# Patient Record
Sex: MALE | Race: WHITE | Employment: FULL TIME | URBAN - METROPOLITAN AREA
[De-identification: names, ages, dates, MRNs, and addresses within clinical notes are randomized per-mention and may not be internally consistent; named-entity substitution may affect disease eponyms.]

---

## 2021-08-05 ENCOUNTER — APPOINTMENT (OUTPATIENT)
Dept: GENERAL RADIOLOGY | Age: 56
DRG: 291 | End: 2021-08-05
Payer: COMMERCIAL

## 2021-08-05 ENCOUNTER — HOSPITAL ENCOUNTER (INPATIENT)
Age: 56
LOS: 2 days | Discharge: HOME | DRG: 291 | End: 2021-08-07
Attending: EMERGENCY MEDICINE | Admitting: HOSPITALIST
Payer: COMMERCIAL

## 2021-08-05 ENCOUNTER — APPOINTMENT (OUTPATIENT)
Dept: CT IMAGING | Age: 56
DRG: 291 | End: 2021-08-05
Payer: COMMERCIAL

## 2021-08-05 DIAGNOSIS — R03.0 ELEVATED BLOOD PRESSURE READING: ICD-10-CM

## 2021-08-05 DIAGNOSIS — I50.9 NEW ONSET OF CONGESTIVE HEART FAILURE (HCC): Primary | ICD-10-CM

## 2021-08-05 PROBLEM — D75.89 MACROCYTOSIS: Status: ACTIVE | Noted: 2021-08-05

## 2021-08-05 PROBLEM — Z78.9 DAILY CONSUMPTION OF ALCOHOL: Status: ACTIVE | Noted: 2021-08-05

## 2021-08-05 PROBLEM — Z72.0 TOBACCO ABUSE: Status: ACTIVE | Noted: 2021-08-05

## 2021-08-05 PROBLEM — R94.31 RIGHT AXIS DEVIATION: Status: ACTIVE | Noted: 2021-08-05

## 2021-08-05 PROBLEM — I10 UNCONTROLLED HYPERTENSION: Status: ACTIVE | Noted: 2021-08-05

## 2021-08-05 PROBLEM — R94.31 QT PROLONGATION: Status: ACTIVE | Noted: 2021-08-05

## 2021-08-05 LAB
A/G RATIO: 1.1 (ref 1.1–2.2)
ALBUMIN SERPL-MCNC: 3.7 G/DL (ref 3.4–5)
ALP BLD-CCNC: 110 U/L (ref 40–129)
ALT SERPL-CCNC: 19 U/L (ref 10–40)
AMPHETAMINE SCREEN, URINE: ABNORMAL
ANION GAP SERPL CALCULATED.3IONS-SCNC: 8 MMOL/L (ref 3–16)
APTT: 30.3 SEC (ref 26.2–38.6)
AST SERPL-CCNC: 22 U/L (ref 15–37)
BARBITURATE SCREEN URINE: ABNORMAL
BASOPHILS ABSOLUTE: 0 K/UL (ref 0–0.2)
BASOPHILS RELATIVE PERCENT: 0.7 %
BENZODIAZEPINE SCREEN, URINE: ABNORMAL
BILIRUB SERPL-MCNC: 0.7 MG/DL (ref 0–1)
BILIRUBIN URINE: NEGATIVE
BLOOD, URINE: NEGATIVE
BUN BLDV-MCNC: 14 MG/DL (ref 7–20)
CALCIUM SERPL-MCNC: 8.9 MG/DL (ref 8.3–10.6)
CANNABINOID SCREEN URINE: POSITIVE
CHLORIDE BLD-SCNC: 98 MMOL/L (ref 99–110)
CHOLESTEROL, TOTAL: 121 MG/DL (ref 0–199)
CLARITY: CLEAR
CO2: 31 MMOL/L (ref 21–32)
COCAINE METABOLITE SCREEN URINE: ABNORMAL
COLOR: NORMAL
CREAT SERPL-MCNC: 0.8 MG/DL (ref 0.9–1.3)
D DIMER: 592 NG/ML DDU (ref 0–229)
EKG ATRIAL RATE: 102 BPM
EKG DIAGNOSIS: NORMAL
EKG P AXIS: 87 DEGREES
EKG P-R INTERVAL: 142 MS
EKG Q-T INTERVAL: 376 MS
EKG QRS DURATION: 98 MS
EKG QTC CALCULATION (BAZETT): 490 MS
EKG R AXIS: 187 DEGREES
EKG T AXIS: 43 DEGREES
EKG VENTRICULAR RATE: 102 BPM
EOSINOPHILS ABSOLUTE: 0.2 K/UL (ref 0–0.6)
EOSINOPHILS RELATIVE PERCENT: 2.5 %
ETHANOL: NORMAL MG/DL (ref 0–0.08)
GFR AFRICAN AMERICAN: >60
GFR NON-AFRICAN AMERICAN: >60
GLOBULIN: 3.3 G/DL
GLUCOSE BLD-MCNC: 108 MG/DL (ref 70–99)
GLUCOSE BLD-MCNC: 164 MG/DL (ref 70–99)
GLUCOSE URINE: NEGATIVE MG/DL
HCT VFR BLD CALC: 49 % (ref 40.5–52.5)
HDLC SERPL-MCNC: 39 MG/DL (ref 40–60)
HEMOGLOBIN: 16.5 G/DL (ref 13.5–17.5)
INR BLD: 1.24 (ref 0.88–1.12)
KETONES, URINE: NEGATIVE MG/DL
LDL CHOLESTEROL CALCULATED: 65 MG/DL
LEUKOCYTE ESTERASE, URINE: NEGATIVE
LYMPHOCYTES ABSOLUTE: 1.3 K/UL (ref 1–5.1)
LYMPHOCYTES RELATIVE PERCENT: 19.2 %
Lab: ABNORMAL
MAGNESIUM: 1.8 MG/DL (ref 1.8–2.4)
MCH RBC QN AUTO: 33.9 PG (ref 26–34)
MCHC RBC AUTO-ENTMCNC: 33.6 G/DL (ref 31–36)
MCV RBC AUTO: 100.9 FL (ref 80–100)
METHADONE SCREEN, URINE: ABNORMAL
MICROSCOPIC EXAMINATION: NORMAL
MONOCYTES ABSOLUTE: 0.6 K/UL (ref 0–1.3)
MONOCYTES RELATIVE PERCENT: 8.7 %
NEUTROPHILS ABSOLUTE: 4.6 K/UL (ref 1.7–7.7)
NEUTROPHILS RELATIVE PERCENT: 68.9 %
NITRITE, URINE: NEGATIVE
OPIATE SCREEN URINE: ABNORMAL
OXYCODONE URINE: ABNORMAL
PDW BLD-RTO: 13.9 % (ref 12.4–15.4)
PERFORMED ON: ABNORMAL
PH UA: 6
PH UA: 6 (ref 5–8)
PHENCYCLIDINE SCREEN URINE: ABNORMAL
PLATELET # BLD: 144 K/UL (ref 135–450)
PMV BLD AUTO: 8.1 FL (ref 5–10.5)
POTASSIUM SERPL-SCNC: 4.6 MMOL/L (ref 3.5–5.1)
PRO-BNP: 1401 PG/ML (ref 0–124)
PROPOXYPHENE SCREEN: ABNORMAL
PROTEIN UA: NEGATIVE MG/DL
PROTHROMBIN TIME: 14.1 SEC (ref 9.9–12.7)
RBC # BLD: 4.86 M/UL (ref 4.2–5.9)
SODIUM BLD-SCNC: 137 MMOL/L (ref 136–145)
SPECIFIC GRAVITY UA: 1.01 (ref 1–1.03)
T4 FREE: 1.2 NG/DL (ref 0.9–1.8)
TOTAL PROTEIN: 7 G/DL (ref 6.4–8.2)
TRIGL SERPL-MCNC: 83 MG/DL (ref 0–150)
TROPONIN: <0.01 NG/ML
TROPONIN: <0.01 NG/ML
TSH SERPL DL<=0.05 MIU/L-ACNC: 0.97 UIU/ML (ref 0.27–4.2)
URINE REFLEX TO CULTURE: NORMAL
URINE TYPE: NORMAL
UROBILINOGEN, URINE: 0.2 E.U./DL
VLDLC SERPL CALC-MCNC: 17 MG/DL
WBC # BLD: 6.7 K/UL (ref 4–11)

## 2021-08-05 PROCEDURE — 36415 COLL VENOUS BLD VENIPUNCTURE: CPT

## 2021-08-05 PROCEDURE — 85610 PROTHROMBIN TIME: CPT

## 2021-08-05 PROCEDURE — 82077 ASSAY SPEC XCP UR&BREATH IA: CPT

## 2021-08-05 PROCEDURE — 2580000003 HC RX 258: Performed by: HOSPITALIST

## 2021-08-05 PROCEDURE — 81003 URINALYSIS AUTO W/O SCOPE: CPT

## 2021-08-05 PROCEDURE — 6360000004 HC RX CONTRAST MEDICATION: Performed by: EMERGENCY MEDICINE

## 2021-08-05 PROCEDURE — 1200000000 HC SEMI PRIVATE

## 2021-08-05 PROCEDURE — 93005 ELECTROCARDIOGRAM TRACING: CPT | Performed by: PHYSICIAN ASSISTANT

## 2021-08-05 PROCEDURE — 85379 FIBRIN DEGRADATION QUANT: CPT

## 2021-08-05 PROCEDURE — 93010 ELECTROCARDIOGRAM REPORT: CPT | Performed by: INTERNAL MEDICINE

## 2021-08-05 PROCEDURE — 84484 ASSAY OF TROPONIN QUANT: CPT

## 2021-08-05 PROCEDURE — 82607 VITAMIN B-12: CPT

## 2021-08-05 PROCEDURE — 6370000000 HC RX 637 (ALT 250 FOR IP): Performed by: HOSPITALIST

## 2021-08-05 PROCEDURE — 6360000002 HC RX W HCPCS: Performed by: HOSPITALIST

## 2021-08-05 PROCEDURE — 80307 DRUG TEST PRSMV CHEM ANLYZR: CPT

## 2021-08-05 PROCEDURE — 96374 THER/PROPH/DIAG INJ IV PUSH: CPT

## 2021-08-05 PROCEDURE — 83735 ASSAY OF MAGNESIUM: CPT

## 2021-08-05 PROCEDURE — 80061 LIPID PANEL: CPT

## 2021-08-05 PROCEDURE — 80053 COMPREHEN METABOLIC PANEL: CPT

## 2021-08-05 PROCEDURE — 71260 CT THORAX DX C+: CPT

## 2021-08-05 PROCEDURE — 6360000002 HC RX W HCPCS: Performed by: PHYSICIAN ASSISTANT

## 2021-08-05 PROCEDURE — 84439 ASSAY OF FREE THYROXINE: CPT

## 2021-08-05 PROCEDURE — 84443 ASSAY THYROID STIM HORMONE: CPT

## 2021-08-05 PROCEDURE — 94640 AIRWAY INHALATION TREATMENT: CPT

## 2021-08-05 PROCEDURE — 99284 EMERGENCY DEPT VISIT MOD MDM: CPT

## 2021-08-05 PROCEDURE — 85730 THROMBOPLASTIN TIME PARTIAL: CPT

## 2021-08-05 PROCEDURE — 83036 HEMOGLOBIN GLYCOSYLATED A1C: CPT

## 2021-08-05 PROCEDURE — 83880 ASSAY OF NATRIURETIC PEPTIDE: CPT

## 2021-08-05 PROCEDURE — 71045 X-RAY EXAM CHEST 1 VIEW: CPT

## 2021-08-05 PROCEDURE — 85025 COMPLETE CBC W/AUTO DIFF WBC: CPT

## 2021-08-05 PROCEDURE — 82746 ASSAY OF FOLIC ACID SERUM: CPT

## 2021-08-05 RX ORDER — METHYLPREDNISOLONE SODIUM SUCCINATE 125 MG/2ML
60 INJECTION, POWDER, LYOPHILIZED, FOR SOLUTION INTRAMUSCULAR; INTRAVENOUS EVERY 12 HOURS
Status: DISCONTINUED | OUTPATIENT
Start: 2021-08-05 | End: 2021-08-06

## 2021-08-05 RX ORDER — ASPIRIN 81 MG/1
81 TABLET ORAL DAILY
Status: DISCONTINUED | OUTPATIENT
Start: 2021-08-05 | End: 2021-08-07 | Stop reason: HOSPADM

## 2021-08-05 RX ORDER — ACETAMINOPHEN 325 MG/1
650 TABLET ORAL EVERY 6 HOURS PRN
Status: DISCONTINUED | OUTPATIENT
Start: 2021-08-05 | End: 2021-08-07 | Stop reason: HOSPADM

## 2021-08-05 RX ORDER — LORAZEPAM 2 MG/ML
3 INJECTION INTRAMUSCULAR
Status: DISCONTINUED | OUTPATIENT
Start: 2021-08-05 | End: 2021-08-07 | Stop reason: HOSPADM

## 2021-08-05 RX ORDER — LORAZEPAM 2 MG/ML
1 INJECTION INTRAMUSCULAR
Status: DISCONTINUED | OUTPATIENT
Start: 2021-08-05 | End: 2021-08-07 | Stop reason: HOSPADM

## 2021-08-05 RX ORDER — SODIUM CHLORIDE 0.9 % (FLUSH) 0.9 %
10 SYRINGE (ML) INJECTION PRN
Status: DISCONTINUED | OUTPATIENT
Start: 2021-08-05 | End: 2021-08-07 | Stop reason: HOSPADM

## 2021-08-05 RX ORDER — SODIUM CHLORIDE 0.9 % (FLUSH) 0.9 %
10 SYRINGE (ML) INJECTION EVERY 12 HOURS SCHEDULED
Status: DISCONTINUED | OUTPATIENT
Start: 2021-08-05 | End: 2021-08-07 | Stop reason: HOSPADM

## 2021-08-05 RX ORDER — MAGNESIUM SULFATE IN WATER 40 MG/ML
2000 INJECTION, SOLUTION INTRAVENOUS PRN
Status: DISCONTINUED | OUTPATIENT
Start: 2021-08-05 | End: 2021-08-07 | Stop reason: HOSPADM

## 2021-08-05 RX ORDER — THIAMINE HYDROCHLORIDE 100 MG/ML
100 INJECTION, SOLUTION INTRAMUSCULAR; INTRAVENOUS DAILY
Status: DISCONTINUED | OUTPATIENT
Start: 2021-08-06 | End: 2021-08-05 | Stop reason: SDUPTHER

## 2021-08-05 RX ORDER — ACETAMINOPHEN 650 MG/1
650 SUPPOSITORY RECTAL EVERY 6 HOURS PRN
Status: DISCONTINUED | OUTPATIENT
Start: 2021-08-05 | End: 2021-08-07 | Stop reason: HOSPADM

## 2021-08-05 RX ORDER — FUROSEMIDE 10 MG/ML
20 INJECTION INTRAMUSCULAR; INTRAVENOUS ONCE
Status: COMPLETED | OUTPATIENT
Start: 2021-08-05 | End: 2021-08-05

## 2021-08-05 RX ORDER — SODIUM CHLORIDE 9 MG/ML
25 INJECTION, SOLUTION INTRAVENOUS PRN
Status: DISCONTINUED | OUTPATIENT
Start: 2021-08-05 | End: 2021-08-07 | Stop reason: HOSPADM

## 2021-08-05 RX ORDER — LOSARTAN POTASSIUM 25 MG/1
25 TABLET ORAL DAILY
Status: DISCONTINUED | OUTPATIENT
Start: 2021-08-06 | End: 2021-08-07

## 2021-08-05 RX ORDER — M-VIT,TX,IRON,MINS/CALC/FOLIC 27MG-0.4MG
1 TABLET ORAL DAILY
Status: DISCONTINUED | OUTPATIENT
Start: 2021-08-06 | End: 2021-08-07 | Stop reason: HOSPADM

## 2021-08-05 RX ORDER — NICOTINE 21 MG/24HR
1 PATCH, TRANSDERMAL 24 HOURS TRANSDERMAL DAILY
Status: DISCONTINUED | OUTPATIENT
Start: 2021-08-06 | End: 2021-08-07 | Stop reason: HOSPADM

## 2021-08-05 RX ORDER — LORAZEPAM 1 MG/1
3 TABLET ORAL
Status: DISCONTINUED | OUTPATIENT
Start: 2021-08-05 | End: 2021-08-07 | Stop reason: HOSPADM

## 2021-08-05 RX ORDER — SENNA PLUS 8.6 MG/1
1 TABLET ORAL DAILY PRN
Status: DISCONTINUED | OUTPATIENT
Start: 2021-08-05 | End: 2021-08-07 | Stop reason: HOSPADM

## 2021-08-05 RX ORDER — ONDANSETRON 2 MG/ML
4 INJECTION INTRAMUSCULAR; INTRAVENOUS EVERY 6 HOURS PRN
Status: DISCONTINUED | OUTPATIENT
Start: 2021-08-05 | End: 2021-08-07 | Stop reason: HOSPADM

## 2021-08-05 RX ORDER — LORAZEPAM 1 MG/1
1 TABLET ORAL
Status: DISCONTINUED | OUTPATIENT
Start: 2021-08-05 | End: 2021-08-07 | Stop reason: HOSPADM

## 2021-08-05 RX ORDER — PROMETHAZINE HYDROCHLORIDE 25 MG/1
12.5 TABLET ORAL EVERY 6 HOURS PRN
Status: DISCONTINUED | OUTPATIENT
Start: 2021-08-05 | End: 2021-08-07 | Stop reason: HOSPADM

## 2021-08-05 RX ORDER — LORAZEPAM 1 MG/1
2 TABLET ORAL
Status: DISCONTINUED | OUTPATIENT
Start: 2021-08-05 | End: 2021-08-07 | Stop reason: HOSPADM

## 2021-08-05 RX ORDER — LORAZEPAM 2 MG/ML
4 INJECTION INTRAMUSCULAR
Status: DISCONTINUED | OUTPATIENT
Start: 2021-08-05 | End: 2021-08-07 | Stop reason: HOSPADM

## 2021-08-05 RX ORDER — POTASSIUM CHLORIDE 20 MEQ/1
40 TABLET, EXTENDED RELEASE ORAL PRN
Status: DISCONTINUED | OUTPATIENT
Start: 2021-08-05 | End: 2021-08-07 | Stop reason: HOSPADM

## 2021-08-05 RX ORDER — LORAZEPAM 2 MG/ML
2 INJECTION INTRAMUSCULAR
Status: DISCONTINUED | OUTPATIENT
Start: 2021-08-05 | End: 2021-08-07 | Stop reason: HOSPADM

## 2021-08-05 RX ORDER — LORAZEPAM 1 MG/1
4 TABLET ORAL
Status: DISCONTINUED | OUTPATIENT
Start: 2021-08-05 | End: 2021-08-07 | Stop reason: HOSPADM

## 2021-08-05 RX ORDER — POTASSIUM CHLORIDE 7.45 MG/ML
10 INJECTION INTRAVENOUS PRN
Status: DISCONTINUED | OUTPATIENT
Start: 2021-08-05 | End: 2021-08-07 | Stop reason: HOSPADM

## 2021-08-05 RX ORDER — IPRATROPIUM BROMIDE AND ALBUTEROL SULFATE 2.5; .5 MG/3ML; MG/3ML
1 SOLUTION RESPIRATORY (INHALATION)
Status: DISCONTINUED | OUTPATIENT
Start: 2021-08-05 | End: 2021-08-07 | Stop reason: HOSPADM

## 2021-08-05 RX ORDER — PANTOPRAZOLE SODIUM 40 MG/1
40 TABLET, DELAYED RELEASE ORAL
Status: DISCONTINUED | OUTPATIENT
Start: 2021-08-06 | End: 2021-08-07 | Stop reason: HOSPADM

## 2021-08-05 RX ADMIN — FUROSEMIDE 20 MG: 10 INJECTION, SOLUTION INTRAMUSCULAR; INTRAVENOUS at 16:05

## 2021-08-05 RX ADMIN — NITROGLYCERIN 0.5 INCH: 20 OINTMENT TOPICAL at 21:44

## 2021-08-05 RX ADMIN — IPRATROPIUM BROMIDE AND ALBUTEROL SULFATE 1 AMPULE: .5; 2.5 SOLUTION RESPIRATORY (INHALATION) at 21:37

## 2021-08-05 RX ADMIN — Medication 10 ML: at 23:42

## 2021-08-05 RX ADMIN — FUROSEMIDE 5 MG/HR: 10 INJECTION, SOLUTION INTRAMUSCULAR; INTRAVENOUS at 23:43

## 2021-08-05 RX ADMIN — IOPAMIDOL 75 ML: 755 INJECTION, SOLUTION INTRAVENOUS at 17:01

## 2021-08-05 RX ADMIN — ASPIRIN 81 MG: 81 TABLET, COATED ORAL at 21:43

## 2021-08-05 RX ADMIN — METHYLPREDNISOLONE SODIUM SUCCINATE 60 MG: 125 INJECTION, POWDER, FOR SOLUTION INTRAMUSCULAR; INTRAVENOUS at 21:44

## 2021-08-05 ASSESSMENT — PAIN DESCRIPTION - ORIENTATION: ORIENTATION: RIGHT;LEFT

## 2021-08-05 ASSESSMENT — ENCOUNTER SYMPTOMS
VOMITING: 0
COUGH: 1
ABDOMINAL PAIN: 0
CHEST TIGHTNESS: 0
SHORTNESS OF BREATH: 1
BACK PAIN: 0
NAUSEA: 0

## 2021-08-05 ASSESSMENT — PAIN DESCRIPTION - LOCATION: LOCATION: LEG

## 2021-08-05 ASSESSMENT — PAIN DESCRIPTION - FREQUENCY: FREQUENCY: CONTINUOUS

## 2021-08-05 ASSESSMENT — PAIN DESCRIPTION - PAIN TYPE: TYPE: ACUTE PAIN

## 2021-08-05 ASSESSMENT — PAIN DESCRIPTION - ONSET: ONSET: ON-GOING

## 2021-08-05 ASSESSMENT — PAIN SCALES - GENERAL
PAINLEVEL_OUTOF10: 1
PAINLEVEL_OUTOF10: 0

## 2021-08-05 ASSESSMENT — PAIN DESCRIPTION - PROGRESSION: CLINICAL_PROGRESSION: NOT CHANGED

## 2021-08-05 NOTE — ED NOTES
Writer updated patient's medication list provided by patient report. Patient does not take any medications on a daily basis and reports \"I have not been to a doctor since I was a kid. \"       True Sharma RN  08/05/21 7570

## 2021-08-05 NOTE — ED PROVIDER NOTES
**ADVANCED PRACTICE PROVIDER, I HAVE EVALUATED THIS [facility]  ED  EMERGENCY DEPARTMENT ENCOUNTER      Pt Name: Jorje Plasencia  PGL:[id number]  Birthdate 1965  Date of evaluation: 8/5/2021  Provider: MIO Godfrey      Chief Complaint:    Chief Complaint   Patient presents with    Leg Pain     pt reports having leg \"tightness\" and tesicle bilateral swelling and penis swelling starting 3 days ago. Has not been to PCP since \"I was a kid\"    Groin Swelling         Nursing Notes, Past Medical Hx, Past Surgical Hx, Social Hx, Allergies, and Family Hx were all reviewed and agreed with or any disagreements were addressed in the HPI.    HPI: (Location, Duration, Timing, Severity, Quality, Assoc Sx, Context, Modifying factors)    Chief Complaint of bilateral leg swelling    This is a  54 y.o. male who presents to the emergency department with no known past medical history complaining of bilateral leg swelling over the last 3 weeks. He states the swelling has increased from his feet and ankles into his calves and thighs. Over the last 3 days the swelling has progressed into his groin and abdomen. He states \"I thought it was just getting Fat and had to get new pants\". He is a smoker and smokes about 1 pack/day. He has not seen a doctor since he was a child. He denies any pain. He states he has had shortness of breath with exertion, but for him this has been chronic over the last several months to year. He does admit to cough with mucus production. He has not tried anything for his symptoms. PastMedical/Surgical History:  History reviewed. No pertinent past medical history. History reviewed. No pertinent surgical history. Medications:  Previous Medications    No medications on file         Review of Systems:  (2-9 systems needed)  Review of Systems   Constitutional: Negative for chills and fever. HENT: Negative for congestion. Eyes: Negative for visual disturbance. Resp: 20   Temp: 98.4 °F (36.9 °C)   TempSrc: Oral   SpO2: 94%   Weight: 170 lb (77.1 kg)   Height: 6' (1.829 m)       LABS:  Labs Reviewed   CBC WITH AUTO DIFFERENTIAL - Abnormal; Notable for the following components:       Result Value    .9 (*)     All other components within normal limits    Narrative:     Performed at:  [address], [facility]   Phone [phone]   COMPREHENSIVE METABOLIC PANEL - Abnormal; Notable for the following components:    Chloride 98 (*)     Glucose 108 (*)     CREATININE 0.8 (*)     All other components within normal limits    Narrative:     Performed at:  [address] [place] [address], [facility]   Phone [phone]   BRAIN NATRIURETIC PEPTIDE - Abnormal; Notable for the following components:    Pro-BNP 1,401 (*)     All other components within normal limits    Narrative:     Performed at:  [address] [place] [address], [facility]   Phone [phone]   TROPONIN    Narrative:     Performed at:  [address] [place] [address], [facility]   Phone  of labs reviewed and were negative at this time or not returned at the time of this note. RADIOLOGY:   Non-plain film images such as CT, Ultrasound and MRI are read by the radiologist. MIO Gomes have directly visualized the radiologic plain film image(s) with the below findings:      Interpretation per the Radiologist below, if available at the time of this note:    XR CHEST PORTABLE   Preliminary Result   Cardiomegaly without acute cardiopulmonary process. No results found. MEDICAL DECISION MAKING / ED COURSE:      Patient was seen and evaluated by myself and attending physician.   All diagnostic, treatment, and disposition decisions were made in conjunction with attending physician. Patient was given:  Medications   furosemide (LASIX) injection 20 mg (has no administration in time range)       Patient presents to the emergency department for bilateral lower extremity edema progressing through his thighs, groin, and down his abdomen. Triage vitals show uncontrolled blood pressure of 162/109. He is slightly tachycardic at 95. His BNP is elevated that 1401. Troponin negative. CBC without evidence of leukocytosis or acute anemia. CMP with maintained kidney function and no acute electrolyte imbalance. Chest x-ray shows cardiomegaly without acute cardiopulmonary process. EKG is interpreted by attending physician and found to have sinus tachycardia, he does not have an old EKG to compare. At this time the patient is suspected new onset CHF and we will plan for admission to the hospitalist with plans for an echo. He is started on 20mg IV lasix. The patient tolerated their visit well. I evaluated the patient. The physician was available for consultation as needed. The patient and / or the family were informed of the results of any tests, a time was given to answer questions, a plan was proposed and they agreed with plan. CLINICAL IMPRESSION:  1. New onset of congestive heart failure (HCC)    2. Elevated blood pressure reading        DISPOSITION Decision To Admit 08/05/2021 03:06:34 PM      PATIENT REFERRED TO:  No follow-up provider specified.     DISCHARGE MEDICATIONS:  New Prescriptions    No medications on file       DISCONTINUED MEDICATIONS:  Discontinued Medications    No medications on file              (Please note the MDM and HPI sections of this note were completed with a voice recognition program.  Efforts were made to edit the dictations but occasionally words are mis-transcribed.)    Electronically signed, Chick Rain, [employer],          Chick Rain, [employer]  08/05/21 2031

## 2021-08-05 NOTE — H&P
HOSPITALISTS HISTORY AND PHYSICAL    8/5/2021 9:26 PM    Patient Information:  Luz Maria Guadarrama is a 54 y.o. male [id number]  PCP:  No primary care provider on file. (Tel: None )    Chief complaint:    Chief Complaint   Patient presents with    Leg Pain     pt reports having leg \"tightness\" and tesicle bilateral swelling and penis swelling starting 3 days ago. Has not been to PCP since \"I was a kid\"    Groin Swelling        History of Present Illness:  Kay Del Angel is a 54 y.o. male who presented to the ED to be evaluated for gradually increasing leg edema ongoing for several months PTA. Patient states that he has not been evaluated by a doctor for greater then 40 years, but became concerned when his penis and testicles began to swell 3 days PTA. The patient takes no medications and endorses 1 PPD tobacco abuse, and routine alcohol consumption. EKG was obtained upon arrival and notable for sinus tachycardia with right axis deviation/pulmonary disease pattern, and nonspecific ST and T wave changes. CXR revealed cardiomegaly without acute cardiopulmonary findings. Labs were notable for BNP elevation 1401 and macrocytosis. Chest CT with PE protocol was ordered by admitting physician due to concerns for acute cor pulmonale. Patient received 1 dosage of 20 mg IV Lasix prior to request for admission. History obtained from patient and review of Epic chart    REVIEW OF SYSTEMS:   Constitutional: Positive generalized weakness and daytime fatigue; negative for fever,chills,weight loss  ENT: Negative for rhinorrhea, epistaxis, hoarseness, and sore throat.   Respiratory: Positive for exertional shortness of breath, wheezing, and cough  Cardiovascular: Negative for chest pain, palpitations; positive edema extending from feet into urogenital region Gastrointestinal: Negative for N/V/D; no hematemesis, hematochezia, or melena; positive anorexia  Genitourinary: Negative for dysuria, frequency, hesitancy, and urgency; no incontinence  Hematologic/Lymphatic: Negative for bleeding tendency, excessive bruising, and enlarged LN  Musculoskeletal: Negative for myalgias and arthalgias; able to ambulate without difficulty  Neurologic: Negative for LOC, seizure activity, paresthesias, dysarthria, vertigo, and gait disturbance  Skin: Negative for itching,rash  Psychiatric: Negative for depression,anxiety, and agitation  Endocrine: Negative for polyuria/polydipsia/polyphagia; no heat/cold intolerance    Past Medical History:   has no past medical history on file. Past Surgical History:   has no past surgical history on file. Medications:  No current facility-administered medications on file prior to encounter. No current outpatient medications on file prior to encounter. Allergies:  No Known Allergies     Social History:   reports that he has been smoking cigarettes. He has a 15.00 pack-year smoking history. He has never used smokeless tobacco. He reports current alcohol use of about 7.0 standard drinks of alcohol per week. He reports previous drug use. Upon further questioning patient states that he drinks 3-4 beers every day after work; past history of seizures when abruptly stopping    Family History:  family history is not on file.      Physical Exam:  BP (!) 158/100   Pulse 94   Temp 98.4 °F (36.9 °C) (Oral)   Resp 14   Ht 6' (1.829 m)   Wt 170 lb (77.1 kg)   SpO2 94%   BMI 23.06 kg/m²     General appearance: Diaphoretic middle-age male laying in bed with mild tachypnea at rest  Eyes: Sclera clear without conjunctival injection; PERRLA; EOMI  ENT: Mucous membranes moist without thrush; poor dentition  Neck: Supple without meningismus; no goiter; no carotid bruit bilaterally  Cardiovascular: Regular rhythm without ectopy; normal S1-S2 with no murmurs; 2+ pitting peripheral edema extending into scrotum/ penis  Respiratory: Positive tachypnea; markedly diminished air exchange with extensive wheeze and rhonchi cleared with cough; no rales  Gastrointestinal: Abdomen soft, non-tender, not distended; bowel sounds normal; no masses/organomegaly appreciated  Musculoskeletal: FROM spine and extremities x4; no gross deformity  Neurology: A&O x3; cranial nerves 2-12 grossly intact; motor 5/5; no seizure activity  Psychiatry: Well-groomed with good eye contact; appropriate affect  Skin: Warm, diaphoretic; venous stasis changes BLE   PV: 2/4 radial and dorsalis pedis bilaterally; brisk capillary refill    Labs:  CBC:   Lab Results   Component Value Date    WBC 6.7 08/05/2021    RBC 4.86 08/05/2021    HGB 16.5 08/05/2021    HCT 49.0 08/05/2021    .9 08/05/2021    MCH 33.9 08/05/2021    MCHC 33.6 08/05/2021    RDW 13.9 08/05/2021     08/05/2021    MPV 8.1 08/05/2021     BMP:    Lab Results   Component Value Date     08/05/2021    K 4.6 08/05/2021    CL 98 08/05/2021    CO2 31 08/05/2021    BUN 14 08/05/2021    CREATININE 0.8 08/05/2021    CALCIUM 8.9 08/05/2021    GFRAA >60 08/05/2021    LABGLOM >60 08/05/2021    GLUCOSE 108 08/05/2021     CT CHEST PULMONARY EMBOLISM W CONTRAST   Final Result   1. No evidence of pulmonary embolism   2. Cardiomegaly with trace pleural effusions         XR CHEST PORTABLE   Final Result   Cardiomegaly without acute cardiopulmonary process. EKG: Ventricular Rate 102 BPM QTc Calculation (Bazett) 490 ms   Atrial Rate 102 BPM P Axis 87 degrees   P-R Interval 142 ms R Axis 187 degrees   QRS Duration 98 ms T Axis 43 degrees   Q-T Interval 376 ms Diagnosis Sinus tachycardiaRight superior axis deviationPulmonary disease patternNonspecific ST & T wave changes; possible RVH.        I visualized CXR images and EKG strips personally and agree with documented interpretation    Discussed case  with ED provider    Problem List  Principal Problem:    Acute decompensated heart failure (HCC)  Active Problems:    Tobacco abuse    Daily consumption of alcohol    QT prolongation    Right axis deviation    Macrocytosis    Uncontrolled hypertension  Resolved Problems:    * No resolved hospital problems. *        Assessment/Plan:     Acute decompensated CHF with untreated HTN  -Admit to floor for continuous telemetry monitoring and strict I's & O's  -IV Lasix 20 megs x1 followed by continuous infusion of 5 mg/H overnight  -Nitropaste applied to chest wall; begin Cozaar 25 mg daily  -ECHO scheduled to further assess cardiac structure and function  -EC ASA 81 mg daily  -2G Na and fluid restriction dietary modifications in place  -Consult placed to Cardiology for further recommendations; may ultimately require right and left heart cath    RAD/RVH with concerns for COPD/ PAH  -Continuous pulse oximetry monitoring initiated with PRN supplemental O2   -Encourage aggressive pulmonary toilet including incentive spirometry every 4H while awake  -DuoNebs initiated 4 times daily  -IV Solu-Medrol 60 mg twice daily   -Patient counseled on necessity of smoking cessation; nicotine replacement patch provided  -Consultation placed to [facility] for further recommendations    Macrocytosis with regular EtOH consumption  -Admit to telemetry floor with CIWA Protocol order set initiated  -Seizure and fall risk precautions in place  -Thiamine, folate, and MVI oral supplementation initiated daily  -Ativan scheduled PRN based on CIWA score  -PPI Protonix ordered daily for GI prophylaxis    DVT prophylaxis-Lovenox 40 mg subcu daily  Code status-full code  Diet-cardiac 2 g sodium with fluid restriction  IV access-PIV established in ED      Admit as inpatient. I anticipate hospitalization spanning more than two midnights for investigation and treatment of the above medically necessary diagnoses. Please note that some part of this chart was generated using Dragon dictation software.  Although every effort was made to ensure the accuracy of this automated transcription, some errors in transcription may have occurred inadvertently. If you may need any clarification, please do not hesitate to contact me through [facility].        Trey Peralta MD    8/5/2021 9:26 PM

## 2021-08-06 LAB
ANION GAP SERPL CALCULATED.3IONS-SCNC: 11 MMOL/L (ref 3–16)
ANION GAP SERPL CALCULATED.3IONS-SCNC: 9 MMOL/L (ref 3–16)
BASOPHILS ABSOLUTE: 0 K/UL (ref 0–0.2)
BASOPHILS RELATIVE PERCENT: 0.9 %
BUN BLDV-MCNC: 14 MG/DL (ref 7–20)
BUN BLDV-MCNC: 15 MG/DL (ref 7–20)
CALCIUM IONIZED: 1.07 MMOL/L (ref 1.12–1.32)
CALCIUM SERPL-MCNC: 8.8 MG/DL (ref 8.3–10.6)
CALCIUM SERPL-MCNC: 8.9 MG/DL (ref 8.3–10.6)
CHLORIDE BLD-SCNC: 97 MMOL/L (ref 99–110)
CHLORIDE BLD-SCNC: 97 MMOL/L (ref 99–110)
CHOLESTEROL, TOTAL: 121 MG/DL (ref 0–199)
CO2: 29 MMOL/L (ref 21–32)
CO2: 34 MMOL/L (ref 21–32)
CREAT SERPL-MCNC: 0.6 MG/DL (ref 0.9–1.3)
CREAT SERPL-MCNC: 0.9 MG/DL (ref 0.9–1.3)
EKG ATRIAL RATE: 85 BPM
EKG DIAGNOSIS: NORMAL
EKG P AXIS: 85 DEGREES
EKG P-R INTERVAL: 148 MS
EKG Q-T INTERVAL: 392 MS
EKG QRS DURATION: 108 MS
EKG QTC CALCULATION (BAZETT): 466 MS
EKG R AXIS: 178 DEGREES
EKG T AXIS: -5 DEGREES
EKG VENTRICULAR RATE: 85 BPM
EOSINOPHILS ABSOLUTE: 0 K/UL (ref 0–0.6)
EOSINOPHILS RELATIVE PERCENT: 0.2 %
ESTIMATED AVERAGE GLUCOSE: 131.2 MG/DL
ESTIMATED AVERAGE GLUCOSE: 131.2 MG/DL
ESTIMATED AVERAGE GLUCOSE: 134.1 MG/DL
FOLATE: 6.22 NG/ML (ref 4.78–24.2)
GFR AFRICAN AMERICAN: >60
GFR AFRICAN AMERICAN: >60
GFR NON-AFRICAN AMERICAN: >60
GFR NON-AFRICAN AMERICAN: >60
GLUCOSE BLD-MCNC: 138 MG/DL (ref 70–99)
GLUCOSE BLD-MCNC: 150 MG/DL (ref 70–99)
GLUCOSE BLD-MCNC: 171 MG/DL (ref 70–99)
GLUCOSE BLD-MCNC: 179 MG/DL (ref 70–99)
GLUCOSE BLD-MCNC: 194 MG/DL (ref 70–99)
GLUCOSE BLD-MCNC: 197 MG/DL (ref 70–99)
HBA1C MFR BLD: 6.2 %
HBA1C MFR BLD: 6.2 %
HBA1C MFR BLD: 6.3 %
HCT VFR BLD CALC: 48.5 % (ref 40.5–52.5)
HDLC SERPL-MCNC: 41 MG/DL (ref 40–60)
HEMOGLOBIN: 16 G/DL (ref 13.5–17.5)
LDL CHOLESTEROL CALCULATED: 70 MG/DL
LV EF: 53 %
LVEF MODALITY: NORMAL
LYMPHOCYTES ABSOLUTE: 0.5 K/UL (ref 1–5.1)
LYMPHOCYTES RELATIVE PERCENT: 11.9 %
MAGNESIUM: 1.6 MG/DL (ref 1.8–2.4)
MCH RBC QN AUTO: 33.4 PG (ref 26–34)
MCHC RBC AUTO-ENTMCNC: 33 G/DL (ref 31–36)
MCV RBC AUTO: 101.4 FL (ref 80–100)
MONOCYTES ABSOLUTE: 0.1 K/UL (ref 0–1.3)
MONOCYTES RELATIVE PERCENT: 1.7 %
NEUTROPHILS ABSOLUTE: 3.7 K/UL (ref 1.7–7.7)
NEUTROPHILS RELATIVE PERCENT: 85.3 %
PDW BLD-RTO: 13.5 % (ref 12.4–15.4)
PERFORMED ON: ABNORMAL
PH VENOUS: 7.43 (ref 7.35–7.45)
PLATELET # BLD: 134 K/UL (ref 135–450)
PMV BLD AUTO: 8.2 FL (ref 5–10.5)
POTASSIUM SERPL-SCNC: 3.9 MMOL/L (ref 3.5–5.1)
POTASSIUM SERPL-SCNC: 4.1 MMOL/L (ref 3.5–5.1)
RBC # BLD: 4.78 M/UL (ref 4.2–5.9)
SARS-COV-2, NAAT: NOT DETECTED
SODIUM BLD-SCNC: 137 MMOL/L (ref 136–145)
SODIUM BLD-SCNC: 140 MMOL/L (ref 136–145)
TRIGL SERPL-MCNC: 49 MG/DL (ref 0–150)
TROPONIN: <0.01 NG/ML
VITAMIN B-12: 594 PG/ML (ref 211–911)
VLDLC SERPL CALC-MCNC: 10 MG/DL
WBC # BLD: 4.3 K/UL (ref 4–11)

## 2021-08-06 PROCEDURE — 99255 IP/OBS CONSLTJ NEW/EST HI 80: CPT | Performed by: INTERNAL MEDICINE

## 2021-08-06 PROCEDURE — 80048 BASIC METABOLIC PNL TOTAL CA: CPT

## 2021-08-06 PROCEDURE — 2700000000 HC OXYGEN THERAPY PER DAY

## 2021-08-06 PROCEDURE — 83735 ASSAY OF MAGNESIUM: CPT

## 2021-08-06 PROCEDURE — 6360000002 HC RX W HCPCS: Performed by: INTERNAL MEDICINE

## 2021-08-06 PROCEDURE — 1200000000 HC SEMI PRIVATE

## 2021-08-06 PROCEDURE — 94761 N-INVAS EAR/PLS OXIMETRY MLT: CPT

## 2021-08-06 PROCEDURE — 6370000000 HC RX 637 (ALT 250 FOR IP): Performed by: HOSPITALIST

## 2021-08-06 PROCEDURE — 2580000003 HC RX 258: Performed by: INTERNAL MEDICINE

## 2021-08-06 PROCEDURE — 94640 AIRWAY INHALATION TREATMENT: CPT

## 2021-08-06 PROCEDURE — 83036 HEMOGLOBIN GLYCOSYLATED A1C: CPT

## 2021-08-06 PROCEDURE — 82330 ASSAY OF CALCIUM: CPT

## 2021-08-06 PROCEDURE — 93306 TTE W/DOPPLER COMPLETE: CPT

## 2021-08-06 PROCEDURE — 6370000000 HC RX 637 (ALT 250 FOR IP): Performed by: INTERNAL MEDICINE

## 2021-08-06 PROCEDURE — 2580000003 HC RX 258: Performed by: HOSPITALIST

## 2021-08-06 PROCEDURE — 93005 ELECTROCARDIOGRAM TRACING: CPT | Performed by: HOSPITALIST

## 2021-08-06 PROCEDURE — 87635 SARS-COV-2 COVID-19 AMP PRB: CPT

## 2021-08-06 PROCEDURE — 93010 ELECTROCARDIOGRAM REPORT: CPT | Performed by: INTERNAL MEDICINE

## 2021-08-06 PROCEDURE — 84484 ASSAY OF TROPONIN QUANT: CPT

## 2021-08-06 PROCEDURE — 36415 COLL VENOUS BLD VENIPUNCTURE: CPT

## 2021-08-06 PROCEDURE — 80061 LIPID PANEL: CPT

## 2021-08-06 PROCEDURE — 6360000002 HC RX W HCPCS: Performed by: HOSPITALIST

## 2021-08-06 PROCEDURE — 85025 COMPLETE CBC W/AUTO DIFF WBC: CPT

## 2021-08-06 RX ORDER — MAGNESIUM SULFATE IN WATER 40 MG/ML
4000 INJECTION, SOLUTION INTRAVENOUS ONCE
Status: COMPLETED | OUTPATIENT
Start: 2021-08-06 | End: 2021-08-06

## 2021-08-06 RX ORDER — LANOLIN ALCOHOL/MO/W.PET/CERES
100 CREAM (GRAM) TOPICAL DAILY
Status: DISCONTINUED | OUTPATIENT
Start: 2021-08-06 | End: 2021-08-07 | Stop reason: HOSPADM

## 2021-08-06 RX ORDER — NICOTINE POLACRILEX 4 MG
15 LOZENGE BUCCAL PRN
Status: DISCONTINUED | OUTPATIENT
Start: 2021-08-06 | End: 2021-08-07 | Stop reason: HOSPADM

## 2021-08-06 RX ORDER — DEXTROSE MONOHYDRATE 50 MG/ML
100 INJECTION, SOLUTION INTRAVENOUS PRN
Status: DISCONTINUED | OUTPATIENT
Start: 2021-08-06 | End: 2021-08-07 | Stop reason: HOSPADM

## 2021-08-06 RX ORDER — DEXTROSE MONOHYDRATE 25 G/50ML
12.5 INJECTION, SOLUTION INTRAVENOUS PRN
Status: DISCONTINUED | OUTPATIENT
Start: 2021-08-06 | End: 2021-08-07 | Stop reason: HOSPADM

## 2021-08-06 RX ADMIN — IPRATROPIUM BROMIDE AND ALBUTEROL SULFATE 1 AMPULE: .5; 2.5 SOLUTION RESPIRATORY (INHALATION) at 20:07

## 2021-08-06 RX ADMIN — IPRATROPIUM BROMIDE AND ALBUTEROL SULFATE 1 AMPULE: .5; 2.5 SOLUTION RESPIRATORY (INHALATION) at 16:06

## 2021-08-06 RX ADMIN — FUROSEMIDE 5 MG/HR: 10 INJECTION, SOLUTION INTRAMUSCULAR; INTRAVENOUS at 14:40

## 2021-08-06 RX ADMIN — ENOXAPARIN SODIUM 40 MG: 40 INJECTION SUBCUTANEOUS at 10:18

## 2021-08-06 RX ADMIN — Medication 100 MG: at 21:28

## 2021-08-06 RX ADMIN — LOSARTAN POTASSIUM 25 MG: 25 TABLET, FILM COATED ORAL at 10:17

## 2021-08-06 RX ADMIN — IPRATROPIUM BROMIDE AND ALBUTEROL SULFATE 1 AMPULE: .5; 2.5 SOLUTION RESPIRATORY (INHALATION) at 08:55

## 2021-08-06 RX ADMIN — ASPIRIN 81 MG: 81 TABLET, COATED ORAL at 10:17

## 2021-08-06 RX ADMIN — Medication 10 ML: at 21:28

## 2021-08-06 RX ADMIN — METHYLPREDNISOLONE SODIUM SUCCINATE 60 MG: 125 INJECTION, POWDER, FOR SOLUTION INTRAMUSCULAR; INTRAVENOUS at 10:18

## 2021-08-06 RX ADMIN — MAGNESIUM SULFATE HEPTAHYDRATE 4000 MG: 40 INJECTION, SOLUTION INTRAVENOUS at 12:13

## 2021-08-06 RX ADMIN — NITROGLYCERIN 0.5 INCH: 20 OINTMENT TOPICAL at 05:57

## 2021-08-06 RX ADMIN — INSULIN LISPRO 1 UNITS: 100 INJECTION, SOLUTION INTRAVENOUS; SUBCUTANEOUS at 21:29

## 2021-08-06 RX ADMIN — MULTIPLE VITAMINS W/ MINERALS TAB 1 TABLET: TAB at 10:17

## 2021-08-06 RX ADMIN — PANTOPRAZOLE SODIUM 40 MG: 40 TABLET, DELAYED RELEASE ORAL at 05:57

## 2021-08-06 RX ADMIN — IPRATROPIUM BROMIDE AND ALBUTEROL SULFATE 1 AMPULE: .5; 2.5 SOLUTION RESPIRATORY (INHALATION) at 12:47

## 2021-08-06 RX ADMIN — INSULIN LISPRO 1 UNITS: 100 INJECTION, SOLUTION INTRAVENOUS; SUBCUTANEOUS at 01:02

## 2021-08-06 ASSESSMENT — PAIN SCALES - GENERAL
PAINLEVEL_OUTOF10: 0

## 2021-08-06 ASSESSMENT — PAIN DESCRIPTION - PROGRESSION: CLINICAL_PROGRESSION: NOT CHANGED

## 2021-08-06 NOTE — PROGRESS NOTES
[address] FAILURE PROGRAM      Naye Palafox 1965    History:  Past Medical History:   Diagnosis Date    Acute systolic congestive heart failure ([facility])     Essential hypertension     Pre-diabetes     Thrombocytopenia (HCC)        ECHO: ordered  ACE/ARB: losartan  BB:   Last Hospital Admission:  Discharge plans: to return home independent     Family Present: none  Lifestyle goal discussed: writer discussed dietary changes and potential medication changes with patient. Pt verbalized challenges with the dietary restrictions provided by dietician stating Adelaida Edgardo been looking at all the things I cant have. \" Writer offered support and encouragement. Patient resting in bed at this time on 1 L O2. Pt denies shortness of breath; no complaints of chest pain. Patient recent weights and intake/output reviewed:    Patient Vitals for the past 96 hrs (Last 3 readings):   Weight   08/06/21 0454 204 lb 4.8 oz (92.7 kg)   08/05/21 2314 207 lb 8 oz (94.1 kg)   08/05/21 1355 170 lb (77.1 kg)         Intake/Output Summary (Last 24 hours) at 8/6/2021 1206  Last data filed at 8/6/2021 1146  Gross per 24 hour   Intake 278.18 ml   Output 3100 ml   Net -2821.82 ml       Notified patient of need for hospital follow-up appointment within 7 days of discharge. This writer will assist with this process. Pt Verbalized understanding of new processes's stating \"this is all new for me. \" Denies any additional questions or concerns at this time.       Melinda Milian RN   8/6/2021 12:06 PM

## 2021-08-06 NOTE — CONSULTS
CARDIOLOGY CONSULTATION        Patient Name: Ying Flood  Date of admission: 8/5/2021  1:47 PM  Admission Dx: Elevated blood pressure reading [R03.0]  Acute decompensated heart failure ([address] [I50.9]  New onset of congestive heart failure ([address] [I50.9]  Requesting Physician: Shady Gordillo MD  Primary Care physician: No primary care provider on file. Reason for Consultation/Chief Complaint: CHF    History of Present Illness:     Ying Flood is a 54 y.o. patient with little past medical history/no MD evaluation in over 40 years, who presented to the hospital with complaints of testicular/penile/LE edema developing over the past few month. ED course/Vital signs on presentation: 162/109, HR 95 bpm, afebrile, 94% on room air. EKG showed sinus tachycardia, IRBBB, extreme right axis, possible RVH. Follow up EKG showed more strain pattern/ST&T wave abnormality anterior precordial leads. CXR showed cardiomegaly. CT PE protocol showed no PE. Cardiomegaly with trace effusions. Pro-BNP 1401, negative troponin x3. COVID neg. Given IV steroid and lasix 20 mg IV x 1 in ED. Patient reports increasing Lower extremity edema x past few months. Reports his legs have been heavy. More fatigued. More dyspneic with exertion, progressive. No orthopnea/PND. Does note he bought new pants as they were fitting tighter. Continued to work as cook. He notes in the last few days, however, more hip/abdominal and testicular swelling that scared him, so decided to seek medical attention. Denies angina. Denies palpitations, dizziness, near-syncope or barbara syncope. Continues to smoke about 1 PPD x 40 years. Drinks 2-3 beers in evening chronically. THC use - denies other illicit drug use. Has not seen MD since he was a child. Past Medical History:   has a past medical history of Acute systolic congestive heart failure ([address] Essential hypertension, Pre-diabetes, and Thrombocytopenia ([address]     Surgical History:  Denies prior surgical history     Social History:   reports that he has been smoking cigarettes. He has a 15.00 pack-year smoking history. He has never used smokeless tobacco. He reports current alcohol use of about 7.0 standard drinks of alcohol per week. He reports previous drug use. As above. Works as cook at [employer]. Family History:  Reports no history of early onset coronary artery disease, myocardial infarction, cerebrovascular accident or sudden cardiac death among primary relatives. Father with LALA. Mother history unknown. 2 brothers, healthy.      Home Medications:  Were reviewed and are listed in nursing record and/or below  Prior to Admission medications    Not on File        CURRENT Medications:  insulin lispro (HUMALOG) injection vial 0-12 Units, TID WC  insulin lispro (HUMALOG) injection vial 0-6 Units, Nightly  glucose (GLUTOSE) 40 % oral gel 15 g, PRN  dextrose 50 % IV solution, PRN  glucagon (rDNA) injection 1 mg, PRN  dextrose 5 % solution, PRN  perflutren lipid microspheres (DEFINITY) injection 1.65 mg, ONCE PRN  sodium chloride flush 0.9 % injection 10 mL, 2 times per day  sodium chloride flush 0.9 % injection 10 mL, PRN  0.9 % sodium chloride infusion, PRN  promethazine (PHENERGAN) tablet 12.5 mg, Q6H PRN   Or  ondansetron (ZOFRAN) injection 4 mg, Q6H PRN  senna (SENOKOT) tablet 8.6 mg, Daily PRN  acetaminophen (TYLENOL) tablet 650 mg, Q6H PRN   Or  acetaminophen (TYLENOL) suppository 650 mg, Q6H PRN  enoxaparin (LOVENOX) injection 40 mg, Daily  potassium chloride (KLOR-CON M) extended release tablet 40 mEq, PRN   Or  potassium bicarb-citric acid (EFFER-K) effervescent tablet 40 mEq, PRN   Or  potassium chloride 10 mEq/100 mL IVPB (Peripheral Line), PRN  magnesium sulfate 2000 mg in 50 mL IVPB premix, PRN  nitroglycerin (NITRO-BID) 2 % ointment 0.5 inch, 3 times per day  ipratropium-albuterol (DUONEB) nebulizer solution 1 ampule, Q4H WA  nicotine (NICODERM CQ) 21 MG/24HR 1 patch, Daily  losartan (COZAAR) tablet 25 mg, Daily  furosemide (LASIX) 100 mg in dextrose 5 % 100 mL infusion, Continuous  pantoprazole (PROTONIX) tablet 40 mg, QAM AC  aspirin EC tablet 81 mg, Daily  methylPREDNISolone sodium (SOLU-MEDROL) injection 60 mg, Q12H  sodium chloride flush 0.9 % injection 10 mL, 2 times per day  sodium chloride flush 0.9 % injection 10 mL, PRN  0.9 % sodium chloride infusion, PRN  LORazepam (ATIVAN) tablet 1 mg, Q1H PRN   Or  LORazepam (ATIVAN) injection 1 mg, Q1H PRN   Or  LORazepam (ATIVAN) tablet 2 mg, Q1H PRN   Or  LORazepam (ATIVAN) injection 2 mg, Q1H PRN   Or  LORazepam (ATIVAN) tablet 3 mg, Q1H PRN   Or  LORazepam (ATIVAN) injection 3 mg, Q1H PRN   Or  LORazepam (ATIVAN) tablet 4 mg, Q1H PRN   Or  LORazepam (ATIVAN) injection 4 mg, Q1H PRN  therapeutic multivitamin-minerals 1 tablet, Daily  thiamine (B-1) 100 mg in sodium chloride 0.9 % 100 mL IVPB, Q24H        Allergies:  Patient has no known allergies. Review of Systems:   A 14 point review of symptoms completed. Pertinent positives identified in the HPI, all other review of symptoms negative as below. Objective:     Vitals:    08/06/21 0454 08/06/21 0455 08/06/21 0824 08/06/21 0857   BP:  (!) 145/84 (!) 152/94    Pulse:  90 86    Resp:  18 16    Temp:  98 °F (36.7 °C) 97.6 °F (36.4 °C)    TempSrc:  Oral Oral    SpO2:  93% 90% 91%   Weight: 204 lb 4.8 oz (92.7 kg)      Height:          Weight: 204 lb 4.8 oz (92.7 kg)       PHYSICAL EXAM:    General:  Alert, cooperative, no distress, appears stated age   Head:  Normocephalic, atraumatic   Eyes:  Conjunctiva/corneas clear, anicteric sclerae    Nose: Nares normal, no drainage or sinus tenderness   Throat: No abnormalities of the lips, oral mucosa or tongue. Neck: Trachea midline.  Neck supple with no lymphadenopathy, thyroid not enlarged, symmetric, no tenderness/mass/nodules, elevated JVP to mandible    Lungs:   Diffuse wheezes bilateral lung fields, diminished BS L base, on 1L with no WOB   Chest Wall:  No deformity or tenderness to palpation   Heart:  Regular rate and rhythm, normal S1, normal S2, no murmur, no rub, no S3/S4, PMI non-palpable, no heave    Abdomen:   Central adiposity, Soft, non-tender, with normoactive bowel sounds. No masses, no hepatosplenomegaly   Extremities: No cyanosis, clubbing, 1+ pre-sacral edema, testicular edema noted, trace pitting LE   Vascular: 2+ radial, dorsalis pedis and posterior tibial pulses bilaterally. Brisk carotid upstrokes without carotid bruit. Skin: Skin color, texture, turgor are normal with no rashes or ulceration. Pysch: Euthymic mood, appropriate affect   Neurologic: Oriented to person, place and time. No slurred speech or facial asymmetry. No motor or sensory deficits on gross examination.          Labs:   CBC:   Lab Results   Component Value Date    WBC 4.3 08/06/2021    RBC 4.78 08/06/2021    HGB 16.0 08/06/2021    HCT 48.5 08/06/2021    .4 08/06/2021    RDW 13.5 08/06/2021     08/06/2021     CMP:  Lab Results   Component Value Date     08/06/2021    K 4.1 08/06/2021    CL 97 08/06/2021    CO2 34 08/06/2021    BUN 15 08/06/2021    CREATININE 0.9 08/06/2021    GFRAA >60 08/06/2021    AGRATIO 1.1 08/05/2021    LABGLOM >60 08/06/2021    GLUCOSE 171 08/06/2021    PROT 7.0 08/05/2021    CALCIUM 8.9 08/06/2021    BILITOT 0.7 08/05/2021    ALKPHOS 110 08/05/2021    AST 22 08/05/2021    ALT 19 08/05/2021     PT/INR:  No results found for: PTINR  HgBA1c:  Lab Results   Component Value Date    LABA1C 6.2 08/05/2021     Lab Results   Component Value Date    TROPONINI <0.01 08/06/2021       Lab Results   Component Value Date    CHOL 121 08/05/2021     Lab Results   Component Value Date    TRIG 83 08/05/2021     Lab Results   Component Value Date    HDL 39 (L) 08/05/2021     Lab Results   Component Value Date    LDLCALC 65 08/05/2021     Lab Results   Component Value Date    LABVLDL 17 08/05/2021     No results found for: CHOLHDLRATIO     Cardiac Data:     EKG: reviewed as above. Telemetry personally reviewed: NSR with brief run pSVT and one 4 beat run NSVT    Echo: pending. Impression and Plan:      1. Acute congestive heart failure, new onset, EF unknown   2. Hypertension, uncontrolled  3. Tobacco abuse  4. EtOH abuse   5. QT prolongation  6. THC use   7. Pre-diabetes  8. Hypomagnesemia   9. Thrombocytopenia    Patient Active Problem List   Diagnosis    Tobacco abuse    Daily consumption of alcohol    QT prolongation    Right axis deviation    Macrocytosis    Uncontrolled hypertension    Acute decompensated heart failure ([address]       PLAN:  1. Continue lasix gtt. Check BID renal profiles while on gtt, replace Mg/K as indicated for goal K >4, Mg>2  2. Strict I/O with external/internal catheter placement to accomplish this on case by case basis, daily standing weights, low salt/caridac diet, and CHF education recommended and discussed with the patient to guide our therapy in the inpatient setting. 3. Will obtain transthoracic echocardiogram for evaluation of underlying cardiac structure and function. 4. Started on losartan for BP management - pending echo, will adjust as needed. 5. Strongly advised smoking cessation  6. Consider metformin for pre-diabetes pending any need for LHC/contrast load  7. 4g IV mag ordered    If EF reduced, will need ischemic evaluation. Given EKG findings, concerned for right sided HF/PH process possibly independent of left sided heart failure syndrome. Further recs pending echo. I will address the patient's cardiac risk factors and adjusted pharmacologic treatment as needed. In addition, I have reinforced the need for patient directed risk factor modification. All questions and concerns were addressed to the patient/family. Alternatives to my treatment were discussed. Thank you for allowing us to participate in the care of Toby Blount.  Please call me with any questions [phone].     Delonte Vinson MD, [facility] [place]  Cardiovascular Disease  [facility]  [phone] [facility]  [phone] [address]  8/6/2021 9:00 AM

## 2021-08-06 NOTE — PROGRESS NOTES
Hospitalist Progress Note      PCP: No primary care provider on file. Date of Admission: 8/5/2021    Chief Complaint: leg swelling    Hospital Course:   Rj Persaud is a 54 y.o. male who presented to the ED to be evaluated for gradually increasing leg edema ongoing for several months PTA. Patient states that he has not been evaluated by a doctor for greater then 40 years, but became concerned when his penis and testicles began to swell 3 days PTA. The patient takes no medications and endorses 1 PPD tobacco abuse, and routine alcohol consumption. EKG was obtained upon arrival and notable for sinus tachycardia with right axis deviation/pulmonary disease pattern, and nonspecific ST and T wave changes. CXR revealed cardiomegaly without acute cardiopulmonary findings. Labs were notable for BNP elevation 1401 and macrocytosis. Chest CT with PE protocol was ordered by admitting physician due to concerns for acute cor pulmonale. Patient received 1 dosage of 20 mg IV Lasix prior to request for admission. Subjective: Leg swelling improving. Echo pending. No new complaints today.       Medications:  Reviewed    Infusion Medications    dextrose      sodium chloride      sodium chloride       Scheduled Medications    insulin lispro  0-12 Units Subcutaneous TID WC    insulin lispro  0-6 Units Subcutaneous Nightly    magnesium sulfate  4,000 mg Intravenous Once    thiamine  100 mg Oral Daily    sodium chloride flush  10 mL Intravenous 2 times per day    enoxaparin  40 mg Subcutaneous Daily    nitroglycerin  0.5 inch Topical 3 times per day    ipratropium-albuterol  1 ampule Inhalation Q4H WA    nicotine  1 patch Transdermal Daily    losartan  25 mg Oral Daily    pantoprazole  40 mg Oral QAM AC    aspirin  81 mg Oral Daily    methylPREDNISolone  60 mg Intravenous Q12H    sodium chloride flush  10 mL Intravenous 2 times per day    multivitamin  1 tablet Oral Daily     PRN Meds: glucose, dextrose, glucagon (rDNA), dextrose, perflutren lipid microspheres, sodium chloride flush, sodium chloride, promethazine **OR** ondansetron, senna, acetaminophen **OR** acetaminophen, potassium chloride **OR** potassium alternative oral replacement **OR** potassium chloride, magnesium sulfate, sodium chloride flush, sodium chloride, LORazepam **OR** LORazepam **OR** LORazepam **OR** LORazepam **OR** LORazepam **OR** LORazepam **OR** LORazepam **OR** LORazepam      Intake/Output Summary (Last 24 hours) at 8/6/2021 1125  Last data filed at 8/6/2021 4296  Gross per 24 hour   Intake 278.18 ml   Output 2450 ml   Net -2171.82 ml       Physical Exam Performed:    BP (!) 152/94   Pulse 86   Temp 97.6 °F (36.4 °C) (Oral)   Resp 16   Ht 6' (1.829 m)   Wt 204 lb 4.8 oz (92.7 kg)   SpO2 91%   BMI 27.71 kg/m²     General appearance: No apparent distress, appears stated age and cooperative. HEENT: Pupils equal, round, and reactive to light. Conjunctivae/corneas clear. Neck: Supple, with full range of motion. No jugular venous distention. Trachea midline. Respiratory:  Normal respiratory effort. Wheezes bilaterally without Rales/Rhonchi. Cardiovascular: Regular rate and rhythm with normal S1/S2 without murmurs, rubs or gallops. Abdomen: Soft, non-tender, non-distended with normal bowel sounds. Musculoskeletal: No clubbing, cyanosis. Marked LE edema bilaterally. Full range of motion without deformity. Skin: Skin color, texture, turgor normal.  No rashes or lesions. Neurologic:  Neurovascularly intact without any focal sensory/motor deficits.  Cranial nerves: II-XII intact, grossly non-focal.  Psychiatric: Alert and oriented, thought content appropriate, normal insight  Capillary Refill: Brisk,3 seconds, normal   Peripheral Pulses: +2 palpable, equal bilaterally       Labs:   Recent Labs     08/05/21  1411 08/06/21  0655   WBC 6.7 4.3   HGB 16.5 16.0   HCT 49.0 48.5    134*     Recent Labs     08/05/21  1411 08/06/21  0655    140   K 4.6 4.1   CL 98* 97*   CO2 31 34*   BUN 14 15   CREATININE 0.8* 0.9   CALCIUM 8.9 8.9     Recent Labs     08/05/21  1411   AST 22   ALT 19   BILITOT 0.7   ALKPHOS 110     Recent Labs     08/05/21  1653   INR 1.24*     Recent Labs     08/05/21  1411 08/05/21  2246 08/06/21  0045   TROPONINI <0.01 <0.01 <0.01       Urinalysis:      Lab Results   Component Value Date    NITRU Negative 08/05/2021    BLOODU Negative 08/05/2021    SPECGRAV 1.010 08/05/2021    GLUCOSEU Negative 08/05/2021       Radiology:  CT CHEST PULMONARY EMBOLISM W CONTRAST   Final Result   1. No evidence of pulmonary embolism   2. Cardiomegaly with trace pleural effusions         XR CHEST PORTABLE   Final Result   Cardiomegaly without acute cardiopulmonary process. Assessment/Plan:    Active Hospital Problems    Diagnosis     Tobacco abuse [Z72.0]     Daily consumption of alcohol [Z78.9]     QT prolongation [R94.31]     Right axis deviation [R94.31]     Macrocytosis [D75.89]     Uncontrolled hypertension [I10]     Acute decompensated heart failure (HCC) [I50.9]      Acute heart failure, suspect diastolic  - echo ordered  - cardiology consulted  - continue lasix gtt  - started losartan    Suspected COPD  - no evidence of exacerbation  - started on duonebs  - stopped IV steroids    Acute hypoxic respiratory failure  - 2/2 CHF  - wean O2 as able    HTN  - poorly controlled  - started on losartan    DMII  - newly diagnosed  - SSI ordered  - will start metformin on discharge    Alcohol dependence  - no evidence of withdrawal. WA protocol  - counseling given  - vitamins ordered    Tobacco dependence  - counseling given.  Nicotine replacement ordered    Hypomagnesemia  - monitor and replete    DVT Prophylaxis: lovenox  Diet: Diet NPO  Code Status: Full Code    PT/OT Eval Status: not ordered    Dispo - home in 1-3 days    Gildardo Au MD

## 2021-08-06 NOTE — PROGRESS NOTES
Perfect served cross cover regarding ionized calcium 1.07 to clarify if replacement is needed at this time. Message read, no new orders or response.

## 2021-08-06 NOTE — PLAN OF CARE
Problem: OXYGENATION/RESPIRATORY FUNCTION  Goal: Patient will maintain patent airway  Outcome: Ongoing  Note:   Patient's EF (Ejection Fraction) is unknown. Echo ordered, not yet completed. Heart Failure Medications:  Diuretics[de-identified] Furosemide    (One of the following REQUIRED for EF <40%/SYSTOLIC FAILURE but MAY be used in EF% >40%/DIASTOLIC FAILURE)        ACE[de-identified] None        ARB[de-identified] Losartan         ARNI[de-identified] None    (Beta Blockers)  NON- Evidenced Based Beta Blocker (for EF% >40%/DIASTOLIC FAILURE): None    Evidenced Based Beta Blocker::(REQUIRED for EF% <40%/SYSTOLIC FAILURE) None  . .................................................................................................................................................. Patient's weights and intake/output reviewed: Yes    Patient's Last Weight: 207 lbs obtained by standing scale. Difference of 0 lbs less than last documented weight. Intake/Output Summary (Last 24 hours) at 8/6/2021 0050  Last data filed at 8/6/2021 0017  Gross per 24 hour   Intake 240 ml   Output 500 ml   Net -260 ml       Comorbidities Reviewed Yes    Patient has no past medical history on file. >>For CHF and Comorbidity documentation on Education Time and Topics, please see Education Tab    Progressive Mobility Assessment:  What is this patient's Current Level of Mobility?: Ambulatory-Up Ad Maria Dolores  How was this patient Mobilized today?: Edge of Bed,  Up to Toilet/Shower, and Up in Room                 With Whom? Self                 Level of Difficulty/Assistance: Independent     Pt resting in bed at this time on room air. Pt denies shortness of breath. Pt with nonpitting lower extremity edema.      Patient and/or Family's stated Goal of Care this Admission: reduce shortness of breath, increase activity tolerance, better understand heart failure and disease management, be more comfortable, and reduce lower extremity edema prior to discharge        :      Problem: Serum Glucose Level - Abnormal:  Goal: Ability to maintain appropriate glucose levels will improve  Description: Ability to maintain appropriate glucose levels will improve  Outcome: Ongoing  Note: Pt will have accuchecks before meals and at bedtime with sliding scale insulin in place for coverage. Will continue to monitor for signs and symptoms of hypoglycemia and hyperglycemia throughout shift.

## 2021-08-06 NOTE — PROGRESS NOTES
4 Eyes Skin Assessment     The patient is being assess for  Admission    I agree that 2 RN's have performed a thorough Head to Toe Skin Assessment on the patient. ALL assessment sites listed below have been assessed. Areas assessed by both nurses: Katharine Hutchinson  [x]   Head, Face, and Ears   [x]   Shoulders, Back, and Chest  [x]   Arms, Elbows, and Hands   [x]   Coccyx, Sacrum, and Ischum  [x]   Legs, Feet, and Heels        Does the Patient have Skin Breakdown?   No         Paul Prevention initiated:  No   Wound Care Orders initiated:  No      [facility] nurse consulted for Pressure Injury (Stage 3,4, Unstageable, DTI, NWPT, and Complex wounds):  No      Nurse 1 eSignature: Electronically signed by Best Horne RN on 8/6/21 at 12:33 AM EDT    **SHARE this note so that the co-signing nurse is able to place an eSignature**    Nurse 2 eSignature: Electronically signed by Renee Easley RN on 8/6/21 at 12:34 AM EDT

## 2021-08-06 NOTE — PROGRESS NOTES
Patient admitted to room 210 from ED. Patient oriented to room, call light, bed rails, phone, lights and bathroom. Patient instructed about the schedule of the day including: vital sign frequency, lab draws, possible tests, frequency of MD and staff rounds, including RN/MD rounding together at bedside, daily weights, and I &O's. Patient instructed about prescribed diet, how to use 8MENU, and television. Telemetry box 22 in place, patient aware of placement and reason. Bed locked, in lowest position, side rails up 2/4, call light within reach. Will continue to monitor.

## 2021-08-06 NOTE — RT PROTOCOL NOTE
RT Inhaler-Nebulizer Bronchodilator Protocol Note    There is a bronchodilator order in the chart from a provider indicating to follow the RT Bronchodilator Protocol and there is an Initiate RT Bronchodilator Protocol order as well (see protocol at bottom of note). The findings from the last RT Protocol Assessment were as follows:  Smoking: >15 Pack years  Surgical Status: No surgery  Xray: Clear  Respiratory Pattern: RR <12 or 21-30  Mental Status: Alert and Oriented  Breath Sounds: Wheezing and/or rhonchi  Cough: Strong, productive  Activity Level: Walking unassisted  Oxygen Requirement: Room Air - 2LNC/28% or home setting  Indication for Bronchodilator Therapy: Wheezing associated with pulm disorder  Bronchodilator Assessment Score: 7    Aerosolized bronchodilator medication orders have been revised according to the RT Bronchodilator Protocol. RT Inhaler-Nebulizer Bronchodilator Protocol:    Respiratory Therapist to perform RT Therapy Protocol Assessment then follow the protocol. No Indications - adjust the frequency to every 6 hours PRN wheezing or bronchospasm, if no treatments needed after 48 hours then discontinue using Per Protocol order mode. If indication present, adjust the RT bronchodilator orders based on the Bronchodilator Assessment Score as follows:    0-6 - enter or revise RT bronchodilator order to Albuterol Inhaler order with frequency of every 2 hours PRN for wheezing or increased work of breathing using Per Protocol order mode. If Albuterol Inhaler not tolerated or not effective, then discontinue the Albuterol Inhaler order and enter Albuterol Nebulizer order with same frequency and PRN reasons. Repeat RT Therapy Protocol Assessment as needed.     7-10 - discontinue any other Inpatient aerosolized bronchodilator medication orders and enter or revise two Albuterol Inhaler orders, one with BID frequency and one with frequency of every 2 hours PRN wheezing or increased work of breathing using Per Protocol order mode. Repeat RT Therapy Protocol Assessment with second treatment then BID and as needed. If Albuterol Inhaler not tolerated or not effective, then discontinue the Albuterol Inhaler orders and enter two Albuterol Nebulizer orders with same frequencies and PRN reasons. 11-13 - discontinue any other Inpatient aerosolized bronchodilator medication orders and enter DuoNeb Nebulizer orders QID frequency and an Albuterol Nebulizer order every 2 hours PRN wheezing or increased work of breathing using Per Protocol order mode. Repeat RT Therapy Protocol Assessment with second treatment then QID and as needed. Greater than 13 - discontinue any other Inpatient bronchodilator aerosolized medication orders and enter DuoNeb Nebulizer order every 4 hours frequency and Albuterol Nebulizer every 2 hours PRN wheezing or increased work of breathing using Per Protocol order mode. Repeat RT Therapy Protocol Assessment with second treatment then every 4 hours and as needed. RT to enter RT Home Evaluation for COPD & MDI Assessment order using Per Protocol order mode.     Electronically signed by Keisha Lance RCP on 8/5/2021 at 11:27 PM

## 2021-08-07 VITALS
DIASTOLIC BLOOD PRESSURE: 81 MMHG | TEMPERATURE: 97.5 F | RESPIRATION RATE: 16 BRPM | HEIGHT: 72 IN | WEIGHT: 198.6 LBS | HEART RATE: 88 BPM | OXYGEN SATURATION: 91 % | SYSTOLIC BLOOD PRESSURE: 124 MMHG | BODY MASS INDEX: 26.9 KG/M2

## 2021-08-07 LAB
ANION GAP SERPL CALCULATED.3IONS-SCNC: 10 MMOL/L (ref 3–16)
ANION GAP SERPL CALCULATED.3IONS-SCNC: 9 MMOL/L (ref 3–16)
BUN BLDV-MCNC: 15 MG/DL (ref 7–20)
BUN BLDV-MCNC: 19 MG/DL (ref 7–20)
CALCIUM SERPL-MCNC: 9 MG/DL (ref 8.3–10.6)
CALCIUM SERPL-MCNC: 9 MG/DL (ref 8.3–10.6)
CHLORIDE BLD-SCNC: 93 MMOL/L (ref 99–110)
CHLORIDE BLD-SCNC: 94 MMOL/L (ref 99–110)
CO2: 32 MMOL/L (ref 21–32)
CO2: 33 MMOL/L (ref 21–32)
CREAT SERPL-MCNC: 0.7 MG/DL (ref 0.9–1.3)
CREAT SERPL-MCNC: 0.7 MG/DL (ref 0.9–1.3)
GFR AFRICAN AMERICAN: >60
GFR AFRICAN AMERICAN: >60
GFR NON-AFRICAN AMERICAN: >60
GFR NON-AFRICAN AMERICAN: >60
GLUCOSE BLD-MCNC: 100 MG/DL (ref 70–99)
GLUCOSE BLD-MCNC: 110 MG/DL (ref 70–99)
GLUCOSE BLD-MCNC: 120 MG/DL (ref 70–99)
GLUCOSE BLD-MCNC: 132 MG/DL (ref 70–99)
GLUCOSE BLD-MCNC: 139 MG/DL (ref 70–99)
MAGNESIUM: 2 MG/DL (ref 1.8–2.4)
PERFORMED ON: ABNORMAL
POTASSIUM SERPL-SCNC: 3.6 MMOL/L (ref 3.5–5.1)
POTASSIUM SERPL-SCNC: 4.2 MMOL/L (ref 3.5–5.1)
SODIUM BLD-SCNC: 135 MMOL/L (ref 136–145)
SODIUM BLD-SCNC: 136 MMOL/L (ref 136–145)

## 2021-08-07 PROCEDURE — 83735 ASSAY OF MAGNESIUM: CPT

## 2021-08-07 PROCEDURE — 2700000000 HC OXYGEN THERAPY PER DAY

## 2021-08-07 PROCEDURE — 2580000003 HC RX 258: Performed by: INTERNAL MEDICINE

## 2021-08-07 PROCEDURE — 94640 AIRWAY INHALATION TREATMENT: CPT

## 2021-08-07 PROCEDURE — 6370000000 HC RX 637 (ALT 250 FOR IP): Performed by: HOSPITALIST

## 2021-08-07 PROCEDURE — 6370000000 HC RX 637 (ALT 250 FOR IP): Performed by: INTERNAL MEDICINE

## 2021-08-07 PROCEDURE — 94761 N-INVAS EAR/PLS OXIMETRY MLT: CPT

## 2021-08-07 PROCEDURE — 99233 SBSQ HOSP IP/OBS HIGH 50: CPT | Performed by: INTERNAL MEDICINE

## 2021-08-07 PROCEDURE — 36415 COLL VENOUS BLD VENIPUNCTURE: CPT

## 2021-08-07 PROCEDURE — 6360000002 HC RX W HCPCS: Performed by: HOSPITALIST

## 2021-08-07 PROCEDURE — 6360000002 HC RX W HCPCS: Performed by: INTERNAL MEDICINE

## 2021-08-07 PROCEDURE — 80048 BASIC METABOLIC PNL TOTAL CA: CPT

## 2021-08-07 RX ORDER — FUROSEMIDE 40 MG/1
40 TABLET ORAL DAILY
Qty: 30 TABLET | Refills: 0 | Status: SHIPPED | OUTPATIENT
Start: 2021-08-08 | End: 2021-09-03 | Stop reason: SDUPTHER

## 2021-08-07 RX ORDER — LOSARTAN POTASSIUM 25 MG/1
50 TABLET ORAL DAILY
Status: DISCONTINUED | OUTPATIENT
Start: 2021-08-08 | End: 2021-08-07 | Stop reason: HOSPADM

## 2021-08-07 RX ORDER — LOSARTAN POTASSIUM 50 MG/1
50 TABLET ORAL DAILY
Qty: 30 TABLET | Refills: 0 | Status: SHIPPED | OUTPATIENT
Start: 2021-08-08 | End: 2021-09-03 | Stop reason: SDUPTHER

## 2021-08-07 RX ORDER — ALBUTEROL SULFATE 90 UG/1
2 AEROSOL, METERED RESPIRATORY (INHALATION) 4 TIMES DAILY PRN
Qty: 1 INHALER | Refills: 0 | Status: SHIPPED | OUTPATIENT
Start: 2021-08-07 | End: 2021-09-03 | Stop reason: SDUPTHER

## 2021-08-07 RX ORDER — FUROSEMIDE 40 MG/1
40 TABLET ORAL DAILY
Status: DISCONTINUED | OUTPATIENT
Start: 2021-08-08 | End: 2021-08-07 | Stop reason: HOSPADM

## 2021-08-07 RX ORDER — ATORVASTATIN CALCIUM 20 MG/1
20 TABLET, FILM COATED ORAL NIGHTLY
Qty: 30 TABLET | Refills: 0 | Status: SHIPPED | OUTPATIENT
Start: 2021-08-07 | End: 2021-09-03 | Stop reason: SDUPTHER

## 2021-08-07 RX ORDER — ATORVASTATIN CALCIUM 10 MG/1
20 TABLET, FILM COATED ORAL NIGHTLY
Status: DISCONTINUED | OUTPATIENT
Start: 2021-08-07 | End: 2021-08-07 | Stop reason: HOSPADM

## 2021-08-07 RX ORDER — ASPIRIN 81 MG/1
81 TABLET ORAL DAILY
Qty: 30 TABLET | Refills: 0 | Status: SHIPPED | OUTPATIENT
Start: 2021-08-08 | End: 2021-09-03 | Stop reason: SDUPTHER

## 2021-08-07 RX ADMIN — IPRATROPIUM BROMIDE AND ALBUTEROL SULFATE 1 AMPULE: .5; 2.5 SOLUTION RESPIRATORY (INHALATION) at 11:52

## 2021-08-07 RX ADMIN — IPRATROPIUM BROMIDE AND ALBUTEROL SULFATE 1 AMPULE: .5; 2.5 SOLUTION RESPIRATORY (INHALATION) at 07:45

## 2021-08-07 RX ADMIN — LOSARTAN POTASSIUM 25 MG: 25 TABLET, FILM COATED ORAL at 09:15

## 2021-08-07 RX ADMIN — FUROSEMIDE 5 MG/HR: 10 INJECTION, SOLUTION INTRAMUSCULAR; INTRAVENOUS at 05:50

## 2021-08-07 RX ADMIN — PANTOPRAZOLE SODIUM 40 MG: 40 TABLET, DELAYED RELEASE ORAL at 05:51

## 2021-08-07 RX ADMIN — ENOXAPARIN SODIUM 40 MG: 40 INJECTION SUBCUTANEOUS at 09:15

## 2021-08-07 RX ADMIN — FUROSEMIDE 5 MG/HR: 10 INJECTION, SOLUTION INTRAMUSCULAR; INTRAVENOUS at 11:10

## 2021-08-07 RX ADMIN — ASPIRIN 81 MG: 81 TABLET, COATED ORAL at 09:15

## 2021-08-07 RX ADMIN — Medication 100 MG: at 09:15

## 2021-08-07 RX ADMIN — MULTIPLE VITAMINS W/ MINERALS TAB 1 TABLET: TAB at 09:15

## 2021-08-07 RX ADMIN — IPRATROPIUM BROMIDE AND ALBUTEROL SULFATE 1 AMPULE: .5; 2.5 SOLUTION RESPIRATORY (INHALATION) at 15:44

## 2021-08-07 ASSESSMENT — PAIN DESCRIPTION - PROGRESSION: CLINICAL_PROGRESSION: NOT CHANGED

## 2021-08-07 ASSESSMENT — PAIN SCALES - GENERAL: PAINLEVEL_OUTOF10: 0

## 2021-08-07 NOTE — PROGRESS NOTES
Hospitalist Progress Note      PCP: No primary care provider on file. Date of Admission: 8/5/2021    Chief Complaint: leg swelling    Hospital Course:   Anitra Hadley is a 54 y.o. male who presented to the ED to be evaluated for gradually increasing leg edema ongoing for several months PTA. Patient states that he has not been evaluated by a doctor for greater then 40 years, but became concerned when his penis and testicles began to swell 3 days PTA. The patient takes no medications and endorses 1 PPD tobacco abuse, and routine alcohol consumption. EKG was obtained upon arrival and notable for sinus tachycardia with right axis deviation/pulmonary disease pattern, and nonspecific ST and T wave changes. CXR revealed cardiomegaly without acute cardiopulmonary findings. Labs were notable for BNP elevation 1401 and macrocytosis. Chest CT with PE protocol was ordered by admitting physician due to concerns for acute cor pulmonale. Patient received 1 dosage of 20 mg IV Lasix prior to request for admission. Subjective: Leg swelling improving. Echo pending. No new complaints today.       Medications:  Reviewed    Infusion Medications    dextrose      furosemide (LASIX) 1mg/ml infusion 5 mg/hr (08/07/21 0550)    sodium chloride      sodium chloride       Scheduled Medications    insulin lispro  0-12 Units Subcutaneous TID WC    insulin lispro  0-6 Units Subcutaneous Nightly    thiamine  100 mg Oral Daily    sodium chloride flush  10 mL Intravenous 2 times per day    enoxaparin  40 mg Subcutaneous Daily    ipratropium-albuterol  1 ampule Inhalation Q4H WA    nicotine  1 patch Transdermal Daily    losartan  25 mg Oral Daily    pantoprazole  40 mg Oral QAM AC    aspirin  81 mg Oral Daily    sodium chloride flush  10 mL Intravenous 2 times per day    multivitamin  1 tablet Oral Daily     PRN Meds: glucose, dextrose, glucagon (rDNA), dextrose, perflutren lipid microspheres, sodium chloride flush, sodium chloride, promethazine **OR** ondansetron, senna, acetaminophen **OR** acetaminophen, potassium chloride **OR** potassium alternative oral replacement **OR** potassium chloride, magnesium sulfate, sodium chloride flush, sodium chloride, LORazepam **OR** LORazepam **OR** LORazepam **OR** LORazepam **OR** LORazepam **OR** LORazepam **OR** LORazepam **OR** LORazepam      Intake/Output Summary (Last 24 hours) at 8/7/2021 0921  Last data filed at 8/7/2021 0646  Gross per 24 hour   Intake 600 ml   Output 2100 ml   Net -1500 ml       Physical Exam Performed:    /73   Pulse 80   Temp 98.3 °F (36.8 °C) (Axillary)   Resp 16   Ht 6' (1.829 m)   Wt 198 lb 9.6 oz (90.1 kg)   SpO2 92%   BMI 26.94 kg/m²     General appearance: No apparent distress, appears stated age and cooperative. HEENT: Pupils equal, round, and reactive to light. Conjunctivae/corneas clear. Neck: Supple, with full range of motion. No jugular venous distention. Trachea midline. Respiratory:  Normal respiratory effort. Wheezes bilaterally without Rales/Rhonchi. Cardiovascular: Regular rate and rhythm with normal S1/S2 without murmurs, rubs or gallops. Abdomen: Soft, non-tender, non-distended with normal bowel sounds. Musculoskeletal: No clubbing, cyanosis. Marked LE edema bilaterally. Full range of motion without deformity. Skin: Skin color, texture, turgor normal.  No rashes or lesions. Neurologic:  Neurovascularly intact without any focal sensory/motor deficits.  Cranial nerves: II-XII intact, grossly non-focal.  Psychiatric: Alert and oriented, thought content appropriate, normal insight  Capillary Refill: Brisk,3 seconds, normal   Peripheral Pulses: +2 palpable, equal bilaterally       Labs:   Recent Labs     08/05/21  1411 08/06/21  0655   WBC 6.7 4.3   HGB 16.5 16.0   HCT 49.0 48.5    134*     Recent Labs     08/06/21  0655 08/06/21  1800 08/07/21  0502    137 136   K 4.1 3.9 4.2   CL 97* 97* 94*   CO2 34* 29 33* BUN 15 14 15   CREATININE 0.9 0.6* 0.7*   CALCIUM 8.9 8.8 9.0     Recent Labs     08/05/21  1411   AST 22   ALT 19   BILITOT 0.7   ALKPHOS 110     Recent Labs     08/05/21  1653   INR 1.24*     Recent Labs     08/05/21  1411 08/05/21  2246 08/06/21  0045   TROPONINI <0.01 <0.01 <0.01       Urinalysis:      Lab Results   Component Value Date    NITRU Negative 08/05/2021    BLOODU Negative 08/05/2021    SPECGRAV 1.010 08/05/2021    GLUCOSEU Negative 08/05/2021       Radiology:  CT CHEST PULMONARY EMBOLISM W CONTRAST   Final Result   1. No evidence of pulmonary embolism   2. Cardiomegaly with trace pleural effusions         XR CHEST PORTABLE   Final Result   Cardiomegaly without acute cardiopulmonary process. Assessment/Plan:    Active Hospital Problems    Diagnosis     Tobacco abuse [Z72.0]     Daily consumption of alcohol [Z78.9]     QT prolongation [R94.31]     Right axis deviation [R94.31]     Macrocytosis [D75.89]     Uncontrolled hypertension [I10]     Acute decompensated heart failure (HCC) [I50.9]      Acute diastolic heart failure  - echo with EF 50-55%, severe right sided heart failure  - cardiology consulted  - continue lasix gtt  - started losartan    Suspected COPD  - no evidence of exacerbation  - started on duonebs  - stopped IV steroids    Acute hypoxic respiratory failure  - 2/2 CHF  - wean O2 as able    HTN  - poorly controlled  - started on losartan    DMII  - newly diagnosed  - SSI ordered  - will start metformin on discharge    Alcohol dependence  - no evidence of withdrawal. WA protocol  - counseling given  - vitamins ordered    Tobacco dependence  - counseling given. Nicotine replacement ordered    Hypomagnesemia  - monitor and replete    DVT Prophylaxis: lovenox  Diet: ADULT DIET; Regular; 5 carb choices (75 gm/meal);  Low Sodium (2 gm); 1800 ml  Code Status: Full Code    PT/OT Eval Status: not ordered    Dispo - home in 1-3 days    Claude Spearing, MD

## 2021-08-07 NOTE — PROGRESS NOTES
Oxygen documentation:    1. O2 saturation at REST on ROOM AIR = 89%    If saturation is 89% or above please proceed with steps 2 and 3. 2. O2 saturation with AMBULATION of 100 feet on ROOM AIR = 93%  3.  O2 saturation with AMBULATION on 1 liter/min = 96%    DCP notified: yes

## 2021-08-07 NOTE — DISCHARGE SUMMARY
Hospital Medicine Discharge Summary    Patient ID: Xander Allison      Patient's PCP: No primary care provider on file. Admit Date: 8/5/2021     Discharge Date:   08/07/21    Admitting Physician: Alex Ferreira MD     Discharge Physician: Pankaj Laureano MD     Discharge Diagnoses: Active Hospital Problems    Diagnosis     New onset of congestive heart failure ([facility] [address] [I50.9]     Elevated blood pressure reading [R03.0]     Tobacco abuse [Z72.0]     Daily consumption of alcohol [Z78.9]     QT prolongation [R94.31]     Right axis deviation [R94.31]     Macrocytosis [D75.89]     Uncontrolled hypertension [I10]     Acute decompensated heart failure ([facility] [address] [I50.9]        The patient was seen and examined on day of discharge and this discharge summary is in conjunction with any daily progress note from day of discharge. Hospital Course:   Brianna Falk a 54 y. o. male who presented to the ED to be evaluated for gradually increasing leg edema ongoing for several months PTA.  Patient states that he has not been evaluated by a doctor for greater then 40 years, but became concerned when his penis and testicles began to swell 3 days PTA.  The patient takes no medications and endorses 1 PPD tobacco abuse, and routine alcohol consumption.  EKG was obtained upon arrival and notable for sinus tachycardia with right axis deviation/pulmonary disease pattern, and nonspecific ST and T wave changes.  CXR revealed cardiomegaly without acute cardiopulmonary findings.  Labs were notable for BNP elevation 1401 and macrocytosis.  Chest CT with PE protocol was ordered by admitting physician due to concerns for acute cor pulmonale.  Patient received 1 dosage of 20 mg IV Lasix prior to request for admission.     Acute diastolic heart failure  - echo with EF 50-55%, severe right sided heart failure  - cardiology consulted  - lasix gtt changed to PO lasix  - started losartan     Suspected COPD  - no evidence of exacerbation  - started on PRN albuterol  - stopped IV steroids  - will need outpatient PFT     Acute hypoxic respiratory failure  - 2/2 CHF  - weaned to room air     HTN  - poorly controlled  - started on losartan    HLD  - started on statin     DMII  - newly diagnosed  - started on metformin     Alcohol dependence  - no evidence of withdrawal. [facility] protocol  - counseling given  - vitamins ordered     Tobacco dependence  - counseling given. Nicotine replacement ordered     Hypomagnesemia  - repleted    Physical Exam Performed:     /81   Pulse 88   Temp 97.5 °F (36.4 °C) (Oral)   Resp 16   Ht 6' (1.829 m)   Wt 198 lb 9.6 oz (90.1 kg)   SpO2 91%   BMI 26.94 kg/m²       General appearance: No apparent distress, appears stated age and cooperative. HEENT: Pupils equal, round, and reactive to light. Conjunctivae/corneas clear. Neck: Supple, with full range of motion. No jugular venous distention. Trachea midline. Respiratory:  Normal respiratory effort. Wheezes bilaterally without Rales/Rhonchi. Cardiovascular: Regular rate and rhythm with normal S1/S2 without murmurs, rubs or gallops. Abdomen: Soft, non-tender, non-distended with normal bowel sounds. Musculoskeletal: No clubbing, cyanosis. Marked LE edema bilaterally. Full range of motion without deformity. Skin: Skin color, texture, turgor normal.  No rashes or lesions. Neurologic:  Neurovascularly intact without any focal sensory/motor deficits. Cranial nerves: II-XII intact, grossly non-focal.  Psychiatric: Alert and oriented, thought content appropriate, normal insight  Capillary Refill: Brisk,3 seconds, normal   Peripheral Pulses: +2 palpable, equal bilaterally     Labs:  For convenience and continuity at follow-up the following most recent labs are provided:      CBC:    Lab Results   Component Value Date    WBC 4.3 08/06/2021    HGB 16.0 08/06/2021    HCT 48.5 08/06/2021     08/06/2021       Renal:    Lab Results   Component Value Date  08/07/2021    K 4.2 08/07/2021    CL 94 08/07/2021    CO2 33 08/07/2021    BUN 15 08/07/2021    CREATININE 0.7 08/07/2021    CALCIUM 9.0 08/07/2021         Significant Diagnostic Studies    Radiology:   CT CHEST PULMONARY EMBOLISM W CONTRAST   Final Result   1. No evidence of pulmonary embolism   2. Cardiomegaly with trace pleural effusions         XR CHEST PORTABLE   Final Result   Cardiomegaly without acute cardiopulmonary process. Consults:     IP CONSULT TO HOSPITALIST  IP CONSULT TO CARDIOLOGY    Disposition:  home    Condition at Discharge: Stable    Discharge Instructions/Follow-up:  Follow up with PCP, cardiology within 1-2 weeks     Code Status:  Full Code     Activity: activity as tolerated    Diet: diabetic diet      Discharge Medications:     Current Discharge Medication List           Details   aspirin 81 MG EC tablet Take 1 tablet by mouth daily  Qty: 30 tablet, Refills: 0      metFORMIN (GLUCOPHAGE) 500 MG tablet Take 1 tablet by mouth daily (with breakfast)  Qty: 30 tablet, Refills: 0      atorvastatin (LIPITOR) 20 MG tablet Take 1 tablet by mouth nightly  Qty: 30 tablet, Refills: 0      losartan (COZAAR) 50 MG tablet Take 1 tablet by mouth daily  Qty: 30 tablet, Refills: 0      furosemide (LASIX) 40 MG tablet Take 1 tablet by mouth daily  Qty: 30 tablet, Refills: 0      albuterol sulfate HFA (VENTOLIN HFA) 108 (90 Base) MCG/ACT inhaler Inhale 2 puffs into the lungs 4 times daily as needed for Wheezing  Qty: 1 Inhaler, Refills: 0             Time Spent on discharge is more than 30 minutes in the examination, evaluation, counseling and review of medications and discharge plan. Signed:    Nadira Almazan MD   8/7/2021      Thank you No primary care provider on file. for the opportunity to be involved in this patient's care. If you have any questions or concerns please feel free to contact me at [phone].

## 2021-08-07 NOTE — PROGRESS NOTES
[address] Daily Progress Note      Admit Date:  8/5/2021    Subjective:  Mr. Robin Deluna is seen for cardiology follow up. [place]  As per my associate Dr Sarika Handy yesterday. Janie Kuo is a 54 y.o. patient with little past medical history/no MD evaluation in over 40 years, who presented to the hospital with complaints of testicular/penile/LE edema developing over the past few month. He feels good today Denies chest pain, shortness of breath, edema, dizziness, palpitations and syncope.       ED course/Vital signs on presentation: 162/109, HR 95 bpm, afebrile, 94% on room air. EKG showed sinus tachycardia, IRBBB, extreme right axis, possible RVH. Follow up EKG showed more strain pattern/ST&T wave abnormality anterior precordial leads. CXR showed cardiomegaly. CT PE protocol showed no PE. Cardiomegaly with trace effusions. Pro-BNP 1401, negative troponin x3. COVID neg. Given IV steroid and lasix 20 mg IV x 1 in ED.     Patient reports increasing Lower extremity edema x past few months. Reports his legs have been heavy. More fatigued. More dyspneic with exertion, progressive. No orthopnea/PND. Does note he bought new pants as they were fitting tighter. Continued to work as cook. He notes in the last few days, however, more hip/abdominal and testicular swelling that scared him, so decided to seek medical attention. Denies angina. Denies palpitations, dizziness, near-syncope or barbara syncope.      Continues to smoke about 1 PPD x 40 years. Drinks 2-3 beers in evening chronically. THC use - denies other illicit drug use.  Has not seen MD since he was a child.      ROS:  12 point ROS negative in all areas as listed below except as in [address]  Constitutional, EENT, pulmonary, GI, , Musculoskeletal, skin, neurological, hematological, endocrine, Psychiatric    Past Medical History:   Diagnosis Date    Acute systolic congestive heart failure ([address]     Essential hypertension     Pre-diabetes     Thrombocytopenia ([address] History reviewed. No pertinent surgical history. Objective:   /81   Pulse 88   Temp 97.5 °F (36.4 °C) (Oral)   Resp 16   Ht 6' (1.829 m)   Wt 198 lb 9.6 oz (90.1 kg)   SpO2 90%   BMI 26.94 kg/m²       Intake/Output Summary (Last 24 hours) at 8/7/2021 1419  Last data filed at 8/7/2021 0646  Gross per 24 hour   Intake 600 ml   Output 1750 ml   Net -1150 ml       TELEMETRY: NSR    Physical Exam:  General: No Respiratory distress, appears well developed and well nourished. Eyes:  Sclera nonicteric  Nose/Sinuses:  negative findings: nose shows no deformity, asymmetry, or inflammation, nasal mucosa normal, septum midline with no perforation or bleeding  Back:  no pain to palpation  Joint:  no active joint inflammation  Musculoskeletal:  negative  Skin:  Warm and dry  Neck:  Negative for JVD and Carotid Bruits. Chest:  Exp wheezing to auscultation, respiration easy  Cardiovascular:  RRR, S1S2 normal, no murmur, no rub or thrill.   Abdomen:  Soft normal liver and spleen  Extremities:   No edema, clubbing, cyanosis,  Pulses:  pedal pulses are normal.  Neuro: intact    Medications:    [START ON 8/8/2021] furosemide  40 mg Oral Daily    atorvastatin  20 mg Oral Nightly    [START ON 8/8/2021] losartan  50 mg Oral Daily    insulin lispro  0-12 Units Subcutaneous TID WC    insulin lispro  0-6 Units Subcutaneous Nightly    thiamine  100 mg Oral Daily    sodium chloride flush  10 mL Intravenous 2 times per day    enoxaparin  40 mg Subcutaneous Daily    ipratropium-albuterol  1 ampule Inhalation Q4H WA    nicotine  1 patch Transdermal Daily    pantoprazole  40 mg Oral QAM AC    aspirin  81 mg Oral Daily    sodium chloride flush  10 mL Intravenous 2 times per day    multivitamin  1 tablet Oral Daily      dextrose      sodium chloride      sodium chloride       glucose, dextrose, glucagon (rDNA), dextrose, perflutren lipid microspheres, sodium chloride flush, sodium chloride, promethazine **OR** ondansetron, senna, acetaminophen **OR** acetaminophen, potassium chloride **OR** potassium alternative oral replacement **OR** potassium chloride, magnesium sulfate, sodium chloride flush, sodium chloride, LORazepam **OR** LORazepam **OR** LORazepam **OR** LORazepam **OR** LORazepam **OR** LORazepam **OR** LORazepam **OR** LORazepam    Lab Data:  CBC:   Recent Labs     08/05/21  1411 08/06/21  0655   WBC 6.7 4.3   HGB 16.5 16.0   HCT 49.0 48.5   .9* 101.4*    134*     BMP:   Recent Labs     08/06/21  0655 08/06/21  1800 08/07/21  0502    137 136   K 4.1 3.9 4.2   CL 97* 97* 94*   CO2 34* 29 33*   BUN 15 14 15   CREATININE 0.9 0.6* 0.7*     LIVER PROFILE:   Recent Labs     08/05/21  1411   AST 22   ALT 19   BILITOT 0.7   ALKPHOS 110     PT/INR:   Recent Labs     08/05/21  1653   PROTIME 14.1*   INR 1.24*     APTT:   Recent Labs     08/05/21  1653   APTT 30.3     BNP:  No results for input(s): BNP in the last 72 hours. 8.6.21 normal lipid panel  Troponin x3 <0.01  ProBNP 1401  IMAGING:   Echo doppler of heart      Conclusions      Summary   Left ventricular systolic function is low normal with a visually estimated   ejection fraction of 50-55%. EF estimated by 3D at 52 %. The left ventricle is normal in size with normal wall thickness. Flattened in diastole and systole (\"D-shaped septum\") consistent with right   ventricular volume and pressure overload. Normal left ventricular diastolic function. The right ventricle is severely dilated. Right ventricular systolic function is reduced. The right atrium is severely enlarged. Mild eccentric tricuspid regurgitation. Systolic pulmonary artery pressure (SPAP) is elevated and estimated at 56   mmHg (right atrial pressure 15 mmHg) consistent with moderate pulmonary   hypertension. The IVC is dilated in size (>2.1 cm) and collapses <50% with respiration   consistent with markedly elevated right atrial pressures (15 mmHg) .

## 2021-08-07 NOTE — PLAN OF CARE
Resting quietly in bed. No signs of acute distress noted. No c/o pain/discomfort noted. Monitored frequently.     Problem: OXYGENATION/RESPIRATORY FUNCTION  Goal: Patient will maintain patent airway  Outcome: Ongoing  Goal: Patient will achieve/maintain normal respiratory rate/effort  Description: Respiratory rate and effort will be within normal limits for the patient  Outcome: Ongoing     Problem: Serum Glucose Level - Abnormal:  Goal: Ability to maintain appropriate glucose levels will improve  Description: Ability to maintain appropriate glucose levels will improve  Outcome: Ongoing

## 2021-08-08 NOTE — PROGRESS NOTES
Pt d/c'd home. Removed IV and stopped bleeding. Catheter intact. Pt tolerated well. No redness noted at site. Notified [facility] and removed tele box. Reviewed d/c instructions, home meds, and  f/u information utilizing teach-back method. Scripts sent to pts pharmacy, medication book given to patient. Patient verbalized understanding.

## 2021-08-09 ENCOUNTER — CARE COORDINATION (OUTPATIENT)
Dept: CASE MANAGEMENT | Age: 56
End: 2021-08-09

## 2021-08-09 ENCOUNTER — TELEPHONE (OUTPATIENT)
Dept: CARDIOLOGY CLINIC | Age: 56
End: 2021-08-09

## 2021-08-09 NOTE — CARE COORDINATION
[address] Transitions Initial Follow Up Call    Call within 2 business days of discharge: Yes    Patient: Joanna Rater Patient : 1965   MRN: [id number]  Reason for Admission: HF  Discharge Date: 21 RARS: Readmission Risk Score: 14      Last Discharge [facility]       Complaint Diagnosis Description Type Department Provider    21 Leg Pain; Groin Swelling New onset of congestive heart failure ([address] . .. ED to Hosp-Admission (Discharged) (ADMITTED) Roya Duenas MD; Stephany Javed. .. Spoke with: [address]: TORI     Transitions of Care Initial Call    Was this an external facility discharge? No Discharge Facility: n/a    Challenges to be reviewed by the provider   Additional needs identified to be addressed with provider: No  none             Method of communication with provider : none      Advance Care Planning:   Does patient have an Advance Directive: not on file. Was this a readmission? No  Patient stated reason for admission: hf   Patients top risk factors for readmission: medical condition-.    Care Transition Nurse (CTN) contacted the patient by telephone to perform post hospital discharge assessment. Verified name and  with patient as identifiers. Provided introduction to self, and explanation of the CTN role. CTN reviewed discharge instructions, medical action plan and red flags with patient who verbalized understanding. Patient given an opportunity to ask questions and does not have any further questions or concerns at this time. Were discharge instructions available to patient? Yes. Reviewed appropriate site of care based on symptoms and resources available to patient including: PCP, Specialist and When to call 911. The patient agrees to contact the PCP office for questions related to their healthcare. Medication reconciliation was performed with patient, who verbalizes understanding of administration of home medications.  Advised obtaining a 90-day supply of all daily and as-needed medications. Reviewed and educated patient on any new and changed medications related to discharge diagnosis. CTN provided contact information. No further follow-up call indicated based on severity of symptoms and risk factors. Spoke with patient who reported he is doing well. Patient denied any cp, sob, or swelling. Patient reported his weight today was 180 lbs. Provided the following education r/t CHF:  · Weigh yourself first thing in the morning after using restroom. Notify your doctor with a weight gain of 3 lbs in a day or 5 lbs in a week. · Monitor sodium intake as directed by doctor. · Monitor daily fluid intake as directed by doctor. · Notify the doctor if you experience any of the following: shortness of breath, chest pain, cough, decrease or difficulty urinating, dizziness, fatigue, or new onset/increased weakness. · If edema present in legs, elevate them above the level of heart or as high as can be tolerated  · Take all of you medications as prescribed. Do not stop taking any medications unless directed by your doctor. If you have concerns of medication s/e, notify your doctor immediately. Patient/caregiver verbalized understanding. CTN reviewed all medications with patient who reported he is taking all as prescribed. Patient reported he has information to get established with new PCP and number for [facility] clinic. CTN encouraged patient to call and setup apt with [facility]. Patient verbalized understanding and denied any further needs at this time.            Care Transitions 24 Hour Call    Do you have any ongoing symptoms?: No  Do you have a copy of your discharge instructions?: Yes  Do you have all of your prescriptions and are they filled?: Yes  Have you been contacted by a [facility] Pharmacist?: No  Have you scheduled your follow up appointment?: No  Were you discharged with any Home Care or Post Acute Services: No  Do you feel

## 2021-08-17 NOTE — ED NOTES
I independently evaluated and obtained a history and physical on Allison Antoine. All diagnostic, treatment, and disposition assistants were made to myself in conjunction the advanced practice provider. For further details of this patient's emergency department encounter, please see the advanced practice provider's documentation. History: The patient is a 68-year-old male who presents with bilateral leg swelling for the past 3 weeks. Reports his symptoms are moderate, constant, and worsening. He denies any known aggravating or alleviating factors. Patient also reports cough and shortness of breath. He denies any chest pain. Physician Exam: Middle-aged male with 3+ pitting edema equal bilaterally. Mild and expiratory wheezing on exam. No focal neurologic deficits. MDM: Patient's history physical exam work-up is consistent with new onset congestive heart failure. He is admitted for further medical evaluation and care. He expresses understanding and agreement with this plan. FINAL IMPRESSION      1.  New onset of congestive heart failure (HCC)    2. Elevated blood pressure reading             Rogerio Nguyen MD  08/17/21 3285

## 2021-08-23 ENCOUNTER — HOSPITAL ENCOUNTER (OUTPATIENT)
Age: 56
Discharge: HOME | End: 2021-08-23
Payer: COMMERCIAL

## 2021-08-23 ENCOUNTER — OFFICE VISIT (OUTPATIENT)
Dept: CARDIOLOGY CLINIC | Age: 56
End: 2021-08-23
Payer: COMMERCIAL

## 2021-08-23 VITALS
BODY MASS INDEX: 24.92 KG/M2 | SYSTOLIC BLOOD PRESSURE: 130 MMHG | HEART RATE: 85 BPM | HEIGHT: 72 IN | OXYGEN SATURATION: 93 % | DIASTOLIC BLOOD PRESSURE: 78 MMHG | WEIGHT: 184 LBS

## 2021-08-23 DIAGNOSIS — R60.0 EDEMA OF BOTH LOWER EXTREMITIES: ICD-10-CM

## 2021-08-23 DIAGNOSIS — I10 UNCONTROLLED HYPERTENSION: ICD-10-CM

## 2021-08-23 DIAGNOSIS — I50.9 NEW ONSET OF CONGESTIVE HEART FAILURE (HCC): ICD-10-CM

## 2021-08-23 DIAGNOSIS — G47.33 OSA (OBSTRUCTIVE SLEEP APNEA): ICD-10-CM

## 2021-08-23 DIAGNOSIS — I50.9 NEW ONSET OF CONGESTIVE HEART FAILURE (HCC): Primary | ICD-10-CM

## 2021-08-23 DIAGNOSIS — Z72.0 TOBACCO ABUSE: ICD-10-CM

## 2021-08-23 LAB
ANION GAP SERPL CALCULATED.3IONS-SCNC: 11 MMOL/L (ref 3–16)
BUN BLDV-MCNC: 18 MG/DL (ref 7–20)
CALCIUM SERPL-MCNC: 10 MG/DL (ref 8.3–10.6)
CHLORIDE BLD-SCNC: 95 MMOL/L (ref 99–110)
CO2: 32 MMOL/L (ref 21–32)
CREAT SERPL-MCNC: <0.5 MG/DL (ref 0.9–1.3)
GFR AFRICAN AMERICAN: >60
GFR NON-AFRICAN AMERICAN: >60
GLUCOSE BLD-MCNC: 77 MG/DL (ref 70–99)
POTASSIUM SERPL-SCNC: 4.2 MMOL/L (ref 3.5–5.1)
PRO-BNP: 889 PG/ML (ref 0–124)
SODIUM BLD-SCNC: 138 MMOL/L (ref 136–145)

## 2021-08-23 PROCEDURE — 80048 BASIC METABOLIC PNL TOTAL CA: CPT

## 2021-08-23 PROCEDURE — 99214 OFFICE O/P EST MOD 30 MIN: CPT | Performed by: INTERNAL MEDICINE

## 2021-08-23 PROCEDURE — 36415 COLL VENOUS BLD VENIPUNCTURE: CPT

## 2021-08-23 PROCEDURE — 83880 ASSAY OF NATRIURETIC PEPTIDE: CPT

## 2021-08-23 NOTE — PATIENT INSTRUCTIONS
1. Referral to for sleep study given for sleep apnea  2. Limit Salt intake to less than 2 grams per day  3. Encourage you to refrain from smoking  4. Call [phone] to schedule PFT to assess for emphysema  5. Continue to monitor weight daily. 6. Encourage healthy eating   7. LABS: BNP, BMP  8. Referral to [facility] for primary needs  9.  Follow up in 3 months

## 2021-08-23 NOTE — PROGRESS NOTES
CARDIOLOGY FOLLOW UP        Patient Name: Olu Richey  Primary Care physician: No primary care provider on file. Reason for Referral/Chief Complaint: Olu Richey is a 54 y.o. patient who is referred to cardiology clinic today for evaluation and treatment of CHF and hypertension. History of Present Illness:   Olu Richey is a 54 y.o. patient with prior medical history unknown as he had not seen PCP in over 40 years, who presented to the hospital 8/5/2021 with complaints of testicular/penile/LE edema developing over the past few months. Cardiac evaluation included EKG revealing sinus tachycardia, IRBBB, extreme right axis, possible RVH. Follow up EKG showed more strain pattern/ST&T wave abnormality anterior precordial leads. CXR showed cardiomegaly. CT PE protocol showed no PE. Cardiomegaly with trace effusions. Pro-BNP 1401, negative troponin x3. Patient was evaluated by me on 8/6/2021 reported increasing lower extremity edema x past few months. Reported his legs have been heavy. More fatigued. More dyspneic with exertion, progressive. No orthopnea/PND. Does note he bought new pants as they were fitting tighter. Continued to work as cook. He noted in the last few days, however, more hip/abdominal and testicular swelling that scared him, so decided to seek medical attention. Echocardiogram revealed preserved ejection fraction, severely dilated right ventricle with reduced function in the setting of moderate pulmonary hypertension with estimated PASP 56 mmHg. Some evidence to suggest diastolic dysfunction. Patient was diuresed 9 pounds prior to discharge. Discharged on 40 mg oral Lasix, losartan for BP management. Today, he reports he is doing very well in the interim. He reports that he has quit smoking. His coughing has improved. He has rarely used inhaler. He is down 23 lbs since admission. Actually is 180 pounds by his home scale.  Denies paroxysmal nocturnal dyspnea, orthopnea, bendopnea, increasing lower extremity edema or weight gain. Denies angina. Denies palpitations, dizziness, near-syncope or barbara syncope. He has been told that he snores by his roommate. He is never been tested for sleep apnea. 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 He has returned to work. Past Medical History:   has a past medical history of Acute systolic congestive heart failure ([facility] [address] Essential hypertension, Pre-diabetes, and Thrombocytopenia ([facility] [address] Surgical History:  Denies prior surgical history. Social History:   reports that he has quit smoking. His smoking use included cigarettes. He has a 15.00 pack-year smoking history. He has never used smokeless tobacco. He reports current alcohol use of about 7.0 standard drinks of alcohol per week. He reports previous drug use. Family History:  Reports no history of early onset coronary artery disease, myocardial infarction, cerebrovascular accident or sudden cardiac death among primary relatives. Home Medications:  Were reviewed and are listed in nursing record and/or below  Prior to Admission medications    Medication Sig Start Date End Date Taking?  Authorizing Provider   aspirin 81 MG EC tablet Take 1 tablet by mouth daily 8/8/21  Yes Alanis Warner MD   metFORMIN (GLUCOPHAGE) 500 MG tablet Take 1 tablet by mouth daily (with breakfast) 8/7/21  Yes Alanis Warner MD   atorvastatin (LIPITOR) 20 MG tablet Take 1 tablet by mouth nightly 8/7/21  Yes Alanis Warner MD   losartan (COZAAR) 50 MG tablet Take 1 tablet by mouth daily 8/8/21  Yes Alanis Warner MD   furosemide (LASIX) 40 MG tablet Take 1 tablet by mouth daily 8/8/21  Yes Alanis Warner MD   albuterol sulfate HFA (VENTOLIN HFA) 108 (90 Base) MCG/ACT inhaler Inhale 2 puffs into the lungs 4 times daily as needed for Wheezing 8/7/21  Yes Alanis Warner MD        CURRENT Medications:  No current facility-administered medications for this visit. Allergies:  Patient has no known allergies. Review of Systems:   A 14 point review of symptoms completed. Pertinent positives identified in the HPI, all other review of symptoms negative as below. Objective:     Vitals:    08/23/21 1001   BP: 130/78   Pulse: 85   SpO2: 93%    Weight: 184 lb (83.5 kg)       PHYSICAL EXAM:    General:  Alert, cooperative, no distress, appears stated age   Head:  Normocephalic, atraumatic   Eyes:  Conjunctiva/corneas clear, anicteric sclerae    Nose: Nares normal, no drainage or sinus tenderness   Throat: No abnormalities of the lips, oral mucosa or tongue. Neck: Trachea midline. Neck supple with no lymphadenopathy, thyroid not enlarged, symmetric, no tenderness/mass/nodules, no Jugular venous pressure elevation    Lungs:   Clear to auscultation bilaterally, no wheezes, no rales, no respiratory distress   Chest Wall:  No deformity or tenderness to palpation   Heart:  Regular rate and rhythm, normal S1, normal S2, no murmur, no rub, no S3/S4, PMI non-displaced. No heave. Abdomen:   Soft, non-tender, with normoactive bowel sounds. No masses, no hepatosplenomegaly   Extremities: No cyanosis, clubbing, trace pitting feet bilaterally with varicose veins. Vascular: 2+ radial,dorsalis pedis and posterior tibial pulses bilaterally. Brisk carotid upstrokes without carotid bruit. Skin: Skin color, texture, turgor are normal with no rashes or ulceration. Pysch: Euthymic mood, appropriate affect   Neurologic: Oriented to person, place and time. No slurred speech or facial asymmetry. No motor or sensory deficits on gross examination.          Labs:   CBC:   Lab Results   Component Value Date    WBC 4.3 08/06/2021    RBC 4.78 08/06/2021    HGB 16.0 08/06/2021    HCT 48.5 08/06/2021    .4 08/06/2021    RDW 13.5 08/06/2021     08/06/2021     CMP:  Lab Results   Component Value Date     08/07/2021    K 3.6 08/07/2021    CL 93 08/07/2021    CO2 32 2021    BUN 19 2021    CREATININE 0.7 2021    GFRAA >60 2021    AGRATIO 1.1 2021    LABGLOM >60 2021    GLUCOSE 110 2021    PROT 7.0 2021    CALCIUM 9.0 2021    BILITOT 0.7 2021    ALKPHOS 110 2021    AST 22 2021    ALT 19 2021     PT/INR:  No results found for: PTINR  HgBA1c:  Lab Results   Component Value Date    LABA1C 6.3 2021     Lab Results   Component Value Date    TROPONINI <0.01 2021     Lab Results   Component Value Date    CHOL 121 2021    CHOL 121 2021     Lab Results   Component Value Date    TRIG 49 2021    TRIG 83 2021     Lab Results   Component Value Date    HDL 41 2021    HDL 39 (L) 2021     Lab Results   Component Value Date    LDLCALC 70 2021    LDLCALC 65 2021     Lab Results   Component Value Date    LABVLDL 10 2021    LABVLDL 17 2021     No results found for: CHOLHDLRATIO      Cardiac Data:     [id number]  Normal sinus rhythmRight axis deviationIncomplete right bundle branch blockRight ventricular hypertrophy with repolarization abnormalityT wave abnormality, consider anterior ischemiaProlonged QTAbnormal ECGWhen compared with ECG of 05-AUG-2021 14:42,T wave inversion more evident in Anterior leadsConfirmed by Phylicia Sunshine MD, Chan Salguero ([id number]) on 2021 4:20:17 PM       Echo: 2021  Summary   Left ventricular systolic function is low normal with a visually estimated   ejection fraction of 50-55%. EF estimated by 3D at 52 %. The left ventricle is normal in size with normal wall thickness. Flattened in diastole and systole (\"D-shaped septum\") consistent with right   ventricular volume and pressure overload. Normal left ventricular diastolic function. The right ventricle is severely dilated. Right ventricular systolic function is reduced. The right atrium is severely enlarged. Mild eccentric tricuspid regurgitation.    Systolic pulmonary artery pressure (SPAP) is elevated and estimated at 56   mmHg (right atrial pressure 15 mmHg) consistent with moderate pulmonary   hypertension. The IVC is dilated in size (>2.1 cm) and collapses <50% with respiration   consistent with markedly elevated right atrial pressures (15 mmHg) . Additional studies: CT Chest 8/5/2021  FINDINGS:   Pulmonary Arteries: Pulmonary arteries are adequately opacified. No emboli   are demonstrated       Mediastinum: Thoracic aorta normal in caliber. Cardiomegaly with trace   pericardial effusion. No mediastinal adenopathy       Lungs/pleura: Trace pleural effusions. Calcified granulomas in the left   lung. No infiltrate or edema       Upper Abdomen: Unremarkable       Soft Tissues/Bones: No acute bone or soft tissue abnormality. Impression   1. No evidence of pulmonary embolism   2. Cardiomegaly with trace pleural effusions             CXR 8/5/2021       FINDINGS:   The cardiomediastinal silhouette is mildly enlarged. The lungs are clear without acute infiltrate or pulmonary edema. No pleural effusion or pneumothorax is present. Impression   Cardiomegaly without acute cardiopulmonary process. Impression and Plan:         1. HFpEF, chronic, NYHA I symptoms at present. 2. Moderate pulmonary hypertension with cor pulmonale/RV dilation: This appears multifactorial with possible untreated underlying sleep apnea, COPD as well as left-sided heart failure contributing. CT showed no evidence of PE.   3. Tobacco abuse, now quit  4. EtOH abuse   5. QT prolongation  6. THC use   7. Pre-diabetes  8.  Thrombocytopenia       Patient Active Problem List   Diagnosis    Tobacco abuse    Daily consumption of alcohol    QT prolongation    Right axis deviation    Macrocytosis    Uncontrolled hypertension    Acute decompensated heart failure ([address]    New onset of congestive heart failure (HCC)    Elevated blood pressure reading    Edema of both lower extremities       PLAN:  1. Continue Lasix 40 mg daily   2. Repeat basic metabolic profile and proBNP today   3. Continue losartan at present dosing for BP management. Goal less than 130/80 in setting of heart failure   4. Will place referral to for sleep study for suspected underlying sleep apnea which may contribute to pulmonary hypertension  5. We discussed low-salt diet, water intake at today's appointment. 6.  Continue to abstain from tobacco use   7. We will obtain pulmonary function testing to assess for underlying emphysema that may be contributing to pulmonary hypertension  8. Referral placed to Dr. Mariela Jerome to establish primary care    Follow up in 3 months; consider repeat echo at that time to reassess pulmonary pressures. Call with results of labs as a return      This note was scribed in the presence of Jalyn Macias MD by Sarita Moore RN. The scribes documentation has been prepared under my direction and personally reviewed by me in its entirety. I confirm that the note above accurately reflects all work, treatment, procedures, and medical decision making performed by me. Abena Matos MD, personally performed the services described in this documentation as scribed by Sarita Moore RN in my presence, and it is both accurate and complete to the best of our ability. I will address the patient's cardiac risk factors and adjusted pharmacologic treatment as needed. In addition, I have reinforced the need for patient directed risk factor modification. All questions and concerns were addressed to the patient/family. Alternatives to my treatment were discussed. Thank you for allowing us to participate in the care of Alesha Nicholas. Please call me with any questions [phone].     Jami Ugarte MD, [facility]  Cardiovascular Disease  [place] [address]  [phone] [facility]  [phone] [address] [place] [address]  8/23/2021 10:34 AM

## 2021-08-24 ENCOUNTER — TELEPHONE (OUTPATIENT)
Dept: CARDIOLOGY CLINIC | Age: 56
End: 2021-08-24

## 2021-08-24 NOTE — TELEPHONE ENCOUNTER
----- Message from Jeronimo Elmore MD sent at 8/23/2021  4:53 PM EDT -----  Continue present medications. Labs look good -electrolytes and kidney function are tolerating diuretic/water pill. No changes.   Follow-up as planned

## 2021-09-02 ENCOUNTER — HOSPITAL ENCOUNTER (OUTPATIENT)
Dept: PULMONOLOGY | Age: 56
Discharge: HOME | End: 2021-09-02
Payer: COMMERCIAL

## 2021-09-02 VITALS — RESPIRATION RATE: 18 BRPM | OXYGEN SATURATION: 95 %

## 2021-09-02 DIAGNOSIS — I10 UNCONTROLLED HYPERTENSION: ICD-10-CM

## 2021-09-02 DIAGNOSIS — I50.9 NEW ONSET OF CONGESTIVE HEART FAILURE (HCC): ICD-10-CM

## 2021-09-02 DIAGNOSIS — G47.33 OSA (OBSTRUCTIVE SLEEP APNEA): ICD-10-CM

## 2021-09-02 DIAGNOSIS — R60.0 EDEMA OF BOTH LOWER EXTREMITIES: ICD-10-CM

## 2021-09-02 DIAGNOSIS — Z72.0 TOBACCO ABUSE: ICD-10-CM

## 2021-09-02 PROCEDURE — 94729 DIFFUSING CAPACITY: CPT

## 2021-09-02 PROCEDURE — 94726 PLETHYSMOGRAPHY LUNG VOLUMES: CPT

## 2021-09-02 PROCEDURE — 94010 BREATHING CAPACITY TEST: CPT

## 2021-09-02 PROCEDURE — 94760 N-INVAS EAR/PLS OXIMETRY 1: CPT

## 2021-09-03 ENCOUNTER — TELEPHONE (OUTPATIENT)
Dept: FAMILY MEDICINE CLINIC | Age: 56
End: 2021-09-03

## 2021-09-03 RX ORDER — LOSARTAN POTASSIUM 50 MG/1
50 TABLET ORAL DAILY
Qty: 30 TABLET | Refills: 1 | Status: SHIPPED | OUTPATIENT
Start: 2021-09-03 | End: 2021-11-05 | Stop reason: SDUPTHER

## 2021-09-03 RX ORDER — LOSARTAN POTASSIUM 50 MG/1
50 TABLET ORAL DAILY
Qty: 30 TABLET | Refills: 0 | Status: CANCELLED | OUTPATIENT
Start: 2021-09-03

## 2021-09-03 RX ORDER — ATORVASTATIN CALCIUM 20 MG/1
20 TABLET, FILM COATED ORAL NIGHTLY
Qty: 30 TABLET | Refills: 0 | Status: CANCELLED | OUTPATIENT
Start: 2021-09-03

## 2021-09-03 RX ORDER — ASPIRIN 81 MG/1
81 TABLET ORAL DAILY
Qty: 30 TABLET | Refills: 0 | Status: CANCELLED | OUTPATIENT
Start: 2021-09-03

## 2021-09-03 RX ORDER — ALBUTEROL SULFATE 90 UG/1
2 AEROSOL, METERED RESPIRATORY (INHALATION) 4 TIMES DAILY PRN
Qty: 1 EACH | Refills: 2 | Status: SHIPPED | OUTPATIENT
Start: 2021-09-03 | End: 2021-11-18 | Stop reason: SDUPTHER

## 2021-09-03 RX ORDER — FUROSEMIDE 40 MG/1
40 TABLET ORAL DAILY
Qty: 30 TABLET | Refills: 1 | Status: SHIPPED | OUTPATIENT
Start: 2021-09-03 | End: 2021-11-05 | Stop reason: SDUPTHER

## 2021-09-03 RX ORDER — ATORVASTATIN CALCIUM 20 MG/1
20 TABLET, FILM COATED ORAL NIGHTLY
Qty: 30 TABLET | Refills: 1 | Status: SHIPPED | OUTPATIENT
Start: 2021-09-03 | End: 2021-11-18 | Stop reason: SDUPTHER

## 2021-09-03 RX ORDER — ASPIRIN 81 MG/1
81 TABLET ORAL DAILY
Qty: 30 TABLET | Refills: 1 | Status: SHIPPED | OUTPATIENT
Start: 2021-09-03 | End: 2021-11-05 | Stop reason: SDUPTHER

## 2021-09-03 RX ORDER — ALBUTEROL SULFATE 90 UG/1
2 AEROSOL, METERED RESPIRATORY (INHALATION) 4 TIMES DAILY PRN
Qty: 1 EACH | Refills: 2 | Status: CANCELLED | OUTPATIENT
Start: 2021-09-03

## 2021-09-03 RX ORDER — FUROSEMIDE 40 MG/1
40 TABLET ORAL DAILY
Qty: 30 TABLET | Refills: 0 | Status: CANCELLED | OUTPATIENT
Start: 2021-09-03

## 2021-09-03 NOTE — TELEPHONE ENCOUNTER
Pt has a new patient appointment on 9/23/2021, he states that he was referred by Dr. Stephania Dai to have you become his new PCP, Pt states he is almost out of his medications for a the entire weekend and was wondering if he could get his refills today.      Refill Request     Last Seen: Last Seen Department: Visit date not found  Last Seen by PCP: Visit date not found    Last Written: 8/8/2021    Next Appointment:   Future Appointments   Date Time Provider Patricia Rust   9/23/2021 10:00 AM DO LOWELL Alcantara  [place] [facility]   11/4/2021 10:20 AM SANCHEZ Gleason - CNP [facility]   12/3/2021 10:30 AM MD Uziel Recinos [facility]       Future appointment scheduled      Requested Prescriptions     Pending Prescriptions Disp Refills    aspirin 81 MG EC tablet 30 tablet 1     Sig: Take 1 tablet by mouth daily    metFORMIN (GLUCOPHAGE) 500 MG tablet 30 tablet 1     Sig: Take 1 tablet by mouth daily (with breakfast)    atorvastatin (LIPITOR) 20 MG tablet 30 tablet 1     Sig: Take 1 tablet by mouth nightly    losartan (COZAAR) 50 MG tablet 30 tablet 1     Sig: Take 1 tablet by mouth daily    furosemide (LASIX) 40 MG tablet 30 tablet 1     Sig: Take 1 tablet by mouth daily    albuterol sulfate HFA (VENTOLIN HFA) 108 (90 Base) MCG/ACT inhaler 1 each 2     Sig: Inhale 2 puffs into the lungs 4 times daily as needed for Wheezing

## 2021-09-03 NOTE — PROCEDURES
[address] [address]                               PULMONARY FUNCTION    PATIENT NAME: Bryson Bagley                    :        1965  MED REC NO:   [id number]                          ROOM:  ACCOUNT NO:   [de-identified]                           ADMIT DATE: 2021  PROVIDER:     Kishore Sloan MD    DATE OF PROCEDURE:  2021    PFT INTERPRETATION    The patient is a 49-year-old male who underwent a PFT for uncontrolled  hypertension. Spirometry shows FVC to be 63%, FEV1 to be 33%, FEV1 to  FVC ratio was 53%, FEF25%-75% was 18%. Postbronchodilator study was not  done on this test.  Lung volume shows the total lung capacity was  normal.  The patient does have some air trapping and hyperinflation. The patient also has normal diffusion capacity when adjusted for volume. The patient's flow-volume loop was suggestive of obstructive pattern. On the basis of this PFT, the patient has very severe obstructive airway  disease and cannot comment upon postbronchodilator improvement because  the test was not done. The patient will benefit from pulmonary rehab on  the basis of this PFT parameter. Please correlate clinically.         Merlinda Pott, MD    D: 2021 9:07:41       T: 2021 9:09:58     [email or web address]  Job#: [id number]     Doc#: [id number]    CC:

## 2021-09-08 ENCOUNTER — TELEPHONE (OUTPATIENT)
Dept: CARDIOLOGY CLINIC | Age: 56
End: 2021-09-08

## 2021-09-08 NOTE — TELEPHONE ENCOUNTER
Spoke to pt. Kasandra Younger [facility] message. Pt is agreeable to seeing a Pulmonary doctor. Please create referral and call pt to let him know when he can call to schedule.

## 2021-09-08 NOTE — TELEPHONE ENCOUNTER
----- Message from Jesusita Jain MD sent at 9/7/2021 10:29 PM EDT -----  Pls let pawan know, PFT's consistent with very severe COPD from smoking which undoubtedly contributes to his right  sided heart failure. Needs optimization, likely pulmonary rehab as recommended by test reader. Will need referral to pulmonary medicine if agreeable. Sees PCP 9/23 as well.

## 2021-09-23 ENCOUNTER — OFFICE VISIT (OUTPATIENT)
Dept: FAMILY MEDICINE CLINIC | Age: 56
End: 2021-09-23
Payer: COMMERCIAL

## 2021-09-23 VITALS
BODY MASS INDEX: 26.79 KG/M2 | WEIGHT: 197.8 LBS | OXYGEN SATURATION: 95 % | SYSTOLIC BLOOD PRESSURE: 114 MMHG | HEART RATE: 77 BPM | DIASTOLIC BLOOD PRESSURE: 68 MMHG | HEIGHT: 72 IN

## 2021-09-23 DIAGNOSIS — Z12.11 SCREEN FOR COLON CANCER: ICD-10-CM

## 2021-09-23 DIAGNOSIS — Z72.0 TOBACCO ABUSE: ICD-10-CM

## 2021-09-23 DIAGNOSIS — Z76.89 ENCOUNTER TO ESTABLISH CARE: Primary | ICD-10-CM

## 2021-09-23 DIAGNOSIS — J44.9 CHRONIC OBSTRUCTIVE PULMONARY DISEASE, UNSPECIFIED COPD TYPE (HCC): ICD-10-CM

## 2021-09-23 DIAGNOSIS — I50.9 NEW ONSET OF CONGESTIVE HEART FAILURE (HCC): ICD-10-CM

## 2021-09-23 PROCEDURE — 99203 OFFICE O/P NEW LOW 30 MIN: CPT | Performed by: STUDENT IN AN ORGANIZED HEALTH CARE EDUCATION/TRAINING PROGRAM

## 2021-09-23 SDOH — ECONOMIC STABILITY: TRANSPORTATION INSECURITY
IN THE PAST 12 MONTHS, HAS THE LACK OF TRANSPORTATION KEPT YOU FROM MEDICAL APPOINTMENTS OR FROM GETTING MEDICATIONS?: NO

## 2021-09-23 SDOH — ECONOMIC STABILITY: FOOD INSECURITY: WITHIN THE PAST 12 MONTHS, THE FOOD YOU BOUGHT JUST DIDN'T LAST AND YOU DIDN'T HAVE MONEY TO GET MORE.: NEVER TRUE

## 2021-09-23 SDOH — ECONOMIC STABILITY: TRANSPORTATION INSECURITY
IN THE PAST 12 MONTHS, HAS LACK OF TRANSPORTATION KEPT YOU FROM MEETINGS, WORK, OR FROM GETTING THINGS NEEDED FOR DAILY LIVING?: NO

## 2021-09-23 SDOH — ECONOMIC STABILITY: FOOD INSECURITY: WITHIN THE PAST 12 MONTHS, YOU WORRIED THAT YOUR FOOD WOULD RUN OUT BEFORE YOU GOT MONEY TO BUY MORE.: NEVER TRUE

## 2021-09-23 ASSESSMENT — SOCIAL DETERMINANTS OF HEALTH (SDOH): HOW HARD IS IT FOR YOU TO PAY FOR THE VERY BASICS LIKE FOOD, HOUSING, MEDICAL CARE, AND HEATING?: NOT HARD AT ALL

## 2021-09-23 ASSESSMENT — PATIENT HEALTH QUESTIONNAIRE - PHQ9
SUM OF ALL RESPONSES TO PHQ QUESTIONS 1-9: 0
1. LITTLE INTEREST OR PLEASURE IN DOING THINGS: 0
SUM OF ALL RESPONSES TO PHQ QUESTIONS 1-9: 0
2. FEELING DOWN, DEPRESSED OR HOPELESS: 0
SUM OF ALL RESPONSES TO PHQ9 QUESTIONS 1 & 2: 0
SUM OF ALL RESPONSES TO PHQ QUESTIONS 1-9: 0

## 2021-09-23 NOTE — PATIENT INSTRUCTIONS
Patient Education        Counting Carbohydrates for Diabetes: Care Instructions  Your Care Instructions     You don't have to eat special foods when you have diabetes. You just have to be careful to eat healthy foods. Carbohydrates (carbs) raise blood sugar higher and quicker than any other nutrient. Carbs are found in desserts, breads and cereals, and fruit. They're also in starchy vegetables. These include potatoes, corn, and grains such as rice and pasta. Carbs are also in milk and yogurt. The more carbs you eat at one time, the higher your blood sugar will rise. Spreading carbs all through the day helps keep your blood sugar levels within your target range. Counting carbs is one of the best ways to keep your blood sugar under control. If you use insulin, counting carbs helps you match the right amount of insulin to the number of grams of carbs in a meal. Then you can change your diet and insulin dose as needed. Testing your blood sugar several times a day can help you learn how carbs affect your blood sugar. A registered dietitian or certified diabetes educator can help you plan meals and snacks. Follow-up care is a key part of your treatment and safety. Be sure to make and go to all appointments, and call your doctor if you are having problems. It's also a good idea to know your test results and keep a list of the medicines you take. How can you care for yourself at home? Know your daily amount of carbohydrates  Your daily amount depends on several things, such as your weight, how active you are, which diabetes medicines you take, and what your goals are for your blood sugar levels. A registered dietitian or certified diabetes educator can help you plan how many carbs to include in each meal and snack. For most adults, a guideline for the daily amount of carbs is:  · 45 to 60 grams at each meal. That's about the same as 3 to 4 carbohydrate servings. · 15 to 20 grams at each snack.  That's about the same as 1 carbohydrate serving. Count carbs  Counting carbs lets you know how much rapid-acting insulin to take before you eat. If you use an insulin pump, you get a constant rate of insulin during the day. So the pump must be programmed at meals. This gives you extra insulin to cover the rise in blood sugar after meals. If you take insulin:  · Learn your own insulin-to-carb ratio. You and your diabetes health professional will figure out the ratio. You can do this by testing your blood sugar after meals. For example, you may need a certain amount of insulin for every 15 grams of carbs. · Add up the carb grams in a meal. Then you can figure out how many units of insulin to take based on your insulin-to-carb ratio. · Exercise lowers blood sugar. You can use less insulin than you would if you were not doing exercise. Keep in mind that timing matters. If you exercise within 1 hour after a meal, your body may need less insulin for that meal than it would if you exercised 3 hours after the meal. Test your blood sugar to find out how exercise affects your need for insulin. If you do or don't take insulin:  · Look at labels on packaged foods. This can tell you how many carbs are in a serving. You can also use guides from the American Diabetes Association. · Be aware of portions, or serving sizes. If a package has two servings and you eat the whole package, you need to double the number of grams of carbohydrate listed for one serving. · Protein, fat, and fiber do not raise blood sugar as much as carbs do. If you eat a lot of these nutrients in a meal, your blood sugar will rise more slowly than it would otherwise. Eat from all food groups  · Eat at least three meals a day. · Plan meals to include food from all the food groups. The food groups include grains, fruits, dairy, proteins, and vegetables. · Talk to your dietitian or diabetes educator about ways to add limited amounts of sweets into your meal plan.   · If you drink alcohol, talk to your doctor. It may not be recommended when you are taking certain diabetes medicines. Where can you learn more? Go to https://chpepiceweb.gis.to. org and sign in to your [employer] account. Enter I148 in the Public Solution box to learn more about \"Counting Carbohydrates for Diabetes: Care Instructions. \"     If you do not have an account, please click on the \"Sign Up Now\" link. Current as of: August 31, 2020               Content Version: 13.0  © 3359-2063 [employer]. Care instructions adapted under license by [facility] ([facility]). If you have questions about a medical condition or this instruction, always ask your healthcare professional. [address] any warranty or liability for your use of this information. Patient Education        Prediabetes: Care Instructions  Overview     Prediabetes is a warning sign that you're at risk for getting type 2 diabetes. It means that your blood sugar is higher than it should be. But it's not high enough to be diabetes. The food you eat naturally turns into sugar. Your body uses the sugar for energy. Normally, an organ called the pancreas makes insulin. And insulin allows the sugar in your blood to get into your body's cells. But sometimes the body can't use insulin the right way. So the sugar stays in your blood instead. This is called insulin resistance. The buildup of sugar in your blood means you have prediabetes. The good news is that you may be able to prevent or delay diabetes. Making small lifestyle changes, like getting active and changing your eating habits, may help you get your blood sugar back to normal. You can work with your doctor to make a treatment plan. Follow-up care is a key part of your treatment and safety. Be sure to make and go to all appointments, and call your doctor if you are having problems.  It's also a good idea to know your test results and keep a list of the medicines you take. How can you care for yourself at home? · Watch your weight. A healthy weight helps your body use insulin properly. · Limit the amount of calories, sweets, and unhealthy fat you eat. Ask your doctor if you should see a dietitian. A registered dietitian can help you create meal plans that fit your lifestyle. · Get at least 30 minutes of exercise on most days of the week. Exercise helps control your blood sugar. It also helps you maintain a healthy weight. Walking is a good choice. You also may want to do other activities, such as running, swimming, cycling, or playing tennis or team sports. · Do not smoke. Smoking can make prediabetes worse. If you need help quitting, talk to your doctor about stop-smoking programs and medicines. These can increase your chances of quitting for good. · If your doctor prescribed medicines, take them exactly as prescribed. Call your doctor if you think you are having a problem with your medicine. You will get more details on the specific medicines your doctor prescribes. When should you call for help? Watch closely for changes in your health, and be sure to contact your doctor if:    · You have any symptoms of diabetes. These may include:  ? Being thirsty more often. ? Urinating more. ? Being hungrier. ? Losing weight. ? Being very tired. ? Having blurry vision.     · You have a wound that will not heal.     · You have an infection that will not go away.     · You have problems with your blood pressure.     · You want more information about diabetes and how you can keep from getting it. Where can you learn more? Go to https://Creactiveszeke.[email or web address] org and sign in to your [employer] account. Enter I222 in the [employer] box to learn more about \"Prediabetes: Care Instructions. \"     If you do not have an account, please click on the \"Sign Up Now\" link.   Current as of: August 31, 2020               Content Version: 13.0  © 4279-1198 Healthwise, "[employer]. Care instructions adapted under license by [facility] ([facility]). If you have questions about a medical condition or this instruction, always ask your healthcare professional. [address] any warranty or liability for your use of this information. Patient Education         Prediabetes: Healthy Changes You Can Make (02:19)  Your health professional recommends that you watch this short online health video. Learn how to make healthy changes that can help delay or prevent type 2 diabetes. Purpose:  Discusses healthy eating and moving more. Encourages making small changes over time. Goal:  The user will learn how to make healthy changes that can help delay or prevent type 2 diabetes. How to watch the video    Scan the QR code   OR Visit the website    https://[email or web address] se/[email or web address]   Current as of: August 31, 2020               Content Version: 13.0  © 5397-5475 [employer]. Care instructions adapted under license by [facility] ([facility]). If you have questions about a medical condition or this instruction, always ask your healthcare professional. [address] any warranty or liability for your use of this information.

## 2021-09-23 NOTE — PROGRESS NOTES
 Highest education level: Not on file   Occupational History    Not on file   Tobacco Use    Smoking status: Former Smoker     Packs/day: 1.00     Years: 15.00     Pack years: 15.00     Types: Cigarettes     Quit date: 2021     Years since quittin.1    Smokeless tobacco: Never Used   Substance and Sexual Activity    Alcohol use: Yes     Alcohol/week: 7.0 standard drinks     Types: 7 Cans of beer per week     Comment: \"2 beers after work\"    Drug use: Not Currently    Sexual activity: Not on file   Other Topics Concern    Not on file   Social History Narrative    Not on file     Social Determinants of Health     Financial Resource Strain: Low Risk     Difficulty of Paying Living Expenses: Not hard at all   Food Insecurity: No Food Insecurity    Worried About [address] in the Last Year: Never true    [address] in the Last Year: Never true   Transportation Needs: No Transportation Needs    Lack of Transportation (Medical): No    Lack of Transportation (Non-Medical): No   Physical Activity:     Days of Exercise per Week:     Minutes of Exercise per Session:    Stress:     Feeling of Stress :    Social Connections:     Frequency of Communication with Friends and Family:     Frequency of Social Gatherings with Friends and Family:     Attends [employer] Services:     Active Member of Clubs or Organizations:     Attends Club or Organization Meetings:     Marital Status:    Intimate Partner Violence:     Fear of Current or Ex-Partner:     Emotionally Abused:     Physically Abused:     Sexually Abused:         History reviewed. No pertinent family history. Vitals:    21 1015   BP: 114/68   Site: Right Upper Arm   Position: Sitting   Cuff Size: Medium Adult   Pulse: 77   SpO2: 95%   Weight: 197 lb 12.8 oz (89.7 kg)   Height: 6' (1.829 m)     Estimated body mass index is 26.83 kg/m² as calculated from the following:    Height as of this encounter: 6' (1.829 m). Weight as of this encounter: 197 lb 12.8 oz (89.7 kg). Physical Exam  Vitals reviewed. Constitutional:       General: He is not in acute distress. Appearance: Normal appearance. He is not ill-appearing. HENT:      Head: Normocephalic and atraumatic. Right Ear: Tympanic membrane normal.      Left Ear: Tympanic membrane normal.   Eyes:      Extraocular Movements: Extraocular movements intact. Conjunctiva/sclera: Conjunctivae normal.   Cardiovascular:      Rate and Rhythm: Normal rate and regular rhythm. Pulses: Normal pulses. Heart sounds: Normal heart sounds. No murmur heard. Pulmonary:      Effort: Pulmonary effort is normal.      Breath sounds: Normal breath sounds. Abdominal:      General: Abdomen is flat. Bowel sounds are normal. There is no distension. Palpations: Abdomen is soft. Tenderness: There is no abdominal tenderness. Musculoskeletal:         General: No swelling. Normal range of motion. Right lower leg: No edema. Left lower leg: No edema. Skin:     General: Skin is warm and dry. Capillary Refill: Capillary refill takes less than 2 seconds. Findings: No rash. Neurological:      General: No focal deficit present. Mental Status: He is alert and oriented to person, place, and time. Psychiatric:         Mood and Affect: Mood normal.         Behavior: Behavior normal.         Thought Content: Thought content normal.         Judgment: Judgment normal.         ASSESSMENT/PLAN:  1. Encounter to establish care  Has not roxanna  Physician regularly in 40 years. Muliple new diagnosis of chronic conditions. 2. New onset of congestive heart failure ([address]  Following with Dr. Anneliese Diaz. Significant improvement with new medications. Compliant with med regimen. 3. Tobacco abuse/COPD  Has stopped smoking completely. Asymptomatic currently. Establishing with pulmonology.       4. Screen for colon cancer  - Cologuard (For External Results Only); Future    5. Prediabetes  Working on changing dietary habits. Discussed at length today. Compliant with metfomin. NO AE. Will return for repeat A1c in 7 weeks. Return in about 7 weeks (around 11/8/2021) for pre-diabetes follow-up. An  electronic signature was used to authenticate this note.     --Nadira Martines, DO on 10/1/2021 at 7:43 AM

## 2021-09-30 ENCOUNTER — NURSE ONLY (OUTPATIENT)
Dept: FAMILY MEDICINE CLINIC | Age: 56
End: 2021-09-30
Payer: COMMERCIAL

## 2021-09-30 DIAGNOSIS — Z23 NEED FOR TETANUS, DIPHTHERIA, AND ACELLULAR PERTUSSIS (TDAP) VACCINE: Primary | ICD-10-CM

## 2021-09-30 PROCEDURE — 90715 TDAP VACCINE 7 YRS/> IM: CPT | Performed by: STUDENT IN AN ORGANIZED HEALTH CARE EDUCATION/TRAINING PROGRAM

## 2021-09-30 PROCEDURE — 90732 PPSV23 VACC 2 YRS+ SUBQ/IM: CPT | Performed by: STUDENT IN AN ORGANIZED HEALTH CARE EDUCATION/TRAINING PROGRAM

## 2021-09-30 PROCEDURE — 90471 IMMUNIZATION ADMIN: CPT | Performed by: STUDENT IN AN ORGANIZED HEALTH CARE EDUCATION/TRAINING PROGRAM

## 2021-09-30 PROCEDURE — 90472 IMMUNIZATION ADMIN EACH ADD: CPT | Performed by: STUDENT IN AN ORGANIZED HEALTH CARE EDUCATION/TRAINING PROGRAM

## 2021-10-01 PROBLEM — J44.9 CHRONIC OBSTRUCTIVE PULMONARY DISEASE (HCC): Status: ACTIVE | Noted: 2021-10-01

## 2021-10-04 ENCOUNTER — TELEPHONE (OUTPATIENT)
Dept: FAMILY MEDICINE CLINIC | Age: 56
End: 2021-10-04

## 2021-10-04 NOTE — TELEPHONE ENCOUNTER
Patient states early yesterday morning started experiencing pain in his ribs on the right side. States hurts when takes deep breath and when lays down. States the longer he is up and on his feet the better it feels. Denies any cough or fever. He is concerned it may be due to the shots he had 9/30/21.  Tdap and Pneumovax 23

## 2021-10-04 NOTE — TELEPHONE ENCOUNTER
Called patient twice today with no answer and no call back left voicemail  requesting a call back so that  note can be relayed to him.

## 2021-10-04 NOTE — TELEPHONE ENCOUNTER
This would be an uncommon reaction to the vaccine if this was the source of his discomfort. Can try taking 800mg Ibuprofen thee times a day to treat for possible pleurisy, however if not rapidly improving would recommend physical exam to evaluate. Also would require evaluation if developing any additional symptoms.

## 2021-10-05 NOTE — TELEPHONE ENCOUNTER
Pt called back and notified, He is going to try the IB and will call back for appointment or if any changes.

## 2021-11-03 ENCOUNTER — TELEPHONE (OUTPATIENT)
Dept: PULMONOLOGY | Age: 56
End: 2021-11-03

## 2021-11-03 NOTE — TELEPHONE ENCOUNTER
Patient cancelled appointment on 11/4/21 with [facility] for NPT sleep eval referred Dr Yuan Woo. Reason: family emergency    Patient did not reschedule appointment. Appointment rescheduled for patient states he will call back to kendal appt.

## 2021-11-05 RX ORDER — ASPIRIN 81 MG/1
81 TABLET ORAL DAILY
Qty: 90 TABLET | Refills: 1 | Status: SHIPPED | OUTPATIENT
Start: 2021-11-05 | End: 2022-06-01

## 2021-11-05 RX ORDER — LOSARTAN POTASSIUM 50 MG/1
50 TABLET ORAL DAILY
Qty: 90 TABLET | Refills: 1 | Status: SHIPPED | OUTPATIENT
Start: 2021-11-05 | End: 2022-06-01

## 2021-11-05 RX ORDER — FUROSEMIDE 40 MG/1
40 TABLET ORAL DAILY
Qty: 90 TABLET | Refills: 1 | Status: SHIPPED | OUTPATIENT
Start: 2021-11-05 | End: 2022-05-20

## 2021-11-05 NOTE — TELEPHONE ENCOUNTER
Last office visit 9/23/2021     Last written     Next office visit scheduled 11/11/2021    Requested Prescriptions     Pending Prescriptions Disp Refills    losartan (COZAAR) 50 MG tablet 30 tablet 1     Sig: Take 1 tablet by mouth daily    aspirin 81 MG EC tablet 30 tablet 1     Sig: Take 1 tablet by mouth daily    furosemide (LASIX) 40 MG tablet 30 tablet 1     Sig: Take 1 tablet by mouth daily    metFORMIN (GLUCOPHAGE) 500 MG tablet 30 tablet 1     Sig: Take 1 tablet by mouth daily (with breakfast)

## 2021-11-11 ENCOUNTER — OFFICE VISIT (OUTPATIENT)
Dept: FAMILY MEDICINE CLINIC | Age: 56
End: 2021-11-11
Payer: COMMERCIAL

## 2021-11-11 VITALS — DIASTOLIC BLOOD PRESSURE: 89 MMHG | HEART RATE: 81 BPM | OXYGEN SATURATION: 96 % | SYSTOLIC BLOOD PRESSURE: 130 MMHG

## 2021-11-11 DIAGNOSIS — R73.03 PREDIABETES: ICD-10-CM

## 2021-11-11 DIAGNOSIS — I10 ESSENTIAL HYPERTENSION: ICD-10-CM

## 2021-11-11 DIAGNOSIS — I50.9 NEW ONSET OF CONGESTIVE HEART FAILURE (HCC): ICD-10-CM

## 2021-11-11 DIAGNOSIS — Z12.11 SCREEN FOR COLON CANCER: Primary | ICD-10-CM

## 2021-11-11 LAB — HBA1C MFR BLD: 6.3 %

## 2021-11-11 PROCEDURE — 83036 HEMOGLOBIN GLYCOSYLATED A1C: CPT | Performed by: STUDENT IN AN ORGANIZED HEALTH CARE EDUCATION/TRAINING PROGRAM

## 2021-11-11 PROCEDURE — 99213 OFFICE O/P EST LOW 20 MIN: CPT | Performed by: STUDENT IN AN ORGANIZED HEALTH CARE EDUCATION/TRAINING PROGRAM

## 2021-11-11 NOTE — PROGRESS NOTES
Patient: Abbe Velázquez is a 64 y.o. male who presents today with the following Chief Complaint(s):  Chief Complaint   Patient presents with    3 Month Follow-Up     Follow up on new patient visit on New congestive heart failure          HPI     Pre-diabetes  Doing well with metformin    LALA  Rescheduling evaluation    Heart failure  Compliant with medications. Still feeling winded with going up steps  Following with cardiology    Current Outpatient Medications   Medication Sig Dispense Refill    losartan (COZAAR) 50 MG tablet Take 1 tablet by mouth daily 90 tablet 1    aspirin 81 MG EC tablet Take 1 tablet by mouth daily 90 tablet 1    furosemide (LASIX) 40 MG tablet Take 1 tablet by mouth daily 90 tablet 1    metFORMIN (GLUCOPHAGE) 500 MG tablet Take 1 tablet by mouth daily (with breakfast) 90 tablet 1    atorvastatin (LIPITOR) 20 MG tablet Take 1 tablet by mouth nightly 30 tablet 1    albuterol sulfate HFA (VENTOLIN HFA) 108 (90 Base) MCG/ACT inhaler Inhale 2 puffs into the lungs 4 times daily as needed for Wheezing 1 each 2     No current facility-administered medications for this visit. Patient's past medical history, surgical history, family history, medications,  andallergies  were all reviewed and updated as appropriate today. Review of Systems  All other systems reviewed and negative    Physical Exam  Vitals reviewed. Constitutional:       Appearance: Normal appearance. HENT:      Head: Normocephalic and atraumatic. Cardiovascular:      Rate and Rhythm: Normal rate and regular rhythm. Pulmonary:      Effort: Pulmonary effort is normal.      Breath sounds: Normal breath sounds. Neurological:      General: No focal deficit present. Mental Status: He is alert and oriented to person, place, and time. Psychiatric:         Behavior: Behavior normal.         Thought Content:  Thought content normal.       Vitals:    11/11/21 1010   BP: 130/89   Pulse: 81   SpO2: 96% Assessment:  Encounter Diagnoses   Name Primary?  Prediabetes     Screen for colon cancer Yes       Plan:  1. Prediabetes  No adverse effects of Metformin. A1c stable at 6.3. Encourage diet and exercise modification.  - POCT glycosylated hemoglobin (Hb A1C)    2. Screen for colon cancer  - Cologuard (For External Results Only); Future    3. Heart failure  Doing well with medications. Continuing to follow with cardiology. Exercise tolerance slowly improving. No follow-ups on file.

## 2021-11-12 PROBLEM — R60.0 EDEMA OF BOTH LOWER EXTREMITIES: Status: RESOLVED | Noted: 2021-08-23 | Resolved: 2021-11-12

## 2021-11-18 ENCOUNTER — NURSE ONLY (OUTPATIENT)
Dept: FAMILY MEDICINE CLINIC | Age: 56
End: 2021-11-18
Payer: COMMERCIAL

## 2021-11-18 DIAGNOSIS — Z23 NEED FOR SHINGLES VACCINE: Primary | ICD-10-CM

## 2021-11-18 PROCEDURE — 90750 HZV VACC RECOMBINANT IM: CPT | Performed by: STUDENT IN AN ORGANIZED HEALTH CARE EDUCATION/TRAINING PROGRAM

## 2021-11-18 PROCEDURE — 90471 IMMUNIZATION ADMIN: CPT | Performed by: STUDENT IN AN ORGANIZED HEALTH CARE EDUCATION/TRAINING PROGRAM

## 2021-11-18 RX ORDER — ATORVASTATIN CALCIUM 20 MG/1
20 TABLET, FILM COATED ORAL NIGHTLY
Qty: 90 TABLET | Refills: 1 | Status: SHIPPED | OUTPATIENT
Start: 2021-11-18 | End: 2022-06-01

## 2021-11-18 RX ORDER — ALBUTEROL SULFATE 90 UG/1
2 AEROSOL, METERED RESPIRATORY (INHALATION) 4 TIMES DAILY PRN
Qty: 1 EACH | Refills: 2 | Status: SHIPPED | OUTPATIENT
Start: 2021-11-18 | End: 2022-04-22

## 2021-11-18 NOTE — PROGRESS NOTES
Patient came in today for first dose of [place] vaccine. Given in R Deltoid. Patient tolerated well. Advised patient that his next dose is due 2-6 months from now.

## 2021-12-02 NOTE — PROGRESS NOTES
CARDIOLOGY FOLLOW UP        Patient Name: Martha Frost  Primary Care physician: Liliane Frances DO    Reason for Referral/Chief Complaint: Martha Frost is a 64 y.o. patient who is referred to cardiology clinic today for evaluation and treatment of CHF and hypertension. History of Present Illness:   Jeremias Ayala is a 64 y.o. patient with prior medical history unknown as he had not seen PCP in over 40 years, who presented to the hospital 8/5/2021 with complaints of testicular/penile/LE edema developing over the past few months. Cardiac evaluation included EKG revealing sinus tachycardia, IRBBB, extreme right axis, possible RVH. Follow up EKG showed more strain pattern/ST&T wave abnormality anterior precordial leads. CXR showed cardiomegaly. CT PE protocol showed no PE. Cardiomegaly with trace effusions. Pro-BNP 1401, negative troponin x3. Diagnosed with new onset congestive heart failure with preserved EF. Echocardiogram revealed preserved ejection fraction, severely dilated right ventricle with reduced function in the setting of moderate pulmonary hypertension with estimated PASP 56 mmHg. Some evidence to suggest diastolic dysfunction. Patient was diuresed 9 pounds prior to discharge. Discharged on 40 mg oral Lasix, losartan for BP management. Last OV 8/23/21, he reported he is doing very well in the interim and had quit smoking. Noted down 23 lbs since admission-180 pounds by his home scale. In interim the patient had a PFT done which showed severe COPD. It was recommended the patient undergo pulmonary rehab. He established care with Dr. Tad Ho for primary care. he was due to be tested for sleep apnea but had to cancel appointment. This has been rescheduled. Today he reports that he feels like his pants are fitting a little tighter. He has had some weight gain but not rapid.   Back up to 206 pounds no return of lower extremity swelling, testicular swelling or significant abdominal bloating. Denies  Bendopnea, orthopnea or PND. He states that when he takes a deep breath in he can \"feel\" some type of discomfort in his chest, mild, does not limit him. When he exerts himself, he does experience some shortness of breath. Stable. He is taking all medications as prescribed. He is on a rescue inhaler but no stable inhaler regimen. He reports he has quit smoking. 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 He has returned to work. Past Medical History:   has a past medical history of Acute systolic congestive heart failure ([facility] Essential hypertension, Pre-diabetes, and Thrombocytopenia ([facility] Surgical History:  Denies prior surgical history. Social History:   reports that he quit smoking about 4 months ago. His smoking use included cigarettes. He has a 15.00 pack-year smoking history. He has never used smokeless tobacco. He reports current alcohol use of about 7.0 standard drinks of alcohol per week. He reports previous drug use. Family History:  Reports no history of early onset coronary artery disease, myocardial infarction, cerebrovascular accident or sudden cardiac death among primary relatives. Home Medications:  Were reviewed and are listed in nursing record and/or below  Prior to Admission medications    Medication Sig Start Date End Date Taking?  Authorizing Provider   albuterol sulfate HFA (VENTOLIN HFA) 108 (90 Base) MCG/ACT inhaler Inhale 2 puffs into the lungs 4 times daily as needed for Wheezing 11/18/21  Yes Keyla Conrad DO   atorvastatin (LIPITOR) 20 MG tablet Take 1 tablet by mouth nightly 11/18/21  Yes Keyla Conrad DO   losartan (COZAAR) 50 MG tablet Take 1 tablet by mouth daily 11/5/21  Yes SANCHEZ Elizabeth - CNP   aspirin 81 MG EC tablet Take 1 tablet by mouth daily 11/5/21  Yes SANCHEZ Elizabeth - CNP   furosemide (LASIX) 40 MG tablet Take 1 tablet by mouth daily 11/5/21  Yes SANCHEZ Elizabeth - CNP   metFORMIN (GLUCOPHAGE) 500 MG tablet Take 1 tablet by mouth daily (with breakfast) 11/5/21  Yes SANCHEZ Rivers CNP        CURRENT Medications:  No current facility-administered medications for this visit. Allergies:  Patient has no known allergies. Review of Systems:   A 14 point review of symptoms completed. Pertinent positives identified in the HPI, all other review of symptoms negative as below. Objective:     Vitals:    12/03/21 1032   BP: 132/80   Pulse: 67    Weight: 206 lb (93.4 kg)       PHYSICAL EXAM:    General:  Alert, cooperative, no distress, appears stated age   Head:  Normocephalic, atraumatic   Eyes:  Conjunctiva/corneas clear, anicteric sclerae    Nose: Nares normal, no drainage or sinus tenderness   Throat: No abnormalities of the lips, oral mucosa or tongue. Neck: Trachea midline. Neck supple with no lymphadenopathy, thyroid not enlarged, symmetric, no tenderness/mass/nodules, no Jugular venous pressure elevation    Lungs:   Clear to auscultation bilaterally, rare end expiratory wheezing, no rales, no respiratory distress   Chest Wall:  No deformity or tenderness to palpation   Heart:  Regular rate and rhythm, normal S1, normal caliber P2, no murmur, no rub, no S3/S4, PMI non-displaced. No heave. Abdomen:   Soft, non-tender, with normoactive bowel sounds. No masses, no hepatosplenomegaly   Extremities: No cyanosis, clubbing, trace pitting feet bilaterally with varicose veins. Vascular: 2+ radial,dorsalis pedis and posterior tibial pulses bilaterally. Brisk carotid upstrokes without carotid bruit. Skin: Skin color, texture, turgor are normal with no rashes or ulceration. Pysch: Euthymic mood, appropriate affect   Neurologic: Oriented to person, place and time. No slurred speech or facial asymmetry. No motor or sensory deficits on gross examination.          Labs:   CBC:   Lab Results   Component Value Date    WBC 4.3 08/06/2021    RBC 4.78 08/06/2021    HGB 16.0 2021    HCT 48.5 2021    .4 2021    RDW 13.5 2021     2021     CMP:  Lab Results   Component Value Date     2021    K 4.2 2021    CL 95 2021    CO2 32 2021    BUN 18 2021    CREATININE <0.5 2021    GFRAA >60 2021    AGRATIO 1.1 2021    LABGLOM >60 2021    GLUCOSE 77 2021    PROT 7.0 2021    CALCIUM 10.0 2021    BILITOT 0.7 2021    ALKPHOS 110 2021    AST 22 2021    ALT 19 2021     PT/INR:  No results found for: PTINR  HgBA1c:  Lab Results   Component Value Date    LABA1C 6.3 2021     Lab Results   Component Value Date    TROPONINI <0.01 2021     Lab Results   Component Value Date    CHOL 121 2021    CHOL 121 2021     Lab Results   Component Value Date    TRIG 49 2021    TRIG 83 2021     Lab Results   Component Value Date    HDL 41 2021    HDL 39 (L) 2021     Lab Results   Component Value Date    LDLCALC 70 2021    LDLCALC 65 2021     Lab Results   Component Value Date    LABVLDL 10 2021    LABVLDL 17 2021     No results found for: CHOLHDLRATIO      Cardiac Data:     [id number]  Normal sinus rhythmRight axis deviationIncomplete right bundle branch blockRight ventricular hypertrophy with repolarization abnormalityT wave abnormality, consider anterior ischemiaProlonged QTAbnormal ECGWhen compared with ECG of 05-AUG-2021 14:42,T wave inversion more evident in Anterior leadsConfirmed by Morrie Cockayne MD, Félix Colbert ([id number]) on 2021 4:20:17 PM       Echo: 2021  Summary   Left ventricular systolic function is low normal with a visually estimated   ejection fraction of 50-55%. EF estimated by 3D at 52 %. The left ventricle is normal in size with normal wall thickness. Flattened in diastole and systole (\"D-shaped septum\") consistent with right   ventricular volume and pressure overload.    Normal the note above accurately reflects all work, treatment, procedures, and medical decision making performed by me. Ava Fuentes MD, personally performed the services described in this documentation as scribed by  Elida Young RN   in my presence, and it is both accurate and complete to the best of our ability. I will address the patient's cardiac risk factors and adjusted pharmacologic treatment as needed. In addition, I have reinforced the need for patient directed risk factor modification. All questions and concerns were addressed to the patient/family. Alternatives to my treatment were discussed. Thank you for allowing us to participate in the care of Abbe Velázquez. Please call me with any questions [phone].     Sunita Ruff MD, [facility]  Cardiovascular Disease  [facility]  [phone] [facility]  [phone] [address] [place] [address]  12/3/2021 11:03 AM

## 2021-12-03 ENCOUNTER — OFFICE VISIT (OUTPATIENT)
Dept: CARDIOLOGY CLINIC | Age: 56
End: 2021-12-03
Payer: COMMERCIAL

## 2021-12-03 VITALS
HEIGHT: 72 IN | DIASTOLIC BLOOD PRESSURE: 80 MMHG | HEART RATE: 67 BPM | BODY MASS INDEX: 27.9 KG/M2 | SYSTOLIC BLOOD PRESSURE: 132 MMHG | WEIGHT: 206 LBS

## 2021-12-03 DIAGNOSIS — I50.30 CONGESTIVE HEART FAILURE WITH PRESERVED LV FUNCTION, NYHA CLASS 1 (HCC): ICD-10-CM

## 2021-12-03 DIAGNOSIS — I50.9 NEW ONSET OF CONGESTIVE HEART FAILURE (HCC): ICD-10-CM

## 2021-12-03 DIAGNOSIS — I27.20 PULMONARY HYPERTENSION (HCC): Primary | ICD-10-CM

## 2021-12-03 DIAGNOSIS — I10 ESSENTIAL HYPERTENSION: ICD-10-CM

## 2021-12-03 PROCEDURE — 99214 OFFICE O/P EST MOD 30 MIN: CPT | Performed by: INTERNAL MEDICINE

## 2021-12-03 NOTE — PATIENT INSTRUCTIONS
PLAN:  1. Obtain a new scale for accurrate daily weights. 2. We encouraged modest weight loss through implementing appropriate dietary measures as well as initiation of a graded exercise program with the ultimate goal of 150 minutes of aerobic exercise weekly. 3. Follow up with Courtney Lozoya DO for COPD/emphysema management, and PFT results. 4. Continue with sleep study to assess obstructive sleep apnea. 5. Limited Echo to evaluate LV function and pulmonary pressures. Your provider has ordered testing for further evaluation. An order/prescription has been included in your paper work.  To schedule outpatient testing, contact Central Scheduling by calling [address] [place] [address] ([phone]). -We will call you with these results  6. Continue taking all medications as prescribed.     Follow up with me in 6 months

## 2021-12-06 ENCOUNTER — TELEPHONE (OUTPATIENT)
Dept: FAMILY MEDICINE CLINIC | Age: 56
End: 2021-12-06

## 2021-12-06 NOTE — TELEPHONE ENCOUNTER
----- Message from Felipe Price DO sent at 12/5/2021  1:55 PM EST -----  Please call patient and let him know that Dr. Alyse Ludwig reached out to me regarding his continued breathing difficulty. Please schedule for any available 15 min appointment to discuss treatment options.  Thank you

## 2022-02-03 ENCOUNTER — TELEPHONE (OUTPATIENT)
Dept: PULMONOLOGY | Age: 57
End: 2022-02-03

## 2022-02-03 ENCOUNTER — VIRTUAL VISIT (OUTPATIENT)
Dept: SLEEP MEDICINE | Age: 57
End: 2022-02-03
Payer: COMMERCIAL

## 2022-02-03 DIAGNOSIS — R53.83 OTHER FATIGUE: ICD-10-CM

## 2022-02-03 DIAGNOSIS — R06.83 SNORING: Primary | ICD-10-CM

## 2022-02-03 DIAGNOSIS — I50.9 NEW ONSET OF CONGESTIVE HEART FAILURE (HCC): ICD-10-CM

## 2022-02-03 DIAGNOSIS — I10 ESSENTIAL HYPERTENSION: ICD-10-CM

## 2022-02-03 DIAGNOSIS — R29.818 SUSPECTED SLEEP APNEA: ICD-10-CM

## 2022-02-03 PROCEDURE — 99244 OFF/OP CNSLTJ NEW/EST MOD 40: CPT | Performed by: NURSE PRACTITIONER

## 2022-02-03 ASSESSMENT — SLEEP AND FATIGUE QUESTIONNAIRES
HOW LIKELY ARE YOU TO NOD OFF OR FALL ASLEEP WHEN YOU ARE A PASSENGER IN A CAR FOR AN HOUR WITHOUT A BREAK: 0
HOW LIKELY ARE YOU TO NOD OFF OR FALL ASLEEP WHILE SITTING INACTIVE IN A PUBLIC PLACE: 0
HOW LIKELY ARE YOU TO NOD OFF OR FALL ASLEEP WHILE LYING DOWN TO REST IN THE AFTERNOON WHEN CIRCUMSTANCES PERMIT: 0
HOW LIKELY ARE YOU TO NOD OFF OR FALL ASLEEP WHILE SITTING AND TALKING TO SOMEONE: 0
HOW LIKELY ARE YOU TO NOD OFF OR FALL ASLEEP WHILE SITTING QUIETLY AFTER LUNCH WITHOUT ALCOHOL: 0
ESS TOTAL SCORE: 1
HOW LIKELY ARE YOU TO NOD OFF OR FALL ASLEEP IN A CAR, WHILE STOPPED FOR A FEW MINUTES IN TRAFFIC: 0
HOW LIKELY ARE YOU TO NOD OFF OR FALL ASLEEP WHILE WATCHING TV: 1
HOW LIKELY ARE YOU TO NOD OFF OR FALL ASLEEP WHILE SITTING AND READING: 0

## 2022-02-03 NOTE — TELEPHONE ENCOUNTER
Within this Telehealth Consent, the terms you and yours refer to the person using the Telehealth Service (Service), or in the case of a use of the Service by or on behalf of a minor, you and yours refer to and include (i) the parent or legal guardian who provides consent to the use of the Service by such minor or uses the Service on behalf of such minor, and (ii) the minor for whom consent is being provided or on whose behalf the Service is being utilized. When using Service, you will be consulting with your health care providers via the use of Telehealth.   Telehealth involves the delivery of healthcare services using electronic communications, information technology or other means between a healthcare provider and a patient who are not in the same physical location. Telehealth may be used for diagnosis, treatment, follow-up and/or patient education, and may include, but is not limited to, one or more of the following:    Electronic transmission of medical records, photo images, personal health information or other data between a patient and a healthcare provider    Interactions between a patient and healthcare provider via audio, video and/or data communications    Use of output data from medical devices, sound and video files    Anticipated Benefits   The use of Telehealth by your Provider(s) through the Service may have the following possible benefits:    Making it easier and more efficient for you to access medical care and treatment for the conditions treated by such Provider(s) utilizing the Service    Allowing you to obtain medical care and treatment by Provider(s) at times that are convenient for you    Enabling you to interact with Provider(s) without the necessity of an in-office appointment     Possible Risks   While the use of Telehealth can provide potential benefits for you, there are also potential risks associated with the use of Telehealth.  These risks include, but may not be limited to the following:    Your Provider(s) may not able to provide medical treatment for your particular condition and you may be required to seek alternative healthcare or emergency care services.  The electronic systems or other security protocols or safeguards used in the Service could fail, causing a breach of privacy of your medical or other information.  Given regulatory requirements in certain jurisdictions, your Provider(s) diagnosis and/or treatment options, especially pertaining to certain prescriptions, may be limited. Acceptance   1. You understand that Services will be provided via Telehealth. This process involves the use of HIPAA compliant and secure, real-time audio-visual interfacing with a qualified and appropriately trained provider located at [facility]. 2. You understand that, under no circumstances, will this session be recorded. 3. You understand that the Provider(s) at [facility] and other clinical participants will be party to the information obtained during the Telehealth session in accordance with best medical practices. 4. You understand that the information obtained during the Telehealth session will be used to help determine the most appropriate treatment options. 5. You understand that You have the right to revoke this consent at any point in time. 6. You understand that Telehealth is voluntary, and that continued treatment is not dependent upon consent. 7. You understand that, in the event of non-consent to Telehealth services and/or technical difficulties, you will obtain services as typically provided in the absence of Telehealth technology. 8. You understand that this consent will be kept in Your medical record. 9. No potential benefits from the use of Telehealth or specific results can be guaranteed. Your condition may not be cured or improved and, in some cases, may get worse.    10. There are limitations in the provision of medical care and treatment via Telehealth and the Service and you may not be able to receive diagnosis and/or treatment through the Service for every condition for which you seek diagnosis and/or treatment. 11. There are potential risks to the use of Telehealth, including but not limited to the risks described in this Telehealth Consent. 12. Your Provider(s) have discussed the use of Telehealth and the Service with you, including the benefits and risks of such and you have provided oral consent to your Provider(s) for the use of Telehealth and the Service. 15. You understand that it is your duty to provide your Provider(s) truthful, accurate and complete information, including all relevant information regarding care that you may have received or may be receiving from other healthcare providers outside of the Service. 14. You understand that each of your Provider(s) may determine in his or sole discretion that your condition is not suitable for diagnosis and/or treatment using the Service, and that you may need to seek medical care and treatment a specialist or other healthcare provider, outside of the Service. 15. You understand that you are fully responsible for payment for all services provided by Provider(s) or through use of the Service and that you may not be able to use third-party insurance. 16. You represent that (a) you have read this Telehealth Consent carefully, (b) you understand the risks and benefits of the Service and the use of Telehealth in the medical care and treatment provided to you by Provider(s) using the Service, and (c) you have the legal capacity and authority to provide this consent for yourself and/or the minor for which you are consenting under applicable federal and state laws, including laws relating to the age of [de-identified] and/or parental/guardian consent.    17. You give your informed consent to the use of Telehealth by Provider(s) using the Service under the terms described in the Terms of Service and this Telehealth Consent. The patient was read the following statement and has consented to the visit as of 2/3/22. The patient has been scheduled for their first telehealth visit on 2/3/22 with Hermes Wu CNP.

## 2022-02-03 NOTE — LETTER
[facility]  [address]  [address]  Phone: [phone]  Fax: [phone]           SANCHEZ Bauer CNP      February 3, 2022     Patient: Nasir Agustin   MR Number: [id number]   YOB: 1965   Date of Visit: 2/3/2022       Dear Dr. Asif Primes: Thank you for referring Arleth Collins to me for evaluation/treatment. Below are the relevant portions of my assessment and plan of care. Assessment:       · Snoring  · Suspected sleep apnea  · Fatigue  · Sleep onset insomniapsychophysiological hyperarousal, poor sleep hygiene  · Hypertension, CHF followed by cardiology  · COPDfollowed by PCP    STOP-BANG score= 5= high risk for LALA        Plan:      · PSG to evaluate forsleep related breathing disorder. · Ointment after testing to discuss results  · Treatment options were discussed with patient if PSG reveals LALA, including CPAP therapy, oral appliances and upper airway surgery. · Sleep hygiene  · Cognitive behavioral therapy was discussed with patient including stimulus control and sleep restriction  · Avoidsedatives, alcohol and caffeinated drinks at bed time. · No driving motorized vehicles or operating heavy machinery while fatigue, drowsy or sleepy. · Weight loss is also recommended as a long-term intervention. · Complications of LALA if not treated were discussed with patient patient to include systemic hypertension, pulmonary hypertension, cardiovascular morbidities, car accidents and all cause mortality. If you have questions, please do not hesitate to call me. I look forward to following Zenia Nunn along with you.     Sincerely,        Hettie , APRN - CNP    CC providers:    No Recipients

## 2022-02-18 ENCOUNTER — NURSE ONLY (OUTPATIENT)
Dept: FAMILY MEDICINE CLINIC | Age: 57
End: 2022-02-18
Payer: COMMERCIAL

## 2022-02-18 DIAGNOSIS — Z23 NEED FOR SHINGLES VACCINE: Primary | ICD-10-CM

## 2022-02-18 PROCEDURE — 90471 IMMUNIZATION ADMIN: CPT | Performed by: STUDENT IN AN ORGANIZED HEALTH CARE EDUCATION/TRAINING PROGRAM

## 2022-02-18 PROCEDURE — 90750 HZV VACC RECOMBINANT IM: CPT | Performed by: STUDENT IN AN ORGANIZED HEALTH CARE EDUCATION/TRAINING PROGRAM

## 2022-04-22 NOTE — TELEPHONE ENCOUNTER
Refill Request     Last Seen: Last Seen Department: 11/11/2021  Last Seen by PCP: Visit date not found    Last Written: 11/18/2021    Next Appointment:   Future Appointments   Date Time Provider Patricia Rust   6/9/2022 10:30 AM MD Carter Frank             Requested Prescriptions     Pending Prescriptions Disp Refills    albuterol sulfate  (90 Base) MCG/ACT inhaler [Pharmacy Med Name: ALBUTEROL HFA 90 MCG INHALER] 1 each 2     Sig: INHALE 2 PUFFS BY MOUTH FOUR TIMES A DAY AS NEEDED FOR WHEEZING

## 2022-04-23 RX ORDER — ALBUTEROL SULFATE 90 UG/1
AEROSOL, METERED RESPIRATORY (INHALATION)
Qty: 1 EACH | Refills: 2 | Status: SHIPPED | OUTPATIENT
Start: 2022-04-23

## 2022-05-11 ENCOUNTER — NURSE TRIAGE (OUTPATIENT)
Dept: OTHER | Facility: CLINIC | Age: 57
End: 2022-05-11

## 2022-05-11 NOTE — TELEPHONE ENCOUNTER
Received call from Julissa Cueva at [facility] with [employer] Complaint. Subjective: Caller states \"leg swelling and tightness, abdominal swelling, SOB\"     Current Symptoms: Abdominal swelling x3 weeks. B/l legs swollen up to groin. Genital area swollen. Had this last August and went to the hospital. Lasix does not seem to be helping. Difficulty breathing with exertion     Takes 40 mg of Lasix per day but missed a few days of rx, unsure when    Onset: 3 weeks ago;      Associated Symptoms: reduced activity, Eating, drinking, sleeping, urinating okay    Pain Severity: backache after work last night. 0/10 currently    Temperature: Denies    What has been tried: Lasix    Recommended disposition: Go to ED/UCC Now (Or to Office with PCP Approval)    Care advice provided, patient verbalizes understanding; denies any other questions or concerns; instructed to call back for any new or worsening symptoms. Writer provided warm transfer to [facility] at [facility] for 2nd level triage     Attention Provider: Thank you for allowing me to participate in the care of your patient. The patient was connected to triage in response to information provided to the ECC/PSC. Please do not respond through this encounter as the response is not directed to a shared pool.       Reason for Disposition   SEVERE swelling (e.g., swelling extends above knee, entire leg is swollen, weeping fluid)    Protocols used: LEG SWELLING AND EDEMA-ADULT-OH

## 2022-05-12 ENCOUNTER — OFFICE VISIT (OUTPATIENT)
Dept: FAMILY MEDICINE CLINIC | Age: 57
End: 2022-05-12
Payer: COMMERCIAL

## 2022-05-12 VITALS
DIASTOLIC BLOOD PRESSURE: 90 MMHG | TEMPERATURE: 97.6 F | HEIGHT: 71 IN | OXYGEN SATURATION: 93 % | WEIGHT: 235 LBS | BODY MASS INDEX: 32.9 KG/M2 | SYSTOLIC BLOOD PRESSURE: 144 MMHG | HEART RATE: 97 BPM

## 2022-05-12 DIAGNOSIS — J44.9 CHRONIC OBSTRUCTIVE PULMONARY DISEASE, UNSPECIFIED COPD TYPE (HCC): ICD-10-CM

## 2022-05-12 DIAGNOSIS — I50.9 ACUTE DECOMPENSATED HEART FAILURE (HCC): Primary | ICD-10-CM

## 2022-05-12 DIAGNOSIS — I50.9 ACUTE DECOMPENSATED HEART FAILURE (HCC): ICD-10-CM

## 2022-05-12 LAB
ALBUMIN SERPL-MCNC: 4.3 G/DL (ref 3.4–5)
ANION GAP SERPL CALCULATED.3IONS-SCNC: 14 MMOL/L (ref 3–16)
BUN BLDV-MCNC: 22 MG/DL (ref 7–20)
CALCIUM SERPL-MCNC: 9.4 MG/DL (ref 8.3–10.6)
CHLORIDE BLD-SCNC: 99 MMOL/L (ref 99–110)
CO2: 27 MMOL/L (ref 21–32)
CREAT SERPL-MCNC: 0.6 MG/DL (ref 0.9–1.3)
GFR AFRICAN AMERICAN: >60
GFR NON-AFRICAN AMERICAN: >60
GLUCOSE BLD-MCNC: 131 MG/DL (ref 70–99)
PHOSPHORUS: 3.8 MG/DL (ref 2.5–4.9)
POTASSIUM SERPL-SCNC: 4.8 MMOL/L (ref 3.5–5.1)
PRO-BNP: 1679 PG/ML (ref 0–124)
SODIUM BLD-SCNC: 140 MMOL/L (ref 136–145)

## 2022-05-12 PROCEDURE — 99214 OFFICE O/P EST MOD 30 MIN: CPT | Performed by: STUDENT IN AN ORGANIZED HEALTH CARE EDUCATION/TRAINING PROGRAM

## 2022-05-12 NOTE — PROGRESS NOTES
Patient: Sulma Sprague is a 64 y.o. male who presents today with the following Chief Complaint(s):  Chief Complaint   Patient presents with    Bloated    Shortness of Breath    Leg Swelling         HPI   Heart failure  Typically missing 2-3 doses of Lasix a week. Was previously taking lasix and could tell it was greatly increasing urination, now feels that it is not as effective. Not monitoring dialy weights or fluid intake. Has been slowly increasing in size over months. Reports that he now feels tense skin in bilateral legs and abdomen as well as significant scrotal swelling. Denies orthopnea. COPD/LALA  Using rescue inhaler twice daily. Did not establish with pulmonology and did not have sleep study done. Current Outpatient Medications   Medication Sig Dispense Refill    umeclidinium-vilanterol (ANORO ELLIPTA) 62.5-25 MCG/INH AEPB inhaler Inhale 1 puff into the lungs daily 60 each 1    albuterol sulfate  (90 Base) MCG/ACT inhaler INHALE 2 PUFFS BY MOUTH FOUR TIMES A DAY AS NEEDED FOR WHEEZING 1 each 2    atorvastatin (LIPITOR) 20 MG tablet Take 1 tablet by mouth nightly 90 tablet 1    losartan (COZAAR) 50 MG tablet Take 1 tablet by mouth daily 90 tablet 1    aspirin 81 MG EC tablet Take 1 tablet by mouth daily 90 tablet 1    furosemide (LASIX) 40 MG tablet Take 1 tablet by mouth daily 90 tablet 1    metFORMIN (GLUCOPHAGE) 500 MG tablet Take 1 tablet by mouth daily (with breakfast) 90 tablet 1     No current facility-administered medications for this visit. Patient's past medical history, surgical history, family history, medications,  andallergies  were all reviewed and updated as appropriate today. Review of Systems  All other systems reviewed and negative    Physical Exam   Tende LE edema bilaterally through upper thigh. Abdomen also distended with fluid. Scrotum not examined. Wheezing heard diffusely bilaterally. NO visible respiratory distress.   Heart RRR, no murmur  Vitals:    05/12/22 1133   BP: (!) 144/90   Pulse: 97   Temp: 97.6 °F (36.4 °C)   SpO2: 93%       Assessment:  Encounter Diagnoses   Name Primary?  Acute decompensated heart failure (HCC) Yes    Chronic obstructive pulmonary disease, unspecified COPD type ([facility]        Plan:  1. Acute decompensated heart failure (HCC)  We will increase Lasix to 80 mg twice daily from 40 mg daily. Instructed patient to monitor daily weights and limit fluid intake to 64 ounces daily. Discussed red flag symptoms including acute shortness of breath that would require ED evaluation for diuresis. Discussed risks of home diuresis and risk to kidney function which patient expressed understanding of  - Brain Natriuretic Peptide; Future  - Renal Function Panel; Future    2. Chronic obstructive pulmonary disease, unspecified COPD type ([facility]  Has not established with pulmonology. We will start Anoro. Currently utilizing rescue inhaler twice daily. - umeclidinium-vilanterol (ANORO ELLIPTA) 62.5-25 MCG/INH AEPB inhaler; Inhale 1 puff into the lungs daily  Dispense: 60 each; Refill: 1        No follow-ups on file.

## 2022-05-13 DIAGNOSIS — I50.9 ACUTE DECOMPENSATED HEART FAILURE (HCC): Primary | ICD-10-CM

## 2022-05-13 RX ORDER — POTASSIUM CHLORIDE 20 MEQ/1
20 TABLET, EXTENDED RELEASE ORAL 2 TIMES DAILY
Qty: 30 TABLET | Refills: 1 | Status: SHIPPED | OUTPATIENT
Start: 2022-05-13 | End: 2022-06-14

## 2022-05-17 RX ORDER — FUROSEMIDE 40 MG/1
TABLET ORAL
Qty: 90 TABLET | Refills: 1 | Status: CANCELLED | OUTPATIENT
Start: 2022-05-17

## 2022-05-17 NOTE — TELEPHONE ENCOUNTER
----- Message from Anisha Curtis DO sent at 5/13/2022 10:18 AM EDT -----  Regarding: CHF Patient  Matthews Ramírez,    I just saw this patient again yesterday. Poor compliance with medications and up 30lbs at least in water weight. Severe edema from abdomen down but luckily no respiratory complications. I was wondering if you would mind following up with him next week to see how his diuresis is going. He is coming in for repeat BMP on Monday to monitor kidney function during diuresis.  Thank you    Dr. Saranya Jang

## 2022-05-17 NOTE — TELEPHONE ENCOUNTER
Called patient to follow up on diuresis. Patient states he is taking lasix 80mg BID. He states he is watching fluids, no more than 64oz daily and sodium, he is not adding any table salt and trying to watch the sodium in the food he is eating. Avoiding fatty/fried foods as well. He states he is still swollen and has indents from shoes/socks but has lost a lot of water weight. He says its gotten better but feels there is still a lot to go. Morning weights from the last three days:  215lb 5/17  220 5/16  228 5/15    Due to his work schedule he plans to hopefully get blood work done tomorrow, Thursday at the latest.      Patient states he needs more lasix sent to his pharmacy. Confirmed he has started the potassium. Lasix pended for approval to his preferred pharmacy.

## 2022-05-19 ENCOUNTER — TELEPHONE (OUTPATIENT)
Dept: FAMILY MEDICINE CLINIC | Age: 57
End: 2022-05-19

## 2022-05-19 DIAGNOSIS — I50.9 ACUTE DECOMPENSATED HEART FAILURE (HCC): Primary | ICD-10-CM

## 2022-05-19 DIAGNOSIS — I50.9 ACUTE DECOMPENSATED HEART FAILURE (HCC): ICD-10-CM

## 2022-05-19 LAB
ANION GAP SERPL CALCULATED.3IONS-SCNC: 14 MMOL/L (ref 3–16)
BUN BLDV-MCNC: 24 MG/DL (ref 7–20)
CALCIUM SERPL-MCNC: 10.3 MG/DL (ref 8.3–10.6)
CHLORIDE BLD-SCNC: 99 MMOL/L (ref 99–110)
CO2: 30 MMOL/L (ref 21–32)
CREAT SERPL-MCNC: 0.9 MG/DL (ref 0.9–1.3)
GFR AFRICAN AMERICAN: >60
GFR NON-AFRICAN AMERICAN: >60
GLUCOSE BLD-MCNC: 105 MG/DL (ref 70–99)
POTASSIUM SERPL-SCNC: 4.3 MMOL/L (ref 3.5–5.1)
SODIUM BLD-SCNC: 143 MMOL/L (ref 136–145)

## 2022-05-19 NOTE — TELEPHONE ENCOUNTER
Called patient, he states his weight was 205lbs today. He is out of lasix. Still having swelling in abdomen, unable to button pants. Legs/groin are improving, still having indents on feet/ankles from shoes/socks. Spoke with PCP, he wants to wait for patient blood work to evaluate his kidney function before going forward on more lasix. If function looks good, he will continue 80mg BID until his weight begins to platou. He sees cardio on 6/9. Patient called and informed of plan regarding lasix.

## 2022-05-20 RX ORDER — FUROSEMIDE 80 MG
80 TABLET ORAL DAILY
Qty: 60 TABLET | Refills: 3 | Status: SHIPPED | OUTPATIENT
Start: 2022-05-20 | End: 2022-06-20 | Stop reason: SDUPTHER

## 2022-06-01 RX ORDER — LOSARTAN POTASSIUM 50 MG/1
TABLET ORAL
Qty: 90 TABLET | Refills: 1 | Status: ON HOLD
Start: 2022-06-01 | End: 2022-11-03 | Stop reason: HOSPADM

## 2022-06-01 RX ORDER — ATORVASTATIN CALCIUM 20 MG/1
TABLET, FILM COATED ORAL
Qty: 90 TABLET | Refills: 1 | Status: SHIPPED | OUTPATIENT
Start: 2022-06-01

## 2022-06-01 RX ORDER — FUROSEMIDE 80 MG
80 TABLET ORAL DAILY
Qty: 90 TABLET | Refills: 0 | OUTPATIENT
Start: 2022-06-01

## 2022-06-01 RX ORDER — ASPIRIN 81 MG/1
TABLET, COATED ORAL
Qty: 90 TABLET | Refills: 1 | Status: SHIPPED | OUTPATIENT
Start: 2022-06-01

## 2022-06-01 NOTE — TELEPHONE ENCOUNTER
Refill Request     CONFIRM preferrred pharmacy with the patient. If Mail Order Rx - Pend for 90 day refill.       Last Seen: Last Seen Department: 5/12/2022  Last Seen by PCP: 5/12/2022    Last Written: 11/18/2021 90 Tablet 1 Refill    Next Appointment:   Future Appointments   Date Time Provider Patricia Rust   6/9/2022 10:30 AM MD Christine Kam     Requested Prescriptions     Pending Prescriptions Disp Refills    atorvastatin (LIPITOR) 20 MG tablet [Pharmacy Med Name: ATORVASTATIN 20 MG TABLET] 30 tablet      Sig: TAKE ONE TABLET BY MOUTH ONCE NIGHTLY   ,

## 2022-06-01 NOTE — TELEPHONE ENCOUNTER
Refill Request     CONFIRM preferrred pharmacy with the patient. If Mail Order Rx - Pend for 90 day refill.       Last Seen: Last Seen Department: 5/12/2022  Last Seen by PCP: Visit date not found    Last Written:   Lasix- 05/20/2022 60 Tablet 3 Refills  Metformin- 11/05/2021 90 Tablet 1 Refill  Aspirin- 11/05/2021 90 Tablet 1 Refill  Losartan- 11/05/2021 90 Tablet 1 Refill    Next Appointment:   Future Appointments   Date Time Provider Patricia Rust   6/9/2022 10:30 AM MD Robert Terry     Requested Prescriptions     Pending Prescriptions Disp Refills    losartan (COZAAR) 50 mg tablet [Pharmacy Med Name: LOSARTAN POTASSIUM 50 MG TAB] 30 tablet      Sig: TAKE ONE TABLET BY MOUTH DAILY    ASPIRIN LOW DOSE 81 MG EC tablet [Pharmacy Med Name: ASPIRIN EC 81 MG TABLET] 30 tablet      Sig: TAKE ONE TABLET BY MOUTH DAILY    metFORMIN (GLUCOPHAGE) 500 MG tablet [Pharmacy Med Name: metFORMIN  MG TABLET] 30 tablet      Sig: TAKE ONE TABLET BY MOUTH DAILY WITH BREAKFAST    furosemide (LASIX) 40 MG tablet [Pharmacy Med Name: FUROSEMIDE 40 MG TABLET] 30 tablet      Sig: TAKE ONE TABLET BY MOUTH DAILY

## 2022-06-09 ENCOUNTER — HOSPITAL ENCOUNTER (OUTPATIENT)
Age: 57
Discharge: HOME | End: 2022-06-09
Payer: COMMERCIAL

## 2022-06-09 ENCOUNTER — OFFICE VISIT (OUTPATIENT)
Dept: CARDIOLOGY CLINIC | Age: 57
End: 2022-06-09
Payer: COMMERCIAL

## 2022-06-09 VITALS
OXYGEN SATURATION: 89 % | HEART RATE: 82 BPM | DIASTOLIC BLOOD PRESSURE: 80 MMHG | HEIGHT: 71 IN | BODY MASS INDEX: 30.31 KG/M2 | SYSTOLIC BLOOD PRESSURE: 110 MMHG | WEIGHT: 216.5 LBS

## 2022-06-09 DIAGNOSIS — I50.9 ACUTE DECOMPENSATED HEART FAILURE (HCC): ICD-10-CM

## 2022-06-09 DIAGNOSIS — J44.9 CHRONIC OBSTRUCTIVE PULMONARY DISEASE, UNSPECIFIED COPD TYPE (HCC): ICD-10-CM

## 2022-06-09 DIAGNOSIS — I50.9 NEW ONSET OF CONGESTIVE HEART FAILURE (HCC): ICD-10-CM

## 2022-06-09 DIAGNOSIS — I10 ESSENTIAL HYPERTENSION: ICD-10-CM

## 2022-06-09 DIAGNOSIS — I10 ESSENTIAL HYPERTENSION: Primary | ICD-10-CM

## 2022-06-09 LAB
ANION GAP SERPL CALCULATED.3IONS-SCNC: 11 MMOL/L (ref 3–16)
BUN BLDV-MCNC: 29 MG/DL (ref 7–20)
CALCIUM SERPL-MCNC: 10.2 MG/DL (ref 8.3–10.6)
CHLORIDE BLD-SCNC: 96 MMOL/L (ref 99–110)
CO2: 32 MMOL/L (ref 21–32)
CREAT SERPL-MCNC: 0.9 MG/DL (ref 0.9–1.3)
GFR AFRICAN AMERICAN: >60
GFR NON-AFRICAN AMERICAN: >60
GLUCOSE BLD-MCNC: 120 MG/DL (ref 70–99)
POTASSIUM SERPL-SCNC: 4.3 MMOL/L (ref 3.5–5.1)
PRO-BNP: 957 PG/ML (ref 0–124)
SODIUM BLD-SCNC: 139 MMOL/L (ref 136–145)

## 2022-06-09 PROCEDURE — 80048 BASIC METABOLIC PNL TOTAL CA: CPT

## 2022-06-09 PROCEDURE — 36415 COLL VENOUS BLD VENIPUNCTURE: CPT

## 2022-06-09 PROCEDURE — 99214 OFFICE O/P EST MOD 30 MIN: CPT | Performed by: INTERNAL MEDICINE

## 2022-06-09 PROCEDURE — 83880 ASSAY OF NATRIURETIC PEPTIDE: CPT

## 2022-06-09 NOTE — PATIENT INSTRUCTIONS
PLAN:  1. NEED to get ECHO done. I recommend ECHO to evaluate LV function and size, wall motion, and valves for any structural abnormalities. CALL TO SCHEDULE  2. Lab work bmp/bnp  3. Referrral Lung specialist  Dr. Glenis Knowles  4. Continue lasix 80 mg  twice a day   5. Limit fluid intake to 64 ounces a day  6. Monitor salt intake  7. Call if sob or increase weight    Your provider has ordered testing for further evaluation. An order/prescription has been included in your paper work.  To schedule outpatient testing, contact Central Scheduling by calling [address] [place] [address] ([phone]).     Follow up with me in 8 weeks

## 2022-06-10 ENCOUNTER — TELEPHONE (OUTPATIENT)
Dept: CARDIOLOGY CLINIC | Age: 57
End: 2022-06-10

## 2022-06-10 NOTE — TELEPHONE ENCOUNTER
----- Message from Mariia Michelle MD sent at 6/10/2022 10:05 AM EDT -----  Please let Monisha Petrona know I reviewed his lab work, electrolytes and kidney function are stable. Fluid level in the body appears back to baseline.   We will follow-up results of echocardiogram and call

## 2022-06-10 NOTE — TELEPHONE ENCOUNTER
Created telephone encounter. [facility] requesting a call back to the office.

## 2022-06-14 DIAGNOSIS — I50.9 ACUTE DECOMPENSATED HEART FAILURE (HCC): ICD-10-CM

## 2022-06-14 RX ORDER — POTASSIUM CHLORIDE 1500 MG/1
TABLET, EXTENDED RELEASE ORAL
Qty: 30 TABLET | Refills: 1 | Status: SHIPPED | OUTPATIENT
Start: 2022-06-14 | End: 2022-07-17

## 2022-06-14 NOTE — TELEPHONE ENCOUNTER
Refill Request     CONFIRM preferrred pharmacy with the patient. If Mail Order Rx - Pend for 90 day refill.       Last Seen: Last Seen Department: 5/12/2022  Last Seen by PCP: 5/12/2022    Last Written: 05/13/2022 30 Tablet 1 Refill    Next Appointment:   Future Appointments   Date Time Provider Patricia Rust   8/11/2022 10:15 AM MD Indra Valiente       Requested Prescriptions     Pending Prescriptions Disp Refills    KLOR-CON M20 20 MEQ extended release tablet [Pharmacy Med Name: KLOR-CON M20 TABLET] 30 tablet 1     Sig: TAKE ONE TABLET BY MOUTH TWICE A DAY WHILE ON INCREASED DOSE OF FUROSEMIDE

## 2022-06-20 DIAGNOSIS — I50.9 ACUTE DECOMPENSATED HEART FAILURE (HCC): ICD-10-CM

## 2022-06-20 RX ORDER — FUROSEMIDE 80 MG
80 TABLET ORAL 2 TIMES DAILY
Qty: 180 TABLET | Refills: 1 | Status: SHIPPED | OUTPATIENT
Start: 2022-06-20 | End: 2022-06-24 | Stop reason: SDUPTHER

## 2022-06-20 NOTE — TELEPHONE ENCOUNTER
Patient needs a refill on the lasix with updated directions. The pharmacy has him taking it once a day and he takes it twice a day so they wont fill it. They state it is too early.

## 2022-06-24 DIAGNOSIS — I50.9 ACUTE DECOMPENSATED HEART FAILURE (HCC): ICD-10-CM

## 2022-06-24 RX ORDER — FUROSEMIDE 80 MG
80 TABLET ORAL 2 TIMES DAILY
Qty: 180 TABLET | Refills: 1 | Status: ON HOLD
Start: 2022-06-24 | End: 2022-11-03 | Stop reason: HOSPADM

## 2022-06-24 NOTE — TELEPHONE ENCOUNTER
.  Refill Request     CONFIRM preferrred pharmacy with the patient. If Mail Order Rx - Pend for 90 day refill.       Last Seen: Last Seen Department: 5/12/2022  Last Seen by PCP: 5/12/2022    Last Written: 6/20/22 180 with 1     Next Appointment:   Future Appointments   Date Time Provider Patricia Rust   8/11/2022 10:15 AM MD Amadeo Macias       Requested Prescriptions     Pending Prescriptions Disp Refills    furosemide (LASIX) 80 MG tablet 180 tablet 1     Sig: Take 1 tablet by mouth 2 times daily

## 2022-07-16 DIAGNOSIS — I50.9 ACUTE DECOMPENSATED HEART FAILURE (HCC): ICD-10-CM

## 2022-07-17 ENCOUNTER — APPOINTMENT (OUTPATIENT)
Dept: ULTRASOUND IMAGING | Age: 57
End: 2022-07-17
Payer: COMMERCIAL

## 2022-07-17 ENCOUNTER — APPOINTMENT (OUTPATIENT)
Dept: CT IMAGING | Age: 57
End: 2022-07-17
Payer: COMMERCIAL

## 2022-07-17 ENCOUNTER — HOSPITAL ENCOUNTER (EMERGENCY)
Age: 57
Discharge: HOME | End: 2022-07-17
Attending: EMERGENCY MEDICINE
Payer: COMMERCIAL

## 2022-07-17 VITALS
OXYGEN SATURATION: 100 % | RESPIRATION RATE: 16 BRPM | BODY MASS INDEX: 30.68 KG/M2 | WEIGHT: 220 LBS | TEMPERATURE: 98.7 F | DIASTOLIC BLOOD PRESSURE: 56 MMHG | SYSTOLIC BLOOD PRESSURE: 102 MMHG | HEART RATE: 65 BPM

## 2022-07-17 DIAGNOSIS — K81.9 CHOLECYSTITIS: Primary | ICD-10-CM

## 2022-07-17 LAB
ALBUMIN SERPL-MCNC: 4.6 G/DL (ref 3.4–5)
ALP BLD-CCNC: 177 U/L (ref 40–129)
ALT SERPL-CCNC: 33 U/L (ref 10–40)
ANION GAP SERPL CALCULATED.3IONS-SCNC: 11 MMOL/L (ref 3–16)
AST SERPL-CCNC: 28 U/L (ref 15–37)
BASOPHILS ABSOLUTE: 0 K/UL (ref 0–0.2)
BASOPHILS RELATIVE PERCENT: 0.4 %
BILIRUB SERPL-MCNC: 1.1 MG/DL (ref 0–1)
BILIRUBIN DIRECT: 0.3 MG/DL (ref 0–0.3)
BILIRUBIN URINE: NEGATIVE
BILIRUBIN, INDIRECT: 0.8 MG/DL (ref 0–1)
BLOOD, URINE: NEGATIVE
BUN BLDV-MCNC: 25 MG/DL (ref 7–20)
CALCIUM SERPL-MCNC: 10.1 MG/DL (ref 8.3–10.6)
CHLORIDE BLD-SCNC: 94 MMOL/L (ref 99–110)
CLARITY: CLEAR
CO2: 30 MMOL/L (ref 21–32)
COLOR: YELLOW
CREAT SERPL-MCNC: 0.8 MG/DL (ref 0.9–1.3)
EOSINOPHILS ABSOLUTE: 0.4 K/UL (ref 0–0.6)
EOSINOPHILS RELATIVE PERCENT: 2.9 %
GFR AFRICAN AMERICAN: >60
GFR NON-AFRICAN AMERICAN: >60
GLUCOSE BLD-MCNC: 173 MG/DL (ref 70–99)
GLUCOSE URINE: NEGATIVE MG/DL
HCT VFR BLD CALC: 46.3 % (ref 40.5–52.5)
HEMOGLOBIN: 15.3 G/DL (ref 13.5–17.5)
KETONES, URINE: NEGATIVE MG/DL
LACTIC ACID: 1.7 MMOL/L (ref 0.4–2)
LEUKOCYTE ESTERASE, URINE: NEGATIVE
LIPASE: 28 U/L (ref 13–60)
LYMPHOCYTES ABSOLUTE: 1.7 K/UL (ref 1–5.1)
LYMPHOCYTES RELATIVE PERCENT: 13.5 %
MCH RBC QN AUTO: 31.6 PG (ref 26–34)
MCHC RBC AUTO-ENTMCNC: 33 G/DL (ref 31–36)
MCV RBC AUTO: 96 FL (ref 80–100)
MICROSCOPIC EXAMINATION: NORMAL
MONOCYTES ABSOLUTE: 0.9 K/UL (ref 0–1.3)
MONOCYTES RELATIVE PERCENT: 6.9 %
NEUTROPHILS ABSOLUTE: 9.7 K/UL (ref 1.7–7.7)
NEUTROPHILS RELATIVE PERCENT: 76.3 %
NITRITE, URINE: NEGATIVE
PDW BLD-RTO: 13.7 % (ref 12.4–15.4)
PH UA: 6 (ref 5–8)
PLATELET # BLD: 159 K/UL (ref 135–450)
PMV BLD AUTO: 8.5 FL (ref 5–10.5)
POTASSIUM REFLEX MAGNESIUM: 4 MMOL/L (ref 3.5–5.1)
PROCALCITONIN: 0.08 NG/ML (ref 0–0.15)
PROTEIN UA: NEGATIVE MG/DL
RBC # BLD: 4.82 M/UL (ref 4.2–5.9)
SODIUM BLD-SCNC: 135 MMOL/L (ref 136–145)
SPECIFIC GRAVITY UA: 1.01 (ref 1–1.03)
TOTAL PROTEIN: 8.2 G/DL (ref 6.4–8.2)
TROPONIN: <0.01 NG/ML
URINE REFLEX TO CULTURE: NORMAL
URINE TYPE: NORMAL
UROBILINOGEN, URINE: 0.2 E.U./DL
WBC # BLD: 12.7 K/UL (ref 4–11)

## 2022-07-17 PROCEDURE — 93005 ELECTROCARDIOGRAM TRACING: CPT | Performed by: PHYSICIAN ASSISTANT

## 2022-07-17 PROCEDURE — 84145 PROCALCITONIN (PCT): CPT

## 2022-07-17 PROCEDURE — 83036 HEMOGLOBIN GLYCOSYLATED A1C: CPT

## 2022-07-17 PROCEDURE — 83690 ASSAY OF LIPASE: CPT

## 2022-07-17 PROCEDURE — 83605 ASSAY OF LACTIC ACID: CPT

## 2022-07-17 PROCEDURE — 84484 ASSAY OF TROPONIN QUANT: CPT

## 2022-07-17 PROCEDURE — 6360000002 HC RX W HCPCS: Performed by: EMERGENCY MEDICINE

## 2022-07-17 PROCEDURE — 96375 TX/PRO/DX INJ NEW DRUG ADDON: CPT

## 2022-07-17 PROCEDURE — 6360000004 HC RX CONTRAST MEDICATION: Performed by: EMERGENCY MEDICINE

## 2022-07-17 PROCEDURE — 76705 ECHO EXAM OF ABDOMEN: CPT

## 2022-07-17 PROCEDURE — 81003 URINALYSIS AUTO W/O SCOPE: CPT

## 2022-07-17 PROCEDURE — 99285 EMERGENCY DEPT VISIT HI MDM: CPT

## 2022-07-17 PROCEDURE — 85025 COMPLETE CBC W/AUTO DIFF WBC: CPT

## 2022-07-17 PROCEDURE — 80048 BASIC METABOLIC PNL TOTAL CA: CPT

## 2022-07-17 PROCEDURE — 74177 CT ABD & PELVIS W/CONTRAST: CPT

## 2022-07-17 PROCEDURE — 96374 THER/PROPH/DIAG INJ IV PUSH: CPT

## 2022-07-17 PROCEDURE — 80076 HEPATIC FUNCTION PANEL: CPT

## 2022-07-17 RX ORDER — CIPROFLOXACIN 500 MG/1
500 TABLET, FILM COATED ORAL ONCE
Status: DISCONTINUED | OUTPATIENT
Start: 2022-07-17 | End: 2022-07-17 | Stop reason: HOSPADM

## 2022-07-17 RX ORDER — HYDROCODONE BITARTRATE AND ACETAMINOPHEN 5; 325 MG/1; MG/1
1 TABLET ORAL EVERY 6 HOURS PRN
Qty: 11 TABLET | Refills: 0 | Status: SHIPPED | OUTPATIENT
Start: 2022-07-17 | End: 2022-07-20

## 2022-07-17 RX ORDER — POTASSIUM CHLORIDE 1500 MG/1
TABLET, EXTENDED RELEASE ORAL
Qty: 30 TABLET | Refills: 1 | Status: ON HOLD
Start: 2022-07-17 | End: 2022-08-13 | Stop reason: HOSPADM

## 2022-07-17 RX ORDER — CIPROFLOXACIN 500 MG/1
500 TABLET, FILM COATED ORAL 2 TIMES DAILY
Qty: 20 TABLET | Refills: 0 | Status: SHIPPED | OUTPATIENT
Start: 2022-07-17 | End: 2022-07-27

## 2022-07-17 RX ORDER — MORPHINE SULFATE 4 MG/ML
4 INJECTION, SOLUTION INTRAMUSCULAR; INTRAVENOUS ONCE
Status: COMPLETED | OUTPATIENT
Start: 2022-07-17 | End: 2022-07-17

## 2022-07-17 RX ORDER — IBUPROFEN 800 MG/1
800 TABLET ORAL EVERY 8 HOURS PRN
Qty: 30 TABLET | Refills: 0 | Status: SHIPPED | OUTPATIENT
Start: 2022-07-17 | End: 2022-08-09

## 2022-07-17 RX ORDER — KETOROLAC TROMETHAMINE 30 MG/ML
15 INJECTION, SOLUTION INTRAMUSCULAR; INTRAVENOUS ONCE
Status: COMPLETED | OUTPATIENT
Start: 2022-07-17 | End: 2022-07-17

## 2022-07-17 RX ADMIN — IOPAMIDOL 75 ML: 755 INJECTION, SOLUTION INTRAVENOUS at 18:31

## 2022-07-17 RX ADMIN — KETOROLAC TROMETHAMINE 15 MG: 30 INJECTION, SOLUTION INTRAMUSCULAR at 21:06

## 2022-07-17 RX ADMIN — MORPHINE SULFATE 4 MG: 4 INJECTION, SOLUTION INTRAMUSCULAR; INTRAVENOUS at 21:06

## 2022-07-17 ASSESSMENT — PAIN - FUNCTIONAL ASSESSMENT: PAIN_FUNCTIONAL_ASSESSMENT: 0-10

## 2022-07-17 ASSESSMENT — PAIN SCALES - GENERAL
PAINLEVEL_OUTOF10: 8
PAINLEVEL_OUTOF10: 8

## 2022-07-17 NOTE — TELEPHONE ENCOUNTER
.Refill Request     CONFIRM preferrred pharmacy with the patient. If Mail Order Rx - Pend for 90 day refill.       Last Seen: Last Seen Department: 5/12/2022  Last Seen by PCP: 5/12/2022    Last Written: 6/14/22 30 with 1     Next Appointment:   Future Appointments   Date Time Provider Patricia Rust   8/11/2022 10:15 AM MD [facility]       Requested Prescriptions     Pending Prescriptions Disp Refills    KLOR-CON M20 20 MEQ extended release tablet [Pharmacy Med Name: KLOR-CON M20 TABLET] 30 tablet 1     Sig: TAKE ONE TABLET BY MOUTH TWICE A DAY WHILE ON INCREASED DOSE OF FUROSEMIDE

## 2022-07-17 NOTE — ED PROVIDER NOTES
[facility]  ED  EMERGENCY DEPARTMENT ENCOUNTER        Pt Name: Stephanie Narvaez  MRN: [id number]  [place] 1965  Date of evaluation: 7/17/2022  Provider: Arti Martin PA-C  PCP: Courtney Lozoya DO  Note Started: 3:32 PM EDT        I have seen and evaluated this patient with my supervising physician Gisselle Cano MD.    [address] [place] [address]       Chief Complaint   Patient presents with    Abdominal Pain     Right sided abd pain started today        HISTORY OF PRESENT ILLNESS   (Location, Timing/Onset, Context/Setting, Quality, Duration, Modifying Factors, Severity, Associated Signs and Symptoms)  Note limiting factors. Chief Complaint: Abdominal pain, nausea, vomiting    Stephanie Narvaez is a 64 y.o. male who presents with the above complaint. States woke this morning about 10 AM.  11 AM he had cereal.  Shortly thereafter he experienced some abdominal pain mildly diffuse but mostly now is located right lower quadrant. States standing up straight and walking in the right over the car was very uncomfortable. He indicates no prior abdominal surgeries. No history kidney stone. He does have associated nausea and vomiting. No diarrhea constipation. No urinary complaints. No chest pain or shortness of breath. The patient presents for evaluation of abdominal pain right lower quadrant. Nursing Notes were all reviewed and agreed with or any disagreements were addressed in the HPI. REVIEW OF SYSTEMS    (2-9 systems for level 4, 10 or more for level 5)     Review of Systems    Positives and Pertinent negatives as per HPI. Except as noted above in the ROS, all other systems were reviewed and negative. PAST MEDICAL HISTORY     Past Medical History:   Diagnosis Date    Acute systolic congestive heart failure ([address]     Essential hypertension     Pre-diabetes     Thrombocytopenia (HCC)          SURGICAL HISTORY   History reviewed.  No pertinent surgical history. CURRENTMEDICATIONS       Previous Medications    ALBUTEROL SULFATE  (90 BASE) MCG/ACT INHALER    INHALE 2 PUFFS BY MOUTH FOUR TIMES A DAY AS NEEDED FOR WHEEZING    ASPIRIN LOW DOSE 81 MG EC TABLET    TAKE ONE TABLET BY MOUTH DAILY    ATORVASTATIN (LIPITOR) 20 MG TABLET    TAKE ONE TABLET BY MOUTH ONCE NIGHTLY    FUROSEMIDE (LASIX) 80 MG TABLET    Take 1 tablet by mouth 2 times daily    LOSARTAN (COZAAR) 50 MG TABLET    TAKE ONE TABLET BY MOUTH DAILY    METFORMIN (GLUCOPHAGE) 500 MG TABLET    TAKE ONE TABLET BY MOUTH DAILY WITH BREAKFAST    UMECLIDINIUM-VILANTEROL (ANORO ELLIPTA) 62.5-25 MCG/INH AEPB INHALER    Inhale 1 puff into the lungs daily         ALLERGIES     Patient has no known allergies. FAMILYHISTORY       Family History   Family history unknown: Yes          SOCIAL HISTORY       Social History     Tobacco Use    Smoking status: Former     Packs/day: 1.00     Years: 15.00     Pack years: 15.00     Types: Cigarettes     Quit date: 2021     Years since quittin.9    Smokeless tobacco: Never   Vaping Use    Vaping Use: Never used   Substance Use Topics    Alcohol use: Not Currently     Comment: \"2 beers after work\"    Drug use: Not Currently       SCREENINGS    [place] Coma Scale  Eye Opening: Spontaneous  Best Verbal Response: Oriented  Best Motor Response: Obeys commands  [place] Coma Scale Score: 15        PHYSICAL EXAM    (up to 7 for level 4, 8 or more for level 5)     ED Triage Vitals [22 1509]   BP Temp Temp Source Heart Rate Resp SpO2 Height Weight   (!) 112/91 98.7 °F (37.1 °C) Oral 77 20 94 % -- 220 lb (99.8 kg)       Physical Exam  Vitals and nursing note reviewed. Constitutional:       Appearance: Normal appearance. He is well-developed. He is obese. HENT:      Head: Normocephalic and atraumatic. Right Ear: External ear normal.      Left Ear: External ear normal.   Eyes:      General: No scleral icterus. Right eye: No discharge.          Left eye: No discharge. Conjunctiva/sclera: Conjunctivae normal.   Cardiovascular:      Rate and Rhythm: Normal rate and regular rhythm. Heart sounds: Normal heart sounds. Pulmonary:      Effort: Pulmonary effort is normal.      Breath sounds: Normal breath sounds. Abdominal:      General: Abdomen is flat. Bowel sounds are normal.      Palpations: Abdomen is soft. Tenderness: abdominal tenderness There is guarding. There is no right CVA tenderness or left CVA tenderness. Comments: Patient has no diffuse abdominal tenderness. More noted right lower quadrant. Musculoskeletal:         General: Normal range of motion. Cervical back: Normal range of motion and neck supple. Right lower leg: No edema. Left lower leg: No edema. Skin:     General: Skin is warm and dry. Neurological:      General: No focal deficit present. Mental Status: He is alert and oriented to person, place, and time. Mental status is at baseline. Psychiatric:         Mood and Affect: Mood normal.         Behavior: Behavior normal.         Thought Content:  Thought content normal.         Judgment: Judgment normal.       DIAGNOSTIC RESULTS   LABS:    Labs Reviewed   CBC WITH AUTO DIFFERENTIAL - Abnormal; Notable for the following components:       Result Value    WBC 12.7 (*)     Neutrophils Absolute 9.7 (*)     All other components within normal limits   BASIC METABOLIC PANEL W/ REFLEX TO MG FOR LOW K - Abnormal; Notable for the following components:    Sodium 135 (*)     Chloride 94 (*)     Glucose 173 (*)     BUN 25 (*)     CREATININE 0.8 (*)     All other components within normal limits   HEPATIC FUNCTION PANEL - Abnormal; Notable for the following components:    Alkaline Phosphatase 177 (*)     Total Bilirubin 1.1 (*)     All other components within normal limits   LIPASE   TROPONIN   URINALYSIS WITH REFLEX TO CULTURE   LACTIC ACID   PROCALCITONIN   HEMOGLOBIN A1C       When ordered only abnormal lab results are displayed. All other labs were within normal range or not returned as of this dictation. EKG: When ordered, EKG's are interpreted by the Emergency Department Physician in the absence of a cardiologist.  Please see their note for interpretation of EKG. RADIOLOGY:   Non-plain film images such as CT, Ultrasound and MRI are read by the radiologist. Plain radiographic images are visualized and preliminarily interpreted by the ED Provider with the below findings:        Interpretation per the Radiologist below, if available at the time of this note:    [facility] organ? LIVER, GALLBLADDER, PANCREAS   Final Result   Tiny stones or sludge are suspected in the gallbladder. The gallbladder wall   thickening and tenderness on the sonographic exam raises the question of   early cholecystitis. No significant biliary ductal dilatation however      RECOMMENDATIONS:         CT ABDOMEN PELVIS W IV CONTRAST Additional Contrast? None   Final Result   1. Mild-to-moderate pericholecystic inflammation either due to   underdistention or acute cholecystitis. 2. Cardiomegaly with trace pericardial effusion. 3. Trace pelvic ascites. No results found. PROCEDURES   Unless otherwise noted below, none     Procedures    CRITICAL CARE TIME   Critical Care  There was a high probability of life-threatening clinical deterioration in the patient's condition requiring my urgent intervention. Total critical care time with the patient was 30 minutes excluding separately reportable procedures. Critical care required due to patients acute abdominal pain and discovery of acute cholecystitis with cholelithiasis and without choledocholithiasis. Time spent discussing case with attending physician and general surgery.       CONSULTS:  [address] and DIFFERENTIAL DIAGNOSIS/MDM:   Vitals:    Vitals:    07/17/22 1509 07/17/22 1902 07/17/22 2027   BP: (!) 112/91 113/77 (!) 102/56   Pulse: 77 60 65   Resp: 20 16 16   Temp: 98.7 °F (37.1 °C)     TempSrc: Oral     SpO2: 94% 99% 100%   Weight: 220 lb (99.8 kg)         Patient was given the following medications:  Medications   ciprofloxacin (CIPRO) tablet 500 mg (has no administration in time range)   iopamidol (ISOVUE-370) 76 % injection 75 mL (75 mLs IntraVENous Given 7/17/22 1831)   ketorolac (TORADOL) injection 15 mg (15 mg IntraVENous Given 7/17/22 2106)   morphine sulfate (PF) injection 4 mg (4 mg IntraVENous Given 7/17/22 2106)         Is this patient to be included in the SEP-1 Core Measure due to severe sepsis or septic shock? No   Exclusion criteria - the patient is NOT to be included for SEP-1 Core Measure due to: Infection is not suspected    This patient presenting with nausea and vomiting abdominal pain beginning about 11:00 this morning after eating cereal for breakfast.  Pain increased suddenly throughout the day. Worse with standing in the right in the car along with walking. CT scan showing concern for cholecystitis. Ultrasound showing evidence of cholelithiasis and sludge. Also suggesting cholecystitis. I did discuss case with attending physician who spoke with Dr. Nicci Ozuna on-call for general surgery. The patient will be discharged with antibiotics and pain control and nausea control and be seen in his surgeon's office on Tuesday. The patient was in agreement and did express understanding of his diagnosis and the treatment plan    FINAL IMPRESSION      1. Cholecystitis          DISPOSITION/PLAN   DISPOSITION Decision To Discharge 07/17/2022 09:18:44 PM      PATIENT REFERRED TO:  Caleb Molina MD  [address] [place] [address]  Jaylan: [address] [place] [address]  [phone]    Schedule an appointment as soon as possible for a visit in 2 days  call tomorrow    DISCHARGE MEDICATIONS:  New Prescriptions    CIPROFLOXACIN (CIPRO) 500 MG TABLET    Take 1 tablet by mouth in the morning and 1 tablet before bedtime. Do all this for 10 days. HYDROCODONE-ACETAMINOPHEN (NORCO) 5-325 MG PER TABLET    Take 1 tablet by mouth every 6 hours as needed for Pain for up to 3 days. IBUPROFEN (ADVIL;MOTRIN) 800 MG TABLET    Take 1 tablet by mouth every 8 hours as needed for Pain       DISCONTINUED MEDICATIONS:  Discontinued Medications    No medications on file              (Please note that portions of this note were completed with a voice recognition program.  Efforts were made to edit the dictations but occasionally words are mis-transcribed. )    Chan Romano PA-C (electronically signed)           Chan Romano PA-C  07/17/22 6855

## 2022-07-17 NOTE — Clinical Note
Marita Manrique was seen and treated in our emergency department on 7/17/2022. He may return to work on 07/19/2022. If you have any questions or concerns, please don't hesitate to call.       Gisselle Cano MD

## 2022-07-18 ENCOUNTER — CARE COORDINATION (OUTPATIENT)
Dept: CARE COORDINATION | Age: 57
End: 2022-07-18

## 2022-07-18 LAB
EKG ATRIAL RATE: 81 BPM
EKG DIAGNOSIS: NORMAL
EKG P AXIS: 80 DEGREES
EKG P-R INTERVAL: 160 MS
EKG Q-T INTERVAL: 406 MS
EKG QRS DURATION: 112 MS
EKG QTC CALCULATION (BAZETT): 471 MS
EKG R AXIS: 233 DEGREES
EKG T AXIS: 65 DEGREES
EKG VENTRICULAR RATE: 81 BPM

## 2022-07-18 PROCEDURE — 93010 ELECTROCARDIOGRAM REPORT: CPT | Performed by: INTERNAL MEDICINE

## 2022-07-18 NOTE — ED NOTES
Pt ok to d/c to home. Pt given d/c instructions. Pt verbalized understating including Rx and follow up care. Pt ambulated to lobby for ride home.  0 s/s of distress at time of d/c.          Jed Severs, RN  07/17/22 1623

## 2022-07-18 NOTE — ED PROVIDER NOTES
I independently performed a history and physical on Sola Rogers. All diagnostic, treatment, and disposition decisions were made by myself in conjunction with the advanced practice provider. For further details of Jae Mcfarlane emergency department encounter, please see Cristian Payton PA-C's documentation. Patient reports that last night he had mesenteries and gravy and felt well and has never had any right upper quadrant pain in the past.  He states this morning after eating some cereal and having a glass of 2% milk he developed right upper quadrant pain. He had some nausea but denies vomiting. On exam he has right upper quadrant tenderness but no rebound, rigidity or guarding. EKG  The Ekg interpreted by me shows  normal sinus rhythm with a rate of 81  Axis is   Right axis deviation  QTc is  normal  Intervals and Durations are unremarkable.       ST Segments: no acute change  No significant change from prior EKG dated 6 aug 2021    Results for orders placed or performed during the hospital encounter of 07/17/22   CBC with Auto Differential   Result Value Ref Range    WBC 12.7 (H) 4.0 - 11.0 K/uL    RBC 4.82 4.20 - 5.90 M/uL    Hemoglobin 15.3 13.5 - 17.5 g/dL    Hematocrit 46.3 40.5 - 52.5 %    MCV 96.0 80.0 - 100.0 fL    MCH 31.6 26.0 - 34.0 pg    MCHC 33.0 31.0 - 36.0 g/dL    RDW 13.7 12.4 - 15.4 %    Platelets 655 835 - 482 K/uL    MPV 8.5 5.0 - 10.5 fL    Neutrophils % 76.3 %    Lymphocytes % 13.5 %    Monocytes % 6.9 %    Eosinophils % 2.9 %    Basophils % 0.4 %    Neutrophils Absolute 9.7 (H) 1.7 - 7.7 K/uL    Lymphocytes Absolute 1.7 1.0 - 5.1 K/uL    Monocytes Absolute 0.9 0.0 - 1.3 K/uL    Eosinophils Absolute 0.4 0.0 - 0.6 K/uL    Basophils Absolute 0.0 0.0 - 0.2 K/uL   Basic Metabolic Panel w/ Reflex to MG   Result Value Ref Range    Sodium 135 (L) 136 - 145 mmol/L    Potassium reflex Magnesium 4.0 3.5 - 5.1 mmol/L    Chloride 94 (L) 99 - 110 mmol/L    CO2 30 21 - 32 mmol/L    Anion Gap 11 3 - 16    Glucose 173 (H) 70 - 99 mg/dL    BUN 25 (H) 7 - 20 mg/dL    CREATININE 0.8 (L) 0.9 - 1.3 mg/dL    GFR Non-African American >60 >60    GFR African American >60 >60    Calcium 10.1 8.3 - 10.6 mg/dL   Hepatic Function Panel   Result Value Ref Range    Total Protein 8.2 6.4 - 8.2 g/dL    Albumin 4.6 3.4 - 5.0 g/dL    Alkaline Phosphatase 177 (H) 40 - 129 U/L    ALT 33 10 - 40 U/L    AST 28 15 - 37 U/L    Total Bilirubin 1.1 (H) 0.0 - 1.0 mg/dL    Bilirubin, Direct 0.3 0.0 - 0.3 mg/dL    Bilirubin, Indirect 0.8 0.0 - 1.0 mg/dL   Lipase   Result Value Ref Range    Lipase 28.0 13.0 - 60.0 U/L   Troponin   Result Value Ref Range    Troponin <0.01 <0.01 ng/mL   Urinalysis with Reflex to Culture    Specimen: Urine   Result Value Ref Range    Color, UA Yellow Straw/Yellow    Clarity, UA Clear Clear    Glucose, Ur Negative Negative mg/dL    Bilirubin Urine Negative Negative    Ketones, Urine Negative Negative mg/dL    Specific Gravity, UA 1.010 1.005 - 1.030    Blood, Urine Negative Negative    pH, UA 6.0 5.0 - 8.0    Protein, UA Negative Negative mg/dL    Urobilinogen, Urine 0.2 <2.0 E.U./dL    Nitrite, Urine Negative Negative    Leukocyte Esterase, Urine Negative Negative    Microscopic Examination Not Indicated     Urine Type NotGiven     Urine Reflex to Culture Not Indicated    Lactic Acid   Result Value Ref Range    Lactic Acid 1.7 0.4 - 2.0 mmol/L   Procalcitonin   Result Value Ref Range    Procalcitonin 0.08 0.00 - 0.15 ng/mL   EKG 12 Lead   Result Value Ref Range    Ventricular Rate 81 BPM    Atrial Rate 81 BPM    P-R Interval 160 ms    QRS Duration 112 ms    Q-T Interval 406 ms    QTc Calculation (Bazett) 471 ms    P Axis 80 degrees    R Axis 233 degrees    T Axis 65 degrees    Diagnosis       Normal sinus rhythmRight atrial enlargementRight superior axis deviationPulmonary disease patternIncomplete right bundle branch blockRight ventricular hypertrophyST & T wave abnormality, consider anterolateral ischemiaProlonged QTAbnormal ECGWhen compared with ECG of 06-AUG-2021 00:05,Nonspecific T wave abnormality, improved in Inferior leads       CT ABDOMEN PELVIS W IV CONTRAST Additional Contrast? None  1. Mild-to-moderate pericholecystic inflammation either due to underdistention or acute cholecystitis. 2. Cardiomegaly with trace pericardial effusion. 3. Trace pelvic ascites. US ABDOMEN LIMITED Specify organ? LIVER, GALLBLADDER, PANCREAS  Tiny stones or sludge are suspected in the gallbladder. The gallbladder wall thickening and tenderness on the sonographic exam raises the question of early cholecystitis. No significant biliary ductal dilatation however RECOMMENDATIONS:        I personally saw this patient and performed a substantive portion of the visit including all aspects of the medical decision making. MDM  I spoke with the on-call surgeon, Dr. Trevor Parekr, and he stated that I feel this patient is safe for discharge that he was seen in the office on Tuesday. In my opinion the patient is safe for discharge having no signs of sepsis and has had pain for about 12 hours which improved with pain medication. Advised patient return immediately for any fevers or any new or worsening pains. We also discussed gallbladder diet and I am providing him also with antibiotics. I personally saw this patient and independently provided 10 minutes of non-concurrent critical care out of the total shared critical care time provided. I estimate there is LOW risk for ACUTE APPENDICITIS, BOWEL OBSTRUCTION, CHOLECYSTITIS, DIVERTICULITIS, INCARCERATED HERNIA, PANCREATITIS, or PERFORATED BOWEL or ULCER, thus I consider the discharge disposition reasonable. Also, there is no evidence or peritonitis, sepsis, or toxicity. Fiona Guerrero and I have discussed the diagnosis and risks, and we agree with discharging home to follow-up with their primary doctor.  We also discussed returning to the Emergency Department immediately if new or worsening symptoms occur. We have discussed the symptoms which are most concerning (e.g., bloody stool, fever, changing or worsening pain, vomiting) that necessitate immediate return. FINAL Impression    1. Cholecystitis        Blood pressure (!) 102/56, pulse 65, temperature 98.7 °F (37.1 °C), temperature source Oral, resp. rate 16, weight 220 lb (99.8 kg), SpO2 100 %.        Giancarlo Guillermo MD  07/17/22 8406

## 2022-07-19 ENCOUNTER — CARE COORDINATION (OUTPATIENT)
Dept: CARE COORDINATION | Age: 57
End: 2022-07-19

## 2022-07-19 DIAGNOSIS — J44.9 CHRONIC OBSTRUCTIVE PULMONARY DISEASE, UNSPECIFIED COPD TYPE (HCC): Primary | ICD-10-CM

## 2022-07-19 LAB
ESTIMATED AVERAGE GLUCOSE: 157.1 MG/DL
HBA1C MFR BLD: 7.1 %

## 2022-07-21 ENCOUNTER — TELEPHONE (OUTPATIENT)
Dept: CARDIOLOGY CLINIC | Age: 57
End: 2022-07-21

## 2022-07-21 ENCOUNTER — TELEPHONE (OUTPATIENT)
Dept: SURGERY | Age: 57
End: 2022-07-21

## 2022-07-21 ENCOUNTER — INITIAL CONSULT (OUTPATIENT)
Dept: SURGERY | Age: 57
End: 2022-07-21
Payer: COMMERCIAL

## 2022-07-21 VITALS
SYSTOLIC BLOOD PRESSURE: 122 MMHG | HEART RATE: 82 BPM | HEIGHT: 71 IN | BODY MASS INDEX: 30.8 KG/M2 | WEIGHT: 220 LBS | DIASTOLIC BLOOD PRESSURE: 79 MMHG

## 2022-07-21 DIAGNOSIS — K81.0 ACUTE CHOLECYSTITIS: Primary | ICD-10-CM

## 2022-07-21 PROCEDURE — 99203 OFFICE O/P NEW LOW 30 MIN: CPT | Performed by: SURGERY

## 2022-07-21 NOTE — LETTER
Surgery Scheduling Form:  DEMOGRAPHICS:                                                                                                         .  Patient Name:  Jas Li  Patient :  1965   Patient SS#:      Patient Phone:  [phone] (home)                         Alt. Patient Phone:                     Patient Address:  [address] [place] [id number]  PCP:  Barney Clayton DO  Insurance:  Payor: HEALTH 2 BUSINESS / Plan: DIRECT HEALTH CONNECT / Product Type: *No Product type* /        Insurance ID Number:    Payer/Plan Subscr  Sex Relation Sub. Ins. ID Effective Group Num   1. HEALTH 2 BUSIOtis Pore 1965 Male Self [id number]                                   [address]     Interpretor Needed:  (NO)  (TYPE)           LATEX ALLERGY:  (NO)  Allergies: No known drug allergies  Defibulator or Pacemaker:  ( NO)    DIAGNOSIS & PROCEDURE:                                                                                       .  Diagnosis Code/Description:   Cholelithiasis K80.20  Operation Code/Description:  Robotic cholecystectomy with intraoperative cholangiogram, possible open 80259  Location:  [facility]        Surgeon:  Dr. Sharp Hint:                                                                                    .  Surgeon's Scheduling Instruction:  elective  Requested Date: 22     OR Time: 8 am            Patient Arrival Time: 6 am  OR Time Required:  120  Minutes  Anesthesia:  General       Equipment:  robot                                                            SA Required (only for Mac and Gen): yes  Status:  Outpatient        Standard C-Arm (only for port and ping):  yes   Mini C-Arm: No   PAT Required:   Yes                                          H&P needs to be completed: yes  Cardiac Clearance Requested:  (NO)               PRE-CERTIFICATION INFORMATION: [employer]   Procedure/CPT code: Robotic cholecystectomy with intraoperative cholangiogram, possible open 37522        Modifier:

## 2022-07-21 NOTE — TELEPHONE ENCOUNTER
CARDIAC CLEARANCE REQUEST    What type of procedure are you having: gallbladder surgery    Are you taking any blood thinners: no    When is your procedure scheduled for: 8/12/22    What physician is performing your procedure:  Dr Enrique Nash  Phone Number:    Fax number to send the letter:  place in epic.

## 2022-07-21 NOTE — TELEPHONE ENCOUNTER
Spoke with the patient, scheduled for dv ping on 8/12/22 at 8 am with an arrival of 6 am. Informed the patient that PAT will also call with further instructions. Informed the patient that [facility] paperwork can be sent to our office and will be completed in a timely manner. Patient verbally states that he/she understands pre-op instructions. Patient notified of pre-op instructions for general/mac anesthesia:    *NPO after midnight     *H&P prior to surgery - PCP    *Cardiac clearance, if needed. Verlan Brim    *Stop all blood thinners, if needed. - n/a    *Will need a     *Call the office with any further questions. *Procedure/Surgery time at this point is tentative time as PAT will confirm arrival time.       Requested cardiac clearance from Dr. Eliazar Foster

## 2022-07-22 ENCOUNTER — TELEPHONE (OUTPATIENT)
Dept: FAMILY MEDICINE CLINIC | Age: 57
End: 2022-07-22

## 2022-07-22 NOTE — TELEPHONE ENCOUNTER
Would recommend a follow-up echocardiogram prior to assessing cardiac risk (due to pulm hypertension and RV dysfunction), lets order that. Thank you.

## 2022-07-22 NOTE — TELEPHONE ENCOUNTER
PRIOR AUTH NEEDED FOR COMBIVENT RESPIMAT inhaler      LAST NAME: Lilliana Edi  : [birth year]  Dx: D34.7

## 2022-07-22 NOTE — PROGRESS NOTES
Teresa Sagchloé    Age 64 y.o.    male    1965    MRN [id number]    8/12/2022  Arrival Time_____________  OR Time____________145 Min     Procedure(s):  ROBOTIC CHOLECYSTECTOMY WITH INTRAOPERATIVE CHOLANGIOGRAM, POSSIBLE OPEN PROCEDURE                      General   Surgeon(s):  Bianca Fruits, MD      DAY ADMIT ___  SDS/OP ___  OUTPT IN BED ___        Phone [phone] (home)     PCP _____________________ Phone_________________ Epic ( ) Epic CE ( ) Appt ________    ADDITIONAL INFO __________________________________ Cardio/Consult _____________    NOTES _____________________________________________________________________    ____________________________________________________________________________    PAT APPT DATE:________ TIME: ________  FAXED QAD: _______  (__) H&P w/ Hospitalist  __________________________________________________________________________  Preop Nurse phone screen complete: _____________  (__) CBC     (__) W/ DIFF ___________     (__) Hgb A1C    ___________  (__) CHEST X RAY   __________  (__) LIPID PROFILE  ___________  (__) EKG   __________  (__) PT/PTT   ___________  (__) PFT's   __________  (__) BMP   ___________  (__) CAROTIDS  __________  (__) CMP   ___________  (__) VEIN MAPPING  __________  (__) U/A   ___________  (__) HISTORY & PHYSICAL __________  (__) URINE C & S  ___________  (__) CARDIAC CLEARANCE __________  (__) U/A W/ FLEX  ___________  (__) PULM.  CLEARANCE __________  (__) SERUM PREGNANCY ___________  (__) Check Epic DOS orders __________  (__) TYPE & SCREEN __________repeat ( ) (__)  __________________ __________  (__) ALBUMIN Vivi Burrs ___________  (__)  __________________ __________  (__) TRANSFERRIN  ___________  (__)  __________________ __________  (__) LIVER PROFILE  ___________  (__)  __________________ __________  (__) MRSA NASAL SWAB ___________  (__) URINE PREG DOS __________  (__) SED RATE  ___________  (__) BLOOD SUGAR DOS __________  (__)

## 2022-07-22 NOTE — CARE COORDINATION
ACM called and spoke to [facility] who provided fax number to fax Respimat prescription to [phone]. ACM called to update patient on change in medication but voicemail was left for patient to return call. Patient returned call and updated on POC/ Medication. Patient verbalized understanding and had no other questions at this time.
ACM called but patient did not answer. ACM left voice message with call back number provided. ACM will follow up at a later date.
Admission medications    Medication Sig Start Date End Date Taking? Authorizing Provider   KLOR-CON M20 20 MEQ extended release tablet TAKE ONE TABLET BY MOUTH TWICE A DAY WHILE ON INCREASED DOSE OF FUROSEMIDE 7/17/22   SANCHEZ Santiago   ciprofloxacin (CIPRO) 500 MG tablet Take 1 tablet by mouth in the morning and 1 tablet before bedtime. Do all this for 10 days. 7/17/22 7/27/22  Giancarlo Guillermo MD   ibuprofen (ADVIL;MOTRIN) 800 MG tablet Take 1 tablet by mouth every 8 hours as needed for Pain 7/17/22   Giancarlo Guillermo MD   HYDROcodone-acetaminophen [facility]) 5-325 MG per tablet Take 1 tablet by mouth every 6 hours as needed for Pain for up to 3 days.  7/17/22 7/20/22  Giancarlo Guillermo MD   furosemide (LASIX) 80 MG tablet Take 1 tablet by mouth 2 times daily 6/24/22   Heather Barnes MD   losartan (COZAAR) 50 mg tablet TAKE ONE TABLET BY MOUTH DAILY 6/1/22   Job Conrad, DO   ASPIRIN LOW DOSE 81 MG EC tablet TAKE ONE TABLET BY MOUTH DAILY 6/1/22   Job Conrad DO   atorvastatin (LIPITOR) 20 MG tablet TAKE ONE TABLET BY MOUTH ONCE NIGHTLY 6/1/22   Farley Lundborg, APRN - CNP   metFORMIN (GLUCOPHAGE) 500 MG tablet TAKE ONE TABLET BY MOUTH DAILY WITH BREAKFAST 6/1/22   Liliane Frances, DO   umeclidinium-vilanterol (ANORO ELLIPTA) 62.5-25 MCG/INH AEPB inhaler Inhale 1 puff into the lungs daily  Patient not taking: Reported on 6/9/2022 5/12/22   Job Conrad DO   albuterol sulfate  (90 Base) MCG/ACT inhaler INHALE 2 PUFFS BY MOUTH FOUR TIMES A DAY AS NEEDED FOR WHEEZING 4/23/22   SANCHEZ Santiago       Future Appointments   Date Time Provider Patricia Rust   7/21/2022 10:15 AM Sunshine Pizano MD AND [facility] [place] [facility]   8/11/2022 10:15 AM MD Nidhi Prescott [facility]      and   General Assessment    Do you have any symptoms that are causing concern?: No

## 2022-07-23 NOTE — PROGRESS NOTES
Department of General Surgery Consult    PATIENT NAME: Nydia Martinez OF BIRTH: 1965    ADMISSION DATE: No admission date for patient encounter. TODAY'S DATE: 7/21/22    Reason for Consult:  acute cholecystitis    Chief Complaint: RUQ pain    Requesting Physician:  Vahe Jack    HISTORY OF PRESENT ILLNESS:              The patient is a 64 y.o. male who presents with RUQ pain. Reports pain and nausea that was acute in onset and sharp. Went to ER and workup showed acute cholecystitis. Has improved with abx. No fevers. Past Medical History:        Diagnosis Date    Acute systolic congestive heart failure ([address]     Essential hypertension     Pre-diabetes     Thrombocytopenia (HCC)        Past Surgical History:    No past surgical history on file. Current Medications:   No current facility-administered medications for this visit. Prior to Admission medications    Medication Sig Start Date End Date Taking? Authorizing Provider   KLOR-CON M20 20 MEQ extended release tablet TAKE ONE TABLET BY MOUTH TWICE A DAY WHILE ON INCREASED DOSE OF FUROSEMIDE 7/17/22  Yes SANCHEZ Wheeler   ciprofloxacin (CIPRO) 500 MG tablet Take 1 tablet by mouth in the morning and 1 tablet before bedtime. Do all this for 10 days.  7/17/22 7/27/22 Yes Vahe Jack MD   ibuprofen (ADVIL;MOTRIN) 800 MG tablet Take 1 tablet by mouth every 8 hours as needed for Pain 7/17/22  Yes Vahe Jack MD   furosemide (LASIX) 80 MG tablet Take 1 tablet by mouth 2 times daily 6/24/22  Yes Fady Raza MD   losartan (COZAAR) 50 mg tablet TAKE ONE TABLET BY MOUTH DAILY 6/1/22  Yes Aditya Conrad DO   ASPIRIN LOW DOSE 81 MG EC tablet TAKE ONE TABLET BY MOUTH DAILY 6/1/22  Yes Aditya Conrad, DO   atorvastatin (LIPITOR) 20 MG tablet TAKE ONE TABLET BY MOUTH ONCE NIGHTLY 6/1/22  Yes Rick Weirs, APRN - CNP   metFORMIN (GLUCOPHAGE) 500 MG tablet TAKE ONE TABLET BY MOUTH DAILY WITH BREAKFAST 6/1/22  Yes Marella Galeazzi JUDITH Conrad DO   umeclidinium-vilanterol (ANORO ELLIPTA) 62.5-25 MCG/INH AEPB inhaler Inhale 1 puff into the lungs daily 22  Yes Evaristo Conrad DO   albuterol sulfate  (90 Base) MCG/ACT inhaler INHALE 2 PUFFS BY MOUTH FOUR TIMES A DAY AS NEEDED FOR WHEEZING 22  Yes SANCHEZ Wheeler   albuterol-ipratropium (COMBIVENT RESPIMAT)  MCG/ACT AERS inhaler Inhale 1 puff into the lungs in the morning and 1 puff at noon and 1 puff in the evening and 1 puff before bedtime. 22   Rena Bartholomew DO        Allergies:  Patient has no known allergies. Social History:   Social History     Socioeconomic History    Marital status: Single     Spouse name: Not on file    Number of children: Not on file    Years of education: Not on file    Highest education level: Not on file   Occupational History    Not on file   Tobacco Use    Smoking status: Former     Packs/day: 1.00     Years: 15.00     Pack years: 15.00     Types: Cigarettes     Quit date: 2021     Years since quittin.9    Smokeless tobacco: Never   Vaping Use    Vaping Use: Never used   Substance and Sexual Activity    Alcohol use: Not Currently     Comment: \"2 beers after work\"    Drug use: Not Currently    Sexual activity: Not on file   Other Topics Concern    Not on file   Social History Narrative    Not on file     Social Determinants of Health     Financial Resource Strain: Low Risk     Difficulty of Paying Living Expenses: Not hard at all   Food Insecurity: No Food Insecurity    Worried About Running Out of Food in the Last Year: Never true    [address] in the Last Year: Never true   Transportation Needs: No Transportation Needs    Lack of Transportation (Medical): No    Lack of Transportation (Non-Medical):  No   Physical Activity: Not on file   Stress: Not on file   Social Connections: Not on file   Intimate Partner Violence: Not on file   Housing Stability: Not on file         Family History:        Family history unknown: Yes       REVIEW OF SYSTEMS:  CONSTITUTIONAL:  negative  HEENT:  negative  RESPIRATORY:  negative  CARDIOVASCULAR:  negative  GASTROINTESTINAL:  negative except for nausea and abdominal pain  GENITOURINARY:  negative  HEMATOLOGIC/LYMPHATIC:  negative  NEUROLOGICAL:  Negative  * All other ROS reviewed and negative. PHYSICAL EXAM:  VITALS:  /79   Pulse 82   Ht 5' 11\" (1.803 m)   Wt 220 lb (99.8 kg)   BMI 30.68 kg/m²   24HR INTAKE/OUTPUT:    [unfilled]  [facility]@      CONSTITUTIONAL:  alert, no apparent distress and mildly obese  EYES:  PERRL, sclera clear  ENT:  Normocephalic,atraumatic, without obvious abnormality  NECK:  supple, symmetrical, trachea midline  LUNGS: Resp effort easy and unlabored, no crackles or wheezing  CARDIOVASCULAR:  NO JVD, regular rate and rhythm and no murmur noted  ABDOMEN:  , normal bowel sounds, soft, non-distended, non-tender,   MUSCULOSKELETAL: No clubbing or cyanosis, 0+ pitting edema lower extremities  NEUROLOGIC:  Mental Status Exam:  Level of Alertness:   awake  PSYCHIATRIC:   person, place, time  SKIN:  no jaundice    DATA:    CBC: No results for input(s): WBC, HGB, HCT, PLT in the last 72 hours. BMP:  No results for input(s): NA, K, CL, CO2, BUN, CREATININE, GLUCOSE in the last 72 hours. Hepatic: No results for input(s): AST, ALT, ALB, BILITOT, ALKPHOS in the last 72 hours. Mag:    No results for input(s): MG in the last 72 hours. Phos:   No results for input(s): PHOS in the last 72 hours. INR: No results for input(s): INR in the last 72 hours. Radiology Review: Images personally reviewed by me. CT/US - acute cholecystitis      IMPRESSION/RECOMMENDATIONS:    65 yo with acute cholecystitis   Discussed his diagnosis and recommendations for operation.     Risks of operation and typical recovery reviewed  Continue abx and low fat diet    Electronically signed by April Swartz MD     [address] Surgery  [id number]

## 2022-07-25 NOTE — TELEPHONE ENCOUNTER
Attempted to reach pt regarding [facility] recommendations to get ECHO, order has been placed, pt just needs to call central scheduling.

## 2022-07-25 NOTE — TELEPHONE ENCOUNTER
Spoke to patient he said he just started a program call the [facility]" where his medication is usually free.    His insurance card is in there under media

## 2022-07-25 NOTE — TELEPHONE ENCOUNTER
Please advise, [facility] wanted pt to get echo prior to clearing him for surgery, but pt can not afford testing.

## 2022-07-26 NOTE — TELEPHONE ENCOUNTER
Pt returned call. Scheduled pt for limited ECHO at [facility]. Pt will let us know if he can afford.  TY

## 2022-07-26 NOTE — TELEPHONE ENCOUNTER
LMOM for pt to contact the off to set up cardiac testing at [place]. Pt cost should show up in [employer] account after it has been scheduled.

## 2022-07-28 ENCOUNTER — OFFICE VISIT (OUTPATIENT)
Dept: FAMILY MEDICINE CLINIC | Age: 57
End: 2022-07-28
Payer: COMMERCIAL

## 2022-07-28 VITALS
WEIGHT: 220.6 LBS | SYSTOLIC BLOOD PRESSURE: 118 MMHG | BODY MASS INDEX: 30.77 KG/M2 | DIASTOLIC BLOOD PRESSURE: 74 MMHG | OXYGEN SATURATION: 92 % | HEART RATE: 83 BPM

## 2022-07-28 DIAGNOSIS — Z01.818 PRE-OP EXAMINATION: ICD-10-CM

## 2022-07-28 DIAGNOSIS — I10 ESSENTIAL HYPERTENSION: ICD-10-CM

## 2022-07-28 DIAGNOSIS — K80.00 CALCULUS OF GALLBLADDER WITH ACUTE CHOLECYSTITIS WITHOUT OBSTRUCTION: Primary | ICD-10-CM

## 2022-07-28 PROCEDURE — 99214 OFFICE O/P EST MOD 30 MIN: CPT | Performed by: STUDENT IN AN ORGANIZED HEALTH CARE EDUCATION/TRAINING PROGRAM

## 2022-07-28 ASSESSMENT — ANXIETY QUESTIONNAIRES
7. FEELING AFRAID AS IF SOMETHING AWFUL MIGHT HAPPEN: 0
5. BEING SO RESTLESS THAT IT IS HARD TO SIT STILL: 0
6. BECOMING EASILY ANNOYED OR IRRITABLE: 0
4. TROUBLE RELAXING: 0
GAD7 TOTAL SCORE: 0
1. FEELING NERVOUS, ANXIOUS, OR ON EDGE: 0
2. NOT BEING ABLE TO STOP OR CONTROL WORRYING: 0
IF YOU CHECKED OFF ANY PROBLEMS ON THIS QUESTIONNAIRE, HOW DIFFICULT HAVE THESE PROBLEMS MADE IT FOR YOU TO DO YOUR WORK, TAKE CARE OF THINGS AT HOME, OR GET ALONG WITH OTHER PEOPLE: NOT DIFFICULT AT ALL
3. WORRYING TOO MUCH ABOUT DIFFERENT THINGS: 0

## 2022-07-28 ASSESSMENT — PATIENT HEALTH QUESTIONNAIRE - PHQ9
SUM OF ALL RESPONSES TO PHQ QUESTIONS 1-9: 0
SUM OF ALL RESPONSES TO PHQ9 QUESTIONS 1 & 2: 0
SUM OF ALL RESPONSES TO PHQ QUESTIONS 1-9: 0
1. LITTLE INTEREST OR PLEASURE IN DOING THINGS: 0
2. FEELING DOWN, DEPRESSED OR HOPELESS: 0

## 2022-07-28 NOTE — PROGRESS NOTES
Preoperative Evaluation    Subjective:   Fiona Guerrero is a 64 y.o. y.o.  male who presents to the office today for a preoperative consultation. Surgeon: Dr. Gutiérrez Adjutant    Location: [address] [place] [address] CHOLANGIOGRAM, POSSIBLE OPEN PROCEDURE  Date: August 12. Planned anesthesia is General.   The patient has the following known anesthesia issues: none   Patient has a bleeding risk of : no recent abnormal bleeding   Patient does not have objection to receiving blood products if needed. Denies any steroid use in the past 6 mo    Review of Systems   Pertinent items are noted in HPI. Denies fevers, chills, diaphoresis, CP, SOB, dysphagia, abd pain, urinary complaints, new edema, rashes, cyanosis    Past Medical History:   Diagnosis Date    Acute systolic congestive heart failure ([facility] [address]     Essential hypertension     Pre-diabetes     Thrombocytopenia ([facility] [address]        No past surgical history on file. Social History       Tobacco History       Smoking Status  Former Quit Date  7/29/2021 Smoking Frequency  1 pack/day for 15.00 years (15.00 pk-yrs) Smoking Tobacco Type  Cigarettes quit in 7/29/2021      Smokeless Tobacco Use  Never              Alcohol History       Alcohol Use Status  Not Currently Comment  \"2 beers after work\"              Drug Use       Drug Use Status  Not Currently              Sexual Activity       Sexually Active  Not Asked                    Family History   Family history unknown: Yes       Current Outpatient Medications   Medication Sig Dispense Refill    albuterol-ipratropium (COMBIVENT RESPIMAT)  MCG/ACT AERS inhaler Inhale 1 puff into the lungs in the morning and 1 puff at noon and 1 puff in the evening and 1 puff before bedtime.  1 each 1    KLOR-CON M20 20 MEQ extended release tablet TAKE ONE TABLET BY MOUTH TWICE A DAY WHILE ON INCREASED DOSE OF FUROSEMIDE 30 tablet 1    ibuprofen (ADVIL;MOTRIN) 800 MG tablet Take 1 tablet by mouth every 8 hours as needed for Pain 30 tablet 0    furosemide (LASIX) 80 MG tablet Take 1 tablet by mouth 2 times daily 180 tablet 1    losartan (COZAAR) 50 mg tablet TAKE ONE TABLET BY MOUTH DAILY 90 tablet 1    ASPIRIN LOW DOSE 81 MG EC tablet TAKE ONE TABLET BY MOUTH DAILY 90 tablet 1    atorvastatin (LIPITOR) 20 MG tablet TAKE ONE TABLET BY MOUTH ONCE NIGHTLY 90 tablet 1    metFORMIN (GLUCOPHAGE) 500 MG tablet TAKE ONE TABLET BY MOUTH DAILY WITH BREAKFAST 90 tablet 1    umeclidinium-vilanterol (ANORO ELLIPTA) 62.5-25 MCG/INH AEPB inhaler Inhale 1 puff into the lungs daily 60 each 1    albuterol sulfate  (90 Base) MCG/ACT inhaler INHALE 2 PUFFS BY MOUTH FOUR TIMES A DAY AS NEEDED FOR WHEEZING 1 each 2     No current facility-administered medications for this visit. Objective:   Vitals:    07/28/22 0829   BP: 118/74   Pulse: 83   SpO2: 92%       Physical Exam   /74 (Site: Right Upper Arm, Position: Sitting, Cuff Size: Medium Adult)   Pulse 83   Wt 220 lb 9.6 oz (100.1 kg)   SpO2 92%   BMI 30.77 kg/m²   General appearance: alert, appears stated age, and cooperative  Throat: lips, mucosa, and tongue normal; teeth and gums normal  Lungs: clear to auscultation bilaterally  Heart: regular rate and rhythm, S1, S2 normal, no murmur, click, rub or gallop  Abdomen: soft, non-tender; bowel sounds normal; no masses,  no organomegaly  Extremities: extremities normal, atraumatic, no cyanosis or edema  Pulses: 2+ and symmetric  Skin: Skin color, texture, turgor normal. No rashes or lesions  Neurologic: Grossly normal     Predictors of intubation difficulty:   Morbid obesity? no   Anatomically abnormal facies? no   Short, thick neck? no   Neck range of motion: normal   Dentition: No chipped, loose, or missing teeth.      Cardiographics   Awaiting cardiac clearance    Lab Review   Admission on 07/17/2022, Discharged on 07/17/2022   Component Date Value    Ventricular Rate 07/17/2022 81 UA 07/17/2022 Yellow     Clarity, UA 07/17/2022 Clear     Glucose, Ur 07/17/2022 Negative     Bilirubin Urine 07/17/2022 Negative     Ketones, Urine 07/17/2022 Negative     Specific Gravity, UA 07/17/2022 1.010     Blood, Urine 07/17/2022 Negative     pH, UA 07/17/2022 6.0     Protein, UA 07/17/2022 Negative     Urobilinogen, Urine 07/17/2022 0.2     Nitrite, Urine 07/17/2022 Negative     Leukocyte Esterase, Urine 07/17/2022 Negative     Microscopic Examination 07/17/2022 Not Indicated     Urine Type 07/17/2022 NotGiven     Urine Reflex to Culture 07/17/2022 Not Indicated     Lactic Acid 07/17/2022 1.7     Procalcitonin 07/17/2022 0.08     Hemoglobin A1C 07/17/2022 7.1     eAG 07/17/2022 157.1    Hospital Outpatient Visit on 06/09/2022   Component Date Value    Pro-BNP 06/09/2022 957 (A)    Sodium 06/09/2022 139     Potassium 06/09/2022 4.3     Chloride 06/09/2022 96 (A)    CO2 06/09/2022 32     Anion Gap 06/09/2022 11     Glucose 06/09/2022 120 (A)    BUN 06/09/2022 29 (A)    Creatinine 06/09/2022 0.9     GFR Non- 06/09/2022 >60     GFR  06/09/2022 >60     Calcium 06/09/2022 10.2    Orders Only on 05/19/2022   Component Date Value    Sodium 05/19/2022 143     Potassium 05/19/2022 4.3     Chloride 05/19/2022 99     CO2 05/19/2022 30     Anion Gap 05/19/2022 14     Glucose 05/19/2022 105 (A)    BUN 05/19/2022 24 (A)    Creatinine 05/19/2022 0.9     GFR Non- 05/19/2022 >60     GFR  05/19/2022 >60     Calcium 05/19/2022 10.3    Orders Only on 05/12/2022   Component Date Value    Sodium 05/12/2022 140     Potassium 05/12/2022 4.8     Chloride 05/12/2022 99     CO2 05/12/2022 27     Anion Gap 05/12/2022 14     Glucose 05/12/2022 131 (A)    BUN 05/12/2022 22 (A)    Creatinine 05/12/2022 0.6 (A)    GFR Non- 05/12/2022 >60     GFR  05/12/2022 >60     Calcium 05/12/2022 9.4     Phosphorus 05/12/2022 3.8     Albumin 05/12/2022 4.3 Pro-BNP 05/12/2022 1,679 (A)        Assessment:   64 y.o. male with planned surgery as above. - Known risk factors for perioperative complications: Coronary disease  Congestive heart failure  Chronic pulmonary disease   - Difficulty with intubation is not anticipated. - Current medications which may produce withdrawal symptoms if withheld perioperatively: none     Plan:   1. Preoperative workup as follows Reviewed recent lab work from 7/17/22. 2. Change in medication regimen before surgery: none, continue med regimen including morning of surgery, w/sip of water, hold metformin 24 hours prior to surgery   Pt instructed to avoid NSAIDs, ASA, MV, Vitamin E, Fish oil 1 week prior to surgery to decrease bleeding risk. 3. Prophylaxis for cardiac events with perioperative beta-blockers: not indicated   4. Invasive hemodynamic monitoring perioperatively: not indicated   5. Deep vein thrombosis prophylaxis postoperatively:regimen to be chosen by surgical team   6. Other measures: none      1. Calculus of gallbladder with acute cholecystitis without obstruction  2. Pre-op examination  Plan for procedure as above    3. Essential hypertension  At goal        While assessing care for this patient, I have reviewed all pertinent lab work/imaging/ specialist notes and care in reference to those problems addressed above in detail. Appropriate medical decision making was based on this. Please note that portions of this note may have been completed with a voice recognition program. Efforts were made to edit the dictations but occasionally words are mis-transcribed.       Pt is at an acceptable risk for planned procedure    Please call with any questions  Abhishek Redding, DO

## 2022-07-29 ENCOUNTER — PROCEDURE VISIT (OUTPATIENT)
Dept: CARDIOLOGY CLINIC | Age: 57
End: 2022-07-29
Payer: COMMERCIAL

## 2022-07-29 DIAGNOSIS — I27.20 PULMONARY HYPERTENSION (HCC): ICD-10-CM

## 2022-07-29 DIAGNOSIS — I50.30 CONGESTIVE HEART FAILURE WITH PRESERVED LV FUNCTION, NYHA CLASS 1 (HCC): ICD-10-CM

## 2022-07-29 PROCEDURE — 93308 TTE F-UP OR LMTD: CPT | Performed by: INTERNAL MEDICINE

## 2022-07-29 PROCEDURE — 93321 DOPPLER ECHO F-UP/LMTD STD: CPT | Performed by: INTERNAL MEDICINE

## 2022-07-29 PROCEDURE — 93325 DOPPLER ECHO COLOR FLOW MAPG: CPT | Performed by: INTERNAL MEDICINE

## 2022-08-02 ENCOUNTER — TELEPHONE (OUTPATIENT)
Dept: CARDIOLOGY CLINIC | Age: 57
End: 2022-08-02

## 2022-08-02 NOTE — TELEPHONE ENCOUNTER
----- Message from Amilcar Thompson MD sent at 8/2/2022 11:17 AM EDT -----  Pls let Chetna Madina know I reviewed his echo. Normal left heart pump strength. The right heart is dilated and weakened with evidence of severe elevation of Right heart/lung pressures and severe leak from the R sided tricuspid heart valve. We will discuss these findings in more detail and soon upcoming appt.  TY

## 2022-08-03 NOTE — TELEPHONE ENCOUNTER
Dr Shy Shoemaker office wanting to know if pt is cleared for gallbladder surgery. Pt's appt with DAVID is 8/11/2022 and his surgery is scheduled 8/12/22, Dr Enrique Nash would like to know something sooner than the day before if possible. TY

## 2022-08-03 NOTE — TELEPHONE ENCOUNTER
[facility] program received the escript via fax. But they need the dr to actually sign the prescription and refax back over.  Ok to print off escript again and have him sign it and fax it over to [phone]

## 2022-08-04 NOTE — PROGRESS NOTES
CARDIOLOGY FOLLOW UP        Patient Name: Clyde Proctor  Primary Care physician: Grady Webster DO    Reason for Referral/Chief Complaint: Clyde Proctor is a 64 y.o. patient who is here to cardiology clinic today for evaluation and treatment of CHF and hypertension. History of Present Illness:   Lydia Ferderick is a 64 y.o. patient with prior medical history of HFpEF, Hypertension, Pre-DM and severe COPD. Diagnosed with new onset congestive heart failure with preserved EF. Echocardiogram revealed preserved ejection fraction, severely dilated right ventricle with reduced function in the setting of moderate pulmonary hypertension with estimated PASP 56 mmHg. Some evidence to suggest diastolic dysfunction. OV 12/03/2021 he reported subtle wt gain, pants fitting tighter. No overt volume overload by exam. Follow-up echo study was planned and he was to continue Lasix 40 mg daily. LOV 06/10/2022, patient said weight climbed to 235 so saw pcp on 05/12/2022. Pro-BNP elevated and his lasix was titrated to 80 mg bid. Today weight is 216. States 210-213 at home. Good wt loss, states edema is much improved. Has not had sleep study because he did not want to go in to get it done. Did not get ECHO performed as instructed last visit. In the [facility] ED on 07/17/2022 with abdominal pain. CT showed mild to moderate pericholecystic inflammation either due to under distention or cholecystitis. Cardiomegaly with trace pericardial effusion. Trace pelvic ascites. Impression cholecystitis. Echo 07/29/2022 EF 66%, There is systolic and diastolic septal flattening consistent with right ventricular pressure and volume overload, The right ventricle is enlarged and hypokinetic ,severe tricuspid regurgitation, right atrium is severely dilated, and small pericardial effusion noted without any hemodynamic   implications.       Today, ***      Past Medical History:   has a past medical history of Acute systolic albuterol sulfate  (90 Base) MCG/ACT inhaler INHALE 2 PUFFS BY MOUTH FOUR TIMES A DAY AS NEEDED FOR WHEEZING 4/23/22   SANCHEZ Merrill        CURRENT Medications:  No current facility-administered medications for this visit. Allergies:  Patient has no known allergies. Review of Systems:   A 14 point review of symptoms completed. Pertinent positives identified in the HPI, all other review of symptoms negative as below. Objective: There were no vitals filed for this visit. PHYSICAL EXAM:    General:  Alert, cooperative, no distress, appears stated age   Head:  Normocephalic, atraumatic   Eyes:  Conjunctiva/corneas clear, anicteric sclerae    Nose: Nares normal, no drainage or sinus tenderness   Throat: No abnormalities of the lips, oral mucosa or tongue. Neck: Trachea midline. Neck supple with no lymphadenopathy, thyroid not enlarged, symmetric, no tenderness/mass/nodules   Lungs:   Clear to auscultation bilaterally, end expiratory wheezing, no rales, no respiratory distress   Chest Wall:  No deformity or tenderness to palpation   Heart:  Regular rate and rhythm, normal S1, normal caliber P2, no murmur, no rub, no S3/S4, PMI non-displaced. No heave. Abdomen:   Obese, Soft, non-tender, with normoactive bowel sounds. No masses, no hepatosplenomegaly   Extremities: No cyanosis, clubbing +trace pitting feet bilaterally with varicose veins. Vascular: 2+ radial,dorsalis pedis and posterior tibial pulses bilaterally. Brisk carotid upstrokes without carotid bruit. Skin: Skin color, texture, turgor are normal with no rashes or ulceration. Pysch: Euthymic mood, appropriate affect   Neurologic: Oriented to person, place and time. No slurred speech or facial asymmetry. No motor or sensory deficits on gross examination.          Labs:   CBC:   Lab Results   Component Value Date/Time    WBC 12.7 07/17/2022 03:28 PM    RBC 4.82 07/17/2022 03:28 PM    HGB 15.3 07/17/2022 03:28 PM HCT 46.3 2022 03:28 PM    MCV 96.0 2022 03:28 PM    RDW 13.7 2022 03:28 PM     2022 03:28 PM     CMP:  Lab Results   Component Value Date/Time     2022 03:28 PM    K 4.0 2022 03:28 PM    CL 94 2022 03:28 PM    CO2 30 2022 03:28 PM    BUN 25 2022 03:28 PM    CREATININE 0.8 2022 03:28 PM    GFRAA >60 2022 03:28 PM    AGRATIO 1.1 2021 02:11 PM    LABGLOM >60 2022 03:28 PM    GLUCOSE 173 2022 03:28 PM    PROT 8.2 2022 03:28 PM    CALCIUM 10.1 2022 03:28 PM    BILITOT 1.1 2022 03:28 PM    ALKPHOS 177 2022 03:28 PM    AST 28 2022 03:28 PM    ALT 33 2022 03:28 PM     PT/INR:  No results found for: PTINR  HgBA1c:  Lab Results   Component Value Date    LABA1C 7.1 2022     Lab Results   Component Value Date    TROPONINI <0.01 2022     Lab Results   Component Value Date    CHOL 121 2021    CHOL 121 2021     Lab Results   Component Value Date    TRIG 49 2021    TRIG 83 2021     Lab Results   Component Value Date    HDL 41 2021    HDL 39 (L) 2021     Lab Results   Component Value Date    LDLCALC 70 2021    LDLCALC 65 2021     Lab Results   Component Value Date    LABVLDL 10 2021    LABVLDL 17 2021     No results found for: CHOLHDLRATIO      Cardiac Data:     [id number]  Normal sinus rhythmRight axis deviationIncomplete right bundle branch blockRight ventricular hypertrophy with repolarization abnormalityT wave abnormality, consider anterior ischemiaProlonged QTAbnormal ECGWhen compared with ECG of 05-AUG-2021 14:42,T wave inversion more evident in Anterior leadsConfirmed by Ivet Avalos MD, Mliss Clink ([id number]) on 2021 4:20:17 PM     Echo: 2022  Summary   Limited echo for left ventricular function and pulmonary pressures      Left ventricular cavity size is normal.   There is mild concentric left ventricular hypertrophy. Left ventricular function appears to be normal with an estimated ejection   fraction of 55%. There is systolic and diastolic septal flattening consistent with right   ventricular pressure and volume overload. The right ventricle is enlarged and hypokinetic. Severe tricuspid regurgitation. The right atrium is severely dilated. Estimated pulmonary artery systolic pressure is at 74 mmHg assuming a right   atrial pressure of 15 mmHg. There is a small pericardial effusion noted without any hemodynamic   implications. CT abdomen: 07/17/2022  1. Mild-to-moderate pericholecystic inflammation either due to underdistention or acute cholecystitis. 2. Cardiomegaly with trace pericardial effusion. 3. Trace pelvic ascites       Echo: 8/5/2021  Summary   Left ventricular systolic function is low normal with a visually estimated   ejection fraction of 50-55%. EF estimated by 3D at 52 %. The left ventricle is normal in size with normal wall thickness. Flattened in diastole and systole (\"D-shaped septum\") consistent with right   ventricular volume and pressure overload. Normal left ventricular diastolic function. The right ventricle is severely dilated. Right ventricular systolic function is reduced. The right atrium is severely enlarged. Mild eccentric tricuspid regurgitation. Systolic pulmonary artery pressure (SPAP) is elevated and estimated at 56   mmHg (right atrial pressure 15 mmHg) consistent with moderate pulmonary   hypertension. The IVC is dilated in size (>2.1 cm) and collapses <50% with respiration   consistent with markedly elevated right atrial pressures (15 mmHg) . Additional studies:   PFT 9/2/21:  PFT INTERPRETATION  Spirometry shows FVC to be 63%, FEV1 to be 33%, FEV1 to  FVC ratio was 53%, FEF25%-75% was 18%.   Postbronchodilator study was not  done on this test.  Lung volume shows the total lung capacity was  normal.  The patient does have some air trapping and hyperinflation. The patient also has normal diffusion capacity when adjusted for volume. The patient's flow-volume loop was suggestive of obstructive pattern. On the basis of this PFT, the patient has very severe obstructive airway  disease and cannot comment upon postbronchodilator improvement because  the test was not done. The patient will benefit from pulmonary rehab on  the basis of this PFT parameter. Please correlate clinically. CT Chest 8/5/2021  FINDINGS:   Pulmonary Arteries: Pulmonary arteries are adequately opacified. No emboli   are demonstrated       Mediastinum: Thoracic aorta normal in caliber. Cardiomegaly with trace   pericardial effusion. No mediastinal adenopathy       Lungs/pleura: Trace pleural effusions. Calcified granulomas in the left   lung. No infiltrate or edema       Upper Abdomen: Unremarkable       Soft Tissues/Bones: No acute bone or soft tissue abnormality. Impression   1. No evidence of pulmonary embolism   2. Cardiomegaly with trace pleural effusions                   Impression and Plan:      1. HFpEF, chronic, NYHA I-II symptoms at present. 2. Moderate pulmonary hypertension with cor pulmonale/RV dilation: This appears multifactorial with suspected untreated underlying sleep apnea, COPD now known severe, as well as left-sided heart failure/diastolic dysfunction. 3. Tobacco abuse, now quit  4. EtOH abuse   5. Obesity  6. Hypertension, controlled  7. Pre-diabetes  8. Thrombocytopenia  9. Severe COPD, resting hypoxia with sat 89% today    Patient Active Problem List   Diagnosis    Tobacco abuse    Daily consumption of alcohol    QT prolongation    Right axis deviation    Macrocytosis    Essential hypertension    Acute decompensated heart failure ([address]    New onset of congestive heart failure (HCC)    Chronic obstructive pulmonary disease ([address]   The ASCVD Risk score (Tyler Councilman., et al., 2013) failed to calculate for the following reasons:     The valid total cholesterol range is 130 to 320 mg/dL        PLAN:  1. Echo for follow up of PA pressures, R heart function. 2. Lab work: Pro-BNP, BMP  3. Pulmonary Referrral Dr. Michael Magana  4. Continue lasix 80 mg  twice a day   5. Limit fluid intake to 64 ounces a day  6. Monitor salt intake, < 2 g daily advised   7. Call for worsening dyspnea or increasing weight  8. Pt has declined sleep study    Follow up with me in     ***    ***    I will address the patient's cardiac risk factors and adjusted pharmacologic treatment as needed. In addition, I have reinforced the need for patient directed risk factor modification. All questions and concerns were addressed to the patient/family. Alternatives to my treatment were discussed. Thank you for allowing us to participate in the care of Twan Dong. Please call me with any questions [phone].     Berenice Feng MD, [facility]  Cardiovascular Disease  [facility]  [phone] [facility]  [phone] [address] [place] [address]  8/4/2022 8:32 AM

## 2022-08-04 NOTE — TELEPHONE ENCOUNTER
Attempted to call the patient, no answer, voicemail left. I reached out to Dr. Larry Strange to discuss his case. Awaiting callback.     DAVID no

## 2022-08-05 ENCOUNTER — TELEPHONE (OUTPATIENT)
Dept: CARDIOLOGY | Age: 57
End: 2022-08-05

## 2022-08-05 DIAGNOSIS — I27.20 PULMONARY HYPERTENSION (HCC): Primary | ICD-10-CM

## 2022-08-05 NOTE — TELEPHONE ENCOUNTER
I did. Looks like will likely move forward with operation but Dr. Mora Like was going to discuss with anesthesia.

## 2022-08-05 NOTE — TELEPHONE ENCOUNTER
Spoke with Dr. Truitt Hodgkin regarding this patient's upcoming cholecystectomy. Patient has severe pulmonary hypertension and severe tricuspid regurgitation in the setting of significant chronic obstructive pulmonary disease and untreated sleep apnea. We have tried previously to engage him with CPAP therapy but he has declined. We can continue to optimize his COPD but he likely has nonprimary pulmonary hypertension and is not a candidate for these therapies. I would not defer his cholecystectomy if there is medical necessity and we can engage with cardiac anesthesia to prep appropriately for his case. We should expect increased risk of prolonged mechanical ventilation postoperatively and patient needs to be willing to except this risk to go through with his operation. Patient remains at high risk for right-sided heart failure in the future. Will refer to our advanced heart failure clinic for further evaluation with Dr. Lauren Licona. Again as above, would not defer surgery at this time. High risk but may proceed at the surgical team's discretion. I tried to reach the patient again this morning as I did yesterday and received his voicemail. If he calls by the office I would like to speak with him directly.   Thank you        Jojo Bonner MD, [address] [facility]  [phone] [facility]  [phone] [facility]

## 2022-08-09 NOTE — PROGRESS NOTES
1. Do not eat or drink anything after 12 midnight prior to surgery. This includes no water, chewing gum mints, or ice chips. You may brush your teeth and gargle the day of surgery but DO NOT SWALLOW THE WATER. 2. Please see your family doctor/pediatrician for a history and physical and/or concerning medications. Bring any test results/reports from your physician's office. If you are under the care of a heart doctor or specialist please be aware that you may be asked to see him or her for clearance. 3. You may be asked to stop blood thinners such as Coumadin, Plavix, Fragmin, and Lovenox or Anti-inflammatories such as Aspirin, Ibuprofen, Advil, and Naproxen prior to your surgery. Please check with your doctor before stopping these or any other medications. 4. Do not smoke, and do not drink any alcoholic beverages 24 hours prior to surgery. 5. You MUST make arrangements for a responsible adult to take you home after your surgery. For your safety, you will not be allowed to leave alone or drive yourself home. Your surgery will be cancelled if you do not have a ride home. Also for your safety, it is strongly suggested someone stay with you the first 24 hrs after your surgery. 6. A parent/legal guardian must accompany a child scheduled for surgery and plan to stay at the hospital until the child is discharged. Please do not bring other children with you. 7. For your comfort,please wear simple, loose fitting clothing to the hospital.  Please do not bring valuables (money, credit cards, checkbooks, etc.) Do not wear any makeup (including no eye makeup) or nail polish on your fingers or toes. 8. For your safety, please DO NOT wear any jewelry or piercings on day of surgery. All body piercing jewelry must be removed. 9. If you have dentures, they will be removed before going to the OR; for your convenience we will provide you with a container.   If you wear contact lenses or glasses, they will be removed, they will be removed, please bring a case for them. 10. If appicable,Please see your family doctor/pediatrician for a history & physical and/or concerning medications. Bring any test results/reports from your physician's office. 11. Remember to bring Blood Bank bracelet to the hospital on the day of surgery. 12. If you have a Living Will and Durable Power of  for Healthcare, please bring in a copy. 15. Notify your Surgeon if you develop any illness between now and surgery  time, cough, cold, fever, sore throat, nausea, vomiting, etc.  Please notify your surgeon if you experience dizziness, shortness of breath or blurred vision between now & the time of your surgery   14. DO NOT shave your operative site 96 hours prior to surgery. For face & neck surgery, men may use an electric razor 48 hours prior to surgery. 15. Shower the night before surgery with _X__Antibacterial soap ___Hibiclens   16. To provide excellent care visitors will be limited to one in the room at any given time. 17.  Please bring picture ID and insurance card. 18.  Visit our web site for additional information:  [email or web address].

## 2022-08-11 ENCOUNTER — ANESTHESIA EVENT (OUTPATIENT)
Dept: OPERATING ROOM | Age: 57
End: 2022-08-11
Payer: COMMERCIAL

## 2022-08-11 ENCOUNTER — HOSPITAL ENCOUNTER (OUTPATIENT)
Age: 57
Discharge: HOME | End: 2022-08-11
Payer: COMMERCIAL

## 2022-08-11 ENCOUNTER — OFFICE VISIT (OUTPATIENT)
Dept: CARDIOLOGY CLINIC | Age: 57
End: 2022-08-11
Payer: COMMERCIAL

## 2022-08-11 VITALS
DIASTOLIC BLOOD PRESSURE: 88 MMHG | OXYGEN SATURATION: 92 % | WEIGHT: 217.5 LBS | SYSTOLIC BLOOD PRESSURE: 116 MMHG | BODY MASS INDEX: 29.46 KG/M2 | HEART RATE: 72 BPM | HEIGHT: 72 IN

## 2022-08-11 DIAGNOSIS — I50.9 NEW ONSET OF CONGESTIVE HEART FAILURE (HCC): ICD-10-CM

## 2022-08-11 DIAGNOSIS — I50.9 NEW ONSET OF CONGESTIVE HEART FAILURE (HCC): Primary | ICD-10-CM

## 2022-08-11 LAB
ANION GAP SERPL CALCULATED.3IONS-SCNC: 11 MMOL/L (ref 3–16)
BUN BLDV-MCNC: 38 MG/DL (ref 7–20)
CALCIUM SERPL-MCNC: 9.6 MG/DL (ref 8.3–10.6)
CHLORIDE BLD-SCNC: 98 MMOL/L (ref 99–110)
CO2: 29 MMOL/L (ref 21–32)
CREAT SERPL-MCNC: 1 MG/DL (ref 0.9–1.3)
GFR AFRICAN AMERICAN: >60
GFR NON-AFRICAN AMERICAN: >60
GLUCOSE BLD-MCNC: 123 MG/DL (ref 70–99)
POTASSIUM SERPL-SCNC: 4.7 MMOL/L (ref 3.5–5.1)
PRO-BNP: 1480 PG/ML (ref 0–124)
SODIUM BLD-SCNC: 138 MMOL/L (ref 136–145)

## 2022-08-11 PROCEDURE — 36415 COLL VENOUS BLD VENIPUNCTURE: CPT

## 2022-08-11 PROCEDURE — 80048 BASIC METABOLIC PNL TOTAL CA: CPT

## 2022-08-11 PROCEDURE — 83880 ASSAY OF NATRIURETIC PEPTIDE: CPT

## 2022-08-11 PROCEDURE — 99214 OFFICE O/P EST MOD 30 MIN: CPT | Performed by: INTERNAL MEDICINE

## 2022-08-11 NOTE — PATIENT INSTRUCTIONS
PLAN:  BNP, BMP- lab work today. Discussed right heart cath. Can be done after gallbladder removal.  Follow up with Puja Linn NP in one month to possibly schedule right heart cath.

## 2022-08-11 NOTE — PROGRESS NOTES
CARDIOLOGY FOLLOW UP        Patient Name: Zelalem Mosqueda  Primary Care physician: Tod Fleming DO    Reason for Referral/Chief Complaint: Zelalem Mosqueda is a 64 y.o. patient who is here to cardiology clinic today for evaluation and treatment of Congestive heart failure and hypertension. History of Present Illness:   Claudine Ivy is a 64 y.o. patient with prior medical history of HFpEF, Hypertension, Pre-DM and severe COPD. Diagnosed with new onset congestive heart failure with preserved EF. Echocardiogram revealed preserved ejection fraction, severely dilated right ventricle with reduced function in the setting of moderate pulmonary hypertension with estimated PASP 56 mmHg. Some evidence to suggest diastolic dysfunction. LOV 06/10/2022, patient said weight climbed to 235 so saw pcp on 05/12/2022. Pro-BNP elevated and his lasix was titrated to 80 mg bid. Weight reduced to 216. Edema was improved. Has not had sleep study because he did not want to go in to get it done. Did not get ECHO performed as instructed last visit. In the [facility] ED on 07/17/2022 with abdominal pain. CT showed mild to moderate pericholecystic inflammation either due to under distention or cholecystitis. Cardiomegaly with trace pericardial effusion. Trace pelvic ascites. Impression cholecystitis. Echo 07/29/2022 EF 25%, There is systolic and diastolic septal flattening consistent with right ventricular pressure and volume overload, The right ventricle is enlarged and hypokinetic ,severe tricuspid regurgitation, right atrium is severely dilated, and small pericardial effusion noted without any hemodynamic implications. Today, he reports that he is doing ok. He has ankle swelling that is normal for him he says. Much improved over previous. He is taking his medications as prescribed including 80 mg oral Lasix twice daily that gives him good urine output. 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   He denies angina with activity. Denies significant limiting shortness of breath with activity. No paroxysmal nocturnal dyspnea or orthopnea. Weight today is 217 pounds which is stable from prior appointment      Past Medical History:   has a past medical history of Acute systolic congestive heart failure ([facility] [address] Diabetes mellitus ([facility] [address] Essential hypertension, Pre-diabetes, and Thrombocytopenia ([facility] [address] Surgical History:  Denies prior surgical history. Social History:   reports that he quit smoking about a year ago. His smoking use included cigarettes. He has a 15.00 pack-year smoking history. He has never used smokeless tobacco. He reports that he does not currently use alcohol. He reports that he does not currently use drugs. Family History:  Reports no history of early onset coronary artery disease, myocardial infarction, cerebrovascular accident or sudden cardiac death among primary relatives. Home Medications:  Were reviewed and are listed in nursing record and/or below  Prior to Admission medications    Medication Sig Start Date End Date Taking? Authorizing Provider   albuterol-ipratropium (COMBIVENT RESPIMAT)  MCG/ACT AERS inhaler Inhale 1 puff into the lungs in the morning and 1 puff at noon and 1 puff in the evening and 1 puff before bedtime.  7/21/22  Yes Quinten Lefort Schklar, DO   KLOR-CON M20 20 MEQ extended release tablet TAKE ONE TABLET BY MOUTH TWICE A DAY WHILE ON INCREASED DOSE OF FUROSEMIDE 7/17/22  Yes SANCHEZ Rolon   furosemide (LASIX) 80 MG tablet Take 1 tablet by mouth 2 times daily 6/24/22  Yes Elsa Billings MD   losartan (COZAAR) 50 mg tablet TAKE ONE TABLET BY MOUTH DAILY 6/1/22  Yes Quinten Lefort Schklar, DO   ASPIRIN LOW DOSE 81 MG EC tablet TAKE ONE TABLET BY MOUTH DAILY 6/1/22  Yes Quinten Lefort Schklar, DO   atorvastatin (LIPITOR) 20 MG tablet TAKE ONE TABLET BY MOUTH ONCE NIGHTLY 6/1/22  Yes SANCHEZ Casillas - CNP   metFORMIN (GLUCOPHAGE) 500 MG tablet TAKE ONE TABLET BY MOUTH DAILY WITH BREAKFAST 6/1/22  Yes Keyla Conrad DO   albuterol sulfate  (90 Base) MCG/ACT inhaler INHALE 2 PUFFS BY MOUTH FOUR TIMES A DAY AS NEEDED FOR WHEEZING 4/23/22  Yes SANCHEZ Wheeler   umeclidinium-vilanterol (ANORO ELLIPTA) 62.5-25 MCG/INH AEPB inhaler Inhale 1 puff into the lungs daily  Patient not taking: No sig reported 5/12/22   Brooke Cavanaugh DO        CURRENT Medications:  No current facility-administered medications for this visit. Allergies:  Patient has no known allergies. Review of Systems:   A 14 point review of symptoms completed. Pertinent positives identified in the HPI, all other review of symptoms negative as below. Objective:     Vitals:    08/11/22 1023   BP: 116/88   Pulse: 72   SpO2: 92%      Weight: 217 lb 8 oz (98.7 kg)       PHYSICAL EXAM:    General:  Alert, cooperative, no distress, appears stated age   Head:  Normocephalic, atraumatic   Eyes:  Conjunctiva/corneas clear, anicteric sclerae    Nose: Nares normal, no drainage or sinus tenderness   Throat: No abnormalities of the lips, oral mucosa or tongue. Neck: Trachea midline. Neck supple with no lymphadenopathy, thyroid not enlarged, symmetric, no tenderness/mass/nodules   Lungs:   Clear to auscultation bilaterally, bilateral end expiratory wheezing as per previous exams, no rales   Chest Wall:  No deformity or tenderness to palpation   Heart:  Regular rate and rhythm, normal S1, normal caliber P2, 2/6 systolic ejection murmur the left sternal border, no rub, no S3/S4, PMI non-displaced. No heave. Abdomen:   Obese, Soft, non-tender, with normoactive bowel sounds. No masses, no hepatosplenomegaly   Extremities: No cyanosis, clubbing 1+ pitting feet and ankles bilaterally with varicosities   Vascular: 2+ radial,dorsalis pedis and posterior tibial pulses bilaterally. Brisk carotid upstrokes without carotid bruit. Skin: Skin color, texture, turgor are normal with no rashes or ulceration.     Pysch: of 05-AUG-2021 14:42,T wave inversion more evident in Anterior leads    Echo: 07/29/2022  Summary   Limited echo for left ventricular function and pulmonary pressures      Left ventricular cavity size is normal.   There is mild concentric left ventricular hypertrophy. Left ventricular function appears to be normal with an estimated ejection   fraction of 55%. There is systolic and diastolic septal flattening consistent with right   ventricular pressure and volume overload. The right ventricle is enlarged and hypokinetic. Severe tricuspid regurgitation. The right atrium is severely dilated. Estimated pulmonary artery systolic pressure is at 74 mmHg assuming a right   atrial pressure of 15 mmHg. There is a small pericardial effusion noted without any hemodynamic   implications. CT abdomen: 07/17/2022  1. Mild-to-moderate pericholecystic inflammation either due to underdistention or acute cholecystitis. 2. Cardiomegaly with trace pericardial effusion. 3. Trace pelvic ascites       Echo: 8/5/2021  Summary   Left ventricular systolic function is low normal with a visually estimated   ejection fraction of 50-55%. EF estimated by 3D at 52 %. The left ventricle is normal in size with normal wall thickness. Flattened in diastole and systole (\"D-shaped septum\") consistent with right   ventricular volume and pressure overload. Normal left ventricular diastolic function. The right ventricle is severely dilated. Right ventricular systolic function is reduced. The right atrium is severely enlarged. Mild eccentric tricuspid regurgitation. Systolic pulmonary artery pressure (SPAP) is elevated and estimated at 56   mmHg (right atrial pressure 15 mmHg) consistent with moderate pulmonary   hypertension. The IVC is dilated in size (>2.1 cm) and collapses <50% with respiration   consistent with markedly elevated right atrial pressures (15 mmHg) .        Additional studies:   PFT 9/2/21:  PFT INTERPRETATION  Spirometry shows FVC to be 63%, FEV1 to be 33%, FEV1 to  FVC ratio was 53%, FEF25%-75% was 18%. Postbronchodilator study was not  done on this test.  Lung volume shows the total lung capacity was  normal.  The patient does have some air trapping and hyperinflation. The patient also has normal diffusion capacity when adjusted for volume. The patient's flow-volume loop was suggestive of obstructive pattern. On the basis of this PFT, the patient has very severe obstructive airway  disease and cannot comment upon postbronchodilator improvement because  the test was not done. The patient will benefit from pulmonary rehab on  the basis of this PFT parameter. Please correlate clinically. CT Chest 8/5/2021  FINDINGS:   Pulmonary Arteries: Pulmonary arteries are adequately opacified. No emboli   are demonstrated       Mediastinum: Thoracic aorta normal in caliber. Cardiomegaly with trace   pericardial effusion. No mediastinal adenopathy       Lungs/pleura: Trace pleural effusions. Calcified granulomas in the left   lung. No infiltrate or edema       Upper Abdomen: Unremarkable       Soft Tissues/Bones: No acute bone or soft tissue abnormality. Impression   1. No evidence of pulmonary embolism   2. Cardiomegaly with trace pleural effusions                   Impression and Plan:      1. HFpEF/cor pulmonale with NYHA I-II symptoms  2. Severe pulmonary hypertension, progressed from last year, with right ventricular dilation/dysfunction this appears multifactorial with suspected untreated underlying sleep apnea, severe obstructive pulmonary disease, as well as left-sided heart failure/diastolic dysfunction. 3. Severe tricuspid regurgitation   4. EtOH abuse   5. Obesity  6. Hypertension, controlled  7. Pre-diabetes  8. Thrombocytopenia  9.  Tobacco abuse, now quit    Patient Active Problem List   Diagnosis    Tobacco abuse    Daily consumption of alcohol    QT prolongation    Right axis deviation    Macrocytosis    Essential hypertension    Acute decompensated heart failure ([facility] [address]    New onset of congestive heart failure (HCC)    Chronic obstructive pulmonary disease ([facility] [address]   The ASCVD Risk score (Opal Bernal, et al., 2013) failed to calculate for the following reasons: The valid total cholesterol range is 130 to 320 mg/dL        PLAN:  Pro-BNP, BMP- lab work today. Discussed right heart catheterization for further evaluation of severe pulmonary hypertension. This may be done following recovery from cholecystectomy. No PE by prior CT last year. Consider V/Q to ensure no evidence of CTEPH. Continue Lasix 80 mg p.o. twice daily. Consider transition to torsemide if failing therapy   Follow up with Main [place] NP in one month for reassessment, and to schedule right heart cath if recovered well from surgery. Patient will be elevated risk for complications at time of surgery. May proceed at the discretion of general surgery. Would suggest use of cardiac anesthesia for the case. Avoid hypotension in the setting of right ventricular dysfunction. There is risk of prolonged mechanical ventilation with severe pulmonary hypertension, severe COPD. We will call with lab work when results are in. Follow-up 1 month with NP      I, Sha Vargas RN, am scribing for and in the presence of Dr. Marjan Coe. 08/11/22 11:10 AM         The scribes documentation has been prepared under my direction and personally reviewed by me in its entirety. I confirm that the note above accurately reflects all work, treatment, procedures, and medical decision making performed by me. Alfa Wyatt MD, personally performed the services described in this documentation as scribed by Sha Vargas RN in my presence, and it is both accurate and complete to the best of our ability. I will address the patient's cardiac risk factors and adjusted pharmacologic treatment as needed.  In addition, I have reinforced the need for patient directed risk factor modification. All questions and concerns were addressed to the patient/family. Alternatives to my treatment were discussed. Thank you for allowing us to participate in the care of Ulisses Ribeiro. Please call me with any questions [phone].     Beryle Drain, MD, [facility]  Cardiovascular Disease  [facility]  [phone] [facility]  [phone] [address] [place] [address]  8/11/2022 11:09 AM

## 2022-08-12 ENCOUNTER — APPOINTMENT (OUTPATIENT)
Dept: GENERAL RADIOLOGY | Age: 57
End: 2022-08-12
Attending: SURGERY
Payer: COMMERCIAL

## 2022-08-12 ENCOUNTER — HOSPITAL ENCOUNTER (OUTPATIENT)
Age: 57
Setting detail: OBSERVATION
Discharge: HOSPICE | End: 2022-08-14
Attending: SURGERY | Admitting: SURGERY
Payer: COMMERCIAL

## 2022-08-12 ENCOUNTER — ANESTHESIA (OUTPATIENT)
Dept: OPERATING ROOM | Age: 57
End: 2022-08-12
Payer: COMMERCIAL

## 2022-08-12 DIAGNOSIS — K80.20 CALCULUS OF GALLBLADDER WITHOUT CHOLECYSTITIS WITHOUT OBSTRUCTION: ICD-10-CM

## 2022-08-12 DIAGNOSIS — G89.18 POSTOPERATIVE PAIN: Primary | ICD-10-CM

## 2022-08-12 LAB
GLUCOSE BLD-MCNC: 132 MG/DL (ref 70–99)
GLUCOSE BLD-MCNC: 134 MG/DL (ref 70–99)
GLUCOSE BLD-MCNC: 156 MG/DL (ref 70–99)
GLUCOSE BLD-MCNC: 207 MG/DL (ref 70–99)
PERFORMED ON: ABNORMAL

## 2022-08-12 PROCEDURE — 2709999900 HC NON-CHARGEABLE SUPPLY: Performed by: SURGERY

## 2022-08-12 PROCEDURE — 6360000002 HC RX W HCPCS: Performed by: ANESTHESIOLOGY

## 2022-08-12 PROCEDURE — 47563 LAPARO CHOLECYSTECTOMY/GRAPH: CPT | Performed by: SURGERY

## 2022-08-12 PROCEDURE — 2500000003 HC RX 250 WO HCPCS: Performed by: SURGERY

## 2022-08-12 PROCEDURE — 2500000003 HC RX 250 WO HCPCS: Performed by: NURSE ANESTHETIST, CERTIFIED REGISTERED

## 2022-08-12 PROCEDURE — 6360000004 HC RX CONTRAST MEDICATION: Performed by: SURGERY

## 2022-08-12 PROCEDURE — 3600000019 HC SURGERY ROBOT ADDTL 15MIN: Performed by: SURGERY

## 2022-08-12 PROCEDURE — 2580000003 HC RX 258: Performed by: SURGERY

## 2022-08-12 PROCEDURE — 7100000001 HC PACU RECOVERY - ADDTL 15 MIN: Performed by: SURGERY

## 2022-08-12 PROCEDURE — 2580000003 HC RX 258: Performed by: ANESTHESIOLOGY

## 2022-08-12 PROCEDURE — 88304 TISSUE EXAM BY PATHOLOGIST: CPT

## 2022-08-12 PROCEDURE — 36415 COLL VENOUS BLD VENIPUNCTURE: CPT

## 2022-08-12 PROCEDURE — 6360000002 HC RX W HCPCS: Performed by: NURSE ANESTHETIST, CERTIFIED REGISTERED

## 2022-08-12 PROCEDURE — 3700000000 HC ANESTHESIA ATTENDED CARE: Performed by: SURGERY

## 2022-08-12 PROCEDURE — G0378 HOSPITAL OBSERVATION PER HR: HCPCS

## 2022-08-12 PROCEDURE — 3700000001 HC ADD 15 MINUTES (ANESTHESIA): Performed by: SURGERY

## 2022-08-12 PROCEDURE — S2900 ROBOTIC SURGICAL SYSTEM: HCPCS | Performed by: SURGERY

## 2022-08-12 PROCEDURE — 7100000000 HC PACU RECOVERY - FIRST 15 MIN: Performed by: SURGERY

## 2022-08-12 PROCEDURE — 6360000002 HC RX W HCPCS: Performed by: SURGERY

## 2022-08-12 PROCEDURE — 83036 HEMOGLOBIN GLYCOSYLATED A1C: CPT

## 2022-08-12 PROCEDURE — 6370000000 HC RX 637 (ALT 250 FOR IP): Performed by: SURGERY

## 2022-08-12 PROCEDURE — 74300 X-RAY BILE DUCTS/PANCREAS: CPT

## 2022-08-12 PROCEDURE — 3209999900 FLUORO FOR SURGICAL PROCEDURES

## 2022-08-12 PROCEDURE — 3600000009 HC SURGERY ROBOT BASE: Performed by: SURGERY

## 2022-08-12 PROCEDURE — 2580000003 HC RX 258: Performed by: NURSE ANESTHETIST, CERTIFIED REGISTERED

## 2022-08-12 PROCEDURE — 2700000000 HC OXYGEN THERAPY PER DAY

## 2022-08-12 PROCEDURE — 6360000002 HC RX W HCPCS

## 2022-08-12 PROCEDURE — 94761 N-INVAS EAR/PLS OXIMETRY MLT: CPT

## 2022-08-12 RX ORDER — LIDOCAINE HYDROCHLORIDE 10 MG/ML
2 INJECTION, SOLUTION INFILTRATION; PERINEURAL
Status: DISCONTINUED | OUTPATIENT
Start: 2022-08-12 | End: 2022-08-12 | Stop reason: HOSPADM

## 2022-08-12 RX ORDER — DEXTROSE MONOHYDRATE 100 MG/ML
INJECTION, SOLUTION INTRAVENOUS CONTINUOUS PRN
Status: DISCONTINUED | OUTPATIENT
Start: 2022-08-12 | End: 2022-08-14 | Stop reason: HOSPADM

## 2022-08-12 RX ORDER — FUROSEMIDE 80 MG
80 TABLET ORAL 2 TIMES DAILY
Status: DISCONTINUED | OUTPATIENT
Start: 2022-08-12 | End: 2022-08-14 | Stop reason: HOSPADM

## 2022-08-12 RX ORDER — INSULIN LISPRO 100 [IU]/ML
0-4 INJECTION, SOLUTION INTRAVENOUS; SUBCUTANEOUS NIGHTLY
Status: DISCONTINUED | OUTPATIENT
Start: 2022-08-12 | End: 2022-08-14 | Stop reason: HOSPADM

## 2022-08-12 RX ORDER — OXYCODONE HYDROCHLORIDE 5 MG/1
5 TABLET ORAL PRN
Status: DISCONTINUED | OUTPATIENT
Start: 2022-08-12 | End: 2022-08-12 | Stop reason: HOSPADM

## 2022-08-12 RX ORDER — ALBUTEROL SULFATE 90 UG/1
2 AEROSOL, METERED RESPIRATORY (INHALATION) EVERY 4 HOURS PRN
Status: DISCONTINUED | OUTPATIENT
Start: 2022-08-12 | End: 2022-08-14 | Stop reason: HOSPADM

## 2022-08-12 RX ORDER — LIDOCAINE HYDROCHLORIDE 10 MG/ML
INJECTION, SOLUTION EPIDURAL; INFILTRATION; INTRACAUDAL; PERINEURAL PRN
Status: DISCONTINUED | OUTPATIENT
Start: 2022-08-12 | End: 2022-08-12 | Stop reason: SDUPTHER

## 2022-08-12 RX ORDER — PROPOFOL 10 MG/ML
INJECTION, EMULSION INTRAVENOUS PRN
Status: DISCONTINUED | OUTPATIENT
Start: 2022-08-12 | End: 2022-08-12 | Stop reason: SDUPTHER

## 2022-08-12 RX ORDER — SODIUM CHLORIDE, SODIUM LACTATE, POTASSIUM CHLORIDE, AND CALCIUM CHLORIDE .6; .31; .03; .02 G/100ML; G/100ML; G/100ML; G/100ML
IRRIGANT IRRIGATION PRN
Status: DISCONTINUED | OUTPATIENT
Start: 2022-08-12 | End: 2022-08-12 | Stop reason: ALTCHOICE

## 2022-08-12 RX ORDER — IPRATROPIUM BROMIDE AND ALBUTEROL SULFATE 2.5; .5 MG/3ML; MG/3ML
1 SOLUTION RESPIRATORY (INHALATION) EVERY 6 HOURS
Status: DISCONTINUED | OUTPATIENT
Start: 2022-08-12 | End: 2022-08-12

## 2022-08-12 RX ORDER — ONDANSETRON 2 MG/ML
4 INJECTION INTRAMUSCULAR; INTRAVENOUS EVERY 6 HOURS PRN
Status: DISCONTINUED | OUTPATIENT
Start: 2022-08-12 | End: 2022-08-14 | Stop reason: HOSPADM

## 2022-08-12 RX ORDER — ATORVASTATIN CALCIUM 10 MG/1
20 TABLET, FILM COATED ORAL NIGHTLY
Status: DISCONTINUED | OUTPATIENT
Start: 2022-08-12 | End: 2022-08-14 | Stop reason: HOSPADM

## 2022-08-12 RX ORDER — ENOXAPARIN SODIUM 100 MG/ML
40 INJECTION SUBCUTANEOUS DAILY
Status: DISCONTINUED | OUTPATIENT
Start: 2022-08-12 | End: 2022-08-14 | Stop reason: HOSPADM

## 2022-08-12 RX ORDER — BUPIVACAINE HYDROCHLORIDE AND EPINEPHRINE 5; 5 MG/ML; UG/ML
INJECTION, SOLUTION PERINEURAL PRN
Status: DISCONTINUED | OUTPATIENT
Start: 2022-08-12 | End: 2022-08-12 | Stop reason: ALTCHOICE

## 2022-08-12 RX ORDER — OXYCODONE HYDROCHLORIDE 5 MG/1
10 TABLET ORAL EVERY 4 HOURS PRN
Status: DISCONTINUED | OUTPATIENT
Start: 2022-08-12 | End: 2022-08-14 | Stop reason: HOSPADM

## 2022-08-12 RX ORDER — OXYCODONE HYDROCHLORIDE 5 MG/1
5 TABLET ORAL EVERY 4 HOURS PRN
Status: DISCONTINUED | OUTPATIENT
Start: 2022-08-12 | End: 2022-08-14 | Stop reason: HOSPADM

## 2022-08-12 RX ORDER — INDOCYANINE GREEN AND WATER 25 MG
5 KIT INJECTION ONCE
Status: COMPLETED | OUTPATIENT
Start: 2022-08-12 | End: 2022-08-12

## 2022-08-12 RX ORDER — ONDANSETRON 2 MG/ML
INJECTION INTRAMUSCULAR; INTRAVENOUS PRN
Status: DISCONTINUED | OUTPATIENT
Start: 2022-08-12 | End: 2022-08-12 | Stop reason: SDUPTHER

## 2022-08-12 RX ORDER — SODIUM CHLORIDE 0.9 % (FLUSH) 0.9 %
5-40 SYRINGE (ML) INJECTION PRN
Status: DISCONTINUED | OUTPATIENT
Start: 2022-08-12 | End: 2022-08-12 | Stop reason: HOSPADM

## 2022-08-12 RX ORDER — POTASSIUM CHLORIDE 20 MEQ/1
20 TABLET, EXTENDED RELEASE ORAL 2 TIMES DAILY WITH MEALS
Status: DISCONTINUED | OUTPATIENT
Start: 2022-08-12 | End: 2022-08-13

## 2022-08-12 RX ORDER — SODIUM CHLORIDE 0.9 % (FLUSH) 0.9 %
5-40 SYRINGE (ML) INJECTION EVERY 12 HOURS SCHEDULED
Status: DISCONTINUED | OUTPATIENT
Start: 2022-08-12 | End: 2022-08-12 | Stop reason: HOSPADM

## 2022-08-12 RX ORDER — HYDROMORPHONE HCL 110MG/55ML
1 PATIENT CONTROLLED ANALGESIA SYRINGE INTRAVENOUS
Status: DISCONTINUED | OUTPATIENT
Start: 2022-08-12 | End: 2022-08-14 | Stop reason: HOSPADM

## 2022-08-12 RX ORDER — SODIUM CHLORIDE, SODIUM LACTATE, POTASSIUM CHLORIDE, CALCIUM CHLORIDE 600; 310; 30; 20 MG/100ML; MG/100ML; MG/100ML; MG/100ML
INJECTION, SOLUTION INTRAVENOUS CONTINUOUS PRN
Status: DISCONTINUED | OUTPATIENT
Start: 2022-08-12 | End: 2022-08-12 | Stop reason: SDUPTHER

## 2022-08-12 RX ORDER — FAMOTIDINE 20 MG/1
20 TABLET, FILM COATED ORAL 2 TIMES DAILY
Status: DISCONTINUED | OUTPATIENT
Start: 2022-08-12 | End: 2022-08-14 | Stop reason: HOSPADM

## 2022-08-12 RX ORDER — LABETALOL HYDROCHLORIDE 5 MG/ML
5 INJECTION, SOLUTION INTRAVENOUS EVERY 10 MIN PRN
Status: DISCONTINUED | OUTPATIENT
Start: 2022-08-12 | End: 2022-08-12 | Stop reason: HOSPADM

## 2022-08-12 RX ORDER — ONDANSETRON 2 MG/ML
4 INJECTION INTRAMUSCULAR; INTRAVENOUS
Status: DISCONTINUED | OUTPATIENT
Start: 2022-08-12 | End: 2022-08-12 | Stop reason: HOSPADM

## 2022-08-12 RX ORDER — NOREPINEPHRINE BITARTRATE 1 MG/ML
INJECTION, SOLUTION INTRAVENOUS PRN
Status: DISCONTINUED | OUTPATIENT
Start: 2022-08-12 | End: 2022-08-12 | Stop reason: SDUPTHER

## 2022-08-12 RX ORDER — DIPHENHYDRAMINE HYDROCHLORIDE 50 MG/ML
12.5 INJECTION INTRAMUSCULAR; INTRAVENOUS
Status: DISCONTINUED | OUTPATIENT
Start: 2022-08-12 | End: 2022-08-12 | Stop reason: HOSPADM

## 2022-08-12 RX ORDER — FENTANYL CITRATE 50 UG/ML
INJECTION, SOLUTION INTRAMUSCULAR; INTRAVENOUS PRN
Status: DISCONTINUED | OUTPATIENT
Start: 2022-08-12 | End: 2022-08-12 | Stop reason: SDUPTHER

## 2022-08-12 RX ORDER — SODIUM CHLORIDE, SODIUM LACTATE, POTASSIUM CHLORIDE, CALCIUM CHLORIDE 600; 310; 30; 20 MG/100ML; MG/100ML; MG/100ML; MG/100ML
INJECTION, SOLUTION INTRAVENOUS CONTINUOUS
Status: DISCONTINUED | OUTPATIENT
Start: 2022-08-12 | End: 2022-08-12 | Stop reason: HOSPADM

## 2022-08-12 RX ORDER — MILRINONE LACTATE 0.2 MG/ML
INJECTION, SOLUTION INTRAVENOUS CONTINUOUS PRN
Status: DISCONTINUED | OUTPATIENT
Start: 2022-08-12 | End: 2022-08-12 | Stop reason: SDUPTHER

## 2022-08-12 RX ORDER — SODIUM CHLORIDE 9 MG/ML
INJECTION, SOLUTION INTRAVENOUS PRN
Status: DISCONTINUED | OUTPATIENT
Start: 2022-08-12 | End: 2022-08-12 | Stop reason: HOSPADM

## 2022-08-12 RX ORDER — ACETAMINOPHEN 325 MG/1
650 TABLET ORAL EVERY 6 HOURS PRN
Status: DISCONTINUED | OUTPATIENT
Start: 2022-08-12 | End: 2022-08-14 | Stop reason: HOSPADM

## 2022-08-12 RX ORDER — SODIUM CHLORIDE 9 MG/ML
INJECTION, SOLUTION INTRAVENOUS PRN
Status: DISCONTINUED | OUTPATIENT
Start: 2022-08-12 | End: 2022-08-14 | Stop reason: HOSPADM

## 2022-08-12 RX ORDER — SODIUM CHLORIDE 0.9 % (FLUSH) 0.9 %
5-40 SYRINGE (ML) INJECTION EVERY 12 HOURS SCHEDULED
Status: DISCONTINUED | OUTPATIENT
Start: 2022-08-12 | End: 2022-08-14 | Stop reason: HOSPADM

## 2022-08-12 RX ORDER — SODIUM CHLORIDE 9 MG/ML
INJECTION, SOLUTION INTRAVENOUS CONTINUOUS
Status: DISCONTINUED | OUTPATIENT
Start: 2022-08-12 | End: 2022-08-13

## 2022-08-12 RX ORDER — IPRATROPIUM BROMIDE AND ALBUTEROL SULFATE 2.5; .5 MG/3ML; MG/3ML
1 SOLUTION RESPIRATORY (INHALATION) EVERY 4 HOURS PRN
Status: DISCONTINUED | OUTPATIENT
Start: 2022-08-12 | End: 2022-08-14 | Stop reason: HOSPADM

## 2022-08-12 RX ORDER — FAMOTIDINE 10 MG/ML
20 INJECTION, SOLUTION INTRAVENOUS 2 TIMES DAILY
Status: DISCONTINUED | OUTPATIENT
Start: 2022-08-12 | End: 2022-08-14 | Stop reason: HOSPADM

## 2022-08-12 RX ORDER — OXYCODONE HYDROCHLORIDE 5 MG/1
10 TABLET ORAL PRN
Status: DISCONTINUED | OUTPATIENT
Start: 2022-08-12 | End: 2022-08-12 | Stop reason: HOSPADM

## 2022-08-12 RX ORDER — SODIUM CHLORIDE 0.9 % (FLUSH) 0.9 %
5-40 SYRINGE (ML) INJECTION PRN
Status: DISCONTINUED | OUTPATIENT
Start: 2022-08-12 | End: 2022-08-14 | Stop reason: HOSPADM

## 2022-08-12 RX ORDER — MEPERIDINE HYDROCHLORIDE 50 MG/ML
12.5 INJECTION INTRAMUSCULAR; INTRAVENOUS; SUBCUTANEOUS EVERY 5 MIN PRN
Status: DISCONTINUED | OUTPATIENT
Start: 2022-08-12 | End: 2022-08-12 | Stop reason: HOSPADM

## 2022-08-12 RX ORDER — ONDANSETRON 4 MG/1
4 TABLET, ORALLY DISINTEGRATING ORAL EVERY 8 HOURS PRN
Status: DISCONTINUED | OUTPATIENT
Start: 2022-08-12 | End: 2022-08-14 | Stop reason: HOSPADM

## 2022-08-12 RX ORDER — LOSARTAN POTASSIUM 100 MG/1
50 TABLET ORAL DAILY
Status: DISCONTINUED | OUTPATIENT
Start: 2022-08-12 | End: 2022-08-14 | Stop reason: HOSPADM

## 2022-08-12 RX ORDER — INSULIN LISPRO 100 [IU]/ML
0-8 INJECTION, SOLUTION INTRAVENOUS; SUBCUTANEOUS
Status: DISCONTINUED | OUTPATIENT
Start: 2022-08-12 | End: 2022-08-14 | Stop reason: HOSPADM

## 2022-08-12 RX ORDER — ROCURONIUM BROMIDE 10 MG/ML
INJECTION, SOLUTION INTRAVENOUS PRN
Status: DISCONTINUED | OUTPATIENT
Start: 2022-08-12 | End: 2022-08-12 | Stop reason: SDUPTHER

## 2022-08-12 RX ORDER — DEXAMETHASONE SODIUM PHOSPHATE 4 MG/ML
INJECTION, SOLUTION INTRA-ARTICULAR; INTRALESIONAL; INTRAMUSCULAR; INTRAVENOUS; SOFT TISSUE PRN
Status: DISCONTINUED | OUTPATIENT
Start: 2022-08-12 | End: 2022-08-12 | Stop reason: SDUPTHER

## 2022-08-12 RX ADMIN — DEXAMETHASONE SODIUM PHOSPHATE 10 MG: 4 INJECTION, SOLUTION INTRAMUSCULAR; INTRAVENOUS at 08:16

## 2022-08-12 RX ADMIN — FENTANYL CITRATE 50 MCG: 50 INJECTION INTRAMUSCULAR; INTRAVENOUS at 08:12

## 2022-08-12 RX ADMIN — POTASSIUM CHLORIDE 20 MEQ: 1500 TABLET, EXTENDED RELEASE ORAL at 18:35

## 2022-08-12 RX ADMIN — NOREPINEPHRINE BITARTRATE 3 MCG: 1 INJECTION INTRAVENOUS at 08:29

## 2022-08-12 RX ADMIN — PROPOFOL 50 MG: 10 INJECTION, EMULSION INTRAVENOUS at 08:14

## 2022-08-12 RX ADMIN — LOSARTAN POTASSIUM 50 MG: 100 TABLET, FILM COATED ORAL at 18:35

## 2022-08-12 RX ADMIN — PROPOFOL 50 MG: 10 INJECTION, EMULSION INTRAVENOUS at 08:12

## 2022-08-12 RX ADMIN — SODIUM CHLORIDE, POTASSIUM CHLORIDE, SODIUM LACTATE AND CALCIUM CHLORIDE: 600; 310; 30; 20 INJECTION, SOLUTION INTRAVENOUS at 06:33

## 2022-08-12 RX ADMIN — ROCURONIUM BROMIDE 50 MG: 10 SOLUTION INTRAVENOUS at 08:16

## 2022-08-12 RX ADMIN — NOREPINEPHRINE BITARTRATE 3 MCG: 1 INJECTION INTRAVENOUS at 08:26

## 2022-08-12 RX ADMIN — HYDROMORPHONE HYDROCHLORIDE 0.5 MG: 1 INJECTION, SOLUTION INTRAMUSCULAR; INTRAVENOUS; SUBCUTANEOUS at 10:50

## 2022-08-12 RX ADMIN — CEFAZOLIN 2 G: 10 INJECTION, POWDER, FOR SOLUTION INTRAVENOUS at 08:01

## 2022-08-12 RX ADMIN — DEXTROSE 5 MCG/MIN: 5 SOLUTION INTRAVENOUS at 08:27

## 2022-08-12 RX ADMIN — ROCURONIUM BROMIDE 15 MG: 10 SOLUTION INTRAVENOUS at 08:47

## 2022-08-12 RX ADMIN — Medication 10 ML: at 21:12

## 2022-08-12 RX ADMIN — FAMOTIDINE 20 MG: 20 TABLET ORAL at 21:12

## 2022-08-12 RX ADMIN — SODIUM CHLORIDE: 9 INJECTION, SOLUTION INTRAVENOUS at 18:44

## 2022-08-12 RX ADMIN — FUROSEMIDE 80 MG: 80 TABLET ORAL at 21:12

## 2022-08-12 RX ADMIN — MILRINONE LACTATE 0.2 MCG/KG/MIN: 0.2 INJECTION, SOLUTION INTRAVENOUS at 10:02

## 2022-08-12 RX ADMIN — SODIUM CHLORIDE, SODIUM LACTATE, POTASSIUM CHLORIDE, AND CALCIUM CHLORIDE: .6; .31; .03; .02 INJECTION, SOLUTION INTRAVENOUS at 08:01

## 2022-08-12 RX ADMIN — LIDOCAINE HYDROCHLORIDE 60 MG: 10 INJECTION, SOLUTION EPIDURAL; INFILTRATION; INTRACAUDAL; PERINEURAL at 08:12

## 2022-08-12 RX ADMIN — ATORVASTATIN CALCIUM 20 MG: 10 TABLET, FILM COATED ORAL at 21:12

## 2022-08-12 RX ADMIN — FENTANYL CITRATE 25 MCG: 50 INJECTION INTRAMUSCULAR; INTRAVENOUS at 09:39

## 2022-08-12 RX ADMIN — ONDANSETRON 4 MG: 2 INJECTION INTRAMUSCULAR; INTRAVENOUS at 08:01

## 2022-08-12 RX ADMIN — LIDOCAINE HYDROCHLORIDE 60 MG: 10 INJECTION, SOLUTION EPIDURAL; INFILTRATION; INTRACAUDAL; PERINEURAL at 10:05

## 2022-08-12 RX ADMIN — INDOCYANINE GREEN AND WATER 5 MG: KIT at 06:34

## 2022-08-12 RX ADMIN — PROPOFOL 50 MG: 10 INJECTION, EMULSION INTRAVENOUS at 08:16

## 2022-08-12 RX ADMIN — MILRINONE LACTATE 0.2 MCG/KG/MIN: 0.2 INJECTION, SOLUTION INTRAVENOUS at 08:01

## 2022-08-12 RX ADMIN — HYDROMORPHONE HYDROCHLORIDE 0.5 MG: 1 INJECTION, SOLUTION INTRAMUSCULAR; INTRAVENOUS; SUBCUTANEOUS at 10:24

## 2022-08-12 ASSESSMENT — PAIN DESCRIPTION - DESCRIPTORS
DESCRIPTORS: BURNING
DESCRIPTORS: BURNING

## 2022-08-12 ASSESSMENT — PAIN DESCRIPTION - PAIN TYPE: TYPE: ACUTE PAIN;SURGICAL PAIN

## 2022-08-12 ASSESSMENT — PAIN SCALES - GENERAL
PAINLEVEL_OUTOF10: 5
PAINLEVEL_OUTOF10: 0
PAINLEVEL_OUTOF10: 7
PAINLEVEL_OUTOF10: 0
PAINLEVEL_OUTOF10: 2
PAINLEVEL_OUTOF10: 0
PAINLEVEL_OUTOF10: 4
PAINLEVEL_OUTOF10: 0

## 2022-08-12 ASSESSMENT — PAIN DESCRIPTION - FREQUENCY: FREQUENCY: INTERMITTENT

## 2022-08-12 ASSESSMENT — PAIN DESCRIPTION - LOCATION
LOCATION: ABDOMEN

## 2022-08-12 ASSESSMENT — PAIN DESCRIPTION - ORIENTATION: ORIENTATION: RIGHT

## 2022-08-12 NOTE — OP NOTE
Date of Surgery: 8/12/22    Preop Dx: Acute Cholecystitis    Postop Dx: Same    Procedure: Robotic Cholecystectomy with Intraoperative Cholangiogram    Surgeon: Bala Holland     Assistant:     Anesthesia: GETA     EBL: <50ml    Specimen: gallbladder    Complications: none    Drains/Lines: none    Indications: 63 yo with history of acute cholecystitis. Description:  Patient was given adequate description of the risks and rewards of the procedure, including bleeding, infection, injury to surrounding structures such as the common bile duct, need for further surgery, and possibility of open procedure and freely consented. Also discussed heightened risk given his pulmonary hypertension and cardiac history. He was given appropriate antibiotics and brought to the OR where general anesthesia was induced. He was placed in supine position. Prepped and draped in usual sterile fashion. Area above the umbilicusnjected with local anesthetic and #11 blade used to incise epidermis. This allowed passage of 5mm optiview trocar using zero degree laparoscope. Once this was inserted the abdomen was insufflated to 10 mmHg pressure with CO2. Thirty degree laparoscope inserted. Abdomen inspected and no inflammation of gallbladder seen. The left lateral trocar was placed with an 8mm trocar under direct visualization. Another 8mm trocar and a 5mm trocars was inserted in the patient's right side under direct visualization. The supraumbilical trocar was then substituted for a 12mm trocar. Patient was placed in slight reverse trendelenburg position. The robot was then brought into position and connected to the appropriate trocars. The camera was inserted as well as initial instruments. I then went to the robot console. Gallbladder then grasped and retracted over dome of liver. Also grasped at infundibulum and retracted anterolaterally. Cystic duct then circumferentially dissected. One endoclip placed proximally on cystic duct.   It was then partially divided. Cholangiogram catheter inserted and cholangiogram performed. This demonstrated some filling of the left and right hepatic ducts and flow into the duodenum without obstruction. Three clips were then placed distal and the duct divided. The cystic artery was then circumferentially dissected with two clips being placed proximal and one distal. It was then divided. A smaller branch was similarly controlled. Using electrocautery the gallbladder was dissected free of the liver bed, placed into an endoretrieval bag. The liver bed was then inspected and made hemostatic with electrocautery. The area was copiously irrigated with all fluid suctioned off. The cystic duct and artery had clips intact without signs of leak or bleeding. The gallbladder was then removed from the abdomen, and sent off as specimen. The trocars were removed and without bleeding at their insertion sites. The abdomen was allowed to desufflate. The periumbilical trocar site had its fascia closed with 0-0 vicryl figure of eight stitch. All of the trocar sites had the epidermis closed with 4-0 monocryl in the subcuticular plane. Sterile dressing placed. All suture, sponge and instrument count correct times two at end of case. Transferred to PACU in stable condition.    Jorge L Joy MD

## 2022-08-12 NOTE — H&P
I have reviewed the history and physical and examined the patient. I find no relevant changes. I have reviewed with the patient and/or family members, during the preoperative office visit the risks, benefits, and alternatives to the procedure.     April Swartz MD

## 2022-08-12 NOTE — ANESTHESIA PROCEDURE NOTES
Arterial Line:    An arterial line was placed using surface landmarks, in the OR for the following indication(s): continuous blood pressure monitoring. A 20 gauge (size), 1 and 3/8 inch (length), Angiocath (type) catheter was placed, Seldinger technique not used, into the radial artery, secured by tape and Tegaderm. Anesthesia type: Local  Local infiltration: Injection    Events:  patient tolerated procedure well with no complications and EBL 0mL. 8/12/2022 7:50 AM8/12/2022 7:55 AM  Anesthesiologist: Eugene Cerda MD  Performed: Anesthesiologist   Preanesthetic Checklist  Completed: patient identified, IV checked, site marked, risks and benefits discussed, surgical/procedural consents, equipment checked, pre-op evaluation, timeout performed, anesthesia consent given, oxygen available and monitors applied/VS acknowledged

## 2022-08-12 NOTE — PROGRESS NOTES
Patient admitted to room 426 from PACU. Patient oriented to room, call light, bed rails, phone, lights and bathroom. Patient instructed about the schedule of the day including: vital sign frequency, lab draws, possible tests, frequency of MD and staff rounds, including RN/MD rounding together at bedside, daily weights, and I &O's. Patient instructed about prescribed diet, how to use 8MENU, and television. Bed alarm in place, patient aware of placement and reason. Telemetry box in place, patient aware of placement and reason. Bed locked, in lowest position, side rails up 2/4, call light within reach. Will continue to monitor.

## 2022-08-12 NOTE — PROGRESS NOTES
Pt arrives in PACU alert and awake. VSS at this time.   Report received from OR team, will continue to monitor

## 2022-08-12 NOTE — PROGRESS NOTES
4 Eyes Skin Assessment     The patient is being assess for   Admission    I agree that 2 RN's have performed a thorough Head to Toe Skin Assessment on the patient. ALL assessment sites listed below have been assessed. Areas assessed for pressure by both nurses:   [x]   Head, Face, and Ears   [x]   Shoulders, Back, and Chest, Abdomen  [x]   Arms, Elbows, and Hands   [x]   Coccyx, Sacrum, and Ischium  [x]   Legs, Feet, and Heels        Skin Assessed Under all Medical Devices by both nurses:  O2 device tubing              All Mepilex Borders were peeled back and area peeked at by both nurses:  No: NA  Please list where Mepilex Borders are located:  NA             **SHARE this note so that the co-signing nurse is able to place an eSignature**    Co-signer eSignature: Electronically signed by Pranav Alarcon RN on 8/12/22 at 6:21 PM EDT    Does the Patient have Skin Breakdown related to pressure?   No     (Insert Photo here)         Paul Prevention initiated:  No   Wound Care Orders initiated:  No      C nurse consulted for Pressure Injury (Stage 3,4, Unstageable, DTI, NWPT, Complex wounds)and New or Established Ostomies:  No      Primary Nurse eSignature: Electronically signed by Thuan Galloway RN on 8/12/22 at 6:20 PM EDT

## 2022-08-12 NOTE — RT PROTOCOL NOTE
RT Nebulizer Bronchodilator Protocol Note    There is a bronchodilator order in the chart from a provider indicating to follow the RT Bronchodilator Protocol and there is an Initiate RT Bronchodilator Protocol order as well (see protocol at bottom of note). CXR Findings:  No results found. The findings from the last RT Protocol Assessment were as follows:  Smoking: Smoker 15 pack years or more  Respiratory Pattern: Regular pattern and RR 12-20 bpm  Breath Sounds: Clear breath sounds  Cough: Strong, spontaneous, non-productive  Indication for Bronchodilator Therapy: None  Bronchodilator Assessment Score: 1    Aerosolized bronchodilator medication orders have been revised according to the RT Nebulizer Bronchodilator Protocol below. Respiratory Therapist to perform RT Therapy Protocol Assessment initially then follow the protocol. Repeat RT Therapy Protocol Assessment PRN for score 0-3 or on second treatment, BID, and PRN for scores above 3. No Indications - adjust the frequency to every 6 hours PRN wheezing or bronchospasm, if no treatments needed after 48 hours then discontinue using Per Protocol order mode. If indication present, adjust the RT bronchodilator orders based on the Bronchodilator Assessment Score as indicated below. If a patient is on this medication at home then do not decrease Frequency below that used at home. 0-3 - enter or revise RT bronchodilator order(s) to equivalent RT Bronchodilator order with Frequency of every 4 hours PRN for wheezing or increased work of breathing using Per Protocol order mode. 4-6 - enter or revise RT Bronchodilator order(s) to two equivalent RT bronchodilator orders with one order with BID Frequency and one order with Frequency of every 4 hours PRN wheezing or increased work of breathing using Per Protocol order mode.          7-10 - enter or revise RT Bronchodilator order(s) to two equivalent RT bronchodilator orders with one order with TID Frequency and one order with Frequency of every 4 hours PRN wheezing or increased work of breathing using Per Protocol order mode. 11-13 - enter or revise RT Bronchodilator order(s) to one equivalent RT bronchodilator order with QID Frequency and an Albuterol order with Frequency of every 4 hours PRN wheezing or increased work of breathing using Per Protocol order mode. Greater than 13 - enter or revise RT Bronchodilator order(s) to one equivalent RT bronchodilator order with every 4 hours Frequency and an Albuterol order with Frequency of every 2 hours PRN wheezing or increased work of breathing using Per Protocol order mode. RT to enter RT Home Evaluation for COPD & MDI Assessment order using Per Protocol order mode.     Electronically signed by Kaleb Berg RCP on 8/12/2022 at 5:43 PM

## 2022-08-12 NOTE — ANESTHESIA PRE PROCEDURE
Chrissy Segovia MD        lidocaine 1 % injection 2 mL  2 mL IntraDERmal Once PRN Jeromy Leach MD        lactated ringers infusion   IntraVENous Continuous Jeromy Leach  mL/hr at 22 0633 New Bag at 22 5140       Allergies:  No Known Allergies    Problem List:    Patient Active Problem List   Diagnosis Code    Tobacco abuse Z72.0    Daily consumption of alcohol Z78.9    QT prolongation R94.31    Right axis deviation R94.31    Macrocytosis D75.89    Essential hypertension I10    Acute decompensated heart failure (HCC) I50.9    New onset of congestive heart failure (HCC) I50.9    Chronic obstructive pulmonary disease ([facility] J44.9       Past Medical History:        Diagnosis Date    Acute systolic congestive heart failure ([facility]     Diabetes mellitus ([facility]     Essential hypertension     Thrombocytopenia ([facility]        Past Surgical History:  History reviewed. No pertinent surgical history.     Social History:    Social History     Tobacco Use    Smoking status: Former     Packs/day: 1.00     Years: 15.00     Pack years: 15.00     Types: Cigarettes     Quit date: 2021     Years since quittin.0    Smokeless tobacco: Never   Substance Use Topics    Alcohol use: Not Currently     Comment: \"2 beers after work\"                                Counseling given: Not Answered      Vital Signs (Current):   Vitals:    22 1452 22 0615   BP:  (!) 127/99   Pulse:  86   Resp:  20   Temp:  98.4 °F (36.9 °C)   TempSrc:  Temporal   SpO2:  92%   Weight: 204 lb (92.5 kg)    Height: 6' (1.829 m)                                               BP Readings from Last 3 Encounters:   22 (!) 127/99   22 116/88   22 118/74       NPO Status: Time of last liquid consumption: 2359                        Time of last solid consumption: 230                        Date of last liquid consumption: 22                        Date of last solid food consumption: 08/11/22    BMI:   Wt Readings from Last 3 Encounters:   08/09/22 204 lb (92.5 kg)   08/11/22 217 lb 8 oz (98.7 kg)   07/28/22 220 lb 9.6 oz (100.1 kg)     Body mass index is 27.67 kg/m².     CBC:   Lab Results   Component Value Date/Time    WBC 12.7 07/17/2022 03:28 PM    RBC 4.82 07/17/2022 03:28 PM    HGB 15.3 07/17/2022 03:28 PM    HCT 46.3 07/17/2022 03:28 PM    MCV 96.0 07/17/2022 03:28 PM    RDW 13.7 07/17/2022 03:28 PM     07/17/2022 03:28 PM       CMP:   Lab Results   Component Value Date/Time     08/11/2022 11:47 AM    K 4.7 08/11/2022 11:47 AM    K 4.0 07/17/2022 03:28 PM    CL 98 08/11/2022 11:47 AM    CO2 29 08/11/2022 11:47 AM    BUN 38 08/11/2022 11:47 AM    CREATININE 1.0 08/11/2022 11:47 AM    GFRAA >60 08/11/2022 11:47 AM    AGRATIO 1.1 08/05/2021 02:11 PM    LABGLOM >60 08/11/2022 11:47 AM    GLUCOSE 123 08/11/2022 11:47 AM    PROT 8.2 07/17/2022 03:28 PM    CALCIUM 9.6 08/11/2022 11:47 AM    BILITOT 1.1 07/17/2022 03:28 PM    ALKPHOS 177 07/17/2022 03:28 PM    AST 28 07/17/2022 03:28 PM    ALT 33 07/17/2022 03:28 PM       POC Tests:   Recent Labs     08/12/22  0629   POCGLU 134*       Coags:   Lab Results   Component Value Date/Time    PROTIME 14.1 08/05/2021 04:53 PM    INR 1.24 08/05/2021 04:53 PM    APTT 30.3 08/05/2021 04:53 PM       HCG (If Applicable): No results found for: PREGTESTUR, PREGSERUM, HCG, HCGQUANT     ABGs: No results found for: PHART, PO2ART, NMY6XCU, DJP8QBC, BEART, Y4NLUBYK     Type & Screen (If Applicable):  No results found for: LABABO, LABRH    Drug/Infectious Status (If Applicable):  No results found for: HIV, HEPCAB    COVID-19 Screening (If Applicable):   Lab Results   Component Value Date/Time    COVID19 Not Detected 08/06/2021 02:40 AM           Anesthesia Evaluation  Patient summary reviewed no history of anesthetic complications:   Airway: Mallampati: III  TM distance: >3 FB   Neck ROM: full  Mouth opening: > = 3 FB   Dental: normal exam Pulmonary:normal exam  breath sounds clear to auscultation  (+) COPD:      (-) asthma and sleep apnea                           Cardiovascular:  Exercise tolerance: good (>4 METS),   (+) hypertension:, valvular problems/murmurs (severe TR):, CHF:, pulmonary hypertension: severe,     (-) CAD,  angina and  MASSEY      Rhythm: regular  Rate: normal                 ROS comment: Prolonged QT     Neuro/Psych:      (-) seizures and TIA            ROS comment: EtOH abuse GI/Hepatic/Renal:        (-) GERD, liver disease and no renal disease       Endo/Other:    (+) Diabetes, blood dyscrasia: thrombocytopenia:., .                 Abdominal:             Vascular: Other Findings:           Anesthesia Plan      general     ASA 4     (I discussed with the patient the risks and benefits of PIV, anesthesia, IV Narcotics, PACU. All questions were answered the patient agrees with the plan and wishes to proceed)  Induction: intravenous. arterial line                  Pt with severe pulm HTN and resultant right-sided heart strain. Will proceed with case with a-line. Pt will stay overnight and discussed potential for need for mechanical ventilation overnight and increased risk for anesthetic complications. Pt understands and accepts risk.         Zoie Toure MD   8/12/2022

## 2022-08-12 NOTE — ANESTHESIA POSTPROCEDURE EVALUATION
Department of Anesthesiology  Postprocedure Note    Patient: Maxime Gustafson  MRN: [id number]  YOB: 1965  Date of evaluation: 8/12/2022      Procedure Summary     Date: 08/12/22 Room / Location: [facility] OR [address] [place] [address]    Anesthesia Start: 0801 Anesthesia Stop: 1007    Procedure: ROBOTIC CHOLECYSTECTOMY WITH INTRAOPERATIVE CHOLANGIOGRAM Diagnosis:       Calculus of gallbladder without cholecystitis without obstruction      (CHOLELITHIASIS)    Surgeons: Rico Moreno MD Responsible Provider: Edwar St MD    Anesthesia Type: general ASA Status: 4          Anesthesia Type: No value filed.     Roque Phase I: Roque Score: 9    Roque Phase II:        Anesthesia Post Evaluation    Comments: Postoperative Anesthesia Note    Name:    Maxime uGstafson  MRN:      [id number]    Patient Vitals in the past 12 hrs:  08/12/22 1749, Height:6' (1.829 m), Weight:217 lb 8 oz (98.7 kg)  08/12/22 1723, BP:(!) 135/106, Temp:98 °F (36.7 °C), Temp src:Oral, Pulse:90, Resp:17, SpO2:92 %  08/12/22 1627, BP:(!) 132/93, Pulse:87, Resp:20, SpO2:(!) 89 %  08/12/22 1621, BP:137/80, Pulse:91, Resp:22, SpO2:(!) 88 %  08/12/22 1454, Pulse:91, Resp:22, SpO2:(!) 89 %  08/12/22 1453, Pulse:92, Resp:24, SpO2:(!) 89 %  08/12/22 1312, Pulse:85, Resp:20, SpO2:93 %  08/12/22 1311, Pulse:85, Resp:21, SpO2:93 %  08/12/22 1216, BP:(!) 129/95, Pulse:85, Resp:(!) 31, SpO2:(!) 88 %  08/12/22 1210, BP:(!) 132/93, Pulse:84, Resp:21, SpO2:90 %  08/12/22 1207, Pulse:83, Resp:24, SpO2:90 %  08/12/22 1206, BP:(!) 127/94, Pulse:85, Resp:19, SpO2:(!) 89 %  08/12/22 1205, Pulse:85, Resp:24, SpO2:(!) 89 %  08/12/22 1107, Pulse:75, Resp:21, SpO2:(!) 89 %  08/12/22 1106, BP:112/88, Pulse:73, Resp:16, SpO2:92 %  08/12/22 1100, BP:116/85, Pulse:73, Resp:20, SpO2:90 %  08/12/22 1051, Pulse:80, Resp:22, SpO2:91 %  08/12/22 1050, BP:(!) 115/91, Pulse:76, Resp:18, SpO2:91 %  08/12/22 1049, Pulse:83, Resp:15, SpO2:90 %  08/12/22 1036, BP:113/80, Pulse:74, Resp:20, SpO2:(!) 88 %  08/12/22 1031, BP:104/77, Pulse:75, Resp:16, SpO2:(!) 89 %  08/12/22 1026, BP:107/79, Pulse:78, Resp:21, SpO2:90 %  08/12/22 1021, BP:105/82, Pulse:74, Resp:17, SpO2:90 %  08/12/22 1015, BP:117/75, Pulse:77, Resp:25, SpO2:91 %  08/12/22 1014, Pulse:79, Resp:17, SpO2:90 %  08/12/22 1013, Pulse:79, Resp:19, SpO2:92 %  08/12/22 1012, Pulse:79, Resp:25, SpO2:92 %  08/12/22 1011, BP:123/82, Pulse:76, Resp:18, SpO2:92 %  08/12/22 1010, Pulse:79, Resp:20, SpO2:92 %  08/12/22 1009, Pulse:81, Resp:25, SpO2:90 %  08/12/22 1008, Pulse:81, Resp:19, SpO2:91 %  08/12/22 1007, Pulse:79, Resp:20, SpO2:91 %  08/12/22 1006, Pulse:87, Resp:21, SpO2:91 %  08/12/22 1005, Pulse:85, Resp:19, SpO2:92 %  08/12/22 1000, BP:122/70, Temp:(!) 96.3 °F (35.7 °C), Temp src:Temporal, Pulse:89, Resp:16, SpO2:92 %     LABS:    CBC  Lab Results       Component                Value               Date/Time                  WBC                      12.7 (H)            07/17/2022 03:28 PM        HGB                      15.3                07/17/2022 03:28 PM        HCT                      46.3                07/17/2022 03:28 PM        PLT                      159                 07/17/2022 03:28 PM   RENAL  Lab Results       Component                Value               Date/Time                  NA                       138                 08/11/2022 11:47 AM        K                        4.7                 08/11/2022 11:47 AM        K                        4.0                 07/17/2022 03:28 PM        CL                       98 (L)              08/11/2022 11:47 AM        CO2                      29                  08/11/2022 11:47 AM        BUN                      38 (H)              08/11/2022 11:47 AM        CREATININE               1.0                 08/11/2022 11:47 AM        GLUCOSE                  123 (H)             08/11/2022 11:47 AM   COAGS  Lab Results       Component Value               Date/Time                  PROTIME                  14.1 (H)            08/05/2021 04:53 PM        INR                      1.24 (H)            08/05/2021 04:53 PM        APTT                     30.3                08/05/2021 04:53 PM     Intake & Output:  @93IZHT@    Nausea & Vomiting:  No    Level of Consciousness:  Awake    Pain Assessment:  Adequate analgesia    Anesthesia Complications:  No apparent anesthetic complications    SUMMARY      Vital signs stable  OK to discharge from Stage I post anesthesia care.   Care transferred from Anesthesiology department on discharge from perioperative area

## 2022-08-13 LAB
ANION GAP SERPL CALCULATED.3IONS-SCNC: 7 MMOL/L (ref 3–16)
BUN BLDV-MCNC: 24 MG/DL (ref 7–20)
CALCIUM SERPL-MCNC: 9.1 MG/DL (ref 8.3–10.6)
CHLORIDE BLD-SCNC: 98 MMOL/L (ref 99–110)
CO2: 28 MMOL/L (ref 21–32)
CREAT SERPL-MCNC: 0.9 MG/DL (ref 0.9–1.3)
ESTIMATED AVERAGE GLUCOSE: 154.2 MG/DL
GFR AFRICAN AMERICAN: >60
GFR NON-AFRICAN AMERICAN: >60
GLUCOSE BLD-MCNC: 112 MG/DL (ref 70–99)
GLUCOSE BLD-MCNC: 136 MG/DL (ref 70–99)
GLUCOSE BLD-MCNC: 164 MG/DL (ref 70–99)
GLUCOSE BLD-MCNC: 203 MG/DL (ref 70–99)
GLUCOSE BLD-MCNC: 249 MG/DL (ref 70–99)
HBA1C MFR BLD: 7 %
PERFORMED ON: ABNORMAL
POTASSIUM SERPL-SCNC: 4.9 MMOL/L (ref 3.5–5.1)
POTASSIUM SERPL-SCNC: 5.9 MMOL/L (ref 3.5–5.1)
SODIUM BLD-SCNC: 133 MMOL/L (ref 136–145)

## 2022-08-13 PROCEDURE — 2580000003 HC RX 258: Performed by: SURGERY

## 2022-08-13 PROCEDURE — G0378 HOSPITAL OBSERVATION PER HR: HCPCS

## 2022-08-13 PROCEDURE — 99024 POSTOP FOLLOW-UP VISIT: CPT | Performed by: SURGERY

## 2022-08-13 PROCEDURE — 36415 COLL VENOUS BLD VENIPUNCTURE: CPT

## 2022-08-13 PROCEDURE — 6360000002 HC RX W HCPCS: Performed by: SURGERY

## 2022-08-13 PROCEDURE — 94640 AIRWAY INHALATION TREATMENT: CPT

## 2022-08-13 PROCEDURE — 80048 BASIC METABOLIC PNL TOTAL CA: CPT

## 2022-08-13 PROCEDURE — 96372 THER/PROPH/DIAG INJ SC/IM: CPT

## 2022-08-13 PROCEDURE — 94761 N-INVAS EAR/PLS OXIMETRY MLT: CPT

## 2022-08-13 PROCEDURE — 84132 ASSAY OF SERUM POTASSIUM: CPT

## 2022-08-13 PROCEDURE — 6370000000 HC RX 637 (ALT 250 FOR IP): Performed by: SURGERY

## 2022-08-13 PROCEDURE — 2700000000 HC OXYGEN THERAPY PER DAY

## 2022-08-13 RX ORDER — OXYCODONE HYDROCHLORIDE 5 MG/1
5-10 TABLET ORAL EVERY 6 HOURS PRN
Qty: 20 TABLET | Refills: 0 | Status: SHIPPED | OUTPATIENT
Start: 2022-08-13 | End: 2022-08-18

## 2022-08-13 RX ADMIN — Medication 10 ML: at 20:29

## 2022-08-13 RX ADMIN — FUROSEMIDE 80 MG: 80 TABLET ORAL at 20:29

## 2022-08-13 RX ADMIN — ATORVASTATIN CALCIUM 20 MG: 10 TABLET, FILM COATED ORAL at 20:29

## 2022-08-13 RX ADMIN — Medication 2 PUFF: at 12:07

## 2022-08-13 RX ADMIN — ENOXAPARIN SODIUM 40 MG: 100 INJECTION SUBCUTANEOUS at 08:28

## 2022-08-13 RX ADMIN — Medication 10 ML: at 08:29

## 2022-08-13 RX ADMIN — FAMOTIDINE 20 MG: 20 TABLET ORAL at 20:29

## 2022-08-13 RX ADMIN — FUROSEMIDE 80 MG: 80 TABLET ORAL at 08:28

## 2022-08-13 RX ADMIN — FAMOTIDINE 20 MG: 20 TABLET ORAL at 08:28

## 2022-08-13 ASSESSMENT — PAIN SCALES - GENERAL: PAINLEVEL_OUTOF10: 0

## 2022-08-13 NOTE — FLOWSHEET NOTE
08/12/22 2006   Oxygen Therapy   SpO2 91 %   Pulse Oximetry Type Intermittent   Pulse Oximeter Device Mode Intermittent   Pulse Oximeter Device Location Finger   O2 Device Nasal cannula   O2 Flow Rate (L/min) 3 L/min       Incentive spirometer given to patient - educated on use and importance

## 2022-08-13 NOTE — PROGRESS NOTES
[facility] GENERAL SURGERY    Surgery Progress Note           POD # 1    PATIENT NAME: Maxime Gustafson     TODAY'S DATE: 8/13/2022    INTERVAL HISTORY:    Pt with mild pain. OBJECTIVE:   VITALS:  /72   Pulse 92   Temp 97.6 °F (36.4 °C) (Oral)   Resp 16   Ht 6' (1.829 m)   Wt 214 lb 9.6 oz (97.3 kg)   SpO2 91%   BMI 29.10 kg/m²     INTAKE/OUTPUT:    I/O last 3 completed shifts: In: 1200 [P.O.:600; I.V.:600]  Out: 6998 [Urine:1650; Blood:25]  I/O this shift:  In: 10 [I.V.:10]  Out: -               CONSTITUTIONAL:  awake and alert  ABDOMEN:   normal bowel sounds, soft, non-distended, appropriately tender   INCISION: clean    Data:  CBC: No results for input(s): WBC, HGB, HCT, PLT in the last 72 hours. BMP:    Recent Labs     08/11/22  1147 08/13/22  0453    133*   K 4.7 5.9*   CL 98* 98*   CO2 29 28   BUN 38* 24*   CREATININE 1.0 0.9   GLUCOSE 123* 164*     Hepatic: No results for input(s): AST, ALT, ALB, BILITOT, ALKPHOS in the last 72 hours. Mag:    No results for input(s): MG in the last 72 hours. Phos:   No results for input(s): PHOS in the last 72 hours. INR: No results for input(s): INR in the last 72 hours.       Radiology Review:  NA    ASSESSMENT AND PLAN:  64 y.o. male status post robotic cholecystectomy   Stable hemodynamics  Tolerating low fat diet  Was on high potassium supplementation as outpatient - will stop and recheck later today  HLIV  If potassium improved and off oxygen will dischargte later today         Electronically signed by Rico Moreno MD

## 2022-08-13 NOTE — PROGRESS NOTES
Dr. Cass Plunkett notified of resting and ambulating oxygen of 88% on room air. Pt is wheezy so respiratory was asked to come and give patient his prn inhaler. Pt placed back on 3L NC due to oxygen saturation being 83% while sleeping. Dr. Cass Plunkett would like to keep the patient one more night to try to wean the oxygen more.

## 2022-08-13 NOTE — DISCHARGE INSTRUCTIONS
[facility] Christina Seaman AND GILBERTO Melissaas. Anthony Fuller M.D. [address] [facility] [address] [address]                [address] Mccloud M.D. [address] [facility], [address] [place] [address]         [place] [address] [facility] Patricia Bedolla M.D                         [phone]        [facility] Keith Pretty M.D. [facility]       POST-OPERATIVE INSTRUCTIONS FOR GALLBLADDER SURGERY    Call the office to schedule your post-operative appointment with your surgeon for two (2) weeks. You will have either white steri-strips and a water occlusive dressing or surgical glue closing your incisions. If you have clear bandages over your incisions, you may remove them in 3-4 days. Leave the steri-stips in place. These will peel away in 7-10 days. You may shower. Wash incisions gently, and pat them dry. Do not rub your incisions. General guidelines for activity:   Avoid strenuous activity or lifting anything heavier than 20 pounds. It is OK to be up  walking around, and walking up and down stairs. Do what is comfortable: stop and rest when you feel tired. Drink plenty of fluids and stay on a bland diet for 2-3 days after surgery. Do NOT drive while taking your narcotic pain medicine. Watch for signs of infection:  Excessive warmth or bright redness around your incisions  Leakage cloudy fluid from you incisions  Fever over 101.5  During the laparoscopic procedure that you had, gas is pumped into the abdominal cavity. You may feel abdominal, shoulder, or rib pain for a few days due to this gas. You will have pain medicine ordered.  Take as directed    If you experience constipation:  Increase your water intake  Increase your activity, walking is best.  A stool softener or mild laxative may be necessary if you still have not had a bowel movement ; call the office for further instructions. Please take note: IF you do not take all of your narcotic pain medication, we ask that you dispose of these responsibly. Drug drop off boxes are located in the [facility] and [facility] emergency departments. These Med Safe return cabinets are available 24/7 in the emergency department [facility] of office staff may NOT accept any medications to drop off in the cabinet.

## 2022-08-14 VITALS
SYSTOLIC BLOOD PRESSURE: 106 MMHG | HEIGHT: 72 IN | OXYGEN SATURATION: 91 % | TEMPERATURE: 97.8 F | RESPIRATION RATE: 16 BRPM | WEIGHT: 222.66 LBS | BODY MASS INDEX: 30.16 KG/M2 | DIASTOLIC BLOOD PRESSURE: 81 MMHG | HEART RATE: 78 BPM

## 2022-08-14 LAB
GLUCOSE BLD-MCNC: 118 MG/DL (ref 70–99)
GLUCOSE BLD-MCNC: 134 MG/DL (ref 70–99)
PERFORMED ON: ABNORMAL
PERFORMED ON: ABNORMAL

## 2022-08-14 PROCEDURE — 2580000003 HC RX 258: Performed by: SURGERY

## 2022-08-14 PROCEDURE — 99024 POSTOP FOLLOW-UP VISIT: CPT | Performed by: SURGERY

## 2022-08-14 PROCEDURE — 6370000000 HC RX 637 (ALT 250 FOR IP): Performed by: SURGERY

## 2022-08-14 PROCEDURE — 2700000000 HC OXYGEN THERAPY PER DAY

## 2022-08-14 PROCEDURE — 94761 N-INVAS EAR/PLS OXIMETRY MLT: CPT

## 2022-08-14 PROCEDURE — G0378 HOSPITAL OBSERVATION PER HR: HCPCS

## 2022-08-14 PROCEDURE — 96372 THER/PROPH/DIAG INJ SC/IM: CPT

## 2022-08-14 PROCEDURE — 6360000002 HC RX W HCPCS: Performed by: SURGERY

## 2022-08-14 RX ADMIN — FAMOTIDINE 20 MG: 20 TABLET ORAL at 09:27

## 2022-08-14 RX ADMIN — ENOXAPARIN SODIUM 40 MG: 100 INJECTION SUBCUTANEOUS at 09:26

## 2022-08-14 RX ADMIN — Medication 10 ML: at 09:27

## 2022-08-14 RX ADMIN — OXYCODONE 5 MG: 5 TABLET ORAL at 12:40

## 2022-08-14 RX ADMIN — FUROSEMIDE 80 MG: 80 TABLET ORAL at 09:26

## 2022-08-14 ASSESSMENT — PAIN DESCRIPTION - PAIN TYPE: TYPE: SURGICAL PAIN

## 2022-08-14 ASSESSMENT — PAIN SCALES - GENERAL: PAINLEVEL_OUTOF10: 5

## 2022-08-14 ASSESSMENT — PAIN DESCRIPTION - LOCATION: LOCATION: ABDOMEN

## 2022-08-14 NOTE — PROGRESS NOTES
[facility] GENERAL SURGERY    Surgery Progress Note           POD # 2    PATIENT NAME: Twan Dong     TODAY'S DATE: 8/14/2022    INTERVAL HISTORY:    Pt with pain controlled     OBJECTIVE:   VITALS:  /67   Pulse 85   Temp 97.9 °F (36.6 °C) (Oral)   Resp 16   Ht 6' (1.829 m)   Wt 222 lb 10.6 oz (101 kg)   SpO2 92%   BMI 30.20 kg/m²     INTAKE/OUTPUT:    I/O last 3 completed shifts: In: 3409 [P.O.:600; I.V.:610]  Out: 8592 [Urine:1650; Blood:25]  I/O this shift: In: 400 [P.O.:400]  Out: -               CONSTITUTIONAL:  awake and alert  ABDOMEN:   normal bowel sounds, soft, non-distended, appropriately tender   INCISION: clean    Data:  CBC: No results for input(s): WBC, HGB, HCT, PLT in the last 72 hours. BMP:    Recent Labs     08/11/22  1147 08/13/22  0453 08/13/22  1032    133*  --    K 4.7 5.9* 4.9   CL 98* 98*  --    CO2 29 28  --    BUN 38* 24*  --    CREATININE 1.0 0.9  --    GLUCOSE 123* 164*  --        Hepatic: No results for input(s): AST, ALT, ALB, BILITOT, ALKPHOS in the last 72 hours. Mag:    No results for input(s): MG in the last 72 hours. Phos:   No results for input(s): PHOS in the last 72 hours. INR: No results for input(s): INR in the last 72 hours. Radiology Review:  NA    ASSESSMENT AND PLAN:  64 y.o. male status post robotic cholecystectomy   Stable hemodynamics  Tolerating low fat diet  Potassium improved  HLIV  OOB and wean oxygen to 87-92. Discharge later today if stable.         Electronically signed by Doreen Bender MD

## 2022-08-14 NOTE — PROGRESS NOTES
Discharge instructions and medications reviewed with patient. All questions answered. Pt left in stable condition via WC to private car.

## 2022-08-14 NOTE — PLAN OF CARE
Problem: Chronic Conditions and Co-morbidities  Goal: Patient's chronic conditions and co-morbidity symptoms are monitored and maintained or improved  8/13/2022 2135 by Roselia Mata RN  Outcome: Progressing  8/13/2022 2135 by Roselia Mata RN  Outcome: Progressing     Problem: Discharge Planning  Goal: Discharge to home or other facility with appropriate resources  8/13/2022 2135 by Roselia Mata RN  Outcome: Progressing  8/13/2022 2135 by Roselia Mata RN  Outcome: Progressing     Problem: Pain  Goal: Verbalizes/displays adequate comfort level or baseline comfort level  8/13/2022 2135 by Roselia Mata RN  Outcome: Progressing  8/13/2022 2135 by Roselia Mata RN  Outcome: Progressing     Problem: ABCDS Injury Assessment  Goal: Absence of physical injury  8/13/2022 2135 by Roselia Mata RN  Outcome: Progressing  8/13/2022 2135 by Roselia Mata RN  Outcome: Progressing

## 2022-08-15 ENCOUNTER — TELEPHONE (OUTPATIENT)
Dept: FAMILY MEDICINE CLINIC | Age: 57
End: 2022-08-15

## 2022-08-15 ENCOUNTER — TELEPHONE (OUTPATIENT)
Dept: CARDIOLOGY CLINIC | Age: 57
End: 2022-08-15

## 2022-08-15 ENCOUNTER — CARE COORDINATION (OUTPATIENT)
Dept: CASE MANAGEMENT | Age: 57
End: 2022-08-15

## 2022-08-15 NOTE — TELEPHONE ENCOUNTER
----- Message from Lata Jefferson MD sent at 8/11/2022  4:32 PM EDT -----  Please let the patient know I reviewed his lab work. Kidney function is stable. fluid level appears to be similar to previous. Labs indicate he may even be a little dehydrated. I would continue same dose of diuretic and hold medications as directed by surgery in the morning and lets get him through his surgery. Planning reassessment thereafter with right heart cath in the future as discussed.

## 2022-08-15 NOTE — TELEPHONE ENCOUNTER
Has appointment with danis pryor on 09/12/2022 and will discuss right heart cath with her at that time.

## 2022-08-15 NOTE — CARE COORDINATION
[facility] Transitions Initial Follow Up Call    Call within 2 business days of discharge: Yes    Patient: Jossy Gaspar Patient : 1965   MRN: [id number]  Reason for Admission: s/p cholecystectomy  Discharge Date: 22 RARS: No data recorded    Last Discharge [facility]       Date Complaint Diagnosis Description Type Department Provider    22  Postoperative pain . .. Admission (Discharged) Abraham Larsen MD             Spoke with:     Facility: A    Attempted to reach patient via phone for initial post hospital transition call.  left stating purpose of call along with my contact information requesting a return call.    Meggan Marlow  [facility]  Care Transitions  [phone]    Care Transitions 24 Hour Call    Do you have all of your prescriptions and are they filled?: Yes  Do you have support at home?: Alone  Are you an active caregiver in your home?: No  Care Transitions Interventions         Follow Up  Future Appointments   Date Time Provider Patricia Rust   2022 11:30 AM SANCHEZ Whitten - CNP [facility]   11/3/2022  9:00 AM Elsworth Scheuermann, DO [facility] [address]

## 2022-08-15 NOTE — TELEPHONE ENCOUNTER
Called and spoke with patient. He has had cholecystectomy he states upon discharge his potassium was discontinued in relation to hyperkalemia. He states he is compliant with Lasix. Please advise if you would like to proceed with right heart cath will need to forward to [facility] for scheduling.

## 2022-08-15 NOTE — TELEPHONE ENCOUNTER
[facility] Transitions Initial Follow Up Call    Outreach made within 2 business days of discharge: Yes    Patient: Arcenio Portillo Patient : 1965   MRN: [id number]  Reason for Admission: There are no discharge diagnoses documented for the most recent discharge. Discharge Date: 22       Spoke with: Patient had surgery, no follow up needed with pcp     Discharge department/facility: [facility]     TCM Interactive Patient Contact:  Was patient able to fill all prescriptions: Yes  Was patient instructed to bring all medications to the follow-up visit: Yes  Is patient taking all medications as directed in the discharge summary?  Yes  Does patient understand their discharge instructions: Yes  Does patient have questions or concerns that need addressed prior to 7-14 day follow up office visit: no    Scheduled appointment with PCP within 7-14 days    Follow Up  Future Appointments   Date Time Provider Patricia Rust   2022 11:30 AM SANCHEZ Villa CNP   11/3/2022  9:00 AM DO LOWELL Cole

## 2022-08-15 NOTE — TELEPHONE ENCOUNTER
I would wait about 3-4 weeks for healing, then we can schedule. Would be difficult to lie on table while his abdominal wall is still healing. Can we set reminder for this?  RUSSELL HERNANDEZ

## 2022-08-16 ENCOUNTER — CARE COORDINATION (OUTPATIENT)
Dept: CASE MANAGEMENT | Age: 57
End: 2022-08-16

## 2022-08-16 NOTE — CARE COORDINATION
[facility] Transitions Initial Follow Up Call    Call within 2 business days of discharge: Yes    Patient: Deandra Clements Patient : 1965   MRN: [id number]  Reason for Admission: s/p cholecystectomy  Discharge Date: 22 RARS: No data recorded    Last Discharge [facility]       Date Complaint Diagnosis Description Type Department Provider    22  Postoperative pain . .. Admission (Discharged) Galen Huston MD             Spoke with: LAWANDA    Facility: [facility]    Second and final attempt to reach patient via phone for initial post hospital transition call.  left stating purpose of call along with my contact information requesting a return call. [employer] message sent to attempt outreach. Episode ended d/t unsuccessful contact.    Meggan Marlow LPN [address] [place] [address]  Care Transitions  [phone]    Care Transitions 24 Hour Call    Do you have all of your prescriptions and are they filled?: Yes  Do you have support at home?: Alone  Are you an active caregiver in your home?: No  Care Transitions Interventions         Follow Up  Future Appointments   Date Time Provider Patricia Rust   2022 11:30 AM Lynnann Catalina, APRN - CNP [facility]   11/3/2022  9:00 AM Mica Sotelo DO [address]

## 2022-08-16 NOTE — CARE COORDINATION
[facility] Transitions Initial Follow Up Call    Call within 2 business days of discharge: Yes    Patient: Abbe Velázquez Patient : 1965   MRN: [id number]  Reason for Admission: Post op pain   Discharge Date: 22 RARS: No data recorded    Last Discharge [facility]       Date Complaint Diagnosis Description Type Department Provider    22  Postoperative pain . .. Admission (Discharged) Ana Duran MD             Final attempt made to reach patient for post hospital follow up call. Left a voice message for patient with my contact information and informed of final outreach attempt.          Care Transitions 24 Hour Call    Do you have all of your prescriptions and are they filled?: Yes  Do you have support at home?: Alone  Are you an active caregiver in your home?: No  Care Transitions Interventions         Follow Up  Future Appointments   Date Time Provider Patricia Rust   2022 11:30 AM SANCHEZ Cohen CNP   11/3/2022  9:00 AM DO LOWELL JenkinsN, RN, [facility]  Care Transition Nurse  [phone] mobile

## 2022-08-17 ENCOUNTER — TELEPHONE (OUTPATIENT)
Dept: SURGERY | Age: 57
End: 2022-08-17

## 2022-08-17 NOTE — TELEPHONE ENCOUNTER
Pt called to make his post appt with Dr. Yari Brito. He had his gallbladder removed 08.12.22. Pt stated that he is having a lot of swelling, \"my belly is bigger than it used to be. \" Pt is asking if this is normal? Pt also stated that he is not in any severe pain. Pt can be reached back at [phone].

## 2022-08-17 NOTE — DISCHARGE SUMMARY
Surgery Discharge Summary    Patient Identification  Twan Dong is a 64 y.o. male. :  1965  Admit Date:  2022    Discharge date:   2022  4:03 PM                                   Disposition: home    Discharge Diagnoses:   Principal Problem:    Calculus of gallbladder without cholecystitis without obstruction  Resolved Problems:    * No resolved hospital problems. *      Discharge condition: good    Discharge Medications:     Discharge Medication List as of 2022  2:14 PM        CONTINUE these medications which have NOT CHANGED    Details   albuterol-ipratropium (COMBIVENT RESPIMAT)  MCG/ACT AERS inhaler Inhale 1 puff into the lungs in the morning and 1 puff at noon and 1 puff in the evening and 1 puff before bedtime. , Disp-1 each, R-1NO PRINT      furosemide (LASIX) 80 MG tablet Take 1 tablet by mouth 2 times daily, Disp-180 tablet, R-1Normal      losartan (COZAAR) 50 mg tablet TAKE ONE TABLET BY MOUTH DAILY, Disp-90 tablet, R-1Normal      ASPIRIN LOW DOSE 81 MG EC tablet TAKE ONE TABLET BY MOUTH DAILY, Disp-90 tablet, R-1Normal      atorvastatin (LIPITOR) 20 MG tablet TAKE ONE TABLET BY MOUTH ONCE NIGHTLY, Disp-90 tablet, R-1Normal      metFORMIN (GLUCOPHAGE) 500 MG tablet TAKE ONE TABLET BY MOUTH DAILY WITH BREAKFAST, Disp-90 tablet, R-1Normal      umeclidinium-vilanterol (ANORO ELLIPTA) 62.5-25 MCG/INH AEPB inhaler Inhale 1 puff into the lungs daily, Disp-60 each, R-1Normal      albuterol sulfate  (90 Base) MCG/ACT inhaler INHALE 2 PUFFS BY MOUTH FOUR TIMES A DAY AS NEEDED FOR WHEEZING, Disp-1 each, R-2Normal                Discharge Medication List as of 2022  2:14 PM        START taking these medications    Details   oxyCODONE (ROXICODONE) 5 MG immediate release tablet Take 1-2 tablets by mouth every 6 hours as needed for Pain for up to 5 days. Intended supply: 5 days.  Take lowest dose possible to manage pain, Disp-20 tablet, R-0Normal               Most Recent Labs:    CBC: No results for input(s): WBC, HGB, HCT, PLT in the last 72 hours. BMP:  No results for input(s): NA, K, CL, CO2, BUN, CREATININE, GLUCOSE in the last 72 hours. Hepatic: No results for input(s): AST, ALT, ALB, BILITOT, ALKPHOS in the last 72 hours. PT/INR:  No results for input(s): INR in the last 72 hours. Consults: none    Surgery: robotic cholecystectomy    Patient Instructions: Activity: no heavy lifting, pushing, pulling for 1  weeks, no driving for 1 weeks or while on analgesics  Diet: low fat  Follow-up with Robi in 2 weeks. The patient and/or family/patient representatives, were provided education regarding discharge instructions, ongoing treatment and follow-up. Details of information given to the patient may be found in the discharge instructions located in the EMR. HPI and Hospital Course:   Patient admitted after above operation with hypoxia. Once weaned off of oxygen was discharged home. Tolerated diet and pain controlled.       Rico Moreno MD    [facility]

## 2022-08-18 ENCOUNTER — CARE COORDINATION (OUTPATIENT)
Dept: CARE COORDINATION | Age: 57
End: 2022-08-18

## 2022-08-30 ENCOUNTER — CARE COORDINATION (OUTPATIENT)
Dept: CARE COORDINATION | Age: 57
End: 2022-08-30

## 2022-09-01 ENCOUNTER — OFFICE VISIT (OUTPATIENT)
Dept: SURGERY | Age: 57
End: 2022-09-01

## 2022-09-01 VITALS
HEART RATE: 89 BPM | BODY MASS INDEX: 29.66 KG/M2 | HEIGHT: 72 IN | WEIGHT: 219 LBS | SYSTOLIC BLOOD PRESSURE: 117 MMHG | DIASTOLIC BLOOD PRESSURE: 68 MMHG

## 2022-09-01 DIAGNOSIS — Z90.49 S/P LAPAROSCOPIC CHOLECYSTECTOMY: Primary | ICD-10-CM

## 2022-09-01 PROCEDURE — 99024 POSTOP FOLLOW-UP VISIT: CPT | Performed by: SURGERY

## 2022-09-07 ENCOUNTER — CARE COORDINATION (OUTPATIENT)
Dept: CARE COORDINATION | Age: 57
End: 2022-09-07

## 2022-09-07 NOTE — CARE COORDINATION
ACM made final outreach attempt and left message for return call. ACM will resolve care episode at this time.

## 2022-09-10 NOTE — PROGRESS NOTES
HPI: Nursing notes reviewed. Patient is a 63 yo who underwent robotic cholecystectomy at [facility].   Reports minimal pain and no nausea. Energy is normal.  no diarrhea and no constipation. ROS:  10 point review of systems performed with pertinent positives in HPI    Phys:    Abd - soft, appropriately tender, nondistended   Incisions - no erythema    Assesment: 63 yo s/p robotic cholecystectomy     Plan: 1. Doing well postop with minimal pain and no nausea   2. No activity limitations   3. Low fat diet   4.   Call with concerns

## 2022-09-14 ENCOUNTER — TELEPHONE (OUTPATIENT)
Dept: CARDIOLOGY CLINIC | Age: 57
End: 2022-09-14

## 2022-09-15 ENCOUNTER — TELEPHONE (OUTPATIENT)
Dept: CARDIOLOGY CLINIC | Age: 57
End: 2022-09-15

## 2022-09-15 NOTE — TELEPHONE ENCOUNTER
Left patient message to call us. We need to see how his surgery incision is healing. If he is doing well we need to schedule a right heart cath for pulmonary htn with dr em.

## 2022-09-22 NOTE — TELEPHONE ENCOUNTER
Attempted to reach pt x3, left vm to call the office back. Sending letter to pt and closing encounter.

## 2022-10-13 DIAGNOSIS — J44.9 CHRONIC OBSTRUCTIVE PULMONARY DISEASE, UNSPECIFIED COPD TYPE (HCC): ICD-10-CM

## 2022-10-13 NOTE — TELEPHONE ENCOUNTER
Refill Request     CONFIRM preferrred pharmacy with the patient. If Mail Order Rx - Pend for 90 day refill. Last Seen: Last Seen Department: 7/28/2022  Last Seen by PCP: 7/28/2022    Last Written: 1 with 1 7/21/2022     If no future appointment scheduled, route STAFF MESSAGE with patient name to the [facility] for scheduling. Next Appointment:   Future Appointments   Date Time Provider Patricia Rust   11/3/2022  9:00 AM DO LOWELL Melara - [facility]       Message sent to [address] [place] [address] to schedule appt with patient? NO      Requested Prescriptions     Pending Prescriptions Disp Refills    albuterol-ipratropium (COMBIVENT RESPIMAT)  MCG/ACT AERS inhaler 1 each 1     Sig: Inhale 1 puff into the lungs in the morning and 1 puff at noon and 1 puff in the evening and 1 puff before bedtime.

## 2022-10-17 LAB
LV EF: 60 %
LVEF MODALITY: NORMAL

## 2022-10-20 ENCOUNTER — TELEPHONE (OUTPATIENT)
Dept: CARDIOLOGY CLINIC | Age: 57
End: 2022-10-20

## 2022-10-20 NOTE — TELEPHONE ENCOUNTER
DAVID WHITE, [facility]    Pt called to say he is extremely swollen in his legs and groin. His legs feel tight and painful. He is SOB. Weight today 238 lbs. He says he does not feel like he is able to pee like he used to. He is also asking if he should restart his potassium. He is not able to check his vitals. Advised pt to go to ER for evaluation.  Please advise

## 2022-10-21 ENCOUNTER — HOSPITAL ENCOUNTER (INPATIENT)
Age: 57
LOS: 13 days | Discharge: HOME | DRG: 286 | End: 2022-11-03
Attending: EMERGENCY MEDICINE | Admitting: INTERNAL MEDICINE
Payer: COMMERCIAL

## 2022-10-21 ENCOUNTER — APPOINTMENT (OUTPATIENT)
Dept: GENERAL RADIOLOGY | Age: 57
DRG: 286 | End: 2022-10-21
Payer: COMMERCIAL

## 2022-10-21 DIAGNOSIS — R09.02 HYPOXIA: ICD-10-CM

## 2022-10-21 DIAGNOSIS — R33.9 URINARY RETENTION: ICD-10-CM

## 2022-10-21 DIAGNOSIS — R79.9 ELEVATED BUN: ICD-10-CM

## 2022-10-21 DIAGNOSIS — R60.1 ANASARCA: Primary | ICD-10-CM

## 2022-10-21 PROBLEM — R60.9 PITTING EDEMA: Status: ACTIVE | Noted: 2022-10-21

## 2022-10-21 PROBLEM — E11.9 DM (DIABETES MELLITUS) (HCC): Status: ACTIVE | Noted: 2022-10-21

## 2022-10-21 LAB
A/G RATIO: 1.2 (ref 1.1–2.2)
ALBUMIN SERPL-MCNC: 3.9 G/DL (ref 3.4–5)
ALP BLD-CCNC: 208 U/L (ref 40–129)
ALT SERPL-CCNC: 29 U/L (ref 10–40)
ANION GAP SERPL CALCULATED.3IONS-SCNC: 10 MMOL/L (ref 3–16)
AST SERPL-CCNC: 34 U/L (ref 15–37)
BILIRUB SERPL-MCNC: 1.2 MG/DL (ref 0–1)
BUN BLDV-MCNC: 43 MG/DL (ref 7–20)
CALCIUM SERPL-MCNC: 9.4 MG/DL (ref 8.3–10.6)
CHLORIDE BLD-SCNC: 97 MMOL/L (ref 99–110)
CO2: 27 MMOL/L (ref 21–32)
CREAT SERPL-MCNC: 1 MG/DL (ref 0.9–1.3)
EKG ATRIAL RATE: 79 BPM
EKG DIAGNOSIS: NORMAL
EKG P AXIS: 78 DEGREES
EKG P-R INTERVAL: 170 MS
EKG Q-T INTERVAL: 380 MS
EKG QRS DURATION: 100 MS
EKG QTC CALCULATION (BAZETT): 435 MS
EKG R AXIS: 211 DEGREES
EKG T AXIS: -1 DEGREES
EKG VENTRICULAR RATE: 79 BPM
GFR SERPL CREATININE-BSD FRML MDRD: >60 ML/MIN/{1.73_M2}
GLUCOSE BLD-MCNC: 129 MG/DL (ref 70–99)
GLUCOSE BLD-MCNC: 143 MG/DL (ref 70–99)
GLUCOSE BLD-MCNC: 97 MG/DL (ref 70–99)
HCT VFR BLD CALC: 44.9 % (ref 40.5–52.5)
HEMOGLOBIN: 14.7 G/DL (ref 13.5–17.5)
MCH RBC QN AUTO: 31.7 PG (ref 26–34)
MCHC RBC AUTO-ENTMCNC: 32.8 G/DL (ref 31–36)
MCV RBC AUTO: 96.6 FL (ref 80–100)
PDW BLD-RTO: 13.9 % (ref 12.4–15.4)
PERFORMED ON: ABNORMAL
PERFORMED ON: NORMAL
PLATELET # BLD: 186 K/UL (ref 135–450)
PMV BLD AUTO: 8.1 FL (ref 5–10.5)
POTASSIUM REFLEX MAGNESIUM: 4.2 MMOL/L (ref 3.5–5.1)
PRO-BNP: 945 PG/ML (ref 0–124)
RBC # BLD: 4.65 M/UL (ref 4.2–5.9)
SODIUM BLD-SCNC: 134 MMOL/L (ref 136–145)
TOTAL PROTEIN: 7.2 G/DL (ref 6.4–8.2)
TROPONIN: <0.01 NG/ML
WBC # BLD: 6.6 K/UL (ref 4–11)

## 2022-10-21 PROCEDURE — 6360000002 HC RX W HCPCS: Performed by: PHYSICIAN ASSISTANT

## 2022-10-21 PROCEDURE — 80053 COMPREHEN METABOLIC PANEL: CPT

## 2022-10-21 PROCEDURE — 94761 N-INVAS EAR/PLS OXIMETRY MLT: CPT

## 2022-10-21 PROCEDURE — 6370000000 HC RX 637 (ALT 250 FOR IP): Performed by: NURSE PRACTITIONER

## 2022-10-21 PROCEDURE — 93005 ELECTROCARDIOGRAM TRACING: CPT | Performed by: PHYSICIAN ASSISTANT

## 2022-10-21 PROCEDURE — 2580000003 HC RX 258: Performed by: NURSE PRACTITIONER

## 2022-10-21 PROCEDURE — 51798 US URINE CAPACITY MEASURE: CPT

## 2022-10-21 PROCEDURE — 94640 AIRWAY INHALATION TREATMENT: CPT

## 2022-10-21 PROCEDURE — 84484 ASSAY OF TROPONIN QUANT: CPT

## 2022-10-21 PROCEDURE — 93010 ELECTROCARDIOGRAM REPORT: CPT | Performed by: INTERNAL MEDICINE

## 2022-10-21 PROCEDURE — 99285 EMERGENCY DEPT VISIT HI MDM: CPT

## 2022-10-21 PROCEDURE — 71046 X-RAY EXAM CHEST 2 VIEWS: CPT

## 2022-10-21 PROCEDURE — 83880 ASSAY OF NATRIURETIC PEPTIDE: CPT

## 2022-10-21 PROCEDURE — 96374 THER/PROPH/DIAG INJ IV PUSH: CPT

## 2022-10-21 PROCEDURE — 85027 COMPLETE CBC AUTOMATED: CPT

## 2022-10-21 PROCEDURE — 1200000000 HC SEMI PRIVATE

## 2022-10-21 PROCEDURE — 2700000000 HC OXYGEN THERAPY PER DAY

## 2022-10-21 RX ORDER — POLYETHYLENE GLYCOL 3350 17 G/17G
17 POWDER, FOR SOLUTION ORAL DAILY PRN
Status: DISCONTINUED | OUTPATIENT
Start: 2022-10-21 | End: 2022-11-03 | Stop reason: HOSPADM

## 2022-10-21 RX ORDER — ASPIRIN 81 MG/1
81 TABLET ORAL DAILY
Status: DISCONTINUED | OUTPATIENT
Start: 2022-10-21 | End: 2022-11-03 | Stop reason: HOSPADM

## 2022-10-21 RX ORDER — SODIUM CHLORIDE 0.9 % (FLUSH) 0.9 %
5-40 SYRINGE (ML) INJECTION EVERY 12 HOURS SCHEDULED
Status: DISCONTINUED | OUTPATIENT
Start: 2022-10-21 | End: 2022-11-03 | Stop reason: HOSPADM

## 2022-10-21 RX ORDER — FUROSEMIDE 10 MG/ML
40 INJECTION INTRAMUSCULAR; INTRAVENOUS 2 TIMES DAILY
Status: DISCONTINUED | OUTPATIENT
Start: 2022-10-21 | End: 2022-10-25

## 2022-10-21 RX ORDER — DEXTROSE MONOHYDRATE 100 MG/ML
INJECTION, SOLUTION INTRAVENOUS CONTINUOUS PRN
Status: DISCONTINUED | OUTPATIENT
Start: 2022-10-21 | End: 2022-11-03 | Stop reason: HOSPADM

## 2022-10-21 RX ORDER — SODIUM CHLORIDE 9 MG/ML
INJECTION, SOLUTION INTRAVENOUS PRN
Status: DISCONTINUED | OUTPATIENT
Start: 2022-10-21 | End: 2022-11-03 | Stop reason: HOSPADM

## 2022-10-21 RX ORDER — SODIUM CHLORIDE 0.9 % (FLUSH) 0.9 %
5-40 SYRINGE (ML) INJECTION PRN
Status: DISCONTINUED | OUTPATIENT
Start: 2022-10-21 | End: 2022-11-03 | Stop reason: HOSPADM

## 2022-10-21 RX ORDER — ACETAMINOPHEN 650 MG/1
650 SUPPOSITORY RECTAL EVERY 6 HOURS PRN
Status: DISCONTINUED | OUTPATIENT
Start: 2022-10-21 | End: 2022-11-03 | Stop reason: HOSPADM

## 2022-10-21 RX ORDER — ATORVASTATIN CALCIUM 10 MG/1
20 TABLET, FILM COATED ORAL NIGHTLY
Status: DISCONTINUED | OUTPATIENT
Start: 2022-10-21 | End: 2022-11-03 | Stop reason: HOSPADM

## 2022-10-21 RX ORDER — ONDANSETRON 2 MG/ML
4 INJECTION INTRAMUSCULAR; INTRAVENOUS EVERY 6 HOURS PRN
Status: DISCONTINUED | OUTPATIENT
Start: 2022-10-21 | End: 2022-11-03 | Stop reason: HOSPADM

## 2022-10-21 RX ORDER — ALBUTEROL SULFATE 90 UG/1
2 AEROSOL, METERED RESPIRATORY (INHALATION) 4 TIMES DAILY
Status: DISCONTINUED | OUTPATIENT
Start: 2022-10-21 | End: 2022-10-23

## 2022-10-21 RX ORDER — ACETAMINOPHEN 325 MG/1
650 TABLET ORAL EVERY 6 HOURS PRN
Status: DISCONTINUED | OUTPATIENT
Start: 2022-10-21 | End: 2022-11-03 | Stop reason: HOSPADM

## 2022-10-21 RX ORDER — INSULIN LISPRO 100 [IU]/ML
0-8 INJECTION, SOLUTION INTRAVENOUS; SUBCUTANEOUS
Status: DISCONTINUED | OUTPATIENT
Start: 2022-10-21 | End: 2022-11-03 | Stop reason: HOSPADM

## 2022-10-21 RX ORDER — ONDANSETRON 4 MG/1
4 TABLET, ORALLY DISINTEGRATING ORAL EVERY 8 HOURS PRN
Status: DISCONTINUED | OUTPATIENT
Start: 2022-10-21 | End: 2022-11-03 | Stop reason: HOSPADM

## 2022-10-21 RX ORDER — ALBUTEROL SULFATE 90 UG/1
2 AEROSOL, METERED RESPIRATORY (INHALATION) EVERY 4 HOURS PRN
Status: DISCONTINUED | OUTPATIENT
Start: 2022-10-21 | End: 2022-11-03 | Stop reason: HOSPADM

## 2022-10-21 RX ORDER — FUROSEMIDE 10 MG/ML
80 INJECTION INTRAMUSCULAR; INTRAVENOUS ONCE
Status: COMPLETED | OUTPATIENT
Start: 2022-10-21 | End: 2022-10-21

## 2022-10-21 RX ORDER — ENOXAPARIN SODIUM 100 MG/ML
40 INJECTION SUBCUTANEOUS DAILY
Status: DISCONTINUED | OUTPATIENT
Start: 2022-10-22 | End: 2022-11-03 | Stop reason: HOSPADM

## 2022-10-21 RX ORDER — LIDOCAINE HYDROCHLORIDE 20 MG/ML
JELLY TOPICAL PRN
Status: DISCONTINUED | OUTPATIENT
Start: 2022-10-21 | End: 2022-11-03 | Stop reason: HOSPADM

## 2022-10-21 RX ORDER — LOSARTAN POTASSIUM 25 MG/1
50 TABLET ORAL DAILY
Status: DISCONTINUED | OUTPATIENT
Start: 2022-10-21 | End: 2022-10-25

## 2022-10-21 RX ORDER — INSULIN LISPRO 100 [IU]/ML
0-4 INJECTION, SOLUTION INTRAVENOUS; SUBCUTANEOUS NIGHTLY
Status: DISCONTINUED | OUTPATIENT
Start: 2022-10-21 | End: 2022-11-03 | Stop reason: HOSPADM

## 2022-10-21 RX ADMIN — ATORVASTATIN CALCIUM 20 MG: 10 TABLET, FILM COATED ORAL at 20:48

## 2022-10-21 RX ADMIN — Medication 2 PUFF: at 19:33

## 2022-10-21 RX ADMIN — FUROSEMIDE 80 MG: 10 INJECTION, SOLUTION INTRAMUSCULAR; INTRAVENOUS at 15:49

## 2022-10-21 RX ADMIN — SODIUM CHLORIDE, PRESERVATIVE FREE 10 ML: 5 INJECTION INTRAVENOUS at 20:48

## 2022-10-21 ASSESSMENT — ENCOUNTER SYMPTOMS
CONSTIPATION: 0
SHORTNESS OF BREATH: 1
NAUSEA: 0
EYE PAIN: 0
VOMITING: 0
RHINORRHEA: 0
COUGH: 0
ABDOMINAL PAIN: 1
SORE THROAT: 0
BACK PAIN: 0
DIARRHEA: 0

## 2022-10-21 ASSESSMENT — PAIN - FUNCTIONAL ASSESSMENT: PAIN_FUNCTIONAL_ASSESSMENT: 0-10

## 2022-10-21 ASSESSMENT — PAIN SCALES - GENERAL: PAINLEVEL_OUTOF10: 6

## 2022-10-21 NOTE — TELEPHONE ENCOUNTER
Attempted to reach pt to let them know [facility] agrees with pt proceeding to the ED

## 2022-10-21 NOTE — H&P
Hospital Medicine History & Physical      PCP: Becky Prado DO    Date of Admission: 10/21/2022    Date of Service: Pt seen/examined on 10/21/2022 and Admitted to Inpatient with expected LOS greater than two midnights due to medical therapy. Chief Complaint: Pitting edema and urinary retention    History Of Present Illness:      62 y.o. male, with past medical history of CHF, obesity and diabetes who presented to [facility] with pitting edema and urinary retention. History obtained from the patient and review of EMR. The patient stated over the last week he has noticed extreme swelling to both of his legs. He stated the swelling is coming up both legs to his groin and all over his abdomen. The patient stated he does take 80 mg of Lasix twice daily and has been doing so. However, he stated he does not feel like he is urinating as much as he usually does. The patient stated his abdomen has not been the same since he left the hospital after his cholecystectomy roughly 3 months ago. Although he does have a history of CHF, he denies any shortness of breath and/or chest pain. He stated in the beginning of the week he did have roughly 12 pound weight gain, but stated he last 7 pounds yesterday. The patient stated his skin feels extremely tight and it is extremely painful for him to walk and bend over. In the emergency room a chest x-ray was obtained that revealed no acute cardiopulmonary findings. And the patient's proBNP was 945. He was given 80 mg of IV Lasix. A Lucia catheter was ordered to be placed, but the patient refused. He was admitted for further evaluation and treatment. The patient denied any other associated symptoms as well as any aggravating and/or alleviating factors. At the time of this assessment, the patient was resting comfortably in bed. He currently denies any chest pain, back pain, abdominal pain, shortness of breath, numbness, tingling, N/V/C/D, fever and/or chills. Past Medical History:          Diagnosis Date    Acute systolic congestive heart failure ([facility]     Diabetes mellitus ([facility]     Essential hypertension     Thrombocytopenia (HCC)      Past Surgical History:          Procedure Laterality Date    CHOLECYSTECTOMY, LAPAROSCOPIC N/A 8/12/2022    ROBOTIC CHOLECYSTECTOMY WITH INTRAOPERATIVE CHOLANGIOGRAM performed by Sonia Madison MD at [place] [address]     Medications Prior to Admission:      Prior to Admission medications    Medication Sig Start Date End Date Taking? Authorizing Provider   albuterol-ipratropium (COMBIVENT RESPIMAT)  MCG/ACT AERS inhaler Inhale 1 puff into the lungs in the morning and 1 puff at noon and 1 puff in the evening and 1 puff before bedtime. 10/14/22   Kenzie Conrad DO   KLOR-CON M20 20 MEQ extended release tablet TAKE ONE TABLET BY MOUTH TWICE A DAY WHILE ON INCREASED DOSE OF FUROSEMIDE 7/17/22 8/13/22  SANCHEZ Ty   furosemide (LASIX) 80 MG tablet Take 1 tablet by mouth 2 times daily 6/24/22   Joseph Haines MD   losartan (COZAAR) 50 mg tablet TAKE ONE TABLET BY MOUTH DAILY 6/1/22   Kenzie Conrad, DO   ASPIRIN LOW DOSE 81 MG EC tablet TAKE ONE TABLET BY MOUTH DAILY 6/1/22   Kenzie Conrad, DO   atorvastatin (LIPITOR) 20 MG tablet TAKE ONE TABLET BY MOUTH ONCE NIGHTLY 6/1/22   SANCHEZ Fleming - CNP   metFORMIN (GLUCOPHAGE) 500 MG tablet TAKE ONE TABLET BY MOUTH DAILY WITH BREAKFAST 6/1/22   Kenzie Conrad,    umeclidinium-vilanterol (ANORO ELLIPTA) 62.5-25 MCG/INH AEPB inhaler Inhale 1 puff into the lungs daily 5/12/22   Kenzie Conrad DO   albuterol sulfate  (90 Base) MCG/ACT inhaler INHALE 2 PUFFS BY MOUTH FOUR TIMES A DAY AS NEEDED FOR WHEEZING 4/23/22   SANCHEZ Ty     Allergies:  Patient has no known allergies. Social History:      The patient currently lives at home    TOBACCO:   reports that he quit smoking about 14 months ago. His smoking use included cigarettes.  He has a 15.00 pack-year smoking history. He has never used smokeless tobacco.  ETOH:   reports that he does not currently use alcohol. E-cigarette/Vaping       Questions Responses    E-cigarette/Vaping Use Never User    Start Date     Passive Exposure     Quit Date     Counseling Given     Comments           Family History:      Reviewed and negative in regards to presenting illness/complaint. Family history unknown: Yes     REVIEW OF SYSTEMS COMPLETED:   Pertinent positives as noted in the HPI. All other systems reviewed and negative. PHYSICAL EXAM PERFORMED:    BP (!) 119/92   Pulse 83   Temp 97.7 °F (36.5 °C) (Oral)   Resp 13   Ht 6' (1.829 m)   Wt 232 lb (105.2 kg)   SpO2 94%   BMI 31.46 kg/m²     General appearance: Pleasant, obese male in no apparent distress, appears stated age and cooperative. HEENT:  Pupils equal, round, and reactive to light. Wears glasses Extra ocular muscles intact. Conjunctivae/corneas clear. Neck: Supple, with full range of motion. No jugular venous distention. Trachea midline. Respiratory:  Normal respiratory effort. Clear to auscultation, bilaterally without Rales/Wheezes/Rhonchi. Cardiovascular:  Regular rate and rhythm with normal S1/S2 without murmurs, rubs or gallops. Abdomen: Soft, obese, round non-tender, non-distended with normal bowel sounds. Musculoskeletal:  No clubbing, cyanosis or edema bilaterally. Full range of motion without deformity. Skin: Skin color, texture, turgor normal.  No significant rashes or lesions. 3-4+ pitting edema BLE bilateral upper thighs and abdomen, taut  Neurologic:  Neurovascularly intact.  Cranial nerves: II-XII intact, grossly non-focal.  Psychiatric:  Alert and oriented, thought content appropriate, normal insight  Capillary Refill: Brisk,3 seconds, normal  Peripheral Pulses: +2 palpable, equal bilaterally     Labs:     Recent Labs     10/21/22  1448   WBC 6.6   HGB 14.7   HCT 44.9        Recent Labs     10/21/22  1448   NA 134*   K 4.2   CL 97*   CO2 27   BUN 43*   CREATININE 1.0   CALCIUM 9.4     Recent Labs     10/21/22  1448   AST 34   ALT 29   BILITOT 1.2*   ALKPHOS 208*     Recent Labs     10/21/22  1448   TROPONINI <0.01     Urinalysis:      Lab Results   Component Value Date/Time    NITRU Negative 07/17/2022 06:27 PM    BLOODU Negative 07/17/2022 06:27 PM    SPECGRAV 1.010 07/17/2022 06:27 PM    GLUCOSEU Negative 07/17/2022 06:27 PM     Radiology:     CXR: I have reviewed the CXR with the following interpretation: No acute cardiopulmonary findings    EKG:  I have reviewed the EKG with the following interpretation: EKG: The Ekg interpreted in the absence of a cardiologist show  Normal sinus rhythmRight superior axis deviationIncomplete right bundle branch blockRight ventricular hypertrophy with repolarization abnormalityCannot rule out Anteroseptal infarct (cited on or before 21-OCT-2022)Abnormal ECGWhen compared with ECG of 21-OCT-2022 15:02,Questionable change in initial forces of Anteroseptal leadsNonspecific T wave abnormality no longer evident in Inferior leads     XR CHEST (2 VW)   Final Result   No radiographic evidence of acute pulmonary disease. Consults:    None    ASSESSMENT:    Active Hospital Problems    Diagnosis Date Noted    DM (diabetes mellitus) ([facility] [E11.9] 10/21/2022     Priority: Medium    CHF (congestive heart failure) ([facility] [I50.9]     Essential hypertension [I10] 08/05/2021     PLAN:    Bilateral lower extremity edema in patient with known CHF  -Patient does not appear to be in acute exacerbation at this time  -Chest x-ray revealed: No acute cardiopulmonary findings  -proBNP 945  -Strict I&O  -Daily weight  -80 mg IV Lasix given in ED  -Lucia catheter ordered in ED, patient refused  -BMP in a.m.  -40 Lasix IV twice daily  -Telemetry monitoring  ECHO on 7/29/2022:   Left ventricular cavity size is normal.   There is mild concentric left ventricular hypertrophy.    Left ventricular function appears to be normal with an estimated ejection   fraction of 55%. There is systolic and diastolic septal flattening consistent with right   ventricular pressure and volume overload. The right ventricle is enlarged and hypokinetic. Severe tricuspid regurgitation. The right atrium is severely dilated. Estimated pulmonary artery systolic pressure is at 74 mmHg assuming a right   atrial pressure of 15 mmHg. There is a small pericardial effusion noted without any hemodynamic   implications. Obesity  -With Body mass index is 31.4 kg/m². Complicating assessment and treatment. Placing patient at risk for multiple co-morbidities as well as early death and contributing to the patient's presentation. Counseled on weight loss    DM2, controlled  -  -hemglobin a1c 7.0 on 8/12/2022  -hold home metformin  -mdssi  -poct ac/hs  -hypoglycemia protocol  -carb control diet    Essential hypertension  -Continue losartan    Hyperlipidemia  -Continue atorvastatin    DVT Prophylaxis: Lovenox    Diet: No diet orders on file    Code Status: Prior    PT/OT Eval Status: No indication for need at this time    Dispo -2-3 days pending clinical improvement     SANCHEZ Matthew - RICHARD    Thank you Deri Litten, DO for the opportunity to be involved in this patient's care.  If you have any questions or concerns please feel free to contact me at [phone].  -----------------------------Anticipated Dr. eKena grimaldo------------------------------------

## 2022-10-21 NOTE — PROGRESS NOTES
4 Eyes Skin Assessment     The patient is being assess for  Admission    I agree that 2 RN's have performed a thorough Head to Toe Skin Assessment on the patient. ALL assessment sites listed below have been assessed. Areas assessed by both nurses:   [x]   Head, Face, and Ears   [x]   Shoulders, Back, and Chest  [x]   Arms, Elbows, and Hands   [x]   Coccyx, Sacrum, and Ischum  [x]   Legs, Feet, and Heels        Does the Patient have Skin Breakdown?   No         Paul Prevention initiated:  No   Wound Care Orders initiated:  No      [facility] nurse consulted for Pressure Injury (Stage 3,4, Unstageable, DTI, NWPT, and Complex wounds):  No      Nurse 1 eSignature: Electronically signed by Kourtney Quijano RN on 10/21/22 at 6:13 PM EDT    **SHARE this note so that the co-signing nurse is able to place an eSignature**    Nurse 2 eSignature: {Esignature:[id number]}

## 2022-10-21 NOTE — PROGRESS NOTES
Patient admitted to room 212  from er. Patient oriented to room, call light, bed rails, phone, lights and bathroom. Patient instructed about the schedule of the day including: vital sign frequency, lab draws, possible tests, frequency of MD and staff rounds, including RN/MD rounding together at bedside, daily weights, and I &O's. Patient instructed about prescribed diet, how to use 8MENU, and television. bed alarm in place, patient aware of placement and reason. Telemetry box  in place, patient aware of placement and reason. Bed locked, in lowest position, side rails up 2/4, call light within reach. Will continue to monitor.

## 2022-10-21 NOTE — ED PROVIDER NOTES
[address] ENCOUNTER        Pt Name: Shireen Bryant  MRN: [id number]  [place] 1965  Date of evaluation: 10/21/2022  Provider: Madison De La Cruz PA-C  PCP: Joanie Douglas DO  Note Started: 5:47 PM EDT       I have seen and evaluated this patient with my supervising physician Alanis Richmond      Triage CHIEF COMPLAINT       Chief Complaint   Patient presents with    Edema     Pt reports BLE edema and ascites x \"few weeks\". Pt reports COPD and CHF, on lasix 80mg BID. Pt reports that he called his cardiologist which told him to come to ED>          HISTORY OF PRESENT ILLNESS   (Location/Symptom, Timing/Onset, Context/Setting, Quality, Duration, Modifying Factors, Severity)  Note limiting factors. Chief Complaint: Leg and abdomen swelling    Shireen Bryant is a 62 y.o. male who presents to the emergency department stating that his legs and his groin has been swelling ever since he was lateral to the hospital after his cholecystectomy. He states that he does have a history of CHF, COPD, takes Lasix, last took 80 mg of Lasix this morning. He states that he feels short of breath when he walks, and he has had some weight gain. He admits to discomfort level 6/10. Nothing seems to make him feel completely better. This morning his shortness of breath seem to get a little worse and he decided to come to the emergency department. He states that he feels tight, and nothing makes him feel good. He states that his abdomen is so distended that it hurts. Nursing Notes were all reviewed and agreed with or any disagreements were addressed in the HPI. REVIEW OF SYSTEMS    (2-9 systems for level 4, 10 or more for level 5)     Review of Systems   Constitutional:  Negative for chills, diaphoresis and fever. HENT:  Negative for congestion, ear pain, rhinorrhea and sore throat. Eyes:  Negative for pain and visual disturbance.    Respiratory:  Positive for shortness of breath. Negative for cough. Cardiovascular:  Positive for leg swelling. Negative for chest pain and palpitations. Gastrointestinal:  Positive for abdominal pain. Negative for constipation, diarrhea, nausea and vomiting. Genitourinary:  Negative for decreased urine volume, dysuria, frequency and urgency. Musculoskeletal:  Negative for back pain and neck pain. Skin:  Negative for rash and wound. Neurological:  Negative for dizziness and light-headedness. PAST MEDICAL HISTORY     Past Medical History:   Diagnosis Date    Acute systolic congestive heart failure ([facility]     Diabetes mellitus ([facility]     Essential hypertension     Thrombocytopenia (HCC)        SURGICAL HISTORY     Past Surgical History:   Procedure Laterality Date    CHOLECYSTECTOMY, LAPAROSCOPIC N/A 8/12/2022    ROBOTIC CHOLECYSTECTOMY WITH INTRAOPERATIVE CHOLANGIOGRAM performed by Mali Layton MD at [address] [place] [address]       Current Discharge Medication List        CONTINUE these medications which have NOT CHANGED    Details   albuterol-ipratropium (COMBIVENT RESPIMAT)  MCG/ACT AERS inhaler Inhale 1 puff into the lungs in the morning and 1 puff at noon and 1 puff in the evening and 1 puff before bedtime.   Qty: 1 each, Refills: 3    Associated Diagnoses: Chronic obstructive pulmonary disease, unspecified COPD type ([facility])      furosemide (LASIX) 80 MG tablet Take 1 tablet by mouth 2 times daily  Qty: 180 tablet, Refills: 1    Associated Diagnoses: Acute decompensated heart failure ([facility])      losartan (COZAAR) 50 mg tablet TAKE ONE TABLET BY MOUTH DAILY  Qty: 90 tablet, Refills: 1      ASPIRIN LOW DOSE 81 MG EC tablet TAKE ONE TABLET BY MOUTH DAILY  Qty: 90 tablet, Refills: 1      atorvastatin (LIPITOR) 20 MG tablet TAKE ONE TABLET BY MOUTH ONCE NIGHTLY  Qty: 90 tablet, Refills: 1      metFORMIN (GLUCOPHAGE) 500 MG tablet TAKE ONE TABLET BY MOUTH DAILY WITH BREAKFAST  Qty: 90 tablet, Refills: 1 umeclidinium-vilanterol (ANORO ELLIPTA) 62.5-25 MCG/INH AEPB inhaler Inhale 1 puff into the lungs daily  Qty: 60 each, Refills: 1    Associated Diagnoses: Chronic obstructive pulmonary disease, unspecified COPD type (HCC)      albuterol sulfate  (90 Base) MCG/ACT inhaler INHALE 2 PUFFS BY MOUTH FOUR TIMES A DAY AS NEEDED FOR WHEEZING  Qty: 1 each, Refills: 2             ALLERGIES     Patient has no known allergies. FAMILYHISTORY       Family History   Family history unknown: Yes        SOCIAL HISTORY       Social History     Socioeconomic History    Marital status: Single     Spouse name: None    Number of children: None    Years of education: None    Highest education level: None   Tobacco Use    Smoking status: Former     Packs/day: 1.00     Years: 15.00     Pack years: 15.00     Types: Cigarettes     Quit date: 2021     Years since quittin.2    Smokeless tobacco: Never   Vaping Use    Vaping Use: Never used   Substance and Sexual Activity    Alcohol use: Not Currently     Comment: \"2 beers after work\"    Drug use: Not Currently       SCREENINGS    [place] Coma Scale  Eye Opening: Spontaneous  Best Verbal Response: Oriented  Best Motor Response: Obeys commands  [place] Coma Scale Score: 15        PHYSICAL EXAM    (up to 7 for level 4, 8 or more for level 5)     ED Triage Vitals [10/21/22 1419]   BP Temp Temp Source Heart Rate Resp SpO2 Height Weight   (!) 127/92 97.7 °F (36.5 °C) Oral 93 18 (!) 89 % 6' (1.829 m) 232 lb (105.2 kg)       Physical Exam  Vitals and nursing note reviewed. Constitutional:       Appearance: Normal appearance. He is well-developed. He is not ill-appearing or diaphoretic. HENT:      Head: Normocephalic and atraumatic. Right Ear: External ear normal.      Left Ear: External ear normal.      Nose: Nose normal.   Eyes:      General:         Right eye: No discharge. Left eye: No discharge.    Cardiovascular:      Rate and Rhythm: Normal rate and regular rhythm. Heart sounds: Normal heart sounds. No murmur heard. No friction rub. No gallop. Pulmonary:      Effort: Respiratory distress present. Breath sounds: Normal breath sounds. No stridor. No wheezing or rales. Chest:      Chest wall: No tenderness. Abdominal:      Tenderness: There is abdominal tenderness. Comments: Pitting edema to his epigastrium   Musculoskeletal:         General: Normal range of motion. Cervical back: Normal range of motion and neck supple. Right lower leg: Edema present. Left lower leg: Edema present. Skin:     General: Skin is warm and dry. Coloration: Skin is not pale. Neurological:      General: No focal deficit present. Mental Status: He is alert and oriented to person, place, and time. Psychiatric:         Mood and Affect: Mood normal.         Behavior: Behavior normal.       DIAGNOSTIC RESULTS   LABS:    Labs Reviewed   COMPREHENSIVE METABOLIC PANEL W/ REFLEX TO MG FOR LOW K - Abnormal; Notable for the following components:       Result Value    Sodium 134 (*)     Chloride 97 (*)     Glucose 129 (*)     BUN 43 (*)     Total Bilirubin 1.2 (*)     Alkaline Phosphatase 208 (*)     All other components within normal limits   BRAIN NATRIURETIC PEPTIDE - Abnormal; Notable for the following components:    Pro- (*)     All other components within normal limits   CBC   TROPONIN   TROPONIN   POCT GLUCOSE       When ordered, only abnormal lab results are displayed. All other labs were within normal range or not returned as of this dictation. EKG: When ordered, EKG's are interpreted by the Emergency Department Physician in the absence of a cardiologist.  Please see their note for interpretation of EKG.       RADIOLOGY:   Non-plain film images such as CT, Ultrasound and MRI are read by the radiologist. Plain radiographic images are visualized andpreliminarily interpreted by the  ED Provider with the below findings:        Interpretation perthe Radiologist below, if available at the time of this note:    XR CHEST (2 VW)   Final Result   No radiographic evidence of acute pulmonary disease. XR CHEST (2 VW)    Result Date: 10/21/2022  EXAMINATION: TWO XRAY VIEWS OF THE CHEST 10/21/2022 2:57 pm COMPARISON: Chest x-ray dated 08/05/2021. HISTORY: ORDERING SYSTEM PROVIDED HISTORY: shortness of breath TECHNOLOGIST PROVIDED HISTORY: Reason for exam:->shortness of breath Reason for Exam: shortness of breath FINDINGS: HEART/MEDIASTINUM: The cardiac silhouette is enlarged, but stable. PLEURA/LUNGS: There are no focal consolidations or pleural effusions. There is no appreciable pneumothorax. BONES/SOFT TISSUE: No acute abnormality. No radiographic evidence of acute pulmonary disease. PROCEDURES   Unless otherwise noted below, none     Procedures    CRITICAL CARE TIME     There was a high probability of life-threatening clinical deterioration in the patient's condition requiring my urgent intervention. I personally saw the patient and independently provided 35 minutes of non-concurrent critical care out of the total shared critical care time provided, excluding separately reportable procedures.          CONSULTS:  None      EMERGENCY DEPARTMENT COURSE and DIFFERENTIAL DIAGNOSIS/MDM:   Vitals:    Vitals:    10/21/22 1551 10/21/22 1621 10/21/22 1652 10/21/22 1720   BP: 120/88 (!) 119/92 (!) 117/94 (!) 132/92   Pulse: 77 83 81 80   Resp: 20 13 15 14   Temp:    97.5 °F (36.4 °C)   TempSrc:    Oral   SpO2: 95% 94% 94% 94%   Weight:       Height:           Patient was given thefollowing medications:  Medications   lidocaine (XYLOCAINE) 2 % uro-jet (has no administration in time range)   albuterol sulfate HFA (PROVENTIL;VENTOLIN;PROAIR) 108 (90 Base) MCG/ACT inhaler 2 puff (has no administration in time range)   albuterol-ipratropium (COMBIVENT RESPIMAT)  MCG/ACT inhaler 1 puff (has no administration in time range)   aspirin EC tablet 81 mg (has no administration in time range)   atorvastatin (LIPITOR) tablet 20 mg (has no administration in time range)   losartan (COZAAR) tablet 50 mg (has no administration in time range)   sodium chloride flush 0.9 % injection 5-40 mL (has no administration in time range)   sodium chloride flush 0.9 % injection 5-40 mL (has no administration in time range)   0.9 % sodium chloride infusion (has no administration in time range)   ondansetron (ZOFRAN-ODT) disintegrating tablet 4 mg (has no administration in time range)     Or   ondansetron (ZOFRAN) injection 4 mg (has no administration in time range)   polyethylene glycol (GLYCOLAX) packet 17 g (has no administration in time range)   acetaminophen (TYLENOL) tablet 650 mg (has no administration in time range)     Or   acetaminophen (TYLENOL) suppository 650 mg (has no administration in time range)   enoxaparin (LOVENOX) injection 40 mg (has no administration in time range)   insulin lispro (HUMALOG) injection vial 0-8 Units (has no administration in time range)   insulin lispro (HUMALOG) injection vial 0-4 Units (has no administration in time range)   glucose chewable tablet 16 g (has no administration in time range)   dextrose bolus 10% 125 mL (has no administration in time range)     Or   dextrose bolus 10% 250 mL (has no administration in time range)   glucagon (rDNA) injection 1 mg (has no administration in time range)   dextrose 10 % infusion (has no administration in time range)   furosemide (LASIX) injection 40 mg (has no administration in time range)   furosemide (LASIX) injection 80 mg (80 mg IntraVENous Given 10/21/22 4379)         Is this patient to be included in the SEP-1 Core Measure due to severe sepsis or septic shock? No   Exclusion criteria - the patient is NOT to be included for SEP-1 Core Measure due to: Infection is not suspected    This patient presents with fluid fluid overload and anasarca.   On bladder scan he had 315 cc of urinary retention, my suspicion is that this is a combination of outlet flow as well as fluid overload. This seems to be more right-sided given his BNP is normal, and there is no fluid in his lungs. The hypoxia is more likely related to the COPD,. Reevaluation of the patient is reassuring, he has remained stable, we did place him on oxygen. I am the Primary Clinician of Record. FINAL IMPRESSION      1. Anasarca    2. Urinary retention    3. Elevated BUN    4. Hypoxia          DISPOSITION/PLAN   DISPOSITION Admitted 10/21/2022 04:52:02 PM      PATIENT REFERREDTO:  No follow-up provider specified.     DISCHARGE MEDICATIONS:  Current Discharge Medication List          DISCONTINUED MEDICATIONS:  Current Discharge Medication List        STOP taking these medications       KLOR-CON M20 20 MEQ extended release tablet Comments:   Reason for Stopping:                      (Please note that portions ofthis note were completed with a voice recognition program.  Efforts were made to edit the dictations but occasionally words are mis-transcribed.)    Declan Velazquez PA-C (electronically signed)             Declan Velazquez PA-C  10/21/22 1741

## 2022-10-21 NOTE — ED PROVIDER NOTES
Emergency Department Attending Provider Note  Location: [address] [place] [address]  ED  10/21/2022     Patient Identification  Ольга Brunson is a 62 y.o. male      Sonali Birmingham was evaluated in the Emergency Department for edema. Patient reports that over the last week he has noted increasing edema of bilateral lower extremities extending into the thigh and abdomen. Patient reports the swelling is so extensive at this point that it is difficult to bend over and even tie his shoes. Mild shortness of breath noted. Although initial history and physical exam information was obtained by ARDEN/NPP/MD/ (who also dictated a record of this visit), I personally saw the patient and performed a substantive portion of the visit including all aspects of the medical decision making. PHYSICAL EXAM:    Head: Normocephalic/atraumatic. Heart: Regular rate and rhythm. Lungs: Clear to auscultation bilaterally. Abdomen: Edematous. Nontender. No rebound, guarding. Extremities: 4+ bilateral lower extremity edema extending to the proximal thighs. EKG Interpretation  Normal sinus rhythm with a rate of 79. Right superior axis deviation with incomplete right bundle branch block. Septal/inferior Q waves noted. EKG similar to previous EKG on 2022. Repeat EK. Normal sinus rhythm with a rate of 82. Right superior axis deviation with incomplete right bundle branch block. Anterior/septal Q waves again noted. Patient seen and evaluated. Relevant records reviewed. Lasix provided. Patient will be admitted. CLINICAL IMPRESSION  1. Anasarca    2. Urinary retention    3. Elevated BUN          Tonia GARCIA DO, am the primary clinician of record. I personally saw the patient and independently provided 0 minutes of non-concurrent critical care out of the total shared critical care time provided.     This chart was generated in part by using Dragon Dictation system and may contain errors related to that system including errors in grammar, punctuation, and spelling, as well as words and phrases that may be inappropriate. If there are any questions or concerns please feel free to contact the dictating provider for clarification.      Melody Mcfarland DO   [employer]        Melody Mcfarland DO  10/21/22 3852

## 2022-10-22 LAB
ANION GAP SERPL CALCULATED.3IONS-SCNC: 9 MMOL/L (ref 3–16)
BASOPHILS ABSOLUTE: 0.1 K/UL (ref 0–0.2)
BASOPHILS RELATIVE PERCENT: 0.9 %
BUN BLDV-MCNC: 40 MG/DL (ref 7–20)
CALCIUM SERPL-MCNC: 9.4 MG/DL (ref 8.3–10.6)
CHLORIDE BLD-SCNC: 100 MMOL/L (ref 99–110)
CHOLESTEROL, TOTAL: 77 MG/DL (ref 0–199)
CO2: 29 MMOL/L (ref 21–32)
CREAT SERPL-MCNC: 1 MG/DL (ref 0.9–1.3)
EKG ATRIAL RATE: 82 BPM
EKG DIAGNOSIS: NORMAL
EKG P AXIS: 73 DEGREES
EKG P-R INTERVAL: 170 MS
EKG Q-T INTERVAL: 396 MS
EKG QRS DURATION: 100 MS
EKG QTC CALCULATION (BAZETT): 462 MS
EKG R AXIS: 184 DEGREES
EKG T AXIS: 71 DEGREES
EKG VENTRICULAR RATE: 82 BPM
EOSINOPHILS ABSOLUTE: 0.3 K/UL (ref 0–0.6)
EOSINOPHILS RELATIVE PERCENT: 4 %
GFR SERPL CREATININE-BSD FRML MDRD: >60 ML/MIN/{1.73_M2}
GLUCOSE BLD-MCNC: 101 MG/DL (ref 70–99)
GLUCOSE BLD-MCNC: 104 MG/DL (ref 70–99)
GLUCOSE BLD-MCNC: 122 MG/DL (ref 70–99)
GLUCOSE BLD-MCNC: 98 MG/DL (ref 70–99)
GLUCOSE BLD-MCNC: 99 MG/DL (ref 70–99)
HCT VFR BLD CALC: 43.9 % (ref 40.5–52.5)
HDLC SERPL-MCNC: 29 MG/DL (ref 40–60)
HEMOGLOBIN: 14.4 G/DL (ref 13.5–17.5)
LDL CHOLESTEROL CALCULATED: 33 MG/DL
LYMPHOCYTES ABSOLUTE: 1.5 K/UL (ref 1–5.1)
LYMPHOCYTES RELATIVE PERCENT: 23.4 %
MAGNESIUM: 2.1 MG/DL (ref 1.8–2.4)
MCH RBC QN AUTO: 31.9 PG (ref 26–34)
MCHC RBC AUTO-ENTMCNC: 32.8 G/DL (ref 31–36)
MCV RBC AUTO: 97.3 FL (ref 80–100)
MONOCYTES ABSOLUTE: 0.6 K/UL (ref 0–1.3)
MONOCYTES RELATIVE PERCENT: 8.8 %
NEUTROPHILS ABSOLUTE: 4.1 K/UL (ref 1.7–7.7)
NEUTROPHILS RELATIVE PERCENT: 62.9 %
PDW BLD-RTO: 14.2 % (ref 12.4–15.4)
PERFORMED ON: ABNORMAL
PERFORMED ON: ABNORMAL
PERFORMED ON: NORMAL
PERFORMED ON: NORMAL
PLATELET # BLD: 182 K/UL (ref 135–450)
PMV BLD AUTO: 7.9 FL (ref 5–10.5)
POTASSIUM SERPL-SCNC: 4.2 MMOL/L (ref 3.5–5.1)
RBC # BLD: 4.51 M/UL (ref 4.2–5.9)
SODIUM BLD-SCNC: 138 MMOL/L (ref 136–145)
TRIGL SERPL-MCNC: 73 MG/DL (ref 0–150)
VLDLC SERPL CALC-MCNC: 15 MG/DL
WBC # BLD: 6.5 K/UL (ref 4–11)

## 2022-10-22 PROCEDURE — 6370000000 HC RX 637 (ALT 250 FOR IP): Performed by: NURSE PRACTITIONER

## 2022-10-22 PROCEDURE — 93010 ELECTROCARDIOGRAM REPORT: CPT | Performed by: INTERNAL MEDICINE

## 2022-10-22 PROCEDURE — 83735 ASSAY OF MAGNESIUM: CPT

## 2022-10-22 PROCEDURE — 94640 AIRWAY INHALATION TREATMENT: CPT

## 2022-10-22 PROCEDURE — 94761 N-INVAS EAR/PLS OXIMETRY MLT: CPT

## 2022-10-22 PROCEDURE — 80048 BASIC METABOLIC PNL TOTAL CA: CPT

## 2022-10-22 PROCEDURE — 6360000002 HC RX W HCPCS: Performed by: NURSE PRACTITIONER

## 2022-10-22 PROCEDURE — 2700000000 HC OXYGEN THERAPY PER DAY

## 2022-10-22 PROCEDURE — 36415 COLL VENOUS BLD VENIPUNCTURE: CPT

## 2022-10-22 PROCEDURE — 1200000000 HC SEMI PRIVATE

## 2022-10-22 PROCEDURE — 80061 LIPID PANEL: CPT

## 2022-10-22 PROCEDURE — 85025 COMPLETE CBC W/AUTO DIFF WBC: CPT

## 2022-10-22 PROCEDURE — 2580000003 HC RX 258: Performed by: NURSE PRACTITIONER

## 2022-10-22 RX ADMIN — LOSARTAN POTASSIUM 50 MG: 25 TABLET, FILM COATED ORAL at 09:37

## 2022-10-22 RX ADMIN — Medication 2 PUFF: at 07:32

## 2022-10-22 RX ADMIN — SODIUM CHLORIDE, PRESERVATIVE FREE 10 ML: 5 INJECTION INTRAVENOUS at 09:40

## 2022-10-22 RX ADMIN — ATORVASTATIN CALCIUM 20 MG: 10 TABLET, FILM COATED ORAL at 20:59

## 2022-10-22 RX ADMIN — ENOXAPARIN SODIUM 40 MG: 100 INJECTION SUBCUTANEOUS at 09:37

## 2022-10-22 RX ADMIN — FUROSEMIDE 40 MG: 10 INJECTION, SOLUTION INTRAMUSCULAR; INTRAVENOUS at 18:24

## 2022-10-22 RX ADMIN — Medication 2 PUFF: at 11:14

## 2022-10-22 RX ADMIN — SODIUM CHLORIDE, PRESERVATIVE FREE 10 ML: 5 INJECTION INTRAVENOUS at 20:59

## 2022-10-22 RX ADMIN — Medication 2 PUFF: at 15:21

## 2022-10-22 RX ADMIN — ASPIRIN 81 MG: 81 TABLET, COATED ORAL at 09:36

## 2022-10-22 RX ADMIN — FUROSEMIDE 40 MG: 10 INJECTION, SOLUTION INTRAMUSCULAR; INTRAVENOUS at 09:37

## 2022-10-22 RX ADMIN — Medication 2 PUFF: at 20:24

## 2022-10-22 NOTE — PLAN OF CARE
Problem: Metabolic/Fluid and Electrolytes - Adult  Goal: Glucose maintained within prescribed range  Outcome: Progressing  Note: Pt at risk for elevated blood glucose levels r/t DM. Pt will have glucose levels monitored prior to breakfast, lunch, dinner, and bedtime. Elevated levels will be treated via SSI. Pt understands the need to monitor levels and has no further complaints at this time.

## 2022-10-22 NOTE — PLAN OF CARE
Problem: Pain  Goal: Verbalizes/displays adequate comfort level or baseline comfort level  Outcome: Progressing     Problem: Safety - Adult  Goal: Free from fall injury  Outcome: Progressing     Problem: Chronic Conditions and Co-morbidities  Goal: Patient's chronic conditions and co-morbidity symptoms are monitored and maintained or improved  Outcome: Progressing     Patient's EF (Ejection Fraction) is greater than 40%    Heart Failure Medications:  Diuretics[de-identified] Furosemide    (One of the following REQUIRED for EF </= 40%/SYSTOLIC FAILURE but MAY be used in EF% >40%/DIASTOLIC FAILURE)        ACE[de-identified] None        ARB[de-identified] Losartan         ARNI[de-identified] None    (Beta Blockers)  NON- Evidenced Based Beta Blocker (for EF% >40%/DIASTOLIC FAILURE): None    Evidenced Based Beta Blocker::(REQUIRED for EF% <40%/SYSTOLIC FAILURE) None  . .................................................................................................................................................. Patient's weights and intake/output reviewed: Yes    Patient's Last Weight: 238 lbs obtained by standing scale. - admission weight       Intake/Output Summary (Last 24 hours) at 10/22/2022 1401  Last data filed at 10/22/2022 0954  Gross per 24 hour   Intake 600 ml   Output 2500 ml   Net -1900 ml       Education Booklet Provided: yes    Comorbidities Reviewed Yes    Patient has a past medical history of Acute systolic congestive heart failure ([address] Diabetes mellitus ([address] Essential hypertension, and Thrombocytopenia ([address] >>For CHF and Comorbidity documentation on Education Time and Topics, please see Education Tab    Progressive Mobility Assessment:  What is this patient's Current Level of Mobility?: Ambulatory-Up Ad Maria Dolores  How was this patient Mobilized today?: Edge of Bed and Up to Chair, ambulated 5 ft                 With Whom? Nurse and Self                 Level of Difficulty/Assistance: Independent     Pt resting in bed at this time on  2 L O2.  Pt with complaints of shortness of breath. Pt with nonpitting lower extremity edema.      Patient and/or Family's stated Goal of Care this Admission: reduce shortness of breath, increase activity tolerance, better understand heart failure and disease management, be more comfortable, and reduce lower extremity edema prior to discharge        :

## 2022-10-22 NOTE — PROGRESS NOTES
Hospitalist Progress Note  10/22/2022 3:01 PM  Subjective:   Admit Date: 10/21/2022  PCP: Becky Prado, DO Status: Inpatient  Interval History: Hospital Day: 2, admitted with bilateral lower extremity edema and urinary retention in setting of known pulmonary hypertension. Over the last week he has noted increasing edema of bilateral lower extremities extending into the thigh and abdomen. Oral furosemide has been working but not now. Patient reports the swelling is so extensive at this point that it is difficult to bend over and even tie his shoes. Mild shortness of breath. 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 Diet: controlled carbohydrate (45 gm/meal)  Right forearm peripheral IV (10/21, day #2)  Medications:     aspirin  81 mg Oral Daily   atorvastatin  20 mg Oral Nightly   losartan  50 mg Oral Daily   enoxaparin  40 mg SubCUTAneous Daily   insulin lispro SSI  0-8 Units SubCUTAneous TID WC   furosemide  40 mg IntraVENous BID   albuterol sulfate   2 puff Inhalation 4x daily   ipratropium  2 puff Inhalation 4x daily     Recent Labs     10/21/22  1448 10/22/22  0747   WBC 6.6 6.5   HGB 14.7 14.4    182   MCV 96.6 97.3     Recent Labs     10/21/22  1448 10/22/22  0747   * 138   K 4.2 4.2   CL 97* 100   CO2 27 29   BUN 43* 40*   CREATININE 1.0 1.0   GLUCOSE 129* 101*     Recent Labs     10/21/22  1448   AST 34   ALT 29   BILITOT 1.2*   ALKPHOS 208*     Troponin T (10/21) < 0.01 ng/mL  Magnesium (10/22) 2.10 mg/dL  proBNP (8/11) 1,480, (10/21) 945 pg/mL    POC Glucose:   Recent Labs     10/21/22  1827 10/21/22  1921 10/22/22  0744 10/22/22  1141   POCGLU 97 143* 98 99     Portable CXR (10/21) No radiographic evidence of acute pulmonary disease. Echocardiogram (7/29/22) Left ventricular cavity size is normal. There is mild concentric left ventricular hypertrophy. Left ventricular function appears to be normal with an estimated ejection fraction of 55%.  There is systolic and diastolic septal flattening consistent with right ventricular pressure and volume  overload. The right ventricle is enlarged and hypokinetic. Severe tricuspid regurgitation. The right atrium is severely dilated. Estimated pulmonary artery systolic pressure is at 74 mmHg assuming a right atrial pressure of 15 mmHg. There is a small pericardial effusion noted without any hemodynamic  implications. Objective:   Vitals:  /74   Pulse 88   Temp 97.5 °F (36.4 °C) (Oral)   Resp 20   Ht 6' (1.829 m)   Wt 238 lb 9.6 oz (108.2 kg)   SpO2 90% on 2 LPM  BMI 32.36 kg/m²   General appearance: alert and cooperative with exam  Lungs: clear to auscultation bilaterally  Heart: regular rate and rhythm, S1, S2 normal, no murmur, click, rub or gallop  Abdomen: soft, non-tender; bowel sounds normal; no masses,  no organomegaly  Extremities: 2+ edema to knees, neurovascular status intact  Neurologic: No obvious focal neurologic deficits. Assessment and Plan:   Bilateral lower extremity edema and urinary retention in setting of known pulmonary hypertension PASP 74 mmHg and EF 55% by echo (July 2022). Acute respiratory failure requiring supplemental oxygen. Known history of COPD but not on home oxygen. Uses inhalers at home. Type 2 Diabetes (HgbA1c 7.0% on 8/12/22). Metformin held on admission. Cover with a \"sliding scale\" lispro moderate scale prandial correction insulin. Primary hypertension on losartan 50 mg daily. Dyslipidemia on atorvastatin 20 mg nightly. Class I obesity (BMI 32.4). Advance Directive: Full Code  DVT prophylaxis with enoxaparin 40 mg sub-Q daily.    Discharge planning: two more days inpatient status        Clifford Del Rio MD  Rounding Hospitalist

## 2022-10-22 NOTE — PLAN OF CARE
Patient's EF (Ejection Fraction) is greater than 40%    Heart Failure Medications:  Diuretics[de-identified] Furosemide    (One of the following REQUIRED for EF </= 40%/SYSTOLIC FAILURE but MAY be used in EF% >40%/DIASTOLIC FAILURE)        ACE[de-identified] None        ARB[de-identified] Losartan         ARNI[de-identified] None    (Beta Blockers)  NON- Evidenced Based Beta Blocker (for EF% >40%/DIASTOLIC FAILURE): None    Evidenced Based Beta Blocker::(REQUIRED for EF% <40%/SYSTOLIC FAILURE) None  . .................................................................................................................................................. Patient's weights and intake/output reviewed: Yes    Patient's Last Weight: 232 lbs obtained by standing scale. Difference of 0 lbs less than last documented weight. Intake/Output Summary (Last 24 hours) at 10/22/2022 0017  Last data filed at 10/21/2022 2049  Gross per 24 hour   Intake 240 ml   Output 1350 ml   Net -1110 ml       Education Booklet Provided: yes    Comorbidities Reviewed Yes    Patient has a past medical history of Acute systolic congestive heart failure ([address] Diabetes mellitus ([address] Essential hypertension, and Thrombocytopenia ([facility] >>For CHF and Comorbidity documentation on Education Time and Topics, please see Education Tab    Progressive Mobility Assessment:  What is this patient's Current Level of Mobility?: Ambulatory-Up Ad Maria Dolores  How was this patient Mobilized today?: Up in Room, ambulated 5 ft                 With Whom? Self                 Level of Difficulty/Assistance: Independent     Pt resting in bed at this time on  2 L O2. Pt denies shortness of breath. Pt with nonpitting lower extremity edema.      Patient and/or Family's stated Goal of Care this Admission: reduce shortness of breath, increase activity tolerance, better understand heart failure and disease management, be more comfortable, and reduce lower extremity edema prior to discharge        :

## 2022-10-23 ENCOUNTER — APPOINTMENT (OUTPATIENT)
Dept: CT IMAGING | Age: 57
DRG: 286 | End: 2022-10-23
Payer: COMMERCIAL

## 2022-10-23 LAB
ANION GAP SERPL CALCULATED.3IONS-SCNC: 9 MMOL/L (ref 3–16)
BUN BLDV-MCNC: 29 MG/DL (ref 7–20)
CALCIUM SERPL-MCNC: 9.1 MG/DL (ref 8.3–10.6)
CHLORIDE BLD-SCNC: 97 MMOL/L (ref 99–110)
CO2: 29 MMOL/L (ref 21–32)
CREAT SERPL-MCNC: 1 MG/DL (ref 0.9–1.3)
GFR SERPL CREATININE-BSD FRML MDRD: >60 ML/MIN/{1.73_M2}
GLUCOSE BLD-MCNC: 100 MG/DL (ref 70–99)
GLUCOSE BLD-MCNC: 104 MG/DL (ref 70–99)
GLUCOSE BLD-MCNC: 107 MG/DL (ref 70–99)
GLUCOSE BLD-MCNC: 124 MG/DL (ref 70–99)
GLUCOSE BLD-MCNC: 150 MG/DL (ref 70–99)
MAGNESIUM: 2.1 MG/DL (ref 1.8–2.4)
PERFORMED ON: ABNORMAL
POTASSIUM SERPL-SCNC: 4.4 MMOL/L (ref 3.5–5.1)
SODIUM BLD-SCNC: 135 MMOL/L (ref 136–145)

## 2022-10-23 PROCEDURE — 2700000000 HC OXYGEN THERAPY PER DAY

## 2022-10-23 PROCEDURE — 2580000003 HC RX 258: Performed by: NURSE PRACTITIONER

## 2022-10-23 PROCEDURE — 6370000000 HC RX 637 (ALT 250 FOR IP): Performed by: NURSE PRACTITIONER

## 2022-10-23 PROCEDURE — 6370000000 HC RX 637 (ALT 250 FOR IP): Performed by: INTERNAL MEDICINE

## 2022-10-23 PROCEDURE — 80048 BASIC METABOLIC PNL TOTAL CA: CPT

## 2022-10-23 PROCEDURE — 94640 AIRWAY INHALATION TREATMENT: CPT

## 2022-10-23 PROCEDURE — 6360000002 HC RX W HCPCS: Performed by: NURSE PRACTITIONER

## 2022-10-23 PROCEDURE — 94761 N-INVAS EAR/PLS OXIMETRY MLT: CPT

## 2022-10-23 PROCEDURE — 99255 IP/OBS CONSLTJ NEW/EST HI 80: CPT | Performed by: INTERNAL MEDICINE

## 2022-10-23 PROCEDURE — 83735 ASSAY OF MAGNESIUM: CPT

## 2022-10-23 PROCEDURE — 75571 CT HRT W/O DYE W/CA TEST: CPT

## 2022-10-23 PROCEDURE — 36415 COLL VENOUS BLD VENIPUNCTURE: CPT

## 2022-10-23 PROCEDURE — 1200000000 HC SEMI PRIVATE

## 2022-10-23 RX ORDER — ALBUTEROL SULFATE 90 UG/1
2 AEROSOL, METERED RESPIRATORY (INHALATION) EVERY 6 HOURS PRN
Status: DISCONTINUED | OUTPATIENT
Start: 2022-10-23 | End: 2022-11-03 | Stop reason: HOSPADM

## 2022-10-23 RX ORDER — IPRATROPIUM BROMIDE AND ALBUTEROL SULFATE 2.5; .5 MG/3ML; MG/3ML
1 SOLUTION RESPIRATORY (INHALATION)
Status: DISCONTINUED | OUTPATIENT
Start: 2022-10-23 | End: 2022-10-30

## 2022-10-23 RX ORDER — IPRATROPIUM BROMIDE AND ALBUTEROL SULFATE 2.5; .5 MG/3ML; MG/3ML
1 SOLUTION RESPIRATORY (INHALATION) ONCE
Status: COMPLETED | OUTPATIENT
Start: 2022-10-23 | End: 2022-10-23

## 2022-10-23 RX ADMIN — FUROSEMIDE 40 MG: 10 INJECTION, SOLUTION INTRAMUSCULAR; INTRAVENOUS at 08:41

## 2022-10-23 RX ADMIN — SODIUM CHLORIDE, PRESERVATIVE FREE 10 ML: 5 INJECTION INTRAVENOUS at 21:04

## 2022-10-23 RX ADMIN — ATORVASTATIN CALCIUM 20 MG: 10 TABLET, FILM COATED ORAL at 21:03

## 2022-10-23 RX ADMIN — SODIUM CHLORIDE, PRESERVATIVE FREE 10 ML: 5 INJECTION INTRAVENOUS at 08:42

## 2022-10-23 RX ADMIN — IPRATROPIUM BROMIDE AND ALBUTEROL SULFATE 1 AMPULE: 2.5; .5 SOLUTION RESPIRATORY (INHALATION) at 08:01

## 2022-10-23 RX ADMIN — ASPIRIN 81 MG: 81 TABLET, COATED ORAL at 08:41

## 2022-10-23 RX ADMIN — IPRATROPIUM BROMIDE AND ALBUTEROL SULFATE 1 AMPULE: 2.5; .5 SOLUTION RESPIRATORY (INHALATION) at 20:01

## 2022-10-23 RX ADMIN — LOSARTAN POTASSIUM 50 MG: 25 TABLET, FILM COATED ORAL at 08:41

## 2022-10-23 RX ADMIN — ENOXAPARIN SODIUM 40 MG: 100 INJECTION SUBCUTANEOUS at 08:41

## 2022-10-23 RX ADMIN — FUROSEMIDE 40 MG: 10 INJECTION, SOLUTION INTRAMUSCULAR; INTRAVENOUS at 18:48

## 2022-10-23 RX ADMIN — IPRATROPIUM BROMIDE AND ALBUTEROL SULFATE 1 AMPULE: 2.5; .5 SOLUTION RESPIRATORY (INHALATION) at 15:46

## 2022-10-23 RX ADMIN — IPRATROPIUM BROMIDE AND ALBUTEROL SULFATE 1 AMPULE: 2.5; .5 SOLUTION RESPIRATORY (INHALATION) at 11:31

## 2022-10-23 NOTE — PLAN OF CARE
Problem: Pain  Goal: Verbalizes/displays adequate comfort level or baseline comfort level  Outcome: Progressing     Problem: Safety - Adult  Goal: Free from fall injury  Outcome: Progressing     Problem: Chronic Conditions and Co-morbidities  Goal: Patient's chronic conditions and co-morbidity symptoms are monitored and maintained or improved  Outcome: Progressing     Patient's EF (Ejection Fraction) is greater than 40%    Heart Failure Medications:  Diuretics[de-identified] Furosemide    (One of the following REQUIRED for EF </= 40%/SYSTOLIC FAILURE but MAY be used in EF% >40%/DIASTOLIC FAILURE)        ACE[de-identified] None        ARB[de-identified] Losartan         ARNI[de-identified] None    (Beta Blockers)  NON- Evidenced Based Beta Blocker (for EF% >40%/DIASTOLIC FAILURE): None    Evidenced Based Beta Blocker::(REQUIRED for EF% <40%/SYSTOLIC FAILURE) None  . .................................................................................................................................................. Patient's weights and intake/output reviewed: Yes    Patient's Last Weight: 238 lbs obtained by standing scale. Difference of 0 lbs than last documented weight. Intake/Output Summary (Last 24 hours) at 10/23/2022 0734  Last data filed at 10/23/2022 0316  Gross per 24 hour   Intake 1080 ml   Output 2601 ml   Net -1521 ml       Education Booklet Provided: yes    Comorbidities Reviewed Yes    Patient has a past medical history of Acute systolic congestive heart failure ([address] Diabetes mellitus ([address] Essential hypertension, and Thrombocytopenia ([address] >>For CHF and Comorbidity documentation on Education Time and Topics, please see Education Tab    Progressive Mobility Assessment:  What is this patient's Current Level of Mobility?: Ambulatory-Up Ad Maria Dolores  How was this patient Mobilized today?: Edge of Bed and Up to Chair,                  With Whom?  Self                 Level of Difficulty/Assistance: Independent     Pt resting in bed at this time on  2 L O2. Pt with complaints of shortness of breath. Pt with nonpitting lower extremity edema.      Patient and/or Family's stated Goal of Care this Admission: reduce shortness of breath, increase activity tolerance, better understand heart failure and disease management, be more comfortable, and reduce lower extremity edema prior to discharge        :

## 2022-10-23 NOTE — CONSULTS
[facility]  [phone]      Attending Physician: Jerson Jeong MD  Reason for Consultation/Chief Complaint: Swelling, difficulty with urination    Subjective   History of Present Illness:  Nerissa Canchola is a 62 y.o. 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 He said he also noted difficulty with urination, shortness of breath and occasional chest pressure. He says has been going on for the last few weeks. He says he does not weigh himself daily and is not sure what a good weight for him is when he is not it swollen. When he presented emergency room, there were concerns for heart failure, he was admitted to the hospital, treated with IV diuretics, work-up showed troponin levels that were negative, proBNP level was elevated at 945. Patient states he is compliant with his medications. Patient has a cardiac history which dates back to August 2021, he was valid by Dr. Haley Duke in our office for possible heart failure. It was noted that he was dealing with edema at that time, he also chronically has had severe COPD. At that time, his weight was 184 pounds, current weight is 238 pounds. He underwent an echocardiogram in 2021 as well as 2022 which showed normal left ventricular function however there was right-sided enlargement and significant pulmonary hypertension noted. Currently, patient also has diabetes and hypertension. Past Medical History:   has a past medical history of Acute systolic congestive heart failure ([address] Diabetes mellitus ([address] Essential hypertension, and Thrombocytopenia ([facility] Surgical History:   has a past surgical history that includes Cholecystectomy, laparoscopic (N/A, 8/12/2022).      Social History:   reports that he quit smoking about 14 months ago. His smoking use included cigarettes. He has a 15.00 pack-year smoking history. He has never used smokeless tobacco. He reports that he does not currently use alcohol. He reports that he does not currently use drugs. Family History:  Family history is unknown by patient. Home Medications:  Were reviewed and are listed in nursing record and/or below  Prior to Admission medications    Medication Sig Start Date End Date Taking? Authorizing Provider   albuterol-ipratropium (COMBIVENT RESPIMAT)  MCG/ACT AERS inhaler Inhale 1 puff into the lungs in the morning and 1 puff at noon and 1 puff in the evening and 1 puff before bedtime.  10/14/22   Brandi Conrad DO   KLOR-CON M20 20 MEQ extended release tablet TAKE ONE TABLET BY MOUTH TWICE A DAY WHILE ON INCREASED DOSE OF FUROSEMIDE 7/17/22 8/13/22  SANCHEZ Velasquez   furosemide (LASIX) 80 MG tablet Take 1 tablet by mouth 2 times daily 6/24/22   Abdi Mcdaniel MD   losartan (COZAAR) 50 mg tablet TAKE ONE TABLET BY MOUTH DAILY 6/1/22   Brandi Conrad DO   ASPIRIN LOW DOSE 81 MG EC tablet TAKE ONE TABLET BY MOUTH DAILY 6/1/22   Brandi Conrad DO   atorvastatin (LIPITOR) 20 MG tablet TAKE ONE TABLET BY MOUTH ONCE NIGHTLY 6/1/22   SANCHEZ Buchanan - CNP   metFORMIN (GLUCOPHAGE) 500 MG tablet TAKE ONE TABLET BY MOUTH DAILY WITH BREAKFAST 6/1/22   Brandi Conrad DO   umeclidinium-vilanterol (ANORO ELLIPTA) 62.5-25 MCG/INH AEPB inhaler Inhale 1 puff into the lungs daily 5/12/22   Brandi Conrad DO   albuterol sulfate  (90 Base) MCG/ACT inhaler INHALE 2 PUFFS BY MOUTH FOUR TIMES A DAY AS NEEDED FOR WHEEZING 4/23/22   SANCHEZ Velasquez        CURRENT Medications:  albuterol sulfate HFA (PROVENTIL;VENTOLIN;PROAIR) 108 (90 Base) MCG/ACT inhaler 2 puff, Q6H PRN  ipratropium-albuterol (DUONEB) nebulizer solution 1 ampule, Q4H WA  lidocaine (XYLOCAINE) 2 % uro-jet, PRN  albuterol sulfate HFA (PROVENTIL;VENTOLIN;PROAIR) 108 (90 Base) MCG/ACT inhaler 2 puff, Q4H PRN  aspirin EC tablet 81 mg, Daily  atorvastatin (LIPITOR) tablet 20 mg, Nightly  losartan (COZAAR) tablet 50 mg, Daily  sodium chloride flush 0.9 % injection 5-40 mL, 2 times per day  sodium chloride flush 0.9 % injection 5-40 mL, PRN  0.9 % sodium chloride infusion, PRN  ondansetron (ZOFRAN-ODT) disintegrating tablet 4 mg, Q8H PRN   Or  ondansetron (ZOFRAN) injection 4 mg, Q6H PRN  polyethylene glycol (GLYCOLAX) packet 17 g, Daily PRN  acetaminophen (TYLENOL) tablet 650 mg, Q6H PRN   Or  acetaminophen (TYLENOL) suppository 650 mg, Q6H PRN  enoxaparin (LOVENOX) injection 40 mg, Daily  insulin lispro (HUMALOG) injection vial 0-8 Units, TID WC  insulin lispro (HUMALOG) injection vial 0-4 Units, Nightly  glucose chewable tablet 16 g, PRN  dextrose bolus 10% 125 mL, PRN   Or  dextrose bolus 10% 250 mL, PRN  glucagon (rDNA) injection 1 mg, PRN  dextrose 10 % infusion, Continuous PRN  furosemide (LASIX) injection 40 mg, BID        Allergies:  Patient has no known allergies. Review of Systems:   A 14 point review of symptoms completed. Pertinent positives identified in the HPI, all other review of symptoms negative as below.       Objective   PHYSICAL EXAM:    Vitals:    10/23/22 0841   BP: 114/81   Pulse: 82   Resp: 19   Temp: 97.5 °F (36.4 °C)   SpO2: 92%    Weight: 238 lb 9.6 oz (108.2 kg)         General Appearance:  Alert, cooperative, no distress, appears stated age, able to nearly lie flat and speak in full sentences   Head:  Normocephalic, without obvious abnormality, atraumatic   Eyes:  PERRL, conjunctiva/corneas clear   Nose: Nares normal, no drainage or sinus tenderness   Throat: Lips, mucosa, and tongue normal   Neck: Supple, symmetrical, trachea midline, no adenopathy, thyroid: not enlarged, symmetric, no tenderness/mass/nodules, elevated JVP   Lungs:   Decreased breath sounds with wheezes, respirations unlabored   Chest Wall:  No deformity or tenderness   Heart:  Regular rate and rhythm, S1, S2 normal, 2 out of 6 systolic murmur   Abdomen:   Soft, non-tender, bowel sounds active all four quadrants,  no masses, no organomegaly   Extremities: Extremities +3 bilateral lower extremity edema   Pulses: 2+ and symmetric in upper extremities, difficult to palpate in lower extremities due to edema   Skin: Skin with lower extremity hyperpigmentation and erythema   Pysch: Normal mood and affect   Neurologic: Normal gross motor and sensory exam.         Labs   CBC:   Lab Results   Component Value Date/Time    WBC 6.5 10/22/2022 07:47 AM    RBC 4.51 10/22/2022 07:47 AM    HGB 14.4 10/22/2022 07:47 AM    HCT 43.9 10/22/2022 07:47 AM    MCV 97.3 10/22/2022 07:47 AM    RDW 14.2 10/22/2022 07:47 AM     10/22/2022 07:47 AM     CMP:  Lab Results   Component Value Date/Time     10/23/2022 07:02 AM    K 4.4 10/23/2022 07:02 AM    K 4.2 10/21/2022 02:48 PM    CL 97 10/23/2022 07:02 AM    CO2 29 10/23/2022 07:02 AM    BUN 29 10/23/2022 07:02 AM    CREATININE 1.0 10/23/2022 07:02 AM    GFRAA >60 08/13/2022 04:53 AM    AGRATIO 1.2 10/21/2022 02:48 PM    LABGLOM >60 10/23/2022 07:02 AM    GLUCOSE 100 10/23/2022 07:02 AM    PROT 7.2 10/21/2022 02:48 PM    CALCIUM 9.1 10/23/2022 07:02 AM    BILITOT 1.2 10/21/2022 02:48 PM    ALKPHOS 208 10/21/2022 02:48 PM    AST 34 10/21/2022 02:48 PM    ALT 29 10/21/2022 02:48 PM     PT/INR:  No results found for: PTINR  HgBA1c:  Lab Results   Component Value Date    LABA1C 7.0 08/12/2022     Lab Results   Component Value Date    TROPONINI <0.01 10/21/2022         Cardiac Data     Last EKG: Normal sinus rhythm, low voltage, extreme right axis deviation, right bundle branch block, nonspecific T wave changes. Overall similar to prior EKGs however the voltage is reduced as compared to them. Echo:    July 2022:    Left ventricular cavity size is normal.   There is mild concentric left ventricular hypertrophy.    Left ventricular function appears to be normal with an estimated ejection   fraction of 55%. There is systolic and diastolic septal flattening consistent with right   ventricular pressure and volume overload. The right ventricle is enlarged and hypokinetic. Severe tricuspid regurgitation. The right atrium is severely dilated. Estimated pulmonary artery systolic pressure is at 74 mmHg assuming a right   atrial pressure of 15 mmHg. There is a small pericardial effusion noted without any hemodynamic    Stress Test:    Cath:    Studies:     Cxr       Impression   No radiographic evidence of acute pulmonary disease. I have reviewed labs and imaging/xray/diagnostic testing in this note. Assessment and Plan          Patient Active Problem List   Diagnosis    Tobacco abuse    Daily consumption of alcohol    QT prolongation    Right axis deviation    Macrocytosis    Essential hypertension    Acute decompensated heart failure (HCC)    CHF (congestive heart failure) (HCC)    Chronic obstructive pulmonary disease (HCC)    Calculus of gallbladder without cholecystitis without obstruction    DM (diabetes mellitus) (HCC)    Pitting edema       Swelling, weight gain, likely related to right-sided heart failure due to pulmonary hypertension which is due to COPD. Continue IV diuresis, goal weight appears to be around 185 pounds. Shortness of breath, likely related to COPD, will also consider ischemia evaluation with Lexiscan nuclear stress test and CT calcium score. Consider cardiac catheterization for both right and left heart catheterization pending and limited echocardiogram for RV/LV function and pulmonary hypertension    Continue baby aspirin. Hypercholesterolemia, continue statin    Copd/Diabetes, Management as per primary service        Thank you for allowing us to participate in the care of Luciano Gonzalez. Please call me with any questions [phone].     Fidelina Phelps MD, [facility]   Interventional Cardiologist  [facility]  [phone] [facility]  [phone] [address] [place] [address]  10/23/2022 10:23 AM

## 2022-10-23 NOTE — PLAN OF CARE
Patient's EF (Ejection Fraction) is greater than 40%    Heart Failure Medications:  Diuretics[de-identified] Furosemide    (One of the following REQUIRED for EF </= 40%/SYSTOLIC FAILURE but MAY be used in EF% >40%/DIASTOLIC FAILURE)        ACE[de-identified] None        ARB[de-identified] Losartan         ARNI[de-identified] None    (Beta Blockers)  NON- Evidenced Based Beta Blocker (for EF% >40%/DIASTOLIC FAILURE): None    Evidenced Based Beta Blocker::(REQUIRED for EF% <40%/SYSTOLIC FAILURE) None  . .................................................................................................................................................. Patient's weights and intake/output reviewed: Yes    Patient's Last Weight: 238 lbs obtained by standing scale. Difference of 6 lbs more than last documented weight. Intake/Output Summary (Last 24 hours) at 10/23/2022 0604  Last data filed at 10/23/2022 0316  Gross per 24 hour   Intake 1080 ml   Output 2601 ml   Net -1521 ml       Education Booklet Provided: yes    Comorbidities Reviewed Yes    Patient has a past medical history of Acute systolic congestive heart failure ([address] Diabetes mellitus ([address] Essential hypertension, and Thrombocytopenia ([facility] >>For CHF and Comorbidity documentation on Education Time and Topics, please see Education Tab    Progressive Mobility Assessment:  What is this patient's Current Level of Mobility?: Ambulatory-Up Ad Maria Dolores  How was this patient Mobilized today?: Bedside Commode and  Up to Toilet/Shower, ambulated 5 ft                 With Whom? Self                 Level of Difficulty/Assistance: Independent     Pt resting in bed at this time on  2 L O2. Pt with complaints of shortness of breath. Pt with pitting lower extremity edema.      Patient and/or Family's stated Goal of Care this Admission: reduce shortness of breath, increase activity tolerance, better understand heart failure and disease management, be more comfortable, and reduce lower extremity edema prior to discharge        :

## 2022-10-23 NOTE — PROGRESS NOTES
Hospitalist Progress Note  10/23/2022 10:46 AM  Subjective:   Admit Date: 10/21/2022  PCP: Daniella Richards, DO  Status: inpatient   Interval History: Hospital Day: 3, less edema but continues to have lower extremity swelling. Breathing better after ipratropium-albuterol nebulizer treatments. Less wheezing. History of present illness:  Admitted with bilateral lower extremity edema and urinary retention in setting of known pulmonary hypertension. Noted difficulty with urination, shortness of breath and occasional chest pressure. Over the last week he has noted increasing edema of bilateral lower extremities extending into the thigh and abdomen. Oral furosemide has been working but not now. Patient reports the swelling is so extensive at this point that it is difficult to bend over and even tie his shoes. Mild shortness of breath. 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 He says has been going on for the last few weeks. He says he does not weigh himself daily and is not sure what a good weight for him is when he is not it swollen.       Diet: controlled carbohydrate (45 gm/meal)  Right forearm peripheral IV (10/21, day #3)  Medications:     ipratropium-albuterol  1 ampule Inhalation Q4H WA   aspirin  81 mg Oral Daily   atorvastatin  20 mg Oral Nightly   losartan  50 mg Oral Daily   enoxaparin  40 mg SubCUTAneous Daily   insulin lispro SSI  0-8 Units SubCUTAneous TID WC   furosemide  40 mg IntraVENous BID     Recent Labs     10/21/22  1448 10/22/22  0747   WBC 6.6 6.5   HGB 14.7 14.4    182   MCV 96.6 97.3     Recent Labs     10/21/22  1448 10/22/22  0747 10/23/22  0702   * 138 135*   K 4.2 4.2 4.4   CL 97* 100 97*   CO2 27 29 29   BUN 43* 40* 29*   CREATININE 1.0 1.0 1.0   GLUCOSE 129* 101* 100*     Recent Labs     10/21/22  1448   AST 34   ALT 29   BILITOT 1.2*   ALKPHOS 208*     Troponin T (10/21) < 0.01 ng/mL  Magnesium (10/22) 2.10, (10/23) 2.10 mg/dL  proBNP (8/11) 1,480, (10/21) 945 pg/mL    POC Glucose:   Recent Labs     10/22/22  1141 10/22/22  1633 10/22/22  1953 10/23/22  0739   POCGLU 99 104* 122* 104*     Portable CXR (10/21) No radiographic evidence of acute pulmonary disease. Echocardiogram (7/29/22) Left ventricular cavity size is normal. There is mild concentric left ventricular hypertrophy. Left ventricular function appears to be normal with an estimated ejection fraction of 55%. There is systolic and diastolic septal flattening consistent with right ventricular pressure and volume  overload. The right ventricle is enlarged and hypokinetic. Severe tricuspid regurgitation. The right atrium is severely dilated. Estimated pulmonary artery systolic pressure is at 74 mmHg assuming a right atrial pressure of 15 mmHg. There is a small pericardial effusion noted without any hemodynamic  implications. Objective:   Vitals:  /81   Pulse 82   Temp 97.5 °F (36.4 °C) (Oral)   Resp 19   Ht 6' (1.829 m)   Wt 238 lb 9.6 oz (108.2 kg)   SpO2 92% on 2 LPM   BMI 32.36 kg/m²   General appearance: alert and cooperative with exam  Lungs: clear to auscultation bilaterally  Heart: regular rate and rhythm, S1, S2 normal, no murmur, click, rub or gallop  Abdomen: soft, non-tender; bowel sounds normal; no masses,  no organomegaly  Extremities: 2+ edema to knees, neurovascular status intact  Neurologic: No obvious focal neurologic deficits. Assessment and Plan:   Heart failure with bilateral lower extremity edema and urinary retention in setting of known pulmonary hypertension PASP 74 mmHg and EF 55% by echo (July 2022). Continue IV diuresis per cardiology, goal weight appears to be around 185 pounds. Ischemia evaluation with Lexiscan nuclear stress test and CT calcium score. Consider cardiac catheterization for both right and left heart catheterization pending and limited echocardiogram for RV/LV function and pulmonary hypertension.    Acute respiratory failure requiring supplemental oxygen. Known history of COPD but not on home oxygen. Uses inhalers at home and getting relief from Duonebs. Echocardiogram in 2021 as well as 2022 which showed normal left ventricular function however there was right-sided enlargement and significant pulmonary hypertension noted. Type 2 Diabetes (HgbA1c 7.0% on 8/12/22). Metformin held on admission. Cover with a \"sliding scale\" lispro moderate scale prandial correction insulin. Primary hypertension on losartan 50 mg daily. Dyslipidemia on atorvastatin 20 mg nightly. Class I obesity (BMI 32.4). Advance Directive: Full Code  DVT prophylaxis with enoxaparin 40 mg sub-Q daily.    Discharge planning: at least two more days inpatient status pending Lexiscan nuclear stress test and CT calcium score on Monday with possible right and left heart catheterization and limited echocardiogram.      Clifford Del Rio MD  [facility] Hospitalist

## 2022-10-24 ENCOUNTER — APPOINTMENT (OUTPATIENT)
Dept: CARDIAC CATH/INVASIVE PROCEDURES | Age: 57
DRG: 286 | End: 2022-10-24
Payer: COMMERCIAL

## 2022-10-24 ENCOUNTER — APPOINTMENT (OUTPATIENT)
Dept: NUCLEAR MEDICINE | Age: 57
DRG: 286 | End: 2022-10-24
Payer: COMMERCIAL

## 2022-10-24 LAB
ANION GAP SERPL CALCULATED.3IONS-SCNC: 7 MMOL/L (ref 3–16)
BUN BLDV-MCNC: 20 MG/DL (ref 7–20)
CALCIUM SERPL-MCNC: 9.4 MG/DL (ref 8.3–10.6)
CHLORIDE BLD-SCNC: 100 MMOL/L (ref 99–110)
CO2: 32 MMOL/L (ref 21–32)
CREAT SERPL-MCNC: 0.9 MG/DL (ref 0.9–1.3)
GFR SERPL CREATININE-BSD FRML MDRD: >60 ML/MIN/{1.73_M2}
GLUCOSE BLD-MCNC: 141 MG/DL (ref 70–99)
GLUCOSE BLD-MCNC: 96 MG/DL (ref 70–99)
GLUCOSE BLD-MCNC: 98 MG/DL (ref 70–99)
GLUCOSE BLD-MCNC: 98 MG/DL (ref 70–99)
HCT VFR BLD CALC: 45.1 % (ref 40.5–52.5)
HEMOGLOBIN: 14.5 G/DL (ref 13.5–17.5)
INR BLD: 1.31 (ref 0.87–1.14)
LV EF: 40 %
LV EF: 58 %
LVEF MODALITY: NORMAL
LVEF MODALITY: NORMAL
MAGNESIUM: 2.2 MG/DL (ref 1.8–2.4)
MCH RBC QN AUTO: 31.5 PG (ref 26–34)
MCHC RBC AUTO-ENTMCNC: 32.2 G/DL (ref 31–36)
MCV RBC AUTO: 97.8 FL (ref 80–100)
PDW BLD-RTO: 14.4 % (ref 12.4–15.4)
PERFORMED ON: ABNORMAL
PERFORMED ON: NORMAL
PERFORMED ON: NORMAL
PLATELET # BLD: 171 K/UL (ref 135–450)
PMV BLD AUTO: 7.6 FL (ref 5–10.5)
POTASSIUM SERPL-SCNC: 4.5 MMOL/L (ref 3.5–5.1)
PRO-BNP: 991 PG/ML (ref 0–124)
PROTHROMBIN TIME: 16.2 SEC (ref 11.7–14.5)
RBC # BLD: 4.61 M/UL (ref 4.2–5.9)
SODIUM BLD-SCNC: 139 MMOL/L (ref 136–145)
WBC # BLD: 8.8 K/UL (ref 4–11)

## 2022-10-24 PROCEDURE — 2709999900 HC NON-CHARGEABLE SUPPLY

## 2022-10-24 PROCEDURE — C1894 INTRO/SHEATH, NON-LASER: HCPCS

## 2022-10-24 PROCEDURE — 85610 PROTHROMBIN TIME: CPT

## 2022-10-24 PROCEDURE — 3430000000 HC RX DIAGNOSTIC RADIOPHARMACEUTICAL: Performed by: INTERNAL MEDICINE

## 2022-10-24 PROCEDURE — 93460 R&L HRT ART/VENTRICLE ANGIO: CPT

## 2022-10-24 PROCEDURE — 6370000000 HC RX 637 (ALT 250 FOR IP): Performed by: INTERNAL MEDICINE

## 2022-10-24 PROCEDURE — 6370000000 HC RX 637 (ALT 250 FOR IP): Performed by: NURSE PRACTITIONER

## 2022-10-24 PROCEDURE — 2700000000 HC OXYGEN THERAPY PER DAY

## 2022-10-24 PROCEDURE — B2151ZZ FLUOROSCOPY OF LEFT HEART USING LOW OSMOLAR CONTRAST: ICD-10-PCS | Performed by: INTERNAL MEDICINE

## 2022-10-24 PROCEDURE — 83735 ASSAY OF MAGNESIUM: CPT

## 2022-10-24 PROCEDURE — 83880 ASSAY OF NATRIURETIC PEPTIDE: CPT

## 2022-10-24 PROCEDURE — 80048 BASIC METABOLIC PNL TOTAL CA: CPT

## 2022-10-24 PROCEDURE — 94640 AIRWAY INHALATION TREATMENT: CPT

## 2022-10-24 PROCEDURE — 6360000004 HC RX CONTRAST MEDICATION

## 2022-10-24 PROCEDURE — 78452 HT MUSCLE IMAGE SPECT MULT: CPT

## 2022-10-24 PROCEDURE — 2500000003 HC RX 250 WO HCPCS

## 2022-10-24 PROCEDURE — 85027 COMPLETE CBC AUTOMATED: CPT

## 2022-10-24 PROCEDURE — A9502 TC99M TETROFOSMIN: HCPCS | Performed by: INTERNAL MEDICINE

## 2022-10-24 PROCEDURE — 6360000002 HC RX W HCPCS: Performed by: NURSE PRACTITIONER

## 2022-10-24 PROCEDURE — 6360000002 HC RX W HCPCS

## 2022-10-24 PROCEDURE — 2580000003 HC RX 258: Performed by: NURSE PRACTITIONER

## 2022-10-24 PROCEDURE — 99152 MOD SED SAME PHYS/QHP 5/>YRS: CPT | Performed by: INTERNAL MEDICINE

## 2022-10-24 PROCEDURE — C1887 CATHETER, GUIDING: HCPCS

## 2022-10-24 PROCEDURE — 1200000000 HC SEMI PRIVATE

## 2022-10-24 PROCEDURE — 93308 TTE F-UP OR LMTD: CPT

## 2022-10-24 PROCEDURE — 94761 N-INVAS EAR/PLS OXIMETRY MLT: CPT

## 2022-10-24 PROCEDURE — 93017 CV STRESS TEST TRACING ONLY: CPT

## 2022-10-24 PROCEDURE — 6360000002 HC RX W HCPCS: Performed by: INTERNAL MEDICINE

## 2022-10-24 PROCEDURE — 36415 COLL VENOUS BLD VENIPUNCTURE: CPT

## 2022-10-24 PROCEDURE — 85347 COAGULATION TIME ACTIVATED: CPT

## 2022-10-24 PROCEDURE — 93460 R&L HRT ART/VENTRICLE ANGIO: CPT | Performed by: INTERNAL MEDICINE

## 2022-10-24 PROCEDURE — 4A023N8 MEASUREMENT OF CARDIAC SAMPLING AND PRESSURE, BILATERAL, PERCUTANEOUS APPROACH: ICD-10-PCS | Performed by: INTERNAL MEDICINE

## 2022-10-24 PROCEDURE — 2580000003 HC RX 258: Performed by: INTERNAL MEDICINE

## 2022-10-24 PROCEDURE — B2111ZZ FLUOROSCOPY OF MULTIPLE CORONARY ARTERIES USING LOW OSMOLAR CONTRAST: ICD-10-PCS | Performed by: INTERNAL MEDICINE

## 2022-10-24 PROCEDURE — C1769 GUIDE WIRE: HCPCS

## 2022-10-24 RX ORDER — SODIUM CHLORIDE 9 MG/ML
INJECTION, SOLUTION INTRAVENOUS CONTINUOUS
Status: DISCONTINUED | OUTPATIENT
Start: 2022-10-24 | End: 2022-10-24

## 2022-10-24 RX ORDER — FENTANYL CITRATE 50 UG/ML
INJECTION, SOLUTION INTRAMUSCULAR; INTRAVENOUS
Status: COMPLETED | OUTPATIENT
Start: 2022-10-24 | End: 2022-10-24

## 2022-10-24 RX ORDER — SODIUM CHLORIDE 9 MG/ML
INJECTION, SOLUTION INTRAVENOUS PRN
Status: DISCONTINUED | OUTPATIENT
Start: 2022-10-24 | End: 2022-11-03 | Stop reason: HOSPADM

## 2022-10-24 RX ORDER — SODIUM CHLORIDE 0.9 % (FLUSH) 0.9 %
5-40 SYRINGE (ML) INJECTION EVERY 12 HOURS SCHEDULED
Status: DISCONTINUED | OUTPATIENT
Start: 2022-10-24 | End: 2022-11-03 | Stop reason: HOSPADM

## 2022-10-24 RX ORDER — MIDAZOLAM HYDROCHLORIDE 1 MG/ML
INJECTION INTRAMUSCULAR; INTRAVENOUS
Status: COMPLETED | OUTPATIENT
Start: 2022-10-24 | End: 2022-10-24

## 2022-10-24 RX ORDER — HYDRALAZINE HYDROCHLORIDE 20 MG/ML
10 INJECTION INTRAMUSCULAR; INTRAVENOUS EVERY 10 MIN PRN
Status: DISCONTINUED | OUTPATIENT
Start: 2022-10-24 | End: 2022-11-03 | Stop reason: HOSPADM

## 2022-10-24 RX ORDER — SODIUM CHLORIDE 0.9 % (FLUSH) 0.9 %
5-40 SYRINGE (ML) INJECTION PRN
Status: DISCONTINUED | OUTPATIENT
Start: 2022-10-24 | End: 2022-11-03 | Stop reason: HOSPADM

## 2022-10-24 RX ADMIN — SODIUM CHLORIDE, PRESERVATIVE FREE 10 ML: 5 INJECTION INTRAVENOUS at 20:20

## 2022-10-24 RX ADMIN — IPRATROPIUM BROMIDE AND ALBUTEROL SULFATE 1 AMPULE: 2.5; .5 SOLUTION RESPIRATORY (INHALATION) at 19:49

## 2022-10-24 RX ADMIN — IPRATROPIUM BROMIDE AND ALBUTEROL SULFATE 1 AMPULE: 2.5; .5 SOLUTION RESPIRATORY (INHALATION) at 08:04

## 2022-10-24 RX ADMIN — MIDAZOLAM HYDROCHLORIDE 1 MG: 1 INJECTION INTRAMUSCULAR; INTRAVENOUS at 14:26

## 2022-10-24 RX ADMIN — MIDAZOLAM HYDROCHLORIDE 1 MG: 1 INJECTION INTRAMUSCULAR; INTRAVENOUS at 14:11

## 2022-10-24 RX ADMIN — FENTANYL CITRATE 25 MCG: 50 INJECTION, SOLUTION INTRAMUSCULAR; INTRAVENOUS at 14:12

## 2022-10-24 RX ADMIN — SODIUM CHLORIDE, PRESERVATIVE FREE 10 ML: 5 INJECTION INTRAVENOUS at 12:35

## 2022-10-24 RX ADMIN — TETROFOSMIN 10.8 MILLICURIE: 1.38 INJECTION, POWDER, LYOPHILIZED, FOR SOLUTION INTRAVENOUS at 08:38

## 2022-10-24 RX ADMIN — ATORVASTATIN CALCIUM 20 MG: 10 TABLET, FILM COATED ORAL at 20:20

## 2022-10-24 RX ADMIN — LOSARTAN POTASSIUM 50 MG: 25 TABLET, FILM COATED ORAL at 12:35

## 2022-10-24 RX ADMIN — ASPIRIN 81 MG: 81 TABLET, COATED ORAL at 12:35

## 2022-10-24 RX ADMIN — TETROFOSMIN 34.1 MILLICURIE: 1.38 INJECTION, POWDER, LYOPHILIZED, FOR SOLUTION INTRAVENOUS at 09:59

## 2022-10-24 RX ADMIN — FUROSEMIDE 40 MG: 10 INJECTION, SOLUTION INTRAMUSCULAR; INTRAVENOUS at 12:35

## 2022-10-24 RX ADMIN — FUROSEMIDE 40 MG: 10 INJECTION, SOLUTION INTRAMUSCULAR; INTRAVENOUS at 18:47

## 2022-10-24 RX ADMIN — REGADENOSON 0.4 MG: 0.08 INJECTION, SOLUTION INTRAVENOUS at 10:00

## 2022-10-24 RX ADMIN — FENTANYL CITRATE 25 MCG: 50 INJECTION, SOLUTION INTRAMUSCULAR; INTRAVENOUS at 14:26

## 2022-10-24 ASSESSMENT — PAIN SCALES - GENERAL: PAINLEVEL_OUTOF10: 0

## 2022-10-24 NOTE — PROGRESS NOTES
non-tender, non-distended with normal bowel sounds. Musculoskeletal: No clubbing, cyanosis or edema bilaterally. Full range of motion without deformity. Skin: Skin color, texture, turgor normal.  No rashes or lesions. Neurologic:  Neurovascularly intact without any focal sensory/motor deficits. Cranial nerves: II-XII intact, grossly non-focal.  Psychiatric: Alert and oriented, thought content appropriate, normal insight  Capillary Refill: Brisk, 3 seconds, normal   Peripheral Pulses: +2 palpable, equal bilaterally       Labs:   Recent Labs     10/21/22  1448 10/22/22  0747   WBC 6.6 6.5   HGB 14.7 14.4   HCT 44.9 43.9    182     Recent Labs     10/22/22  0747 10/23/22  0702 10/24/22  0759    135* 139   K 4.2 4.4 4.5    97* 100   CO2 29 29 32   BUN 40* 29* 20   CREATININE 1.0 1.0 0.9   CALCIUM 9.4 9.1 9.4     Recent Labs     10/21/22  1448   AST 34   ALT 29   BILITOT 1.2*   ALKPHOS 208*     No results for input(s): INR in the last 72 hours. Recent Labs     10/21/22  1448   TROPONINI <0.01       Urinalysis:      Lab Results   Component Value Date/Time    NITRU Negative 07/17/2022 06:27 PM    BLOODU Negative 07/17/2022 06:27 PM    SPECGRAV 1.010 07/17/2022 06:27 PM    GLUCOSEU Negative 07/17/2022 06:27 PM       Radiology:  CT CARDIAC CALCIUM SCORING   Final Result   Total Agatston calcium score of 40. This is in the 40th percentile      Findings of fluid overload denoted by small right-sided pleural effusion,   body wall anasarca, and mild abdominopelvic ascites. Calcium Score Interpretation      0  No identifiable atherosclerotic plaque. Very low cardiovascular disease   risk. Less than 5% chance of presence of coronary artery disease. A   negative examination. 1-10 Minimal plaque burden. Significant coronary artery disease very   unlikely.  Mild plaque burden. Likely mild or minimal coronary stenosis. 101-400 Moderate plaque burden.   Moderate non-obstructive coronary artery   disease highly likely. Over 400 Extensive plaque burden. High likelihood of at least one   significant coronary artery stenosis (>50% diameter). CALCIUM SCORING OVERVIEW:      Coronary calcium is a marker for plaque in a blood vessel or atherosclerosis   (hardening of the arteries). The presence and amount of calcium detected in   the coronary artery by the CT scan estimates the presence and amount of   atherosclerotic plaque. These calcium deposits can appear years before the   development of heart disease symptoms such as chest pain and shortness of   breath. A calcium score is computed for each of the coronary arteries based upon the   volume and density of the calcium deposits. This can be referred to as your   calcified plaque burden. It does not correspond directly to the percentage   of narrowing in the artery, but does correlate with the severity of the   overall coronary atherosclerotic burden. This score is then used to determine the calcium percentile which compares   your calcified plaque burden to that of other asymptomatic men and women of   the same age. The calcium score, in combination with the percentile, enables   your physician to determine your risk of developing symptomatic coronary   artery disease, and to measure the progression of disease as well as the   effectiveness of treatment. A score of zero indicates that there is no calcified plaque burden. This   implies that there is no significant coronary artery narrowing and very low   likelihood of a cardiac event over at least the next 3 years. It does not   absolutely rule out the presence of soft, noncalcified plaque or totally   eliminate the possibility of a cardiac event. A score greater than zero indicates at least some coronary artery disease.    As the score increases, so does the likelihood of a significant coronary   narrowing and the likelihood of a coronary event over the next 3 years. Similarly, the likelihood of a coronary event increases with increasing   calcium percentiles. XR CHEST (2 VW)   Final Result   No radiographic evidence of acute pulmonary disease. NM Cardiac Stress Test Nuclear Imaging    (Results Pending)           Assessment/Plan:    Active Hospital Problems    Diagnosis     DM (diabetes mellitus) ([facility] [address] [E11.9]      Priority: Medium    Pitting edema [R60.9]      Priority: Medium    CHF (congestive heart failure) ([facility]) [I50.9]     Essential hypertension [I10]      Heart failure with bilateral lower extremity edema and urinary retention in setting of known pulmonary hypertension PASP 74 mmHg and EF 55% by echo (July 2022). Continue IV diuresis per cardiology, goal weight appears to be around 185 pounds. Ischemia evaluation with Lexiscan nuclear stress test and CT calcium score were abn  -plan for  right and left heart catheterization pending and limited echocardiogram for RV/LV function and pulmonary hypertension. Acute respiratory failure requiring supplemental oxygen. Known history of COPD but not on home oxygen. Uses inhalers at home and getting relief from Duonebs. Echocardiogram in 2021 as well as 2022 which showed normal left ventricular function however there was right-sided enlargement and significant pulmonary hypertension noted. Type 2 Diabetes (HgbA1c 7.0% on 8/12/22). Metformin held on admission. Cover with a \"sliding scale\" lispro moderate scale prandial correction insulin. Primary hypertension on losartan 50 mg daily. Dyslipidemia on atorvastatin 20 mg nightly. Class I obesity (BMI 32.4).       DVT Prophylaxis: lovenox  Diet: Diet NPO  Code Status: Full Code  PT/OT Eval Status: not ordered    Dispo - pending workup, cards recs    Appropriate for A1 Discharge Unit: Tiana Glover MD

## 2022-10-24 NOTE — POST SEDATION
Patient:  Amanda Hernandes   :   1965    A pre-sedation re-evaluation was performed immediately at the end of the procedure. Procedure:  Cardiac cath  Medications: Procedural sedation with minimal conscious sedation  Complications: None  Estimated Blood Loss: none  Specimens: Were not obtained        John Medication and Procedural Reconciliation:  I agree that the documented medications and procedures performed are true. The medications were given under my order. The procedures were performed under my direct supervision.

## 2022-10-24 NOTE — DISCHARGE INSTRUCTIONS
FOLLOW-UP APPOINTMENTS    [facility] OFFICE - Follow-up appointment on November 22nd at 9:30am with Kesha Mitchell CNP 81. [facility],  [facility], [address] [place] [address], [address] [place] [address]. Office #: [phone]. If you are unable to make this appointment, please call to reschedule. [facility] OFFICE - Follow-up appointment on November 30th at 9:30am with Kain Mitchell CNPalgolaf 81. You and your one visitor will need to have your mouth and nose covered  with a mask. No children please. [facility],  [facility], [address] [place] [address], [address] [place] [address]. Office #: [phone]. If you are unable to make this appointment, please call to reschedule. [facility] OFFICE - Follow-up appointment on January 3rd at 8:15am with Kesha Parker 81. You and your one visitor will need to have your mouth and nose covered  with a mask. No children please. [facility],  [facility], [address] [place] [address], [address] [place] [address]. Office #: [phone]. If you are unable to make this appointment, please call to reschedule. Directions to [place] [address] towards [place]. [address] [facility] exit. Right off exit. Cross over [employer]. Right on [address]. Left into hospital. Follow the signs to the emergency room ( turn left toward the Emergency room). Go right at the first stop sign. Just past the Emergency room at the second stop sign turn right and go up the ramp and park on the top level if possible. Go in the glass doors of the [facility] we on the top level of the garage [address]. As soon as you get in the door turn left and our office is the one with the glass doors.

## 2022-10-24 NOTE — PROCEDURES
[facility]       Cardiac Catheterization Lab Report    PATIENT: Kavon Ballard  DATE: 10/24/2022  MRN: [id number]  CSN: [id number]  : 1965      Performing Physician: Rip Obrien MD, [address], [place], [place]  Primary Care Physician: Devante Dacosta DO  Admitting Provider: Brenden Bishop DO    Procedures Performed:   1. Left heart catheterization  2. Selective left and right coronary angiogram  3. Left ventriculography   5. Right heart catheterization    Procedure Findings:  1. Mild non-obstructive coronary artery disease. 2. Normal left ventricular function with EF estimated at 55-60%  3. Normal left heart hemodynamics  4. Severe pulmonary hypertension    Indications:   Patient Active Problem List   Diagnosis    Tobacco abuse    Daily consumption of alcohol    QT prolongation    Right axis deviation    Macrocytosis    Essential hypertension    Acute decompensated heart failure (HCC)    CHF (congestive heart failure) (HCC)    Chronic obstructive pulmonary disease (HCC)    Calculus of gallbladder without cholecystitis without obstruction    DM (diabetes mellitus) ([facility] [address]    Pitting edema       Details:   Kavon Ballard was brought to the cardiac catheterization lab in a fasting state after informed consent was obtained. If the patient was able to provide written consent, it was obtained. The patient's vitals were monitored through out the procedure. The patient was sedated using the appropriate levels of sedation and ASA guidelines. The appropriate access site area was prepped and drapped in a sterile fashion. The area was anesthetized with 2% lidocaine. Using the modified Seldinger technique, an arterial sheath was introduced into the arterial access site using a single anterior wall puncture. The sheath was flushed and prepped in usual fashion. Catheters used during the procedure included 5 Romanian TIG 4.0 catheter.  The catheters were advanced and removed over a .035\" wire, into the appropriate positions. Multiple angiographic views were obtained. An LV gram was obtained. Findings:    1. Left main coronary artery was normal. It gave off the left anterior descending artery and left circumflex. 2. Left anterior descending artery has mild atherosclerotic disease. It was moderate in size. It gave off septal perforators and a moderate sized diagonal branch. The LAD covered the entire apex of the left ventricle. 3. Left circumflex has mild atherosclerotic disease. It was moderate in size. There was a moderate sized obtuse marginal branch. 4. Right coronary artery has mild atherosclerotic disease. It was moderate in size and was the dominant artery. 5. Left ventriculogram showed normal LVEF at 55-60%. Wall motion was normal . There was no significant mitral valve or aortic valve disease noted. LVEDP was normal. There was no gradient noted across the aortic valve during pullback of the catheter. 6.  Right heart catheterization was performed. Right atrial pressure of 25  RV pressure 70/13  PA pressure of 78/37 with a mean of 50  Pulmonary artery wedge pressure mean of 12  Cardiac output of 5.32  Cardiac index 2.33  Aortic saturation 88%  PA saturation 59%  SVR of 917      /88   Pulse 84   Temp 97.7 °F (36.5 °C) (Oral)   Resp 18   Ht 6' (1.829 m)   Wt 236 lb 12.8 oz (107.4 kg)   SpO2 94%   BMI 32.12 kg/m²     The access site was controlled with manual pressure and/or appropriate closure device. Moderate Conscious Sedation Details: An independent trained observer pushed medications at my direction. We monitoring the patient's level of consciousness and vital signs/physiologic status throughout the procedure.   CPT codes 74091 and 76810      Start time: 1410  Stop time: 1430  ASA class: 3    Sedation totals:  Versad - 2mg  Fentanyl - 50mcg    EBL - minimal <5 cc blood loss    The patient was monitored continuously with the ECG, pulse oximetry, blood pressure, and direct observation. CONCLUSIONS:    1. Severe pulmonary hypertension  2. Mild non-obstructive coronary artery disease with preserved left ventricular function    ASSESSMENT/RECOMMENDATIONS:    Secondary prevention for coronary artery disease.    2.  Referral to pulmonary hypertension clinic    Andrew Abraham MD, [place] [address] Cardiology  [place] [address]  [phone] Main central office  [phone] [facility] office  10/24/2022  2:57 PM

## 2022-10-24 NOTE — CONSULTS
INPATIENT PULMONARY CRITICAL CARE CONSULT NOTE      Chief Complaint/Referring Provider:  Patient is being seen at the request of Dr. Deandra Larsen for a consultation for SOB, severe pulmonary hypertension on [facility], assistance with workup and follow up. Presenting HPI: Mimi Woodson is a 25-year-old male living by himself in [facility]. He was  long ago. He does have a son who is healthy. The patient has smoked 1.5 PPD since his teenage years, quit in August 2020. He does not drink alcohol, drank heavily when he was much younger, although the discharge summary from August 2021 indicates that he was drinking every day. The patient has a history of diabetes mellitus, hypertension, CHF. He was hospitalized in August 2021 for new onset of CHF. He presented with increasing leg edema, penile and scrotal edema. CT chest with PE protocol showed concerns for acute cor pulmonale. He was seen by cardiology. He underwent an echocardiogram, was diuresed and placed on losartan. He has been followed by cardiology. He was treated for presumed COPD. His hypoxemia resolved, he was discharged home on room air. He was seen by Dr. Darrian Paul in follow-up, was felt to have HFpEF with NYHA I symptoms, moderate pulmonary hypertension with cor pulmonale/RV dilation. Underlying sleep apnea was suspected, PFT confirmed severe COPD. There was a component of left-sided heart failure/diastolic dysfunction. He was seen by [facility] for suspected sleep apnea, sleep onset insomnia psychophysiological hyperarousal, poor sleep hygiene. The patient was offered NPSG, refused to sleep in the lab. He did not follow-up for his suspected sleep apnea. In the interim, the patient developed acute cholecystitis in July, was seen by general surgery and underwent robotic cholecystectomy with intraoperative cholangiogram on 8/12/2022 after cardiac clearance was obtained.   The patient tolerated surgery, was discharged on 8/17/2022. He presented back to the ER on 10/21/2022 for increasing edema extending up to his thigh and abdomen. It was getting difficult to bend over and tie his shoes. The patient does work in [employer]. He also had urinary retention and elevated BUN. He was admitted to the hospitalist service, was seen in consultation by cardiology. On 10/24/2022 he underwent left and right heart catheterization. This showed mild nonobstructive CAD, normal LVEF of 55 to 60%, normal left heart hemodynamics next, severe pulmonary hypertension. The patient was continued with IV diuretics with goal weight of 185 pounds. We are consulted for assistance with management of severe pulmonary hypertension. The patient remains short of breath. He says he has been short of breath with walking for the last 2 to 3 years. He does have cough with occasional phlegm, wheezes every day. He does use his inhalers. He describes chest pain associated with coughing. He has a good appetite, his weight is stable. He had gained weight, was around 235 pounds, had dropped to 15 pounds with diuresis. He is not sure he snores. He works nights, from 3 PM to 11 PM, sleeps anywhere between 2 and 3 AM.  He does watch TV in bed. His dog wakes him up around 7 AM, he then sleeps again for some time.   The rest of his ROS was as above    [facility] 10/24/2022  Right atrial pressure of 25  RV pressure 70/13  PA pressure of 78/37 with a mean of 50  Pulmonary artery wedge pressure mean of 12  Cardiac output of 5.32  Cardiac index 2.33  Aortic saturation 88%  PA saturation 59%  SVR of 917         Patient Active Problem List    Diagnosis Date Noted    DM (diabetes mellitus) ([facility] 10/21/2022    Pitting edema 10/21/2022    Calculus of gallbladder without cholecystitis without obstruction 08/12/2022    Chronic obstructive pulmonary disease ([address] 10/01/2021    CHF (congestive heart failure) ([facility]     Tobacco abuse 08/05/2021    Daily consumption of alcohol 2021    QT prolongation 2021    Right axis deviation 2021    Macrocytosis 2021    Essential hypertension 2021    Acute decompensated heart failure ([facility] 2021       Past Medical History:   Diagnosis Date    Acute systolic congestive heart failure ([address]     Diabetes mellitus ([facility] [facility]     Essential hypertension     Thrombocytopenia (HCC)         Past Surgical History:   Procedure Laterality Date    CHOLECYSTECTOMY, LAPAROSCOPIC N/A 2022    ROBOTIC CHOLECYSTECTOMY WITH INTRAOPERATIVE CHOLANGIOGRAM performed by Brissa Bhat MD at [facility] History   Family history unknown: Yes        Social History     Tobacco Use    Smoking status: Former     Packs/day: 1.00     Years: 15.00     Pack years: 15.00     Types: Cigarettes     Quit date: 2021     Years since quittin.2    Smokeless tobacco: Never   Substance Use Topics    Alcohol use: Not Currently     Comment: \"2 beers after work\"        No Known Allergies      Physical Exam:  Blood pressure (!) 118/92, pulse 97, temperature 98.4 °F (36.9 °C), temperature source Oral, resp. rate 20, height 6' (1.829 m), weight 235 lb 12.8 oz (107 kg), SpO2 91 %.'   Constitutional: Tall, overweight. Was just waking up from sleep. He was tachypneic even when asleep with possible increased expiratory effort. He could speak in full sentences  HENT:  Oropharynx is clear and moist.  Class III airway. Eyes:  Conjunctivae are normal. Pupils equal, round, and reactive to light. No scleral icterus. Neck: Short and large neck. No tracheal deviation present. No obvious thyroid mass. Cardiovascular: Normal rate, regular rhythm, distant heart sounds. 1+ pitting lower extremity edema. Pulmonary/Chest: Increased work of breathing, prolonged exhalation, bilateral wheezes. No crackles. No accessory muscle usage or stridor. Abdominal: Soft, protuberant, fatty. Bowel sounds present. No distension or hernia.  No tenderness. Percussible dullness, likely representing ascites  Musculoskeletal: No cyanosis. No clubbing. No obvious joint deformity. Lymphadenopathy: No cervical or supraclavicular adenopathy. Skin: Skin is warm and dry. No rash or nodules on the exposed extremities. Psychiatric: Normal mood and affect. Behavior is normal.  No anxiety. Neurologic: Alert, awake and oriented. PERRL. Speech fluent. No obvious neurologic deficit, detailed exam not performed      Results:  CBC:   Recent Labs     10/24/22  1308   WBC 8.8   HGB 14.5   HCT 45.1   MCV 97.8        BMP:   Recent Labs     10/23/22  0702 10/24/22  0759 10/25/22  0808   * 139 138   K 4.4 4.5 4.6   CL 97* 100 98*   CO2 29 32 31   BUN 29* 20 16   CREATININE 1.0 0.9 0.8*     PT/INR:   Recent Labs     10/24/22  1308   PROTIME 16.2*   INR 1.31*     Since  Imaging:  I have reviewed radiology images personally. NM Cardiac Stress Test Nuclear Imaging   Final Result      CT CARDIAC CALCIUM SCORING   Final Result   Total Agatston calcium score of 40. This is in the 40th percentile      Findings of fluid overload denoted by small right-sided pleural effusion,   body wall anasarca, and mild abdominopelvic ascites. Calcium Score Interpretation      0  No identifiable atherosclerotic plaque. Very low cardiovascular disease   risk. Less than 5% chance of presence of coronary artery disease. A   negative examination. 1-10 Minimal plaque burden. Significant coronary artery disease very   unlikely.  Mild plaque burden. Likely mild or minimal coronary stenosis. 101-400 Moderate plaque burden. Moderate non-obstructive coronary artery   disease highly likely. Over 400 Extensive plaque burden. High likelihood of at least one   significant coronary artery stenosis (>50% diameter).       CALCIUM SCORING OVERVIEW:      Coronary calcium is a marker for plaque in a blood vessel or atherosclerosis   (hardening of the arteries). The presence and amount of calcium detected in   the coronary artery by the CT scan estimates the presence and amount of   atherosclerotic plaque. These calcium deposits can appear years before the   development of heart disease symptoms such as chest pain and shortness of   breath. A calcium score is computed for each of the coronary arteries based upon the   volume and density of the calcium deposits. This can be referred to as your   calcified plaque burden. It does not correspond directly to the percentage   of narrowing in the artery, but does correlate with the severity of the   overall coronary atherosclerotic burden. This score is then used to determine the calcium percentile which compares   your calcified plaque burden to that of other asymptomatic men and women of   the same age. The calcium score, in combination with the percentile, enables   your physician to determine your risk of developing symptomatic coronary   artery disease, and to measure the progression of disease as well as the   effectiveness of treatment. A score of zero indicates that there is no calcified plaque burden. This   implies that there is no significant coronary artery narrowing and very low   likelihood of a cardiac event over at least the next 3 years. It does not   absolutely rule out the presence of soft, noncalcified plaque or totally   eliminate the possibility of a cardiac event. A score greater than zero indicates at least some coronary artery disease. As the score increases, so does the likelihood of a significant coronary   narrowing and the likelihood of a coronary event over the next 3 years. Similarly, the likelihood of a coronary event increases with increasing   calcium percentiles. XR CHEST (2 VW)   Final Result   No radiographic evidence of acute pulmonary disease.            XR CHEST (2 VW)    Result Date: 10/21/2022  EXAMINATION: TWO XRAY VIEWS OF THE CHEST 10/21/2022 2:57 pm COMPARISON: Chest x-ray dated 08/05/2021. HISTORY: ORDERING SYSTEM PROVIDED HISTORY: shortness of breath TECHNOLOGIST PROVIDED HISTORY: Reason for exam:->shortness of breath Reason for Exam: shortness of breath FINDINGS: HEART/MEDIASTINUM: The cardiac silhouette is enlarged, but stable. PLEURA/LUNGS: There are no focal consolidations or pleural effusions. There is no appreciable pneumothorax. BONES/SOFT TISSUE: No acute abnormality. No radiographic evidence of acute pulmonary disease. CT CARDIAC CALCIUM SCORING    Result Date: 10/23/2022  EXAMINATION: CT OF THE HEART WITHOUT CONTRAST, CORONARY ARTERY CALCIUM SCREENING 10/23/2022 HISTORY: CT OF THE HEART WITHOUT CONTRAST, CORONARY ARTERY CALCIUM SCREENING COMPARISON: Conventional chest CT 08/05/2021 TECHNIQUE: CT of the heart was obtained without intravenous contrast.  Calcium scoring analysis was performed using a separate  workstation. Dose modulation, iterative reconstruction, and/or weight based adjustment of the mA/kV was utilized to reduce the radiation dose to as low as reasonably achievable. FINDINGS: LEFT MAIN: 23 LEFT ANTERIOR DESCENDING: 10 CIRCUMFLEX: 7 RIGHT CORONARY ARTERY: Zero (0). TOTAL AGATSTON CALCIUM SCORE: 36 EXTRACARDIAC STRUCTURES: Trace aortic valve and aortic annulus calcification is seen. No aortic aneurysm. Ascending aorta measures 3.5 cm. Small pericardial effusion is seen. No pericardial calcification. Small hiatal hernia is seen. There is nonspecific thickening at the GE junction. Small mediastinal nodes are noted, likely reactive. A few calcified mediastinal nodes are also seen. Respiratory motion artifact limits evaluation of pulmonary parenchymal change. Scattered areas of bronchial wall thickening are seen. Small right-sided pleural effusion is seen. There is adjacent consolidation at the right lung base. .  This pleural effusion is slightly increased in size compared to chest CT 08/05/2021 There is body wall anasarca. There is mild ascites seen in the upper abdomen, partially imaged. Spurring is seen in the spine     Total Agatston calcium score of 40. This is in the 40th percentile Findings of fluid overload denoted by small right-sided pleural effusion, body wall anasarca, and mild abdominopelvic ascites. Calcium Score Interpretation 0  No identifiable atherosclerotic plaque. Very low cardiovascular disease risk. Less than 5% chance of presence of coronary artery disease. A negative examination. 1-10 Minimal plaque burden. Significant coronary artery disease very unlikely.  Mild plaque burden. Likely mild or minimal coronary stenosis. 101-400 Moderate plaque burden. Moderate non-obstructive coronary artery disease highly likely. Over 400 Extensive plaque burden. High likelihood of at least one significant coronary artery stenosis (>50% diameter). CALCIUM SCORING OVERVIEW: Coronary calcium is a marker for plaque in a blood vessel or atherosclerosis (hardening of the arteries). The presence and amount of calcium detected in the coronary artery by the CT scan estimates the presence and amount of atherosclerotic plaque. These calcium deposits can appear years before the development of heart disease symptoms such as chest pain and shortness of breath. A calcium score is computed for each of the coronary arteries based upon the volume and density of the calcium deposits. This can be referred to as your calcified plaque burden. It does not correspond directly to the percentage of narrowing in the artery, but does correlate with the severity of the overall coronary atherosclerotic burden. This score is then used to determine the calcium percentile which compares your calcified plaque burden to that of other asymptomatic men and women of the same age.   The calcium score, in combination with the percentile, enables your physician to determine your risk of developing symptomatic coronary artery disease, and to measure the progression of disease as well as the effectiveness of treatment. A score of zero indicates that there is no calcified plaque burden. This implies that there is no significant coronary artery narrowing and very low likelihood of a cardiac event over at least the next 3 years. It does not absolutely rule out the presence of soft, noncalcified plaque or totally eliminate the possibility of a cardiac event. A score greater than zero indicates at least some coronary artery disease. As the score increases, so does the likelihood of a significant coronary narrowing and the likelihood of a coronary event over the next 3 years. Similarly, the likelihood of a coronary event increases with increasing calcium percentiles. NM Cardiac Stress Test Nuclear Imaging    Result Date: 10/24/2022  Cardiac Perfusion Imaging  Demographics   Patient Name       Fide Tam   Date of Study      10/17/2022         Gender               Male   Patient Number     [id number]         Date of Birth        1965   Visit Number       [id number]          Age                  62 year(s)   Accession Number   [id number]         Room Number          0338   Corporate ID       [id number]           NM Technician        [place]    Nurse              Epifanio Mondragon,        [address] [place] [address], RN      Physician            Selam ALBA MD   Ordering Physician Daniel Milligan MD   The procedure was explained in detail to the patient. Risks,  complications and alternative treatments were reviewed. Written consent  was obtained. Procedure Procedure Type:   Nuclear Stress Test:Pharmacological, NM MYOCARDIAL SPECT REST EXERCISE OR  RX   Study location: [address] [place] [address] - Nuclear Medicine   Indications: Chest pain. Hospital Status: Inpatient.   Height: 72 inches Weight: 238 pounds  Risk Factors   The patient risk factors include:treated hypertension, diabetes mellitus and  chronic lung disease. Conclusions   Summary  Normal LVEF 60%  Normal wall motion  Moderate inferior defect noted, suggestive of ischemia, less likely  attenuation artifact  Summed stress scores may underestimate perfusion defects   Overall, this would be considered an abnormal, intermediate risk, study  Stress Protocols   Resting ECG  NSR  RBBB  borderline low voltage   Resting HR:88 bpm  Resting BP:117/88 mmHg  Stress Protocol:Pharmacologic - Lexiscan's  Peak HR:96 bpm                         HR/BP product:54740  Peak BP:147/82 mmHg  Predicted HR: 163 bpm  % of predicted HR: 59  Test duration: 4 min  Reason for termination:Completed   ECG Findings  No ischemic EKG changes. Arrhythmias  PVCs   Symptoms  Patient developed shortness of breath , likely related to lexiscan. Symptoms resolved with rest.   Complications  Procedure complication was none. Stress Interpretation  Normal LVEF 60%  Normal wall motion  Moderate inferior defect noted, suggestive of ischemia, less likely  attenuation artifact  Summed stress scores may underestimate perfusion defects  Procedure Medications   - Lexiscan 0.4 mg.  Imaging Protocols   - One Day   Rest                          Stress   Isotope:Myoview/Tetrofosmin   Isotope: Myoview/Tetrofosmin  Isotope dose:10.8 mCi         Isotope dose:34.1 mCi  Administration Route:I.V.      Administration Route:I.V.  Date:10/24/2022 08:35         Date:10/24/2022 09:55                                 Technique:      Gated  Imaging Results   Applied corrections   - Attenuation correction  applied     Stress ejection    Ejection fraction:60 %    EDV :83 ml    ESV :33 ml    Stroke volume :50 ml    LV mass :118 gr  Medical History  Signatures   ------------------------------------------------------------------  Electronically signed by Joanne Dowling MD (Interpreting  physician) on 10/24/2022 at 12:08  ------------------------------------------------------------------ Echocardiogram 8/6/2021:  Left ventricular systolic function is low normal with a visually estimated  ejection fraction of 50-55%. EF estimated by 3D at 52 %. The left ventricle is normal in size with normal wall thickness. Flattened in diastole and systole (\"D-shaped septum\") consistent with right  ventricular volume and pressure overload. Normal left ventricular diastolic function. The right ventricle is severely dilated. Right ventricular systolic function is reduced. The right atrium is severely enlarged. Mild eccentric tricuspid regurgitation. Systolic pulmonary artery pressure (SPAP) is elevated and estimated at 56  mmHg (right atrial pressure 15 mmHg) consistent with moderate pulmonary  hypertension. The IVC is dilated in size (>2.1 cm) and collapses <50% with respiration  consistent with markedly elevated right atrial pressures (15 mmHg) . PFT 9/2/2021:  FVC 3.29 L, 63% predicted, FEV1 1.33 L, 33% predicted with ratio 40%. Lung volumes showed air trapping, diffusion capacity was normal      Assessment and plan:  Severe COPD, possible acute exacerbation. The patient has expiratory wheezes, is tachypneic. PFT shows severe obstruction. Add steroid for now, continue with nebulized bronchodilator  Severe pulmonary hypertension. Less likely to be thromboembolic as CT PE was negative, although CT chest may not be as accurate as VQ scan in detecting CTEPH. Agree with opinion of Dr. Mamadou Borrego that this is probably a combination of diastolic dysfunction, severe COPD and untreated sleep apnea. We will need to address all 3 of these to control his pulmonary hypertension. Acute respiratory failure. Possibly hypoxemic, will obtain ABG in a.m. to assess for hypercarbia. We will have to assess for home O2, including testing saturation with ambulation at the time of discharge. The patient should not be left hypoxemic due to severe pulm hypertension  Right heart failure. Being diuresed  CHF.   Diastolic heart failure, being followed by cardiology  LALA. In order to facilitate treatment for LALA, I would agree with a home sleep study as the patient absolutely declines an in lab study. He is agreeable to home sleep study, this will be set up at the time of discharge  Essential hypertension. Per IM  DM2. Per IM  Alcohol dependence. Do not see evidence of portopulmonary hypertension although he did have ascites  Obesity. Address as an outpatient  DVT prophylaxis. On Lovenox    I have examined the patient, reviewed labs, imaging and medical records. My impressions and recommendations are as above. We will follow with you, thank you for the consultation.       Electronically signed by:  Vinh Rinladi MD    10/25/2022    6:29 PM.

## 2022-10-24 NOTE — PLAN OF CARE
Patient's EF (Ejection Fraction) is greater than 40%    Heart Failure Medications:  Diuretics[de-identified] None    (One of the following REQUIRED for EF </= 40%/SYSTOLIC FAILURE but MAY be used in EF% >40%/DIASTOLIC FAILURE)        ACE[de-identified] None        ARB[de-identified] Losartan         ARNI[de-identified] None    (Beta Blockers)  NON- Evidenced Based Beta Blocker (for EF% >40%/DIASTOLIC FAILURE): None    Evidenced Based Beta Blocker::(REQUIRED for EF% <40%/SYSTOLIC FAILURE) None  . .................................................................................................................................................. Patient's weights and intake/output reviewed: Yes    Patient's Last Weight: 236 lbs obtained by standing scale. Difference of 2 lbs less than last documented weight. Intake/Output Summary (Last 24 hours) at 10/24/2022 0918  Last data filed at 10/24/2022 0436  Gross per 24 hour   Intake 1370 ml   Output 2030 ml   Net -660 ml       Education Booklet Provided: yes    Comorbidities Reviewed Yes    Patient has a past medical history of Acute systolic congestive heart failure ([address] Diabetes mellitus ([address] Essential hypertension, and Thrombocytopenia ([facility] >>For CHF and Comorbidity documentation on Education Time and Topics, please see Education Tab    Progressive Mobility Assessment:  What is this patient's Current Level of Mobility?: Ambulatory-Up Ad Maria Dolores  How was this patient Mobilized today?: Edge of Bed and Up in Room, ambulated 50   ft                 With Whom? Self                 Level of Difficulty/Assistance: Independent     Pt resting in bed at this time on  2 L O2. Pt with complaints of shortness of breath. Pt with nonpitting lower extremity edema.      Patient and/or Family's stated Goal of Care this Admission: reduce shortness of breath, increase activity tolerance, better understand heart failure and disease management, be more comfortable, and reduce lower extremity edema prior to discharge        :Patient with diagnosis of Diabetes. Active orders for ACHS glucose monitoring, SSI and Lantus. Instructed importance of following carb control diet to maintain stable gluocose levels.

## 2022-10-24 NOTE — PROGRESS NOTES
[facility] notified writer that pt had 5 beats of Vtach. Pt alert and oriented with no complaints at this time.

## 2022-10-24 NOTE — PRE SEDATION
Brief Pre-Op Note/Sedation Assessment      Roverto Mak  1965  [id number]  2:59 PM    Planned Procedure: Cardiac Catheterization Procedure  Post Procedure Plan: Return to same level of care  Consent: I have discussed with the patient and/or the patient representative the indication, alternatives, and the possible risks and/or complications of the planned procedure and the anesthesia methods. The patient and/or patient representative appear to understand and agree to proceed. Chief Complaint:   Dyspnea      Indications for Cath Procedure:  Presentation:  Cardiomyopathy  2. Anginal Classification within 2 weeks:  No symptoms  3. Angina Symptoms Assessment:  Asymptomatic  4. Heart Failure Class within last 2 weeks:  Yes:  Heart Failure Type: Unknown Severity:  Class IV - Symptoms of HF at rest  5. Cardiovascular Instability:  No    Prior Ischemic Workup/Eval:  Pre-Procedural Medications: Yes: Aspirin and STATIN  2. Stress Test Completed? Yes:  Stress or Imaging Studies Performed (within ANY time period):   Type:  Stress Nuclear  Results:  Positive:  Myocardial Perfusion Defects (Nuclear) Extent of Ischemia:  Intermediate    Does Patient need surgery?   Cath Valve Surgery:  No    Pre-Procedure Medical History:  Vital Signs:  /88   Pulse 84   Temp 97.7 °F (36.5 °C) (Oral)   Resp 18   Ht 6' (1.829 m)   Wt 236 lb 12.8 oz (107.4 kg)   SpO2 94%   BMI 32.12 kg/m²     Allergies:  No Known Allergies  Medications:    Current Facility-Administered Medications   Medication Dose Route Frequency Provider Last Rate Last Admin    albuterol sulfate HFA (PROVENTIL;VENTOLIN;PROAIR) 108 (90 Base) MCG/ACT inhaler 2 puff  2 puff Inhalation Q6H PRN Carlo Leonardo MD        ipratropium-albuterol (DUONEB) nebulizer solution 1 ampule  1 ampule Inhalation Q4H WA Carlo Leonardo MD   1 ampule at 10/24/22 0804    perflutren lipid microspheres (DEFINITY) injection 1.65 mg  1.5 mL IntraVENous ONCE PRN CNP        glucagon (rDNA) injection 1 mg  1 mg SubCUTAneous PRN Minerva Galen, APRN - CNP        dextrose 10 % infusion   IntraVENous Continuous PRN Minerva Gaeln, APRN - CNP        furosemide (LASIX) injection 40 mg  40 mg IntraVENous BID Minerva Galen, APRN - CNP   40 mg at 10/24/22 1235       Past Medical History:    Past Medical History:   Diagnosis Date    Acute systolic congestive heart failure ([facility] [address]     Diabetes mellitus ([facility] [address]     Essential hypertension     Thrombocytopenia ([address] [address]        Surgical History:    Past Surgical History:   Procedure Laterality Date    CHOLECYSTECTOMY, LAPAROSCOPIC N/A 8/12/2022    ROBOTIC CHOLECYSTECTOMY WITH INTRAOPERATIVE CHOLANGIOGRAM performed by Karen Casiano MD at [address] [place] [address]:  Pre-Sedation Documentation and Exam:  I have assessed the patient and reviewed the H&P on the chart. Prior History of Anesthesia Complications:   none    Modified Mallampati:  II (soft palate, uvula, fauces visible)    ASA Classification:  Class 3 - A patient with severe systemic disease that limits activity but is not incapacitating    Rqoue Scale: Activity:  2 - Able to move 4 extremities voluntarily on command  Respiration:  1 - Dyspnic, shallow, or limited breathing  Circulation:  2 - BP+/- 20mmHg of normal  Consciousness:  2 - Fully awake  Oxygen Saturation (color):  1 - Needs oxygen to maintain oxygen saturation >90%    Sedation/Anesthesia Plan:  Guard the patient's safety and welfare. Minimize physical discomfort and pain. Minimize negative psychological responses to treatment by providing sedation and analgesia and maximize the potential amnesia. Patient to meet pre-procedure discharge plan.     Medication Planned:  midazolam intravenously and fentanyl intravenously    Patient is an appropriate candidate for plan of sedation:   yes      Electronically signed by Contreras Dueñas MD on 10/24/2022 at 2:59 PM

## 2022-10-24 NOTE — PROGRESS NOTES
Page out to on call cardiology for clarification on IVF orders for patient post cath since patient has active orders for IV lasix BID.

## 2022-10-24 NOTE — PLAN OF CARE
Problem: Pain  Goal: Verbalizes/displays adequate comfort level or baseline comfort level  Outcome: Progressing     Problem: Safety - Adult  Goal: Free from fall injury  Outcome: Progressing     Problem: Metabolic/Fluid and Electrolytes - Adult  Goal: Glucose maintained within prescribed range  Outcome: Progressing

## 2022-10-25 PROBLEM — Z87.891 FORMER SMOKER: Status: ACTIVE | Noted: 2022-10-25

## 2022-10-25 PROBLEM — J96.01 ACUTE RESPIRATORY FAILURE WITH HYPOXIA (HCC): Status: ACTIVE | Noted: 2022-10-25

## 2022-10-25 PROBLEM — R29.818 SUSPECTED SLEEP APNEA: Status: ACTIVE | Noted: 2022-10-25

## 2022-10-25 PROBLEM — I27.20 SEVERE PULMONARY HYPERTENSION (HCC): Status: ACTIVE | Noted: 2022-10-25

## 2022-10-25 LAB
ANION GAP SERPL CALCULATED.3IONS-SCNC: 9 MMOL/L (ref 3–16)
BUN BLDV-MCNC: 16 MG/DL (ref 7–20)
CALCIUM SERPL-MCNC: 8.9 MG/DL (ref 8.3–10.6)
CHLORIDE BLD-SCNC: 98 MMOL/L (ref 99–110)
CO2: 31 MMOL/L (ref 21–32)
CREAT SERPL-MCNC: 0.8 MG/DL (ref 0.9–1.3)
GFR SERPL CREATININE-BSD FRML MDRD: >60 ML/MIN/{1.73_M2}
GLUCOSE BLD-MCNC: 100 MG/DL (ref 70–99)
GLUCOSE BLD-MCNC: 101 MG/DL (ref 70–99)
GLUCOSE BLD-MCNC: 119 MG/DL (ref 70–99)
GLUCOSE BLD-MCNC: 192 MG/DL (ref 70–99)
GLUCOSE BLD-MCNC: 194 MG/DL (ref 70–99)
MAGNESIUM: 2 MG/DL (ref 1.8–2.4)
PERFORMED ON: ABNORMAL
POTASSIUM SERPL-SCNC: 4.6 MMOL/L (ref 3.5–5.1)
SODIUM BLD-SCNC: 138 MMOL/L (ref 136–145)

## 2022-10-25 PROCEDURE — 83735 ASSAY OF MAGNESIUM: CPT

## 2022-10-25 PROCEDURE — 80048 BASIC METABOLIC PNL TOTAL CA: CPT

## 2022-10-25 PROCEDURE — 6360000002 HC RX W HCPCS: Performed by: INTERNAL MEDICINE

## 2022-10-25 PROCEDURE — 94669 MECHANICAL CHEST WALL OSCILL: CPT

## 2022-10-25 PROCEDURE — 2580000003 HC RX 258: Performed by: INTERNAL MEDICINE

## 2022-10-25 PROCEDURE — 6370000000 HC RX 637 (ALT 250 FOR IP): Performed by: NURSE PRACTITIONER

## 2022-10-25 PROCEDURE — 36415 COLL VENOUS BLD VENIPUNCTURE: CPT

## 2022-10-25 PROCEDURE — 2700000000 HC OXYGEN THERAPY PER DAY

## 2022-10-25 PROCEDURE — 94640 AIRWAY INHALATION TREATMENT: CPT

## 2022-10-25 PROCEDURE — 99223 1ST HOSP IP/OBS HIGH 75: CPT | Performed by: INTERNAL MEDICINE

## 2022-10-25 PROCEDURE — 99233 SBSQ HOSP IP/OBS HIGH 50: CPT | Performed by: NURSE PRACTITIONER

## 2022-10-25 PROCEDURE — 6360000002 HC RX W HCPCS: Performed by: NURSE PRACTITIONER

## 2022-10-25 PROCEDURE — 6370000000 HC RX 637 (ALT 250 FOR IP): Performed by: INTERNAL MEDICINE

## 2022-10-25 PROCEDURE — 94761 N-INVAS EAR/PLS OXIMETRY MLT: CPT

## 2022-10-25 PROCEDURE — 1200000000 HC SEMI PRIVATE

## 2022-10-25 RX ORDER — METHYLPREDNISOLONE SODIUM SUCCINATE 40 MG/ML
40 INJECTION, POWDER, LYOPHILIZED, FOR SOLUTION INTRAMUSCULAR; INTRAVENOUS EVERY 8 HOURS
Status: DISCONTINUED | OUTPATIENT
Start: 2022-10-25 | End: 2022-10-28

## 2022-10-25 RX ORDER — LOSARTAN POTASSIUM 25 MG/1
25 TABLET ORAL DAILY
Status: DISCONTINUED | OUTPATIENT
Start: 2022-10-26 | End: 2022-11-03 | Stop reason: HOSPADM

## 2022-10-25 RX ORDER — FUROSEMIDE 10 MG/ML
80 INJECTION INTRAMUSCULAR; INTRAVENOUS 3 TIMES DAILY
Status: DISCONTINUED | OUTPATIENT
Start: 2022-10-25 | End: 2022-11-01

## 2022-10-25 RX ORDER — SPIRONOLACTONE 25 MG/1
12.5 TABLET ORAL DAILY
Status: DISCONTINUED | OUTPATIENT
Start: 2022-10-25 | End: 2022-10-27

## 2022-10-25 RX ADMIN — METHYLPREDNISOLONE SODIUM SUCCINATE 40 MG: 40 INJECTION, POWDER, FOR SOLUTION INTRAMUSCULAR; INTRAVENOUS at 14:01

## 2022-10-25 RX ADMIN — FUROSEMIDE 80 MG: 10 INJECTION, SOLUTION INTRAMUSCULAR; INTRAVENOUS at 21:37

## 2022-10-25 RX ADMIN — FUROSEMIDE 40 MG: 10 INJECTION, SOLUTION INTRAMUSCULAR; INTRAVENOUS at 09:22

## 2022-10-25 RX ADMIN — ASPIRIN 81 MG: 81 TABLET, COATED ORAL at 09:22

## 2022-10-25 RX ADMIN — Medication 10 ML: at 09:24

## 2022-10-25 RX ADMIN — IPRATROPIUM BROMIDE AND ALBUTEROL SULFATE 1 AMPULE: 2.5; .5 SOLUTION RESPIRATORY (INHALATION) at 15:10

## 2022-10-25 RX ADMIN — IPRATROPIUM BROMIDE AND ALBUTEROL SULFATE 1 AMPULE: 2.5; .5 SOLUTION RESPIRATORY (INHALATION) at 19:51

## 2022-10-25 RX ADMIN — IPRATROPIUM BROMIDE AND ALBUTEROL SULFATE 1 AMPULE: 2.5; .5 SOLUTION RESPIRATORY (INHALATION) at 11:46

## 2022-10-25 RX ADMIN — IPRATROPIUM BROMIDE AND ALBUTEROL SULFATE 1 AMPULE: 2.5; .5 SOLUTION RESPIRATORY (INHALATION) at 08:33

## 2022-10-25 RX ADMIN — Medication 10 ML: at 21:43

## 2022-10-25 RX ADMIN — FUROSEMIDE 80 MG: 10 INJECTION, SOLUTION INTRAMUSCULAR; INTRAVENOUS at 14:00

## 2022-10-25 RX ADMIN — ATORVASTATIN CALCIUM 20 MG: 10 TABLET, FILM COATED ORAL at 21:37

## 2022-10-25 RX ADMIN — ENOXAPARIN SODIUM 40 MG: 100 INJECTION SUBCUTANEOUS at 09:22

## 2022-10-25 RX ADMIN — LOSARTAN POTASSIUM 50 MG: 25 TABLET, FILM COATED ORAL at 09:22

## 2022-10-25 RX ADMIN — METHYLPREDNISOLONE SODIUM SUCCINATE 40 MG: 40 INJECTION, POWDER, FOR SOLUTION INTRAMUSCULAR; INTRAVENOUS at 21:37

## 2022-10-25 RX ADMIN — SPIRONOLACTONE 12.5 MG: 25 TABLET, FILM COATED ORAL at 14:01

## 2022-10-25 NOTE — PLAN OF CARE
Problem: Metabolic/Fluid and Electrolytes - Adult  Goal: Glucose maintained within prescribed range  Outcome: Progressing  Note: Pt will have accuchecks before meals and at bedtime with sliding scale insulin in place for coverage. Will continue to monitor for signs and symptoms of hypoglycemia and hyperglycemia throughout shift.

## 2022-10-25 NOTE — PROGRESS NOTES
[facility]   Daily Progress Note    Admit Date:  10/21/2022  HPI:    Chief Complaint   Patient presents with    Edema     Pt reports BLE edema and ascites x \"few weeks\". Pt reports COPD and CHF, on lasix 80mg BID. Pt reports that he called his cardiologist which told him to come to ED>         Interval history: Ольга Brunson is being followed for Edema and CHF. Hx of HFpEF, HTN, DM, severe COPD. Echo on admission showed RV failure and LVEF 40%, volume overload. He had abnormal stress testing which prompted LHC on 10/24/22 showed mild non-obstructive CAD. Weight at 8/2022 appt was 217lb  Weight prior to admission 238lbs    Subjective:  Mr. Daysi Cortez feels stable, but remains on oxygen SPO2 89% on 2l. Some coughing. Still feels fluid overloaded, with edema up to the abd. No weeping or sores. Objective:   /88   Pulse 100   Temp 98.4 °F (36.9 °C) (Oral)   Resp 20   Ht 6' (1.829 m)   Wt 235 lb 12.8 oz (107 kg)   SpO2 90%   BMI 31.98 kg/m²     Intake/Output Summary (Last 24 hours) at 10/25/2022 1210  Last data filed at 10/25/2022 1034  Gross per 24 hour   Intake 600 ml   Output 1775 ml   Net -1175 ml       NYHA: IV    Physical Exam:  General:  Awake, alert, NAD, on NC  Skin:  Warm and dry  Neck:  JVD to the earlobe   Chest:  wheezing and rhonchi to auscultation throughout   Telemetry: NSR   Cardiovascular:  RRR S1S2, no m/r/g distant heart sounds  Abdomen:  hard and tight skin noted. Extremities:  +++ bilateral lower extremity edema up to the thighs- skin is very tight.      Medications:    sodium chloride flush  5-40 mL IntraVENous 2 times per day    ipratropium-albuterol  1 ampule Inhalation Q4H WA    aspirin  81 mg Oral Daily    atorvastatin  20 mg Oral Nightly    losartan  50 mg Oral Daily    sodium chloride flush  5-40 mL IntraVENous 2 times per day    enoxaparin  40 mg SubCUTAneous Daily    insulin lispro  0-8 Units SubCUTAneous TID WC    insulin lispro  0-4 Units SubCUTAneous Nightly    furosemide  40 mg IntraVENous BID      sodium chloride      sodium chloride      dextrose         Lab Data:  CBC:   Recent Labs     10/24/22  1308   WBC 8.8   HGB 14.5        BMP:    Recent Labs     10/23/22  0702 10/24/22  0759 10/25/22  0808   * 139 138   K 4.4 4.5 4.6   CO2 29 32 31   BUN 29* 20 16   CREATININE 1.0 0.9 0.8*     INR:    Recent Labs     10/24/22  1308   INR 1.31*     BNP:    Recent Labs     10/24/22  0759   PROBNP 991*     Lab Results   Component Value Date    LVEF 53 08/06/2021       Testing:    Echo: 8/5/2021  Summary   Left ventricular systolic function is low normal with a visually estimated   ejection fraction of 50-55%. EF estimated by 3D at 52 %. The left ventricle is normal in size with normal wall thickness. Flattened in diastole and systole (\"D-shaped septum\") consistent with right   ventricular volume and pressure overload. Normal left ventricular diastolic function. The right ventricle is severely dilated. Right ventricular systolic function is reduced. The right atrium is severely enlarged. Mild eccentric tricuspid regurgitation. Systolic pulmonary artery pressure (SPAP) is elevated and estimated at 56   mmHg (right atrial pressure 15 mmHg) consistent with moderate pulmonary   hypertension. The IVC is dilated in size (>2.1 cm) and collapses <50% with respiration   consistent with markedly elevated right atrial pressures (15 mmHg) . Echo 7/29/22    Summary   Limited echo for left ventricular function and pulmonary pressures      Left ventricular cavity size is normal.   There is mild concentric left ventricular hypertrophy. Left ventricular function appears to be normal with an estimated ejection   fraction of 55%. There is systolic and diastolic septal flattening consistent with right   ventricular pressure and volume overload. The right ventricle is enlarged and hypokinetic. Severe tricuspid regurgitation.    The right atrium is severely dilated. Estimated pulmonary artery systolic pressure is at 74 mmHg assuming a right   atrial pressure of 15 mmHg. There is a small pericardial effusion noted without any hemodynamic   implications. Cardiac calcium CT 10/24/22  TECHNIQUE:   CT of the heart was obtained without intravenous contrast.  Calcium scoring   analysis was performed using a separate  workstation. Dose modulation,   iterative reconstruction, and/or weight based adjustment of the mA/kV was   utilized to reduce the radiation dose to as low as reasonably achievable. FINDINGS:   LEFT MAIN: 23       LEFT ANTERIOR DESCENDING: 10       CIRCUMFLEX: 7       RIGHT CORONARY ARTERY: Zero (0). TOTAL AGATSTON CALCIUM SCORE: 36       EXTRACARDIAC STRUCTURES: Trace aortic valve and aortic annulus calcification   is seen. No aortic aneurysm. Ascending aorta measures 3.5 cm. Small   pericardial effusion is seen. No pericardial calcification. Small hiatal   hernia is seen. There is nonspecific thickening at the GE junction. Small mediastinal nodes are noted, likely reactive. A few calcified   mediastinal nodes are also seen. Respiratory motion artifact limits evaluation of pulmonary parenchymal   change. Scattered areas of bronchial wall thickening are seen. Small   right-sided pleural effusion is seen. There is adjacent consolidation at the   right lung base. .  This pleural effusion is slightly increased in size   compared to chest CT 08/05/2021       There is body wall anasarca. There is mild ascites seen in the upper   abdomen, partially imaged. Spurring is seen in the spine           Echo 10/24/22   Summary   Limited echo for LV/RV function & PHTN with limited doppler/no color. Global left ventricular systolic function is moderately decreased with   ejection fraction estimated at 40%. Normal left ventricle size and wall thickness.    There is diastolic septal flattening (\"D-shaped\" septum) consistent with   right ventricular volume overload. Diastolic dysfunction grade and filling pressure are indeterminate. The right ventricle is severely enlarged in size with reduced function. The right atrium is moderately dilated. The tricuspid valve is normal in structure with tenting of the valve   leaflets. Moderate-severe tricuspid valve regurgitation. Systolic pulmonary artery pressure (SPAP) estimated at 70 mmHg (RA pressure   15 mmHg), consistent with severe pulmonary hypertension. Stress test 10/24/22   Summary    Normal LVEF 60%    Normal wall motion    Moderate inferior defect noted, suggestive of ischemia, less likely    attenuation artifact    Summed stress scores may underestimate perfusion defects        Overall, this would be considered an abnormal, intermediate risk, study       Procedures Performed: 10/24/22  1. Left heart catheterization  2. Selective left and right coronary angiogram  3. Left ventriculography   5. Right heart catheterization     Procedure Findings:  1. Mild non-obstructive coronary artery disease. 2. Normal left ventricular function with EF estimated at 55-60%  3. Normal left heart hemodynamics  4. Severe pulmonary hypertension    Findings:     1. Left main coronary artery was normal. It gave off the left anterior descending artery and left circumflex. 2. Left anterior descending artery has mild atherosclerotic disease. It was moderate in size. It gave off septal perforators and a moderate sized diagonal branch. The LAD covered the entire apex of the left ventricle. 3. Left circumflex has mild atherosclerotic disease. It was moderate in size. There was a moderate sized obtuse marginal branch. 4. Right coronary artery has mild atherosclerotic disease. It was moderate in size and was the dominant artery. 5. Left ventriculogram showed normal LVEF at 55-60%.  Wall motion was normal . There was no significant mitral valve or aortic valve disease noted. LVEDP was normal. There was no gradient noted across the aortic valve during pullback of the catheter. 6.  Right heart catheterization was performed. Right atrial pressure of 25  RV pressure 70/13  PA pressure of 78/37 with a mean of 50  Pulmonary artery wedge pressure mean of 12  Cardiac output of 5.32  Cardiac index 2.33  Aortic saturation 88%  PA saturation 59%  SVR of 917        10/25/22 0440 235 lb 12.8 oz (107 kg) Actual;Standing scale     10/24/22 0613 236 lb 12.8 oz (107.4 kg) Standing scale     10/23/22 0609 238 lb 9.6 oz (108.2 kg) Standing scale     10/22/22 0556 238 lb 9.6 oz (108.2 kg) Standing scale     10/21/22 1419 232 lb (105.2 kg) Stated       Principal Problem:    Pitting edema  Active Problems:    DM (diabetes mellitus) ([facility])    Essential hypertension    CHF (congestive heart failure) ([facility]  Resolved Problems:    * No resolved hospital problems. *      Assessment:  Acute on chronic HFpEF with Right heart failure (cor pulmonale)   - volume overloaded   -baseline weight 217lbs   - admission weight 238lbs  Severe pulmonary HTN, who group 3  Severe TR  HTN  Very severe COPD  Acute respiratory failure due to pleural effusion, CHF and COPD  DM   HLD      Impression/Plan:  Attempted to get RHC waveforms from cath lab- system not able to pull them up for review. Daily weights, strict I/O's  Grossly appears volume overloaded on exam,  with tight abd/edema into the thighs (anasarca noted on his CT scan), ongoing oxygen requirement, right pleural effusion ; needs more diuresis; adjust lasix to 80mg IV TID and add Spironolactone (aldactone) 12.5mg daily  Will consider adding SGLT2 pending renal function  Reduce losartan to 25mg to allow for b/p room to increase diuretics; will plan to change to entresto at discharge    Education provided today Disease process and medications discussed. Questions answered fully. Emphasized salt restriction.   Encouraged daily monitoring of the patient's weight.   Total Time spent educating today 15 minutes     SANCHEZ Goldsmith CNP,  10/25/2022, 1:29 PM

## 2022-10-25 NOTE — PLAN OF CARE
Patient's EF (Ejection Fraction) is greater than 40%    Heart Failure Medications:  Diuretics[de-identified] Furosemide    (One of the following REQUIRED for EF </= 40%/SYSTOLIC FAILURE but MAY be used in EF% >40%/DIASTOLIC FAILURE)        ACE[de-identified] None        ARB[de-identified] Losartan         ARNI[de-identified] None    (Beta Blockers)  NON- Evidenced Based Beta Blocker (for EF% >40%/DIASTOLIC FAILURE): None    Evidenced Based Beta Blocker::(REQUIRED for EF% <40%/SYSTOLIC FAILURE) None  . .................................................................................................................................................. Patient's weights and intake/output reviewed: Yes    Patient's Last Weight: 236 lbs obtained by standing scale. Difference of 2 lbs less than last documented weight. Intake/Output Summary (Last 24 hours) at 10/24/2022 2047  Last data filed at 10/24/2022 2018  Gross per 24 hour   Intake 240 ml   Output 1305 ml   Net -1065 ml       Education Booklet Provided: yes    Comorbidities Reviewed Yes    Patient has a past medical history of Acute systolic congestive heart failure ([address] Diabetes mellitus ([address] Essential hypertension, and Thrombocytopenia ([facility] >>For CHF and Comorbidity documentation on Education Time and Topics, please see Education Tab    Progressive Mobility Assessment:  What is this patient's Current Level of Mobility?: Ambulatory-Up Ad Maria Dolores  How was this patient Mobilized today?: Edge of Bed, Up to Chair, Bedside Commode,  Up to Toilet/Shower, Up in Room, and Up in Hallway, ambulated 10 ft                 With Whom? Nurse and Self                 Level of Difficulty/Assistance: Independent     Pt resting in bed at this time on  2 L O2. Pt with complaints of shortness of breath. Pt with pitting lower extremity edema.      Patient and/or Family's stated Goal of Care this Admission: reduce shortness of breath, increase activity tolerance, better understand heart failure and disease management, be more comfortable, and reduce lower extremity edema prior to discharge        :

## 2022-10-25 NOTE — CARE COORDINATION
Spoke with patient at bedside. He lives alone in an apartment. He is independent at home, works full time and is an active . He denies any DME or services at home. Will continue to follow should any needs arise.

## 2022-10-25 NOTE — PLAN OF CARE
Patient's EF (Ejection Fraction) is greater than 40%    Heart Failure Medications:  Diuretics[de-identified] Furosemide    (One of the following REQUIRED for EF </= 40%/SYSTOLIC FAILURE but MAY be used in EF% >40%/DIASTOLIC FAILURE)        ACE[de-identified] None        ARB[de-identified] Losartan         ARNI[de-identified] None    (Beta Blockers)  NON- Evidenced Based Beta Blocker (for EF% >40%/DIASTOLIC FAILURE): None    Evidenced Based Beta Blocker::(REQUIRED for EF% <40%/SYSTOLIC FAILURE) None  . .................................................................................................................................................. Patient's weights and intake/output reviewed: Yes    Patient's Last Weight: 235 lbs obtained by standing scale. Difference of 1 lbs less than last documented weight. Intake/Output Summary (Last 24 hours) at 10/25/2022 1035  Last data filed at 10/25/2022 1034  Gross per 24 hour   Intake 600 ml   Output 1775 ml   Net -1175 ml       Education Booklet Provided: yes    Comorbidities Reviewed Yes    Patient has a past medical history of Acute systolic congestive heart failure ([address] Diabetes mellitus ([address] Essential hypertension, and Thrombocytopenia ([facility] >>For CHF and Comorbidity documentation on Education Time and Topics, please see Education Tab    Progressive Mobility Assessment:  What is this patient's Current Level of Mobility?: Ambulatory-Up Ad Maria Dolores  How was this patient Mobilized today?: Edge of Bed,  Up to Toilet/Shower, and Up in Room, ambulated 50 ft                 With Whom? Self                 Level of Difficulty/Assistance: 1x Assist     Pt resting in bed at this time on  2 L O2. Pt denies shortness of breath. Pt with pitting lower extremity edema.      Patient and/or Family's stated Goal of Care this Admission: increase activity tolerance, better understand heart failure and disease management, be more comfortable, and reduce lower extremity edema prior to discharge        :

## 2022-10-25 NOTE — PROGRESS NOTES
Hospitalist Progress Note      PCP: Deri Litten, DO    Date of Admission: 10/21/2022    Chief Complaint: Pitting edema and urinary retention     Hospital Course: reviewed      Subjective:  remains volume overloaded sincere in abd and legs, no sob currently, on 2L(on room air at home)       Medications:  Reviewed    Infusion Medications    sodium chloride      sodium chloride      dextrose       Scheduled Medications    sodium chloride flush  5-40 mL IntraVENous 2 times per day    ipratropium-albuterol  1 ampule Inhalation Q4H WA    aspirin  81 mg Oral Daily    atorvastatin  20 mg Oral Nightly    losartan  50 mg Oral Daily    sodium chloride flush  5-40 mL IntraVENous 2 times per day    enoxaparin  40 mg SubCUTAneous Daily    insulin lispro  0-8 Units SubCUTAneous TID WC    insulin lispro  0-4 Units SubCUTAneous Nightly    furosemide  40 mg IntraVENous BID     PRN Meds: sodium chloride flush, sodium chloride, hydrALAZINE, albuterol sulfate HFA **AND** [DISCONTINUED] ipratropium, perflutren lipid microspheres, lidocaine, albuterol sulfate HFA, sodium chloride flush, sodium chloride, ondansetron **OR** ondansetron, polyethylene glycol, acetaminophen **OR** acetaminophen, glucose, dextrose bolus **OR** dextrose bolus, glucagon (rDNA), dextrose      Intake/Output Summary (Last 24 hours) at 10/25/2022 1034  Last data filed at 10/25/2022 0956  Gross per 24 hour   Intake 240 ml   Output 1775 ml   Net -1535 ml       Physical Exam Performed:    /88   Pulse 100   Temp 98.4 °F (36.9 °C) (Oral)   Resp 20   Ht 6' (1.829 m)   Wt 235 lb 12.8 oz (107 kg)   SpO2 90%   BMI 31.98 kg/m²     General appearance: No apparent distress, appears stated age and cooperative. HEENT: Pupils equal, round, and reactive to light. Conjunctivae/corneas clear. Neck: Supple, with full range of motion. No jugular venous distention. Trachea midline. Respiratory:  Normal respiratory effort.  Clear to auscultation, bilaterally without Rales/Wheezes/Rhonchi. Cardiovascular: Regular rate and rhythm with normal S1/S2 without murmurs, rubs or gallops. Abdomen: Soft, non-tender, non-distended with normal bowel sounds. Musculoskeletal: No clubbing, cyanosis or edema bilaterally. Full range of motion without deformity. Skin: Skin color, texture, turgor normal.  No rashes or lesions. Neurologic:  Neurovascularly intact without any focal sensory/motor deficits. Cranial nerves: II-XII intact, grossly non-focal.  Psychiatric: Alert and oriented, thought content appropriate, normal insight  Capillary Refill: Brisk, 3 seconds, normal   Peripheral Pulses: +2 palpable, equal bilaterally       Labs:   Recent Labs     10/24/22  1308   WBC 8.8   HGB 14.5   HCT 45.1        Recent Labs     10/23/22  0702 10/24/22  0759 10/25/22  0808   * 139 138   K 4.4 4.5 4.6   CL 97* 100 98*   CO2 29 32 31   BUN 29* 20 16   CREATININE 1.0 0.9 0.8*   CALCIUM 9.1 9.4 8.9     No results for input(s): AST, ALT, BILIDIR, BILITOT, ALKPHOS in the last 72 hours. Recent Labs     10/24/22  1308   INR 1.31*     No results for input(s): Cleatrice Maynard in the last 72 hours. Urinalysis:      Lab Results   Component Value Date/Time    NITRU Negative 07/17/2022 06:27 PM    BLOODU Negative 07/17/2022 06:27 PM    SPECGRAV 1.010 07/17/2022 06:27 PM    GLUCOSEU Negative 07/17/2022 06:27 PM       Radiology:  NM Cardiac Stress Test Nuclear Imaging   Final Result      CT CARDIAC CALCIUM SCORING   Final Result   Total Agatston calcium score of 40. This is in the 40th percentile      Findings of fluid overload denoted by small right-sided pleural effusion,   body wall anasarca, and mild abdominopelvic ascites. Calcium Score Interpretation      0  No identifiable atherosclerotic plaque. Very low cardiovascular disease   risk. Less than 5% chance of presence of coronary artery disease. A   negative examination. 1-10 Minimal plaque burden.   Significant coronary artery disease very   unlikely.  Mild plaque burden. Likely mild or minimal coronary stenosis. 101-400 Moderate plaque burden. Moderate non-obstructive coronary artery   disease highly likely. Over 400 Extensive plaque burden. High likelihood of at least one   significant coronary artery stenosis (>50% diameter). CALCIUM SCORING OVERVIEW:      Coronary calcium is a marker for plaque in a blood vessel or atherosclerosis   (hardening of the arteries). The presence and amount of calcium detected in   the coronary artery by the CT scan estimates the presence and amount of   atherosclerotic plaque. These calcium deposits can appear years before the   development of heart disease symptoms such as chest pain and shortness of   breath. A calcium score is computed for each of the coronary arteries based upon the   volume and density of the calcium deposits. This can be referred to as your   calcified plaque burden. It does not correspond directly to the percentage   of narrowing in the artery, but does correlate with the severity of the   overall coronary atherosclerotic burden. This score is then used to determine the calcium percentile which compares   your calcified plaque burden to that of other asymptomatic men and women of   the same age. The calcium score, in combination with the percentile, enables   your physician to determine your risk of developing symptomatic coronary   artery disease, and to measure the progression of disease as well as the   effectiveness of treatment. A score of zero indicates that there is no calcified plaque burden. This   implies that there is no significant coronary artery narrowing and very low   likelihood of a cardiac event over at least the next 3 years. It does not   absolutely rule out the presence of soft, noncalcified plaque or totally   eliminate the possibility of a cardiac event.       A score greater than zero indicates at least some coronary artery disease. As the score increases, so does the likelihood of a significant coronary   narrowing and the likelihood of a coronary event over the next 3 years. Similarly, the likelihood of a coronary event increases with increasing   calcium percentiles. XR CHEST (2 VW)   Final Result   No radiographic evidence of acute pulmonary disease. Assessment/Plan:    Active Hospital Problems    Diagnosis     DM (diabetes mellitus) ([facility] [address] [E11.9]      Priority: Medium    Pitting edema [R60.9]      Priority: Medium    CHF (congestive heart failure) ([facility]) [I50.9]     Essential hypertension [I10]      Heart failure with bilateral lower extremity edema and urinary retention in setting of known pulmonary hypertension PASP 74 mmHg and EF 55% by echo (July 2022). Continue IV diuresis per cardiology, goal weight appears to be around 185 pounds. Ischemia evaluation with Lexiscan nuclear stress test and CT calcium score were abn  -s/p right and left heart catheterization 10/24, noted severe pulm htn, mild nonobstructive CAD, preserved EF  -echo done     Acute respiratory failure requiring supplemental oxygen. Known history of COPD but not on home oxygen. Uses inhalers at home and getting relief from Duonebs. Echocardiogram in 2021 as well as 2022 which showed normal left ventricular function however there was right-sided enlargement and significant pulmonary hypertension noted. -pulm consulted, apprec recs,     Type 2 Diabetes (HgbA1c 7.0% on 8/12/22). Metformin held on admission. Cover with a \"sliding scale\" lispro moderate scale prandial correction insulin. Primary hypertension on losartan 50 mg daily. Dyslipidemia on atorvastatin 20 mg nightly. Class I obesity (BMI 32.4). DVT Prophylaxis: lovenox  Diet: ADULT DIET;  Regular  Code Status: Full Code    PT/OT Eval Status: not ordered     Dispo - pending workup, cards recs on diuresis    Appropriate for A1 Discharge Unit:       Geoff Quinn MD

## 2022-10-26 PROBLEM — J44.1 COPD EXACERBATION (HCC): Status: ACTIVE | Noted: 2022-10-26

## 2022-10-26 PROBLEM — E66.9 CLASS 1 OBESITY IN ADULT: Status: ACTIVE | Noted: 2022-10-26

## 2022-10-26 PROBLEM — I27.81 COR PULMONALE (HCC): Status: ACTIVE | Noted: 2022-10-26

## 2022-10-26 PROBLEM — E66.811 CLASS 1 OBESITY IN ADULT: Status: ACTIVE | Noted: 2022-10-26

## 2022-10-26 PROBLEM — I51.7 ENLARGED RV (RIGHT VENTRICLE): Status: ACTIVE | Noted: 2022-10-26

## 2022-10-26 PROBLEM — I07.1 TRICUSPID REGURGITATION: Status: ACTIVE | Noted: 2022-10-26

## 2022-10-26 PROBLEM — E11.65 UNCONTROLLED TYPE 2 DIABETES MELLITUS WITH HYPERGLYCEMIA (HCC): Status: ACTIVE | Noted: 2022-10-26

## 2022-10-26 PROBLEM — R09.89 CHEST CONGESTION: Status: ACTIVE | Noted: 2022-10-26

## 2022-10-26 LAB
ANION GAP SERPL CALCULATED.3IONS-SCNC: 9 MMOL/L (ref 3–16)
BASE EXCESS ARTERIAL: 2.3 MMOL/L (ref -3–3)
BUN BLDV-MCNC: 20 MG/DL (ref 7–20)
CALCIUM SERPL-MCNC: 9.8 MG/DL (ref 8.3–10.6)
CARBOXYHEMOGLOBIN ARTERIAL: 0.8 % (ref 0–1.5)
CHLORIDE BLD-SCNC: 95 MMOL/L (ref 99–110)
CO2: 33 MMOL/L (ref 21–32)
CREAT SERPL-MCNC: 0.8 MG/DL (ref 0.9–1.3)
GFR SERPL CREATININE-BSD FRML MDRD: >60 ML/MIN/{1.73_M2}
GLUCOSE BLD-MCNC: 165 MG/DL (ref 70–99)
GLUCOSE BLD-MCNC: 167 MG/DL (ref 70–99)
GLUCOSE BLD-MCNC: 176 MG/DL (ref 70–99)
GLUCOSE BLD-MCNC: 179 MG/DL (ref 70–99)
GLUCOSE BLD-MCNC: 224 MG/DL (ref 70–99)
HCO3 ARTERIAL: 26.7 MMOL/L (ref 21–29)
HEMOGLOBIN, ART, EXTENDED: 15.8 G/DL (ref 13.5–17.5)
MAGNESIUM: 2.1 MG/DL (ref 1.8–2.4)
METHEMOGLOBIN ARTERIAL: 0.6 %
O2 SAT, ARTERIAL: 90.1 %
O2 THERAPY: ABNORMAL
PCO2 ARTERIAL: 40.6 MMHG (ref 35–45)
PERFORMED ON: ABNORMAL
PH ARTERIAL: 7.44 (ref 7.35–7.45)
PO2 ARTERIAL: 57.1 MMHG (ref 75–108)
POTASSIUM SERPL-SCNC: 4.9 MMOL/L (ref 3.5–5.1)
SODIUM BLD-SCNC: 137 MMOL/L (ref 136–145)
TCO2 ARTERIAL: 28 MMOL/L

## 2022-10-26 PROCEDURE — 2580000003 HC RX 258: Performed by: INTERNAL MEDICINE

## 2022-10-26 PROCEDURE — 1200000000 HC SEMI PRIVATE

## 2022-10-26 PROCEDURE — 6360000002 HC RX W HCPCS: Performed by: NURSE PRACTITIONER

## 2022-10-26 PROCEDURE — 36415 COLL VENOUS BLD VENIPUNCTURE: CPT

## 2022-10-26 PROCEDURE — 94669 MECHANICAL CHEST WALL OSCILL: CPT

## 2022-10-26 PROCEDURE — 6370000000 HC RX 637 (ALT 250 FOR IP): Performed by: NURSE PRACTITIONER

## 2022-10-26 PROCEDURE — 2700000000 HC OXYGEN THERAPY PER DAY

## 2022-10-26 PROCEDURE — 6370000000 HC RX 637 (ALT 250 FOR IP): Performed by: INTERNAL MEDICINE

## 2022-10-26 PROCEDURE — 99233 SBSQ HOSP IP/OBS HIGH 50: CPT | Performed by: NURSE PRACTITIONER

## 2022-10-26 PROCEDURE — 99233 SBSQ HOSP IP/OBS HIGH 50: CPT | Performed by: INTERNAL MEDICINE

## 2022-10-26 PROCEDURE — 83735 ASSAY OF MAGNESIUM: CPT

## 2022-10-26 PROCEDURE — 94761 N-INVAS EAR/PLS OXIMETRY MLT: CPT

## 2022-10-26 PROCEDURE — 94640 AIRWAY INHALATION TREATMENT: CPT

## 2022-10-26 PROCEDURE — 6360000002 HC RX W HCPCS: Performed by: INTERNAL MEDICINE

## 2022-10-26 PROCEDURE — 82803 BLOOD GASES ANY COMBINATION: CPT

## 2022-10-26 PROCEDURE — 80048 BASIC METABOLIC PNL TOTAL CA: CPT

## 2022-10-26 PROCEDURE — 36600 WITHDRAWAL OF ARTERIAL BLOOD: CPT

## 2022-10-26 RX ORDER — DOXYCYCLINE HYCLATE 100 MG
100 TABLET ORAL EVERY 12 HOURS SCHEDULED
Status: COMPLETED | OUTPATIENT
Start: 2022-10-26 | End: 2022-10-30

## 2022-10-26 RX ORDER — GUAIFENESIN 600 MG/1
600 TABLET, EXTENDED RELEASE ORAL 2 TIMES DAILY
Status: DISCONTINUED | OUTPATIENT
Start: 2022-10-26 | End: 2022-11-03 | Stop reason: HOSPADM

## 2022-10-26 RX ADMIN — Medication 10 ML: at 09:24

## 2022-10-26 RX ADMIN — FUROSEMIDE 80 MG: 10 INJECTION, SOLUTION INTRAMUSCULAR; INTRAVENOUS at 20:56

## 2022-10-26 RX ADMIN — GUAIFENESIN 600 MG: 600 TABLET, EXTENDED RELEASE ORAL at 09:24

## 2022-10-26 RX ADMIN — METHYLPREDNISOLONE SODIUM SUCCINATE 40 MG: 40 INJECTION, POWDER, FOR SOLUTION INTRAMUSCULAR; INTRAVENOUS at 12:35

## 2022-10-26 RX ADMIN — SODIUM CHLORIDE, PRESERVATIVE FREE 10 ML: 5 INJECTION INTRAVENOUS at 20:57

## 2022-10-26 RX ADMIN — INSULIN LISPRO 2 UNITS: 100 INJECTION, SOLUTION INTRAVENOUS; SUBCUTANEOUS at 12:35

## 2022-10-26 RX ADMIN — DOXYCYCLINE HYCLATE 100 MG: 100 TABLET, COATED ORAL at 20:56

## 2022-10-26 RX ADMIN — FUROSEMIDE 80 MG: 10 INJECTION, SOLUTION INTRAMUSCULAR; INTRAVENOUS at 14:35

## 2022-10-26 RX ADMIN — IPRATROPIUM BROMIDE AND ALBUTEROL SULFATE 1 AMPULE: 2.5; .5 SOLUTION RESPIRATORY (INHALATION) at 19:46

## 2022-10-26 RX ADMIN — IPRATROPIUM BROMIDE AND ALBUTEROL SULFATE 1 AMPULE: 2.5; .5 SOLUTION RESPIRATORY (INHALATION) at 17:18

## 2022-10-26 RX ADMIN — ATORVASTATIN CALCIUM 20 MG: 10 TABLET, FILM COATED ORAL at 20:56

## 2022-10-26 RX ADMIN — FUROSEMIDE 80 MG: 10 INJECTION, SOLUTION INTRAMUSCULAR; INTRAVENOUS at 09:24

## 2022-10-26 RX ADMIN — IPRATROPIUM BROMIDE AND ALBUTEROL SULFATE 1 AMPULE: 2.5; .5 SOLUTION RESPIRATORY (INHALATION) at 07:44

## 2022-10-26 RX ADMIN — LOSARTAN POTASSIUM 25 MG: 25 TABLET, FILM COATED ORAL at 09:24

## 2022-10-26 RX ADMIN — ASPIRIN 81 MG: 81 TABLET, COATED ORAL at 09:24

## 2022-10-26 RX ADMIN — METHYLPREDNISOLONE SODIUM SUCCINATE 40 MG: 40 INJECTION, POWDER, FOR SOLUTION INTRAMUSCULAR; INTRAVENOUS at 20:56

## 2022-10-26 RX ADMIN — Medication 10 ML: at 20:57

## 2022-10-26 RX ADMIN — ENOXAPARIN SODIUM 40 MG: 100 INJECTION SUBCUTANEOUS at 09:24

## 2022-10-26 RX ADMIN — GUAIFENESIN 600 MG: 600 TABLET, EXTENDED RELEASE ORAL at 20:56

## 2022-10-26 RX ADMIN — METHYLPREDNISOLONE SODIUM SUCCINATE 40 MG: 40 INJECTION, POWDER, FOR SOLUTION INTRAMUSCULAR; INTRAVENOUS at 04:57

## 2022-10-26 RX ADMIN — SPIRONOLACTONE 12.5 MG: 25 TABLET, FILM COATED ORAL at 09:26

## 2022-10-26 RX ADMIN — IPRATROPIUM BROMIDE AND ALBUTEROL SULFATE 1 AMPULE: 2.5; .5 SOLUTION RESPIRATORY (INHALATION) at 13:06

## 2022-10-26 RX ADMIN — DOXYCYCLINE HYCLATE 100 MG: 100 TABLET, COATED ORAL at 09:24

## 2022-10-26 ASSESSMENT — PAIN SCALES - GENERAL
PAINLEVEL_OUTOF10: 0

## 2022-10-26 NOTE — PLAN OF CARE
Problem: Chronic Conditions and Co-morbidities  Goal: Patient's chronic conditions and co-morbidity symptoms are monitored and maintained or improved  10/26/2022 1308 by Roman Shanks RN  Outcome: Progressing   Pt educated on the importance of a carb control diet. Monitoring pt's blood glucose AC/HS. Sliding scale insulin given as ordered according to pt's blood glucose. Will continue to monitor.

## 2022-10-26 NOTE — PROGRESS NOTES
INPATIENT PULMONARY CRITICAL CARE PROGRESS NOTE      Reason for visit    SOB, severe pulmonary hypertension on [facility], assistance with workup and follow up. SUBJECTIVE: Patient when seen this morning continues to be symptomatic, patient still has some cough with scanty mucoid expectoration, patient still has some shortness of breath and wheezing, patient does not have any chest pain, patient does not have any epistaxis or hemoptysis, no difficulty in swallowing, no coughing or choking while eating, no odynophagia or dysphagia per se, patient does not have any abdominal symptoms of concern, patient does have leg edema, patient does not take too much of salt, patient has not been taking any diuretics at home, patient states that he has been taking albuterol inhaler at home but it is not as effective as the nebulizer out here, patient does give history of sleep fragmentation, patient quit smoking about 14 months back, patient is afebrile, patient has sinus rhythm on the monitor, patient's diastolic heart blood pressure was slightly on the higher side, patient was on 2 L of nasal cannula oxygen with saturation of 90%, patient blood sugars are not optimally controlled, patient has had good urine output overnight with cumulative fluid balance of -7.5 L, patient was alert and oriented, no other pertinent review of system of concern         Physical Exam:  Blood pressure 119/89, pulse 96, temperature 97.5 °F (36.4 °C), temperature source Oral, resp. rate 18, height 6' (1.829 m), weight 233 lb 1.6 oz (105.7 kg), SpO2 90 %.'     Constitutional:  No acute distress. Symptomatic with chest congestion and wheezing  HENT:  Oropharynx is clear and moist. No thyromegaly. Eyes:  Conjunctivae are normal. Pupils equal, round, and reactive to light. No scleral icterus. Neck: . No tracheal deviation present. No obvious thyroid mass. Cardiovascular: Normal rate, regular rhythm, LLSB murmur   No right ventricular heave.   1-2+ lower extremity edema. Pulmonary/Chest: Scattered wheezes. Bilateral rales. Some chest congestion chest wall is not dull to percussion. No accessory muscle usage or stridor. Decreased breath sound intensity with prolonged expiration  Abdominal: Soft. Bowel sounds present. No distension or hernia. No tenderness. Musculoskeletal: No cyanosis. No clubbing. No obvious joint deformity. Lymphadenopathy: No cervical or supraclavicular adenopathy. Skin: Skin is warm and dry. No rash or nodules on the exposed extremities. Psychiatric: Normal mood and affect. Behavior is normal.  No anxiety. Neurologic: Alert, awake and oriented. PERRL. Speech fluent        Results:  CBC:   Recent Labs     10/24/22  1308   WBC 8.8   HGB 14.5   HCT 45.1   MCV 97.8        BMP:   Recent Labs     10/24/22  0759 10/25/22  0808 10/26/22  0657    138 137   K 4.5 4.6 4.9    98* 95*   CO2 32 31 33*   BUN 20 16 20   CREATININE 0.9 0.8* 0.8*       PT/INR:   Recent Labs     10/24/22  1308   PROTIME 16.2*   INR 1.31*       Imaging:  I have reviewed radiology images personally. NM Cardiac Stress Test Nuclear Imaging   Final Result      CT CARDIAC CALCIUM SCORING   Final Result   Total Agatston calcium score of 40. This is in the 40th percentile      Findings of fluid overload denoted by small right-sided pleural effusion,   body wall anasarca, and mild abdominopelvic ascites. Calcium Score Interpretation      0  No identifiable atherosclerotic plaque. Very low cardiovascular disease   risk. Less than 5% chance of presence of coronary artery disease. A   negative examination. 1-10 Minimal plaque burden. Significant coronary artery disease very   unlikely.  Mild plaque burden. Likely mild or minimal coronary stenosis. 101-400 Moderate plaque burden. Moderate non-obstructive coronary artery   disease highly likely. Over 400 Extensive plaque burden.   High likelihood of at least one significant coronary artery stenosis (>50% diameter). CALCIUM SCORING OVERVIEW:      Coronary calcium is a marker for plaque in a blood vessel or atherosclerosis   (hardening of the arteries). The presence and amount of calcium detected in   the coronary artery by the CT scan estimates the presence and amount of   atherosclerotic plaque. These calcium deposits can appear years before the   development of heart disease symptoms such as chest pain and shortness of   breath. A calcium score is computed for each of the coronary arteries based upon the   volume and density of the calcium deposits. This can be referred to as your   calcified plaque burden. It does not correspond directly to the percentage   of narrowing in the artery, but does correlate with the severity of the   overall coronary atherosclerotic burden. This score is then used to determine the calcium percentile which compares   your calcified plaque burden to that of other asymptomatic men and women of   the same age. The calcium score, in combination with the percentile, enables   your physician to determine your risk of developing symptomatic coronary   artery disease, and to measure the progression of disease as well as the   effectiveness of treatment. A score of zero indicates that there is no calcified plaque burden. This   implies that there is no significant coronary artery narrowing and very low   likelihood of a cardiac event over at least the next 3 years. It does not   absolutely rule out the presence of soft, noncalcified plaque or totally   eliminate the possibility of a cardiac event. A score greater than zero indicates at least some coronary artery disease. As the score increases, so does the likelihood of a significant coronary   narrowing and the likelihood of a coronary event over the next 3 years. Similarly, the likelihood of a coronary event increases with increasing   calcium percentiles. XR CHEST (2 VW)   Final Result   No radiographic evidence of acute pulmonary disease. XR CHEST (2 VW)    Result Date: 10/21/2022  EXAMINATION: TWO XRAY VIEWS OF THE CHEST 10/21/2022 2:57 pm COMPARISON: Chest x-ray dated 08/05/2021. HISTORY: ORDERING SYSTEM PROVIDED HISTORY: shortness of breath TECHNOLOGIST PROVIDED HISTORY: Reason for exam:->shortness of breath Reason for Exam: shortness of breath FINDINGS: HEART/MEDIASTINUM: The cardiac silhouette is enlarged, but stable. PLEURA/LUNGS: There are no focal consolidations or pleural effusions. There is no appreciable pneumothorax. BONES/SOFT TISSUE: No acute abnormality. No radiographic evidence of acute pulmonary disease. CT CARDIAC CALCIUM SCORING    Result Date: 10/23/2022  EXAMINATION: CT OF THE HEART WITHOUT CONTRAST, CORONARY ARTERY CALCIUM SCREENING 10/23/2022 HISTORY: CT OF THE HEART WITHOUT CONTRAST, CORONARY ARTERY CALCIUM SCREENING COMPARISON: Conventional chest CT 08/05/2021 TECHNIQUE: CT of the heart was obtained without intravenous contrast.  Calcium scoring analysis was performed using a separate  workstation. Dose modulation, iterative reconstruction, and/or weight based adjustment of the mA/kV was utilized to reduce the radiation dose to as low as reasonably achievable. FINDINGS: LEFT MAIN: 23 LEFT ANTERIOR DESCENDING: 10 CIRCUMFLEX: 7 RIGHT CORONARY ARTERY: Zero (0). TOTAL AGATSTON CALCIUM SCORE: 36 EXTRACARDIAC STRUCTURES: Trace aortic valve and aortic annulus calcification is seen. No aortic aneurysm. Ascending aorta measures 3.5 cm. Small pericardial effusion is seen. No pericardial calcification. Small hiatal hernia is seen. There is nonspecific thickening at the GE junction. Small mediastinal nodes are noted, likely reactive. A few calcified mediastinal nodes are also seen. Respiratory motion artifact limits evaluation of pulmonary parenchymal change. Scattered areas of bronchial wall thickening are seen.   Small right-sided pleural effusion is seen. There is adjacent consolidation at the right lung base. .  This pleural effusion is slightly increased in size compared to chest CT 08/05/2021 There is body wall anasarca. There is mild ascites seen in the upper abdomen, partially imaged. Spurring is seen in the spine     Total Agatston calcium score of 40. This is in the 40th percentile Findings of fluid overload denoted by small right-sided pleural effusion, body wall anasarca, and mild abdominopelvic ascites. Calcium Score Interpretation 0  No identifiable atherosclerotic plaque. Very low cardiovascular disease risk. Less than 5% chance of presence of coronary artery disease. A negative examination. 1-10 Minimal plaque burden. Significant coronary artery disease very unlikely.  Mild plaque burden. Likely mild or minimal coronary stenosis. 101-400 Moderate plaque burden. Moderate non-obstructive coronary artery disease highly likely. Over 400 Extensive plaque burden. High likelihood of at least one significant coronary artery stenosis (>50% diameter). CALCIUM SCORING OVERVIEW: Coronary calcium is a marker for plaque in a blood vessel or atherosclerosis (hardening of the arteries). The presence and amount of calcium detected in the coronary artery by the CT scan estimates the presence and amount of atherosclerotic plaque. These calcium deposits can appear years before the development of heart disease symptoms such as chest pain and shortness of breath. A calcium score is computed for each of the coronary arteries based upon the volume and density of the calcium deposits. This can be referred to as your calcified plaque burden. It does not correspond directly to the percentage of narrowing in the artery, but does correlate with the severity of the overall coronary atherosclerotic burden.  This score is then used to determine the calcium percentile which compares your calcified plaque burden to that of other asymptomatic men and women of the same age. The calcium score, in combination with the percentile, enables your physician to determine your risk of developing symptomatic coronary artery disease, and to measure the progression of disease as well as the effectiveness of treatment. A score of zero indicates that there is no calcified plaque burden. This implies that there is no significant coronary artery narrowing and very low likelihood of a cardiac event over at least the next 3 years. It does not absolutely rule out the presence of soft, noncalcified plaque or totally eliminate the possibility of a cardiac event. A score greater than zero indicates at least some coronary artery disease. As the score increases, so does the likelihood of a significant coronary narrowing and the likelihood of a coronary event over the next 3 years. Similarly, the likelihood of a coronary event increases with increasing calcium percentiles. NM Cardiac Stress Test Nuclear Imaging    Result Date: 10/24/2022  Cardiac Perfusion Imaging  Demographics   Patient Name       Jayro Ace   Date of Study      10/17/2022         Gender               Male   Patient Number     [id number]         Date of Birth        1965   Visit Number       [id number]          Age                  62 year(s)   Accession Number   [id number]         Room Number          9345   Corporate ID       [id number]           NM Technician        Mandy Coles   Nurse              Wili Guan,        [facility] [address] [place] [address], RN      Physician            Duane Birkenhead R MD   Ordering Physician Precious Anne MD   The procedure was explained in detail to the patient. Risks,  complications and alternative treatments were reviewed. Written consent  was obtained.   Procedure Procedure Type:   Nuclear Stress Test:Pharmacological, NM MYOCARDIAL SPECT REST EXERCISE OR  RX   Study location: [place] [facility] - Nuclear Medicine Indications: Chest pain. Hospital Status: Inpatient. Height: 72 inches Weight: 238 pounds  Risk Factors   The patient risk factors include:treated hypertension, diabetes mellitus and  chronic lung disease. Conclusions   Summary  Normal LVEF 60%  Normal wall motion  Moderate inferior defect noted, suggestive of ischemia, less likely  attenuation artifact  Summed stress scores may underestimate perfusion defects   Overall, this would be considered an abnormal, intermediate risk, study  Stress Protocols   Resting ECG  NSR  RBBB  borderline low voltage   Resting HR:88 bpm  Resting BP:117/88 mmHg  Stress Protocol:Pharmacologic - Lexiscan's  Peak HR:96 bpm                         HR/BP product:11185  Peak BP:147/82 mmHg  Predicted HR: 163 bpm  % of predicted HR: 59  Test duration: 4 min  Reason for termination:Completed   ECG Findings  No ischemic EKG changes. Arrhythmias  PVCs   Symptoms  Patient developed shortness of breath , likely related to lexiscan. Symptoms resolved with rest.   Complications  Procedure complication was none. Stress Interpretation  Normal LVEF 60%  Normal wall motion  Moderate inferior defect noted, suggestive of ischemia, less likely  attenuation artifact  Summed stress scores may underestimate perfusion defects  Procedure Medications   - Lexiscan 0.4 mg.  Imaging Protocols   - One Day   Rest                          Stress   Isotope:Myoview/Tetrofosmin   Isotope: Myoview/Tetrofosmin  Isotope dose:10.8 mCi         Isotope dose:34.1 mCi  Administration Route:I.V.      Administration Route:I.V.  Date:10/24/2022 08:35         Date:10/24/2022 09:55                                 Technique:      Gated  Imaging Results   Applied corrections   - Attenuation correction  applied     Stress ejection    Ejection fraction:60 %    EDV :83 ml    ESV :33 ml    Stroke volume :50 ml    LV mass :118 gr  Medical History  Signatures ------------------------------------------------------------------  Electronically signed by Angie Kulkarni MD (Interpreting  physician) on 10/24/2022 at 12:08  ------------------------------------------------------------------        ECHO-Summary   Limited echo for LV/RV function & PHTN with limited doppler/no color. Global left ventricular systolic function is moderately decreased with   ejection fraction estimated at 40%. Normal left ventricle size and wall thickness. There is diastolic septal flattening (\"D-shaped\" septum) consistent with   right ventricular volume overload. Diastolic dysfunction grade and filling pressure are indeterminate. The right ventricle is severely enlarged in size with reduced function. The right atrium is moderately dilated. The tricuspid valve is normal in structure with tenting of the valve   leaflets. Moderate-severe tricuspid valve regurgitation. Systolic pulmonary artery pressure (SPAP) estimated at 70 mmHg (RA pressure   15 mmHg), consistent with severe pulmonary hypertension. Latest Reference Range & Units 10/26/22 07:48   Hemoglobin, Art, Extended 13.5 - 17.5 g/dL 15.8   pH, Arterial 7.350 - 7.450  7.436   pCO2, Arterial 35.0 - 45.0 mmHg 40.6   pO2, Arterial 75.0 - 108.0 mmHg 57.1 (L)   HCO3, Arterial 21.0 - 29.0 mmol/L 26.7   TCO2 (calc), Art Not Established mmol/L 28.0   Base Excess, Arterial -3.0 - 3.0 mmol/L 2.3   O2 Sat, Arterial >92 % 90.1 (L)   Methemoglobin, Arterial <1.5 % 0.6   Carboxyhgb, Arterial 0.0 - 1.5 % 0.8     PFT 9/2/2021:  FVC 3.29 L, 63% predicted, FEV1 1.33 L, 33% predicted with ratio 40%.   Lung volumes showed air trapping, diffusion capacity was normal    Assessment:  Principal Problem:    Pitting edema  Active Problems:    DM (diabetes mellitus) (HCC)    Acute respiratory failure with hypoxia (HCC)    Severe pulmonary hypertension (HCC)    Suspected sleep apnea    Former smoker    Enlarged RV (right ventricle)    Tricuspid regurgitation    Chest congestion    Uncontrolled type 2 diabetes mellitus with hyperglycemia ([facility])    Class 1 obesity in adult    COPD exacerbation ([facility])    Essential hypertension    CHF (congestive heart failure) ([facility])    Stage 3 severe COPD by GOLD classification ([address]  Resolved Problems:    * No resolved hospital problems.  *          Plan:   Oxygen supplementation to keep saturation between 90 and 94% only  Please titrate the oxygen as per the above parameters  Pulmonary toilet  Bronchodilators  Mucinex ordered  Patient may require mucolytic therapy via nebulizer  IV Solu-Medrol to continue  Empiric doxycycline started on the patient  IV Lasix and Aldactone as per cardiology continue  Monitor input output and BMP  Correct electrolytes on whenever necessary basis  The patient continues to have increased chest congestion not improving with conservative management will contemplate bronchoscopy  Blood glucose monitoring with sliding scale insulin  Clinically patient appears to have LALA and will benefit from a sleep study as an outpatient  Patient has only been taking albuterol at home but patient's PFT from last year says that patient has severe COPD and may require LABA plus LAMA plus ICS combination at some point  Diet and lifestyle modifications  Patient needs to lose weight  Patient to remain abstinent from smoking  Patient does have history of alcohol use-monitor for any alcohol withdrawal symptoms  Patient's TSH in the past was normal  Patient's echocardiogram results were reviewed  Please make sure that patient's flu shot is up-to-date prior to discharge from the hospital  PUD and DVT prophylaxis as per IM            Electronically signed by:  Susy Mc MD    10/26/2022    9:11 AM.

## 2022-10-26 NOTE — PROGRESS NOTES
10/26/22 0748   Oxygen Therapy/Pulse Ox   O2 Therapy Oxygen   O2 Device Nasal cannula   O2 Flow Rate (L/min) 2 L/min   Blood Gas  Performed? Yes   $ABG $ABG   Santos's Test #1 Pos   Site #1 Left Radial   Site Prepped #1 Yes   Number of Attempts #1 1   Pressure Held #1 Yes   Complications #1 None   Post-procedure #1 Standard   Specimen Status #1 To lab   How Tolerated?  Tolerated well

## 2022-10-26 NOTE — PROGRESS NOTES
Hospitalist Progress Note      PCP: Bethany Lopez DO    Date of Admission: 10/21/2022    Chief Complaint: Pitting edema and urinary retention     Hospital Course: reviewed      Subjective:  remains volume overloaded sincere in abd and legs but notes slight improvement after inc in diuretic dose, no sob currently, on 2L(on room air at home)       Medications:  Reviewed    Infusion Medications    sodium chloride      sodium chloride      dextrose       Scheduled Medications    guaiFENesin  600 mg Oral BID    doxycycline hyclate  100 mg Oral 2 times per day    furosemide  80 mg IntraVENous TID    methylPREDNISolone  40 mg IntraVENous Q8H    losartan  25 mg Oral Daily    spironolactone  12.5 mg Oral Daily    sodium chloride flush  5-40 mL IntraVENous 2 times per day    ipratropium-albuterol  1 ampule Inhalation Q4H WA    aspirin  81 mg Oral Daily    atorvastatin  20 mg Oral Nightly    sodium chloride flush  5-40 mL IntraVENous 2 times per day    enoxaparin  40 mg SubCUTAneous Daily    insulin lispro  0-8 Units SubCUTAneous TID WC    insulin lispro  0-4 Units SubCUTAneous Nightly     PRN Meds: sodium chloride flush, sodium chloride, hydrALAZINE, albuterol sulfate HFA **AND** [DISCONTINUED] ipratropium, perflutren lipid microspheres, lidocaine, albuterol sulfate HFA, sodium chloride flush, sodium chloride, ondansetron **OR** ondansetron, polyethylene glycol, acetaminophen **OR** acetaminophen, glucose, dextrose bolus **OR** dextrose bolus, glucagon (rDNA), dextrose      Intake/Output Summary (Last 24 hours) at 10/26/2022 1108  Last data filed at 10/26/2022 0956  Gross per 24 hour   Intake 1100 ml   Output 2675 ml   Net -1575 ml       Physical Exam Performed:    /89   Pulse 96   Temp 97.5 °F (36.4 °C) (Oral)   Resp 18   Ht 6' (1.829 m)   Wt 233 lb 1.6 oz (105.7 kg)   SpO2 90%   BMI 31.61 kg/m²     General appearance: No apparent distress, appears stated age and cooperative.   HEENT: Pupils equal, round, and reactive to light. Conjunctivae/corneas clear. Neck: Supple, with full range of motion. No jugular venous distention. Trachea midline. Respiratory:  Normal respiratory effort. Clear to auscultation, bilaterally without Rales/Wheezes/Rhonchi. Cardiovascular: Regular rate and rhythm with normal S1/S2 without murmurs, rubs or gallops. Abdomen: Soft, non-tender, non-distended with normal bowel sounds. Musculoskeletal: No clubbing, cyanosis or edema bilaterally. Full range of motion without deformity. Skin: Skin color, texture, turgor normal.  No rashes or lesions. Neurologic:  Neurovascularly intact without any focal sensory/motor deficits. Cranial nerves: II-XII intact, grossly non-focal.  Psychiatric: Alert and oriented, thought content appropriate, normal insight  Capillary Refill: Brisk, 3 seconds, normal   Peripheral Pulses: +2 palpable, equal bilaterally       Labs:   Recent Labs     10/24/22  1308   WBC 8.8   HGB 14.5   HCT 45.1        Recent Labs     10/24/22  0759 10/25/22  0808 10/26/22  0657    138 137   K 4.5 4.6 4.9    98* 95*   CO2 32 31 33*   BUN 20 16 20   CREATININE 0.9 0.8* 0.8*   CALCIUM 9.4 8.9 9.8     No results for input(s): AST, ALT, BILIDIR, BILITOT, ALKPHOS in the last 72 hours. Recent Labs     10/24/22  1308   INR 1.31*     No results for input(s): [place] in the last 72 hours. Urinalysis:      Lab Results   Component Value Date/Time    NITRU Negative 07/17/2022 06:27 PM    BLOODU Negative 07/17/2022 06:27 PM    SPECGRAV 1.010 07/17/2022 06:27 PM    GLUCOSEU Negative 07/17/2022 06:27 PM       Radiology:  NM Cardiac Stress Test Nuclear Imaging   Final Result      CT CARDIAC CALCIUM SCORING   Final Result   Total Agatston calcium score of 40. This is in the 40th percentile      Findings of fluid overload denoted by small right-sided pleural effusion,   body wall anasarca, and mild abdominopelvic ascites.          Calcium Score Interpretation      0 No identifiable atherosclerotic plaque. Very low cardiovascular disease   risk. Less than 5% chance of presence of coronary artery disease. A   negative examination. 1-10 Minimal plaque burden. Significant coronary artery disease very   unlikely.  Mild plaque burden. Likely mild or minimal coronary stenosis. 101-400 Moderate plaque burden. Moderate non-obstructive coronary artery   disease highly likely. Over 400 Extensive plaque burden. High likelihood of at least one   significant coronary artery stenosis (>50% diameter). CALCIUM SCORING OVERVIEW:      Coronary calcium is a marker for plaque in a blood vessel or atherosclerosis   (hardening of the arteries). The presence and amount of calcium detected in   the coronary artery by the CT scan estimates the presence and amount of   atherosclerotic plaque. These calcium deposits can appear years before the   development of heart disease symptoms such as chest pain and shortness of   breath. A calcium score is computed for each of the coronary arteries based upon the   volume and density of the calcium deposits. This can be referred to as your   calcified plaque burden. It does not correspond directly to the percentage   of narrowing in the artery, but does correlate with the severity of the   overall coronary atherosclerotic burden. This score is then used to determine the calcium percentile which compares   your calcified plaque burden to that of other asymptomatic men and women of   the same age. The calcium score, in combination with the percentile, enables   your physician to determine your risk of developing symptomatic coronary   artery disease, and to measure the progression of disease as well as the   effectiveness of treatment. A score of zero indicates that there is no calcified plaque burden.  This   implies that there is no significant coronary artery narrowing and very low   likelihood of a cardiac event over at least the next 3 years. It does not   absolutely rule out the presence of soft, noncalcified plaque or totally   eliminate the possibility of a cardiac event. A score greater than zero indicates at least some coronary artery disease. As the score increases, so does the likelihood of a significant coronary   narrowing and the likelihood of a coronary event over the next 3 years. Similarly, the likelihood of a coronary event increases with increasing   calcium percentiles. XR CHEST (2 VW)   Final Result   No radiographic evidence of acute pulmonary disease. Assessment/Plan:    Active Hospital Problems    Diagnosis     Enlarged RV (right ventricle) [I51.7]      Priority: Medium    Tricuspid regurgitation [I07.1]      Priority: Medium    Chest congestion [R09.89]      Priority: Medium    Uncontrolled type 2 diabetes mellitus with hyperglycemia ([facility] [E11.65]      Priority: Medium    Class 1 obesity in adult [E66.9]      Priority: Medium    COPD exacerbation ([facility] [J44.1]      Priority: Medium    Acute respiratory failure with hypoxia (HCC) [J96.01]      Priority: Medium    Severe pulmonary hypertension ([facility] [I27.20]      Priority: Medium    Suspected sleep apnea [R29.818]      Priority: Medium    Former smoker [Z57.525]      Priority: Medium    DM (diabetes mellitus) ([facility] [E11.9]      Priority: Medium    Pitting edema [R60.9]      Priority: Medium    Stage 3 severe COPD by GOLD classification ([facility] [J44.9]     CHF (congestive heart failure) ([facility]) [I50.9]     Essential hypertension [I10]        Acute diastolic Heart failure - with rt heart failure(cor pulmonale) with bilateral lower extremity edema and urinary retention in setting of known pulmonary hypertension PASP 74 mmHg and EF 55% by echo (July 2022). Continued IV diuresis per cardiology, goal weight appears to be around 185 pounds.   Ischemia evaluation with Lexiscan nuclear stress test and CT calcium score were abn  -s/p right and left heart catheterization 10/24, noted severe pulm htn, mild nonobstructive CAD, preserved EF  -echo done(ef 40%, rv severe enlarged, severe TR, severe PH)   -repeat RHC planned 10/28    Acute respiratory failure requiring supplemental oxygen. Known history of COPD but not on home oxygen. Uses inhalers at home and getting relief from Duonebs. Echocardiogram in 2021 as well as 2022 which showed normal left ventricular function however there was right-sided enlargement and significant pulmonary hypertension noted. -on iv steroids   -pulm consulted, apprec recs, empiric doxycycline started 10/26     Type 2 Diabetes (HgbA1c 7.0% on 8/12/22). Metformin held on admission. Cover with a \"sliding scale\" lispro moderate scale prandial correction insulin. Primary hypertension - on losartan 50 mg daily. Dyslipidemia on atorvastatin 20 mg nightly. Class I obesity (BMI 32.4). DVT Prophylaxis: lovenox  Diet: ADULT DIET;  Regular  Code Status: Full Code  PT/OT Eval Status: not ordered    Dispo - continue diuresis, ?by weekend    Appropriate for A1 Discharge Unit: No      Marily Metcalf MD

## 2022-10-26 NOTE — PROGRESS NOTES
SubCUTAneous TID WC    insulin lispro  0-4 Units SubCUTAneous Nightly      sodium chloride      sodium chloride      dextrose         Lab Data:  CBC:   Recent Labs     10/24/22  1308   WBC 8.8   HGB 14.5        BMP:    Recent Labs     10/24/22  0759 10/25/22  0808 10/26/22  0657    138 137   K 4.5 4.6 4.9   CO2 32 31 33*   BUN 20 16 20   CREATININE 0.9 0.8* 0.8*     INR:    Recent Labs     10/24/22  1308   INR 1.31*     BNP:    Recent Labs     10/24/22  0759   PROBNP 991*     Lab Results   Component Value Date    LVEF 53 08/06/2021       Testing:    Echo: 8/5/2021  Summary   Left ventricular systolic function is low normal with a visually estimated   ejection fraction of 50-55%. EF estimated by 3D at 52 %. The left ventricle is normal in size with normal wall thickness. Flattened in diastole and systole (\"D-shaped septum\") consistent with right   ventricular volume and pressure overload. Normal left ventricular diastolic function. The right ventricle is severely dilated. Right ventricular systolic function is reduced. The right atrium is severely enlarged. Mild eccentric tricuspid regurgitation. Systolic pulmonary artery pressure (SPAP) is elevated and estimated at 56   mmHg (right atrial pressure 15 mmHg) consistent with moderate pulmonary   hypertension. The IVC is dilated in size (>2.1 cm) and collapses <50% with respiration   consistent with markedly elevated right atrial pressures (15 mmHg) . Echo 7/29/22    Summary   Limited echo for left ventricular function and pulmonary pressures      Left ventricular cavity size is normal.   There is mild concentric left ventricular hypertrophy. Left ventricular function appears to be normal with an estimated ejection   fraction of 55%. There is systolic and diastolic septal flattening consistent with right   ventricular pressure and volume overload. The right ventricle is enlarged and hypokinetic.    Severe tricuspid regurgitation. The right atrium is severely dilated. Estimated pulmonary artery systolic pressure is at 74 mmHg assuming a right   atrial pressure of 15 mmHg. There is a small pericardial effusion noted without any hemodynamic   implications. Cardiac calcium CT 10/24/22  TECHNIQUE:   CT of the heart was obtained without intravenous contrast.  Calcium scoring   analysis was performed using a separate  workstation. Dose modulation,   iterative reconstruction, and/or weight based adjustment of the mA/kV was   utilized to reduce the radiation dose to as low as reasonably achievable. FINDINGS:   LEFT MAIN: 23       LEFT ANTERIOR DESCENDING: 10       CIRCUMFLEX: 7       RIGHT CORONARY ARTERY: Zero (0). TOTAL AGATSTON CALCIUM SCORE: 36       EXTRACARDIAC STRUCTURES: Trace aortic valve and aortic annulus calcification   is seen. No aortic aneurysm. Ascending aorta measures 3.5 cm. Small   pericardial effusion is seen. No pericardial calcification. Small hiatal   hernia is seen. There is nonspecific thickening at the GE junction. Small mediastinal nodes are noted, likely reactive. A few calcified   mediastinal nodes are also seen. Respiratory motion artifact limits evaluation of pulmonary parenchymal   change. Scattered areas of bronchial wall thickening are seen. Small   right-sided pleural effusion is seen. There is adjacent consolidation at the   right lung base. .  This pleural effusion is slightly increased in size   compared to chest CT 08/05/2021       There is body wall anasarca. There is mild ascites seen in the upper   abdomen, partially imaged. Spurring is seen in the spine           Echo 10/24/22   Summary   Limited echo for LV/RV function & PHTN with limited doppler/no color. Global left ventricular systolic function is moderately decreased with   ejection fraction estimated at 40%. Normal left ventricle size and wall thickness.    There is diastolic septal flattening (\"D-shaped\" septum) consistent with   right ventricular volume overload. Diastolic dysfunction grade and filling pressure are indeterminate. The right ventricle is severely enlarged in size with reduced function. The right atrium is moderately dilated. The tricuspid valve is normal in structure with tenting of the valve   leaflets. Moderate-severe tricuspid valve regurgitation. Systolic pulmonary artery pressure (SPAP) estimated at 70 mmHg (RA pressure   15 mmHg), consistent with severe pulmonary hypertension. Stress test 10/24/22   Summary    Normal LVEF 60%    Normal wall motion    Moderate inferior defect noted, suggestive of ischemia, less likely    attenuation artifact    Summed stress scores may underestimate perfusion defects        Overall, this would be considered an abnormal, intermediate risk, study       Procedures Performed: 10/24/22  1. Left heart catheterization  2. Selective left and right coronary angiogram  3. Left ventriculography   5. Right heart catheterization     Procedure Findings:  1. Mild non-obstructive coronary artery disease. 2. Normal left ventricular function with EF estimated at 55-60%  3. Normal left heart hemodynamics  4. Severe pulmonary hypertension    Findings:     1. Left main coronary artery was normal. It gave off the left anterior descending artery and left circumflex. 2. Left anterior descending artery has mild atherosclerotic disease. It was moderate in size. It gave off septal perforators and a moderate sized diagonal branch. The LAD covered the entire apex of the left ventricle. 3. Left circumflex has mild atherosclerotic disease. It was moderate in size. There was a moderate sized obtuse marginal branch. 4. Right coronary artery has mild atherosclerotic disease. It was moderate in size and was the dominant artery. 5. Left ventriculogram showed normal LVEF at 55-60%.  Wall motion was normal . There was no significant mitral valve or aortic valve disease noted. LVEDP was normal. There was no gradient noted across the aortic valve during pullback of the catheter. 6.  Right heart catheterization was performed. Right atrial pressure of 25  RV pressure 70/13  PA pressure of 78/37 with a mean of 50  Pulmonary artery wedge pressure mean of 12  Cardiac output of 5.32  Cardiac index 2.33  Aortic saturation 88%  PA saturation 59%  SVR of 917       10/26/22 0445 233 lb 1.6 oz (105.7 kg) Standing scale; Actual     10/25/22 0440 235 lb 12.8 oz (107 kg) Actual;Standing scale     10/24/22 0613 236 lb 12.8 oz (107.4 kg) Standing scale     10/23/22 0609 238 lb 9.6 oz (108.2 kg) Standing scale     10/22/22 0556 238 lb 9.6 oz (108.2 kg) Standing scale     10/21/22 1419 232 lb (105.2 kg) Stated           Principal Problem:    Pitting edema  Active Problems:    DM (diabetes mellitus) ([address]    Acute respiratory failure with hypoxia ([facility])    Severe pulmonary hypertension ([address]    Suspected sleep apnea    Former smoker    Essential hypertension    CHF (congestive heart failure) ([facility])    Stage 3 severe COPD by GOLD classification ([facility]  Resolved Problems:    * No resolved hospital problems. *      Assessment:  Acute on chronic HFpEF with Right heart failure (cor pulmonale)   - volume overloaded   -baseline weight 217lbs   - admission weight 238lbs  Severe pulmonary HTN, who group 3  Severe TR  HTN  Very severe COPD  Acute respiratory failure due to pleural effusion, CHF and COPD  DM   HLD      Impression/Plan:  Daily weights, strict I/O's  Continue  lasix to 80mg IV TID and add Spironolactone (aldactone) 12.5mg daily- he is having good diuretic response to the IV lasix (net negative 2.2L last 24 hours)- will continue. Will consider adding SGLT2 pending renal function  losartan to 25mg to allow for b/p room to increase diuretics; will plan to change to entresto at discharge    Dicsussed with DR. Lakhwinder Cabrera on 10/25/22- will plan on repeat RHC once diuresed, likely Friday. Education provided today Disease process and medications discussed. Questions answered fully.   Total Time spent educating today 15 minutes     SANCHEZ Abel CNP,  10/26/2022, 1:49 PM

## 2022-10-26 NOTE — PLAN OF CARE
Problem: Chronic Conditions and Co-morbidities  Goal: Patient's chronic conditions and co-morbidity symptoms are monitored and maintained or improved  Outcome: Progressing     Patient's EF (Ejection Fraction) is greater than 40%    Heart Failure Medications:  Diuretics[de-identified] Furosemide and Spironolactone    (One of the following REQUIRED for EF </= 40%/SYSTOLIC FAILURE but MAY be used in EF% >40%/DIASTOLIC FAILURE)        ACE[de-identified] None        ARB[de-identified] None         ARNI[de-identified] None    (Beta Blockers)  NON- Evidenced Based Beta Blocker (for EF% >40%/DIASTOLIC FAILURE): None    Evidenced Based Beta Blocker::(REQUIRED for EF% <40%/SYSTOLIC FAILURE) None  . .................................................................................................................................................. Patient's weights and intake/output reviewed: Yes    Patient's Last Weight: 235 lbs obtained by standing scale. Difference of 2 lbs less than last documented weight. Intake/Output Summary (Last 24 hours) at 10/26/2022 0521  Last data filed at 10/26/2022 0501  Gross per 24 hour   Intake 1100 ml   Output 3375 ml   Net -2275 ml       Education Booklet Provided: yes    Comorbidities Reviewed Yes    Patient has a past medical history of Acute systolic congestive heart failure ([address] Diabetes mellitus ([address] Essential hypertension, and Thrombocytopenia ([address] >>For CHF and Comorbidity documentation on Education Time and Topics, please see Education Tab    Progressive Mobility Assessment:  What is this patient's Current Level of Mobility?: Ambulatory-Up [place] [facility]  How was this patient Mobilized today?: Edge of Bed, Up to Chair,  Up to Toilet/Shower, and Up in Hallway, ambulated 15 ft                 With Whom? Nurse, PCA, and Self                 Level of Difficulty/Assistance: Independent     Pt resting in bed at this time on  2 L O2. Pt denies shortness of breath. Pt with pitting lower extremity edema.      Patient and/or Family's stated Goal of Care this Admission: reduce shortness of breath, increase activity tolerance, better understand heart failure and disease management, be more comfortable, and reduce lower extremity edema prior to discharge        :  Problem: Safety - Adult  Goal: Free from fall injury  Outcome: Progressing   Pt will remain free from falls throughout hospital stay. Fall precautions in place, bed in lowest position with wheels locked and side rails 2/4 up. Room door open and hourly rounding completed. Will continue to monitor throughout shift. Problem: Pain  Goal: Verbalizes/displays adequate comfort level or baseline comfort level  Outcome: Progressing  Pt will be satisfied with pain control. Pt uses numeric pain rating scale with reassessments after pain med administration. Will continue to monitor progression throughout shift.

## 2022-10-26 NOTE — PLAN OF CARE
Patient's EF (Ejection Fraction) is 40%    Heart Failure Medications:  Diuretics[de-identified] Furosemide and Spironolactone    (One of the following REQUIRED for EF </= 40%/SYSTOLIC FAILURE but MAY be used in EF% >40%/DIASTOLIC FAILURE)        ACE[de-identified] None        ARB[de-identified] Losartan         ARNI[de-identified] None    (Beta Blockers)  NON- Evidenced Based Beta Blocker (for EF% >40%/DIASTOLIC FAILURE): None    Evidenced Based Beta Blocker::(REQUIRED for EF% <40%/SYSTOLIC FAILURE) None  . .................................................................................................................................................. Patient's weights and intake/output reviewed: Yes    Patient's Last Weight: 233 lbs obtained by standing scale. Difference of 2 lbs less than last documented weight. Intake/Output Summary (Last 24 hours) at 10/26/2022 1309  Last data filed at 10/26/2022 1239  Gross per 24 hour   Intake 1340 ml   Output 4375 ml   Net -3035 ml       Education Booklet Provided: yes    Comorbidities Reviewed Yes    Patient has a past medical history of Acute systolic congestive heart failure ([address] Diabetes mellitus ([address] Essential hypertension, and Thrombocytopenia ([address] >>For CHF and Comorbidity documentation on Education Time and Topics, please see Education Tab    Progressive Mobility Assessment:  What is this patient's Current Level of Mobility?: Ambulatory-Up [facility]  How was this patient Mobilized today?: Edge of Bed, Up to Chair,  Up to Toilet/Shower, and Up in Room, ambulated 15 ft                 With Whom? Self                 Level of Difficulty/Assistance: Independent     Pt resting in bed at this time on  2 L O2. Pt with complaints of shortness of breath. Pt with pitting lower extremity edema.      Patient and/or Family's stated Goal of Care this Admission: reduce shortness of breath, increase activity tolerance, better understand heart failure and disease management, be more comfortable, and reduce lower extremity edema prior to discharge        :

## 2022-10-27 LAB
ANION GAP SERPL CALCULATED.3IONS-SCNC: 13 MMOL/L (ref 3–16)
BUN BLDV-MCNC: 28 MG/DL (ref 7–20)
CALCIUM SERPL-MCNC: 9.5 MG/DL (ref 8.3–10.6)
CHLORIDE BLD-SCNC: 95 MMOL/L (ref 99–110)
CO2: 29 MMOL/L (ref 21–32)
CREAT SERPL-MCNC: 0.7 MG/DL (ref 0.9–1.3)
GFR SERPL CREATININE-BSD FRML MDRD: >60 ML/MIN/{1.73_M2}
GLUCOSE BLD-MCNC: 163 MG/DL (ref 70–99)
GLUCOSE BLD-MCNC: 166 MG/DL (ref 70–99)
GLUCOSE BLD-MCNC: 172 MG/DL (ref 70–99)
GLUCOSE BLD-MCNC: 174 MG/DL (ref 70–99)
GLUCOSE BLD-MCNC: 205 MG/DL (ref 70–99)
PERFORMED ON: ABNORMAL
POTASSIUM SERPL-SCNC: 4.4 MMOL/L (ref 3.5–5.1)
PRO-BNP: 1825 PG/ML (ref 0–124)
SODIUM BLD-SCNC: 137 MMOL/L (ref 136–145)

## 2022-10-27 PROCEDURE — 6370000000 HC RX 637 (ALT 250 FOR IP): Performed by: INTERNAL MEDICINE

## 2022-10-27 PROCEDURE — 6360000002 HC RX W HCPCS: Performed by: INTERNAL MEDICINE

## 2022-10-27 PROCEDURE — 80048 BASIC METABOLIC PNL TOTAL CA: CPT

## 2022-10-27 PROCEDURE — 83880 ASSAY OF NATRIURETIC PEPTIDE: CPT

## 2022-10-27 PROCEDURE — 99233 SBSQ HOSP IP/OBS HIGH 50: CPT | Performed by: INTERNAL MEDICINE

## 2022-10-27 PROCEDURE — 1200000000 HC SEMI PRIVATE

## 2022-10-27 PROCEDURE — 2580000003 HC RX 258: Performed by: INTERNAL MEDICINE

## 2022-10-27 PROCEDURE — 94669 MECHANICAL CHEST WALL OSCILL: CPT

## 2022-10-27 PROCEDURE — 94761 N-INVAS EAR/PLS OXIMETRY MLT: CPT

## 2022-10-27 PROCEDURE — 6360000002 HC RX W HCPCS: Performed by: NURSE PRACTITIONER

## 2022-10-27 PROCEDURE — 6370000000 HC RX 637 (ALT 250 FOR IP): Performed by: NURSE PRACTITIONER

## 2022-10-27 PROCEDURE — 94640 AIRWAY INHALATION TREATMENT: CPT

## 2022-10-27 PROCEDURE — 2700000000 HC OXYGEN THERAPY PER DAY

## 2022-10-27 PROCEDURE — 99233 SBSQ HOSP IP/OBS HIGH 50: CPT | Performed by: NURSE PRACTITIONER

## 2022-10-27 PROCEDURE — 36415 COLL VENOUS BLD VENIPUNCTURE: CPT

## 2022-10-27 RX ORDER — SPIRONOLACTONE 25 MG/1
12.5 TABLET ORAL ONCE
Status: COMPLETED | OUTPATIENT
Start: 2022-10-27 | End: 2022-10-27

## 2022-10-27 RX ORDER — SPIRONOLACTONE 25 MG/1
25 TABLET ORAL DAILY
Status: DISCONTINUED | OUTPATIENT
Start: 2022-10-28 | End: 2022-11-03 | Stop reason: HOSPADM

## 2022-10-27 RX ADMIN — GUAIFENESIN 600 MG: 600 TABLET, EXTENDED RELEASE ORAL at 20:16

## 2022-10-27 RX ADMIN — ASPIRIN 81 MG: 81 TABLET, COATED ORAL at 09:48

## 2022-10-27 RX ADMIN — LOSARTAN POTASSIUM 25 MG: 25 TABLET, FILM COATED ORAL at 09:48

## 2022-10-27 RX ADMIN — IPRATROPIUM BROMIDE AND ALBUTEROL SULFATE 1 AMPULE: 2.5; .5 SOLUTION RESPIRATORY (INHALATION) at 20:54

## 2022-10-27 RX ADMIN — Medication 10 ML: at 20:16

## 2022-10-27 RX ADMIN — INSULIN LISPRO 2 UNITS: 100 INJECTION, SOLUTION INTRAVENOUS; SUBCUTANEOUS at 12:17

## 2022-10-27 RX ADMIN — DOXYCYCLINE HYCLATE 100 MG: 100 TABLET, COATED ORAL at 09:48

## 2022-10-27 RX ADMIN — ENOXAPARIN SODIUM 40 MG: 100 INJECTION SUBCUTANEOUS at 09:57

## 2022-10-27 RX ADMIN — FUROSEMIDE 80 MG: 10 INJECTION, SOLUTION INTRAMUSCULAR; INTRAVENOUS at 20:16

## 2022-10-27 RX ADMIN — METHYLPREDNISOLONE SODIUM SUCCINATE 40 MG: 40 INJECTION, POWDER, FOR SOLUTION INTRAMUSCULAR; INTRAVENOUS at 14:46

## 2022-10-27 RX ADMIN — METHYLPREDNISOLONE SODIUM SUCCINATE 40 MG: 40 INJECTION, POWDER, FOR SOLUTION INTRAMUSCULAR; INTRAVENOUS at 20:16

## 2022-10-27 RX ADMIN — SODIUM CHLORIDE, PRESERVATIVE FREE 10 ML: 5 INJECTION INTRAVENOUS at 20:17

## 2022-10-27 RX ADMIN — IPRATROPIUM BROMIDE AND ALBUTEROL SULFATE 1 AMPULE: 2.5; .5 SOLUTION RESPIRATORY (INHALATION) at 12:30

## 2022-10-27 RX ADMIN — IPRATROPIUM BROMIDE AND ALBUTEROL SULFATE 1 AMPULE: 2.5; .5 SOLUTION RESPIRATORY (INHALATION) at 16:35

## 2022-10-27 RX ADMIN — FUROSEMIDE 80 MG: 10 INJECTION, SOLUTION INTRAMUSCULAR; INTRAVENOUS at 09:48

## 2022-10-27 RX ADMIN — ATORVASTATIN CALCIUM 20 MG: 10 TABLET, FILM COATED ORAL at 20:15

## 2022-10-27 RX ADMIN — FUROSEMIDE 80 MG: 10 INJECTION, SOLUTION INTRAMUSCULAR; INTRAVENOUS at 14:46

## 2022-10-27 RX ADMIN — SPIRONOLACTONE 12.5 MG: 25 TABLET ORAL at 10:11

## 2022-10-27 RX ADMIN — IPRATROPIUM BROMIDE AND ALBUTEROL SULFATE 1 AMPULE: 2.5; .5 SOLUTION RESPIRATORY (INHALATION) at 08:36

## 2022-10-27 RX ADMIN — GUAIFENESIN 600 MG: 600 TABLET, EXTENDED RELEASE ORAL at 09:48

## 2022-10-27 RX ADMIN — METHYLPREDNISOLONE SODIUM SUCCINATE 40 MG: 40 INJECTION, POWDER, FOR SOLUTION INTRAMUSCULAR; INTRAVENOUS at 05:05

## 2022-10-27 RX ADMIN — SPIRONOLACTONE 12.5 MG: 25 TABLET, FILM COATED ORAL at 09:48

## 2022-10-27 RX ADMIN — DOXYCYCLINE HYCLATE 100 MG: 100 TABLET, COATED ORAL at 20:15

## 2022-10-27 RX ADMIN — SODIUM CHLORIDE, PRESERVATIVE FREE 10 ML: 5 INJECTION INTRAVENOUS at 09:56

## 2022-10-27 ASSESSMENT — PAIN SCALES - GENERAL
PAINLEVEL_OUTOF10: 0
PAINLEVEL_OUTOF10: 0

## 2022-10-27 NOTE — PROGRESS NOTES
INPATIENT PULMONARY CRITICAL CARE PROGRESS NOTE      Reason for visit    SOB, severe pulmonary hypertension on [facility], assistance with workup and follow up. SUBJECTIVE: Patient when seen this morning was better as compared to yesterday, patient was not having that much shortness of breath or wheezing, patient was not have any chest pain or palpitations, patient was afebrile and he medically maintained, patient was on 2 L of nasal cannula oxygen was saturating 90% when seen, patient's blood sugars are not optimally controlled, patient has sinus rhythm on the monitor, patient's blood pressure fluctuates, patient has had good urine output overnight with cumulative fluid balance of -11.9 L, patient was alert and communicative, no other pertinent review of system of concern         Physical Exam:  Blood pressure (!) 117/94, pulse 88, temperature 97.3 °F (36.3 °C), temperature source Oral, resp. rate 18, height 6' (1.829 m), weight 232 lb 8 oz (105.5 kg), SpO2 92 %.'     Constitutional:  No acute distress. HENT:  Oropharynx is clear and moist. No thyromegaly. Eyes:  Conjunctivae are normal. Pupils equal, round, and reactive to light. No scleral icterus. Neck: . No tracheal deviation present. No obvious thyroid mass. Cardiovascular: Normal rate, regular rhythm, LLSB murmur   No right ventricular heave. 1-+ lower extremity edema. Pulmonary/Chest: Decreased scattered wheezes. Decreased bilateral rales. Some chest congestion chest wall is not dull to percussion. No accessory muscle usage or stridor. Decreased breath sound intensity with prolonged expiration  Abdominal: Soft. Bowel sounds present. No distension or hernia. No tenderness. Musculoskeletal: No cyanosis. No clubbing. No obvious joint deformity. Lymphadenopathy: No cervical or supraclavicular adenopathy. Skin: Skin is warm and dry. No rash or nodules on the exposed extremities. Psychiatric: Normal mood and affect. Behavior is normal.  No anxiety. Neurologic: Alert, awake and oriented. PERRL. Speech fluent        Results:  CBC:   Recent Labs     10/24/22  1308   WBC 8.8   HGB 14.5   HCT 45.1   MCV 97.8          BMP:   Recent Labs     10/25/22  0808 10/26/22  0657    137   K 4.6 4.9   CL 98* 95*   CO2 31 33*   BUN 16 20   CREATININE 0.8* 0.8*         PT/INR:   Recent Labs     10/24/22  1308   PROTIME 16.2*   INR 1.31*         Imaging:  I have reviewed radiology images personally. NM Cardiac Stress Test Nuclear Imaging   Final Result      CT CARDIAC CALCIUM SCORING   Final Result   Total Agatston calcium score of 40. This is in the 40th percentile      Findings of fluid overload denoted by small right-sided pleural effusion,   body wall anasarca, and mild abdominopelvic ascites. Calcium Score Interpretation      0  No identifiable atherosclerotic plaque. Very low cardiovascular disease   risk. Less than 5% chance of presence of coronary artery disease. A   negative examination. 1-10 Minimal plaque burden. Significant coronary artery disease very   unlikely.  Mild plaque burden. Likely mild or minimal coronary stenosis. 101-400 Moderate plaque burden. Moderate non-obstructive coronary artery   disease highly likely. Over 400 Extensive plaque burden. High likelihood of at least one   significant coronary artery stenosis (>50% diameter). CALCIUM SCORING OVERVIEW:      Coronary calcium is a marker for plaque in a blood vessel or atherosclerosis   (hardening of the arteries). The presence and amount of calcium detected in   the coronary artery by the CT scan estimates the presence and amount of   atherosclerotic plaque. These calcium deposits can appear years before the   development of heart disease symptoms such as chest pain and shortness of   breath. A calcium score is computed for each of the coronary arteries based upon the   volume and density of the calcium deposits.   This can be referred to as your   calcified plaque burden. It does not correspond directly to the percentage   of narrowing in the artery, but does correlate with the severity of the   overall coronary atherosclerotic burden. This score is then used to determine the calcium percentile which compares   your calcified plaque burden to that of other asymptomatic men and women of   the same age. The calcium score, in combination with the percentile, enables   your physician to determine your risk of developing symptomatic coronary   artery disease, and to measure the progression of disease as well as the   effectiveness of treatment. A score of zero indicates that there is no calcified plaque burden. This   implies that there is no significant coronary artery narrowing and very low   likelihood of a cardiac event over at least the next 3 years. It does not   absolutely rule out the presence of soft, noncalcified plaque or totally   eliminate the possibility of a cardiac event. A score greater than zero indicates at least some coronary artery disease. As the score increases, so does the likelihood of a significant coronary   narrowing and the likelihood of a coronary event over the next 3 years. Similarly, the likelihood of a coronary event increases with increasing   calcium percentiles. XR CHEST (2 VW)   Final Result   No radiographic evidence of acute pulmonary disease. PFT 9/2/2021:  FVC 3.29 L, 63% predicted, FEV1 1.33 L, 33% predicted with ratio 40%.   Lung volumes showed air trapping, diffusion capacity was normal    Assessment:  Principal Problem:    Pitting edema  Active Problems:    DM (diabetes mellitus) ([address]    Acute respiratory failure with hypoxia (HCC)    Pulmonary HTN (HCC)    Suspected sleep apnea    Former smoker    Enlarged RV (right ventricle)    Severe tricuspid regurgitation    Chest congestion    Uncontrolled type 2 diabetes mellitus with hyperglycemia (HCC)    Class 1 obesity in adult    COPD exacerbation (HCC)    Cor pulmonale (HCC)    Essential hypertension    CHF (congestive heart failure) ([facility])    Stage 3 severe COPD by GOLD classification ([address]  Resolved Problems:    * No resolved hospital problems.  *          Plan:   Oxygen supplementation to keep saturation between 90 and 94% only  Please titrate the oxygen as per the above parameters  Patient was still on 2 L of nasal can oxygen with saturation of 90% when seen  Pulmonary toilet  Bronchodilators  Mucinex to continue  IV Solu-Medrol to continue  Empiric doxycycline started on the patient  IV Lasix and Aldactone as per cardiology continue  Monitor input output and BMP  Correct electrolytes on whenever necessary basis  The patient continues to have increased chest congestion not improving with conservative management will contemplate bronchoscopy  Blood glucose monitoring with sliding scale insulin  Clinically patient appears to have LALA and will benefit from a sleep study as an outpatient  Patient has only been taking albuterol at home but patient's PFT from last year says that patient has severe COPD and may require LABA plus LAMA plus ICS combination at some point  Diet and lifestyle modifications  Patient needs to lose weight  Patient to remain abstinent from smoking  Patient does have history of alcohol use-monitor for any alcohol withdrawal symptoms  Patient's blood sugars are not optimally controlled and management as per IM  Patient's TSH in the past was normal  Patient's echocardiogram results were reviewed  Please make sure that patient's flu shot is up-to-date prior to discharge from the hospital  PUD and DVT prophylaxis as per IM  PT/OT            Electronically signed by:  Sean Foster MD    10/27/2022    8:13 AM.

## 2022-10-27 NOTE — PROGRESS NOTES
[facility]   Daily Progress Note    Admit Date:  10/21/2022  HPI:    Chief Complaint   Patient presents with    Edema     Pt reports BLE edema and ascites x \"few weeks\". Pt reports COPD and CHF, on lasix 80mg BID. Pt reports that he called his cardiologist which told him to come to ED>         Interval history: Rima Morales is being followed for Edema and CHF. Hx of HFpEF, HTN, DM, severe COPD. Echo on admission showed RV failure and LVEF 40%, volume overload. He had abnormal stress testing which prompted C on 10/24/22 showed mild non-obstructive CAD. Weight at 8/2022 appt was 217lb  Weight prior to admission 238lbs    Subjective:  Mr. Dionne Queen remains on oxygen. Still coughing not productive. Abd still tight and full. Only chest pain with coughing. Objective:   BP (!) 117/94   Pulse 88   Temp 97.3 °F (36.3 °C) (Oral)   Resp 18   Ht 6' (1.829 m)   Wt 232 lb 8 oz (105.5 kg)   SpO2 92%   BMI 31.53 kg/m²     Intake/Output Summary (Last 24 hours) at 10/27/2022 0755  Last data filed at 10/27/2022 7489  Gross per 24 hour   Intake 1260 ml   Output 4200 ml   Net -2940 ml       NYHA: IV    Physical Exam:  General:  Awake, alert, NAD, on NC  Skin:  Warm and dry  Neck:  JVD to the earlobe   Chest:  less wheezing and rhonchi to auscultation throughout today   Telemetry: NSR   Cardiovascular:  RRR S1S2, no m/r/g distant heart sounds  Abdomen:  hard and tight skin noted. Extremities:  ++ bilateral lower extremity edema up to the thighs- skin is very tight.      Medications:    guaiFENesin  600 mg Oral BID    doxycycline hyclate  100 mg Oral 2 times per day    furosemide  80 mg IntraVENous TID    methylPREDNISolone  40 mg IntraVENous Q8H    losartan  25 mg Oral Daily    spironolactone  12.5 mg Oral Daily    sodium chloride flush  5-40 mL IntraVENous 2 times per day    ipratropium-albuterol  1 ampule Inhalation Q4H WA    aspirin  81 mg Oral Daily    atorvastatin  20 mg Oral Nightly    sodium chloride flush  5-40 mL IntraVENous 2 times per day    enoxaparin  40 mg SubCUTAneous Daily    insulin lispro  0-8 Units SubCUTAneous TID WC    insulin lispro  0-4 Units SubCUTAneous Nightly      sodium chloride      sodium chloride      dextrose         Lab Data:  CBC:   Recent Labs     10/24/22  1308   WBC 8.8   HGB 14.5        BMP:    Recent Labs     10/24/22  0759 10/25/22  0808 10/26/22  0657    138 137   K 4.5 4.6 4.9   CO2 32 31 33*   BUN 20 16 20   CREATININE 0.9 0.8* 0.8*     INR:    Recent Labs     10/24/22  1308   INR 1.31*     BNP:    Recent Labs     10/24/22  0759   PROBNP 991*     Lab Results   Component Value Date    LVEF 53 08/06/2021       Testing:    Echo: 8/5/2021  Summary   Left ventricular systolic function is low normal with a visually estimated   ejection fraction of 50-55%. EF estimated by 3D at 52 %. The left ventricle is normal in size with normal wall thickness. Flattened in diastole and systole (\"D-shaped septum\") consistent with right   ventricular volume and pressure overload. Normal left ventricular diastolic function. The right ventricle is severely dilated. Right ventricular systolic function is reduced. The right atrium is severely enlarged. Mild eccentric tricuspid regurgitation. Systolic pulmonary artery pressure (SPAP) is elevated and estimated at 56   mmHg (right atrial pressure 15 mmHg) consistent with moderate pulmonary   hypertension. The IVC is dilated in size (>2.1 cm) and collapses <50% with respiration   consistent with markedly elevated right atrial pressures (15 mmHg) . Echo 7/29/22    Summary   Limited echo for left ventricular function and pulmonary pressures      Left ventricular cavity size is normal.   There is mild concentric left ventricular hypertrophy. Left ventricular function appears to be normal with an estimated ejection   fraction of 55%.    There is systolic and diastolic septal flattening consistent with right ventricular pressure and volume overload. The right ventricle is enlarged and hypokinetic. Severe tricuspid regurgitation. The right atrium is severely dilated. Estimated pulmonary artery systolic pressure is at 74 mmHg assuming a right   atrial pressure of 15 mmHg. There is a small pericardial effusion noted without any hemodynamic   implications. Cardiac calcium CT 10/24/22  TECHNIQUE:   CT of the heart was obtained without intravenous contrast.  Calcium scoring   analysis was performed using a separate  workstation. Dose modulation,   iterative reconstruction, and/or weight based adjustment of the mA/kV was   utilized to reduce the radiation dose to as low as reasonably achievable. FINDINGS:   LEFT MAIN: 23       LEFT ANTERIOR DESCENDING: 10       CIRCUMFLEX: 7       RIGHT CORONARY ARTERY: Zero (0). TOTAL AGATSTON CALCIUM SCORE: 36       EXTRACARDIAC STRUCTURES: Trace aortic valve and aortic annulus calcification   is seen. No aortic aneurysm. Ascending aorta measures 3.5 cm. Small   pericardial effusion is seen. No pericardial calcification. Small hiatal   hernia is seen. There is nonspecific thickening at the GE junction. Small mediastinal nodes are noted, likely reactive. A few calcified   mediastinal nodes are also seen. Respiratory motion artifact limits evaluation of pulmonary parenchymal   change. Scattered areas of bronchial wall thickening are seen. Small   right-sided pleural effusion is seen. There is adjacent consolidation at the   right lung base. .  This pleural effusion is slightly increased in size   compared to chest CT 08/05/2021       There is body wall anasarca. There is mild ascites seen in the upper   abdomen, partially imaged. Spurring is seen in the spine           Echo 10/24/22   Summary   Limited echo for LV/RV function & PHTN with limited doppler/no color.    Global left ventricular systolic function is moderately decreased with ejection fraction estimated at 40%. Normal left ventricle size and wall thickness. There is diastolic septal flattening (\"D-shaped\" septum) consistent with   right ventricular volume overload. Diastolic dysfunction grade and filling pressure are indeterminate. The right ventricle is severely enlarged in size with reduced function. The right atrium is moderately dilated. The tricuspid valve is normal in structure with tenting of the valve   leaflets. Moderate-severe tricuspid valve regurgitation. Systolic pulmonary artery pressure (SPAP) estimated at 70 mmHg (RA pressure   15 mmHg), consistent with severe pulmonary hypertension. Stress test 10/24/22   Summary    Normal LVEF 60%    Normal wall motion    Moderate inferior defect noted, suggestive of ischemia, less likely    attenuation artifact    Summed stress scores may underestimate perfusion defects        Overall, this would be considered an abnormal, intermediate risk, study       Procedures Performed: 10/24/22  1. Left heart catheterization  2. Selective left and right coronary angiogram  3. Left ventriculography   5. Right heart catheterization     Procedure Findings:  1. Mild non-obstructive coronary artery disease. 2. Normal left ventricular function with EF estimated at 55-60%  3. Normal left heart hemodynamics  4. Severe pulmonary hypertension    Findings:     1. Left main coronary artery was normal. It gave off the left anterior descending artery and left circumflex. 2. Left anterior descending artery has mild atherosclerotic disease. It was moderate in size. It gave off septal perforators and a moderate sized diagonal branch. The LAD covered the entire apex of the left ventricle. 3. Left circumflex has mild atherosclerotic disease. It was moderate in size. There was a moderate sized obtuse marginal branch. 4. Right coronary artery has mild atherosclerotic disease. It was moderate in size and was the dominant artery. 5. Left ventriculogram showed normal LVEF at 55-60%. Wall motion was normal . There was no significant mitral valve or aortic valve disease noted. LVEDP was normal. There was no gradient noted across the aortic valve during pullback of the catheter. 6.  Right heart catheterization was performed. Right atrial pressure of 25  RV pressure 70/13  PA pressure of 78/37 with a mean of 50  Pulmonary artery wedge pressure mean of 12  Cardiac output of 5.32  Cardiac index 2.33  Aortic saturation 88%  PA saturation 59%  SVR of 917       10/26/22 0445 233 lb 1.6 oz (105.7 kg) Standing scale; Actual     10/25/22 0440 235 lb 12.8 oz (107 kg) Actual;Standing scale     10/24/22 0613 236 lb 12.8 oz (107.4 kg) Standing scale     10/23/22 0609 238 lb 9.6 oz (108.2 kg) Standing scale     10/22/22 0556 238 lb 9.6 oz (108.2 kg) Standing scale     10/21/22 1419 232 lb (105.2 kg) Stated           Principal Problem:    Pitting edema  Active Problems:    DM (diabetes mellitus) ([facility])    Acute respiratory failure with hypoxia ([facility])    Pulmonary HTN ([address]    Suspected sleep apnea    Former smoker    Enlarged RV (right ventricle)    Severe tricuspid regurgitation    Chest congestion    Uncontrolled type 2 diabetes mellitus with hyperglycemia ([facility])    Class 1 obesity in adult    COPD exacerbation ([facility])    Cor pulmonale ([facility])    Essential hypertension    CHF (congestive heart failure) ([facility])    Stage 3 severe COPD by GOLD classification ([address]  Resolved Problems:    * No resolved hospital problems.  *      Assessment:  Acute on chronic HFpEF with Right heart failure (cor pulmonale)   - volume overloaded   -baseline weight 217lbs   - admission weight 238lbs  Severe pulmonary HTN, who group 3  Severe TR  HTN  Very severe COPD  Acute respiratory failure due to pleural effusion, CHF and COPD  DM   HLD      Impression/Plan:  Daily weights, strict I/O's  Continue  lasix to 80mg IV TID and adjust  Spironolactone (aldactone) 25 mg daily- he is having good diuretic response to the IV lasix   (net negative 2.9L last 24 hours, net negative 10.4L this admission )  Will consider adding SGLT2 pending renal function  losartan to 25mg to allow for b/p room to increase diuretics; will plan to change to entresto at discharge    Dicsussed with DR. Liz Davis on 10/25/22- will plan on repeat RHC once diuresed; will plan for RHC on Monday as will need several more days of diuresis     Education provided today Disease process and medications discussed. Questions answered fully.   Total Time spent educating today 10 minutes     SANCHEZ Alfred - RICHARD,  10/27/2022, 10:52 AM

## 2022-10-27 NOTE — PROGRESS NOTES
Hospitalist Progress Note      PCP: Becky Prado DO    Date of Admission: 10/21/2022    Chief Complaint: Pitting edema and urinary retention     Hospital Course: reviewed        Subjective: resting in bed, wants to get up and walk around, no cp/sob complaints currently       Medications:  Reviewed    Infusion Medications    sodium chloride      sodium chloride      dextrose       Scheduled Medications    [START ON 10/28/2022] spironolactone  25 mg Oral Daily    guaiFENesin  600 mg Oral BID    doxycycline hyclate  100 mg Oral 2 times per day    furosemide  80 mg IntraVENous TID    methylPREDNISolone  40 mg IntraVENous Q8H    losartan  25 mg Oral Daily    sodium chloride flush  5-40 mL IntraVENous 2 times per day    ipratropium-albuterol  1 ampule Inhalation Q4H WA    aspirin  81 mg Oral Daily    atorvastatin  20 mg Oral Nightly    sodium chloride flush  5-40 mL IntraVENous 2 times per day    enoxaparin  40 mg SubCUTAneous Daily    insulin lispro  0-8 Units SubCUTAneous TID WC    insulin lispro  0-4 Units SubCUTAneous Nightly     PRN Meds: sodium chloride flush, sodium chloride, hydrALAZINE, albuterol sulfate HFA **AND** [DISCONTINUED] ipratropium, perflutren lipid microspheres, lidocaine, albuterol sulfate HFA, sodium chloride flush, sodium chloride, ondansetron **OR** ondansetron, polyethylene glycol, acetaminophen **OR** acetaminophen, glucose, dextrose bolus **OR** dextrose bolus, glucagon (rDNA), dextrose      Intake/Output Summary (Last 24 hours) at 10/27/2022 1043  Last data filed at 10/27/2022 1013  Gross per 24 hour   Intake 1140 ml   Output 4425 ml   Net -3285 ml       Physical Exam Performed:    /89   Pulse 87   Temp 97.7 °F (36.5 °C) (Oral)   Resp 20   Ht 6' (1.829 m)   Wt 232 lb 8 oz (105.5 kg)   SpO2 90%   BMI 31.53 kg/m²   General appearance: No apparent distress, appears stated age and cooperative. HEENT: Pupils equal, round, and reactive to light.  Conjunctivae/corneas clear.  Neck: Supple, with full range of motion. No jugular venous distention. Trachea midline. Respiratory:  Normal respiratory effort. Clear to auscultation, bilaterally without Rales/Wheezes/Rhonchi. Cardiovascular: Regular rate and rhythm with normal S1/S2 without murmurs, rubs or gallops. Abdomen: Soft, non-tender, non-distended with normal bowel sounds. Tightness noted in lower abd  Musculoskeletal: No clubbing, cyanosis or edema bilaterally. Full range of motion without deformity. Skin: Skin color, texture, turgor normal.  No rashes or lesions. Neurologic:  Neurovascularly intact without any focal sensory/motor deficits. Cranial nerves: II-XII intact, grossly non-focal.  Psychiatric: Alert and oriented, thought content appropriate, normal insight  Capillary Refill: Brisk, 3 seconds, normal   Peripheral Pulses: +2 palpable, equal bilaterally          Labs:   Recent Labs     10/24/22  1308   WBC 8.8   HGB 14.5   HCT 45.1        Recent Labs     10/25/22  0808 10/26/22  0657 10/27/22  0640    137 137   K 4.6 4.9 4.4   CL 98* 95* 95*   CO2 31 33* 29   BUN 16 20 28*   CREATININE 0.8* 0.8* 0.7*   CALCIUM 8.9 9.8 9.5     No results for input(s): AST, ALT, BILIDIR, BILITOT, ALKPHOS in the last 72 hours. Recent Labs     10/24/22  1308   INR 1.31*     No results for input(s): [place] Bounds in the last 72 hours. Urinalysis:      Lab Results   Component Value Date/Time    NITRU Negative 07/17/2022 06:27 PM    BLOODU Negative 07/17/2022 06:27 PM    SPECGRAV 1.010 07/17/2022 06:27 PM    GLUCOSEU Negative 07/17/2022 06:27 PM       Radiology:  NM Cardiac Stress Test Nuclear Imaging   Final Result      CT CARDIAC CALCIUM SCORING   Final Result   Total Agatston calcium score of 40. This is in the 40th percentile      Findings of fluid overload denoted by small right-sided pleural effusion,   body wall anasarca, and mild abdominopelvic ascites.          Calcium Score Interpretation      0  No identifiable atherosclerotic plaque. Very low cardiovascular disease   risk. Less than 5% chance of presence of coronary artery disease. A   negative examination. 1-10 Minimal plaque burden. Significant coronary artery disease very   unlikely.  Mild plaque burden. Likely mild or minimal coronary stenosis. 101-400 Moderate plaque burden. Moderate non-obstructive coronary artery   disease highly likely. Over 400 Extensive plaque burden. High likelihood of at least one   significant coronary artery stenosis (>50% diameter). CALCIUM SCORING OVERVIEW:      Coronary calcium is a marker for plaque in a blood vessel or atherosclerosis   (hardening of the arteries). The presence and amount of calcium detected in   the coronary artery by the CT scan estimates the presence and amount of   atherosclerotic plaque. These calcium deposits can appear years before the   development of heart disease symptoms such as chest pain and shortness of   breath. A calcium score is computed for each of the coronary arteries based upon the   volume and density of the calcium deposits. This can be referred to as your   calcified plaque burden. It does not correspond directly to the percentage   of narrowing in the artery, but does correlate with the severity of the   overall coronary atherosclerotic burden. This score is then used to determine the calcium percentile which compares   your calcified plaque burden to that of other asymptomatic men and women of   the same age. The calcium score, in combination with the percentile, enables   your physician to determine your risk of developing symptomatic coronary   artery disease, and to measure the progression of disease as well as the   effectiveness of treatment. A score of zero indicates that there is no calcified plaque burden.  This   implies that there is no significant coronary artery narrowing and very low   likelihood of a cardiac event over at least the next 3 years. It does not   absolutely rule out the presence of soft, noncalcified plaque or totally   eliminate the possibility of a cardiac event. A score greater than zero indicates at least some coronary artery disease. As the score increases, so does the likelihood of a significant coronary   narrowing and the likelihood of a coronary event over the next 3 years. Similarly, the likelihood of a coronary event increases with increasing   calcium percentiles. XR CHEST (2 VW)   Final Result   No radiographic evidence of acute pulmonary disease. Assessment/Plan:    Active Hospital Problems    Diagnosis     Enlarged RV (right ventricle) [I51.7]      Priority: Medium    Severe tricuspid regurgitation [I07.1]      Priority: Medium    Chest congestion [R09.89]      Priority: Medium    Uncontrolled type 2 diabetes mellitus with hyperglycemia ([facility] [E11.65]      Priority: Medium    Class 1 obesity in adult [E66.9]      Priority: Medium    COPD exacerbation ([address] [J44.1]      Priority: Medium    Cor pulmonale ([facility] [I27.81]      Priority: Medium    Acute respiratory failure with hypoxia (HCC) [J96.01]      Priority: Medium    Pulmonary HTN ([facility] [I27.20]      Priority: Medium    Suspected sleep apnea [R29.818]      Priority: Medium    Former smoker [Z87.891]      Priority: Medium    DM (diabetes mellitus) ([address] [E11.9]      Priority: Medium    Pitting edema [R60.9]      Priority: Medium    Stage 3 severe COPD by GOLD classification ([facility] [J44.9]     CHF (congestive heart failure) (HCC) [I50.9]     Essential hypertension [I10]        Acute diastolic Heart failure - with rt heart failure(cor pulmonale) with bilateral lower extremity edema and urinary retention in setting of known pulmonary hypertension PASP 74 mmHg and EF 55% by echo (July 2022). Continued IV diuresis per cardiology, goal weight appears to be around 185 pounds.   Ischemia evaluation with [employer] nuclear stress test and CT calcium score were abn  -s/p right and left heart catheterization 10/24, noted severe pulm htn, mild nonobstructive CAD, preserved EF  -echo done(ef 40%, rv severe enlarged, severe TR, severe PH)   -repeat RHC planned 10/28     Acute respiratory failure requiring supplemental oxygen. Known history of COPD but not on home oxygen. Uses inhalers at home and getting relief from Duonebs. Echocardiogram in 2021 as well as 2022 which showed normal left ventricular function however there was right-sided enlargement and significant pulmonary hypertension noted. -on iv steroids   -pulm consulted, apprec recs, empiric doxycycline started 10/26     Type 2 Diabetes (HgbA1c 7.0% on 8/12/22). Metformin held on admission. Cover with a \"sliding scale\" lispro moderate scale prandial correction insulin. Primary hypertension - on losartan 50 mg daily. Dyslipidemia on atorvastatin 20 mg nightly. Class I obesity (BMI 32.4). DVT Prophylaxis: lovenox  Diet: ADULT DIET;  Regular  Code Status: Full Code    PT/OT Eval Status: not ordered     Dispo - continue diuresis, ?by Monday, pending RHC 10/31    Appropriate for A1 Discharge Unit: Tiana Bell MD

## 2022-10-27 NOTE — PLAN OF CARE
Problem: Pain  Goal: Verbalizes/displays adequate comfort level or baseline comfort level  Outcome: Progressing     Problem: Safety - Adult  Goal: Free from fall injury  Outcome: Progressing     Problem: Chronic Conditions and Co-morbidities  Goal: Patient's chronic conditions and co-morbidity symptoms are monitored and maintained or improved  10/27/2022 1147 by Steffen Salguero RN  Outcome: Progressing  Note:   Patient's EF (Ejection Fraction) is 40%    Heart Failure Medications:  Diuretics[de-identified] Furosemide and Spironolactone    (One of the following REQUIRED for EF </= 40%/SYSTOLIC FAILURE but MAY be used in EF% >40%/DIASTOLIC FAILURE)        ACE[de-identified] None        ARB[de-identified] Losartan         ARNI[de-identified] None    (Beta Blockers)  NON- Evidenced Based Beta Blocker (for EF% >40%/DIASTOLIC FAILURE): None    Evidenced Based Beta Blocker::(REQUIRED for EF% <40%/SYSTOLIC FAILURE) None  . .................................................................................................................................................. Patient's weights and intake/output reviewed: Yes    Patient's Last Weight: 232 lbs obtained by standing scale. Difference of 1 lbs less than last documented weight. Intake/Output Summary (Last 24 hours) at 10/27/2022 1147  Last data filed at 10/27/2022 1013  Gross per 24 hour   Intake 1140 ml   Output 3025 ml   Net -1885 ml       Education Booklet Provided: yes    Comorbidities Reviewed Yes    Patient has a past medical history of Acute systolic congestive heart failure ([address] Diabetes mellitus ([address] Essential hypertension, and Thrombocytopenia ([address] >>For CHF and Comorbidity documentation on Education Time and Topics, please see Education Tab    Progressive Mobility Assessment:  What is this patient's Current Level of Mobility?: Ambulatory-Up Ad Maria Dolores  How was this patient Mobilized today?: Edge of Bed,  Up to Toilet/Shower, and Up in Room, ambulated 15 ft                 With Whom?  Self Level of Difficulty/Assistance: Independent     Pt resting in bed at this time on  2 L O2. Pt denies shortness of breath. Pt with pitting lower extremity edema. Patient and/or Family's stated Goal of Care this Admission: reduce shortness of breath, increase activity tolerance, better understand heart failure and disease management, be more comfortable, and reduce lower extremity edema prior to discharge        :     Pt will have accuchecks before meals and at bedtime with sliding scale insulin in place for coverage. Will continue to monitor for signs and symptoms of hypoglycemia and hyperglycemia throughout shift.

## 2022-10-27 NOTE — PLAN OF CARE
Problem: Chronic Conditions and Co-morbidities  Goal: Patient's chronic conditions and co-morbidity symptoms are monitored and maintained or improved  Outcome: Progressing     Patient's EF (Ejection Fraction) is  40%    Heart Failure Medications:  Diuretics[de-identified] Furosemide    (One of the following REQUIRED for EF </= 40%/SYSTOLIC FAILURE but MAY be used in EF% >40%/DIASTOLIC FAILURE)        ACE[de-identified] None        ARB[de-identified] Losartan         ARNI[de-identified] None    (Beta Blockers)  NON- Evidenced Based Beta Blocker (for EF% >40%/DIASTOLIC FAILURE): None    Evidenced Based Beta Blocker::(REQUIRED for EF% <40%/SYSTOLIC FAILURE) None  . .................................................................................................................................................. Patient's weights and intake/output reviewed: Yes    Patient's Last Weight: 232 lbs obtained by standing scale. Difference of 1 lbs less than last documented weight. Intake/Output Summary (Last 24 hours) at 10/27/2022 0601  Last data filed at 10/27/2022 1316  Gross per 24 hour   Intake 1260 ml   Output 4200 ml   Net -2940 ml       Education Booklet Provided: yes    Comorbidities Reviewed Yes    Patient has a past medical history of Acute systolic congestive heart failure ([address] Diabetes mellitus ([address] Essential hypertension, and Thrombocytopenia ([address] >>For CHF and Comorbidity documentation on Education Time and Topics, please see Education Tab    Progressive Mobility Assessment:  What is this patient's Current Level of Mobility?: Ambulatory-Up Ad Maria Dolores  How was this patient Mobilized today?: Edge of Bed, Up to Chair,  Up to Toilet/Shower, Up in Room, and Up in Hallway, ambulated 10 ft                 With Whom? Nurse, PCA, and Self                 Level of Difficulty/Assistance: Independent     Pt resting in bed at this time on  2 L O2. Pt denies shortness of breath. Pt with pitting lower extremity edema.      Patient and/or Family's stated Goal of Care this Admission: reduce shortness of breath, increase activity tolerance, better understand heart failure and disease management, be more comfortable, and reduce lower extremity edema prior to discharge        :

## 2022-10-28 ENCOUNTER — TELEPHONE (OUTPATIENT)
Dept: PULMONOLOGY | Age: 57
End: 2022-10-28

## 2022-10-28 PROBLEM — R60.1 ANASARCA: Status: ACTIVE | Noted: 2022-10-28

## 2022-10-28 LAB
ANION GAP SERPL CALCULATED.3IONS-SCNC: 12 MMOL/L (ref 3–16)
BUN BLDV-MCNC: 34 MG/DL (ref 7–20)
CALCIUM SERPL-MCNC: 9.6 MG/DL (ref 8.3–10.6)
CHLORIDE BLD-SCNC: 93 MMOL/L (ref 99–110)
CO2: 31 MMOL/L (ref 21–32)
CREAT SERPL-MCNC: 0.9 MG/DL (ref 0.9–1.3)
GFR SERPL CREATININE-BSD FRML MDRD: >60 ML/MIN/{1.73_M2}
GLUCOSE BLD-MCNC: 151 MG/DL (ref 70–99)
GLUCOSE BLD-MCNC: 176 MG/DL (ref 70–99)
GLUCOSE BLD-MCNC: 192 MG/DL (ref 70–99)
GLUCOSE BLD-MCNC: 194 MG/DL (ref 70–99)
GLUCOSE BLD-MCNC: 238 MG/DL (ref 70–99)
PERFORMED ON: ABNORMAL
POTASSIUM SERPL-SCNC: 4.6 MMOL/L (ref 3.5–5.1)
SODIUM BLD-SCNC: 136 MMOL/L (ref 136–145)

## 2022-10-28 PROCEDURE — 6370000000 HC RX 637 (ALT 250 FOR IP): Performed by: INTERNAL MEDICINE

## 2022-10-28 PROCEDURE — 94640 AIRWAY INHALATION TREATMENT: CPT

## 2022-10-28 PROCEDURE — 99233 SBSQ HOSP IP/OBS HIGH 50: CPT | Performed by: INTERNAL MEDICINE

## 2022-10-28 PROCEDURE — 99232 SBSQ HOSP IP/OBS MODERATE 35: CPT | Performed by: INTERNAL MEDICINE

## 2022-10-28 PROCEDURE — 6360000002 HC RX W HCPCS: Performed by: NURSE PRACTITIONER

## 2022-10-28 PROCEDURE — 1200000000 HC SEMI PRIVATE

## 2022-10-28 PROCEDURE — 2700000000 HC OXYGEN THERAPY PER DAY

## 2022-10-28 PROCEDURE — 6360000002 HC RX W HCPCS: Performed by: INTERNAL MEDICINE

## 2022-10-28 PROCEDURE — 2580000003 HC RX 258: Performed by: INTERNAL MEDICINE

## 2022-10-28 PROCEDURE — 94761 N-INVAS EAR/PLS OXIMETRY MLT: CPT

## 2022-10-28 PROCEDURE — 36415 COLL VENOUS BLD VENIPUNCTURE: CPT

## 2022-10-28 PROCEDURE — 6370000000 HC RX 637 (ALT 250 FOR IP): Performed by: NURSE PRACTITIONER

## 2022-10-28 PROCEDURE — 80048 BASIC METABOLIC PNL TOTAL CA: CPT

## 2022-10-28 PROCEDURE — 94669 MECHANICAL CHEST WALL OSCILL: CPT

## 2022-10-28 RX ORDER — PREDNISONE 20 MG/1
40 TABLET ORAL DAILY
Status: DISCONTINUED | OUTPATIENT
Start: 2022-10-28 | End: 2022-11-03 | Stop reason: HOSPADM

## 2022-10-28 RX ADMIN — GUAIFENESIN 600 MG: 600 TABLET, EXTENDED RELEASE ORAL at 21:16

## 2022-10-28 RX ADMIN — IPRATROPIUM BROMIDE AND ALBUTEROL SULFATE 1 AMPULE: 2.5; .5 SOLUTION RESPIRATORY (INHALATION) at 11:55

## 2022-10-28 RX ADMIN — DOXYCYCLINE HYCLATE 100 MG: 100 TABLET, COATED ORAL at 09:45

## 2022-10-28 RX ADMIN — PREDNISONE 40 MG: 20 TABLET ORAL at 15:41

## 2022-10-28 RX ADMIN — FUROSEMIDE 80 MG: 10 INJECTION, SOLUTION INTRAMUSCULAR; INTRAVENOUS at 11:04

## 2022-10-28 RX ADMIN — FUROSEMIDE 80 MG: 10 INJECTION, SOLUTION INTRAMUSCULAR; INTRAVENOUS at 15:41

## 2022-10-28 RX ADMIN — SODIUM CHLORIDE, PRESERVATIVE FREE 10 ML: 5 INJECTION INTRAVENOUS at 09:46

## 2022-10-28 RX ADMIN — IPRATROPIUM BROMIDE AND ALBUTEROL SULFATE 1 AMPULE: 2.5; .5 SOLUTION RESPIRATORY (INHALATION) at 08:38

## 2022-10-28 RX ADMIN — DOXYCYCLINE HYCLATE 100 MG: 100 TABLET, COATED ORAL at 21:16

## 2022-10-28 RX ADMIN — LOSARTAN POTASSIUM 25 MG: 25 TABLET, FILM COATED ORAL at 09:45

## 2022-10-28 RX ADMIN — ATORVASTATIN CALCIUM 20 MG: 10 TABLET, FILM COATED ORAL at 21:16

## 2022-10-28 RX ADMIN — SPIRONOLACTONE 25 MG: 25 TABLET ORAL at 11:04

## 2022-10-28 RX ADMIN — FUROSEMIDE 80 MG: 10 INJECTION, SOLUTION INTRAMUSCULAR; INTRAVENOUS at 21:16

## 2022-10-28 RX ADMIN — ENOXAPARIN SODIUM 40 MG: 100 INJECTION SUBCUTANEOUS at 09:45

## 2022-10-28 RX ADMIN — IPRATROPIUM BROMIDE AND ALBUTEROL SULFATE 1 AMPULE: 2.5; .5 SOLUTION RESPIRATORY (INHALATION) at 16:02

## 2022-10-28 RX ADMIN — GUAIFENESIN 600 MG: 600 TABLET, EXTENDED RELEASE ORAL at 09:45

## 2022-10-28 RX ADMIN — IPRATROPIUM BROMIDE AND ALBUTEROL SULFATE 1 AMPULE: 2.5; .5 SOLUTION RESPIRATORY (INHALATION) at 20:03

## 2022-10-28 RX ADMIN — METHYLPREDNISOLONE SODIUM SUCCINATE 40 MG: 40 INJECTION, POWDER, FOR SOLUTION INTRAMUSCULAR; INTRAVENOUS at 06:26

## 2022-10-28 RX ADMIN — METHYLPREDNISOLONE SODIUM SUCCINATE 40 MG: 40 INJECTION, POWDER, FOR SOLUTION INTRAMUSCULAR; INTRAVENOUS at 13:18

## 2022-10-28 RX ADMIN — ASPIRIN 81 MG: 81 TABLET, COATED ORAL at 09:45

## 2022-10-28 ASSESSMENT — PAIN SCALES - GENERAL
PAINLEVEL_OUTOF10: 0

## 2022-10-28 NOTE — PROGRESS NOTES
Hospitalist Progress Note      PCP: Joe Cruz DO    Date of Admission: 10/21/2022    Chief Complaint: Pitting edema and urinary retention     Hospital Course: reviewed         Subjective: resting in bed, did well yesterday with walking around, no cp/sob complaints currently , notes good UOP         Medications:  Reviewed    Infusion Medications    sodium chloride      sodium chloride      dextrose       Scheduled Medications    spironolactone  25 mg Oral Daily    guaiFENesin  600 mg Oral BID    doxycycline hyclate  100 mg Oral 2 times per day    furosemide  80 mg IntraVENous TID    methylPREDNISolone  40 mg IntraVENous Q8H    losartan  25 mg Oral Daily    sodium chloride flush  5-40 mL IntraVENous 2 times per day    ipratropium-albuterol  1 ampule Inhalation Q4H WA    aspirin  81 mg Oral Daily    atorvastatin  20 mg Oral Nightly    sodium chloride flush  5-40 mL IntraVENous 2 times per day    enoxaparin  40 mg SubCUTAneous Daily    insulin lispro  0-8 Units SubCUTAneous TID WC    insulin lispro  0-4 Units SubCUTAneous Nightly     PRN Meds: sodium chloride flush, sodium chloride, hydrALAZINE, albuterol sulfate HFA **AND** [DISCONTINUED] ipratropium, perflutren lipid microspheres, lidocaine, albuterol sulfate HFA, sodium chloride flush, sodium chloride, ondansetron **OR** ondansetron, polyethylene glycol, acetaminophen **OR** acetaminophen, glucose, dextrose bolus **OR** dextrose bolus, glucagon (rDNA), dextrose      Intake/Output Summary (Last 24 hours) at 10/28/2022 1131  Last data filed at 10/28/2022 0853  Gross per 24 hour   Intake 720 ml   Output 3425 ml   Net -2705 ml       Physical Exam Performed:    BP (!) 134/104   Pulse 91   Temp 97.7 °F (36.5 °C) (Oral)   Resp 20   Ht 6' (1.829 m)   Wt 230 lb (104.3 kg)   SpO2 90%   BMI 31.19 kg/m²     General appearance: No apparent distress, appears stated age and cooperative. HEENT: Pupils equal, round, and reactive to light.  Conjunctivae/corneas clear.  Neck: Supple, with full range of motion. No jugular venous distention. Trachea midline. Respiratory:  Normal respiratory effort. Clear to auscultation, bilaterally without Rales/Wheezes/Rhonchi. Cardiovascular: Regular rate and rhythm with normal S1/S2 without murmurs, rubs or gallops. Abdomen: Soft, non-tender, non-distended with normal bowel sounds. Tightness noted in lower abd  Musculoskeletal: No clubbing, cyanosis or edema bilaterally. Full range of motion without deformity. Skin: Skin color, texture, turgor normal.  No rashes or lesions. Neurologic:  Neurovascularly intact without any focal sensory/motor deficits. Cranial nerves: II-XII intact, grossly non-focal.  Psychiatric: Alert and oriented, thought content appropriate, normal insight  Capillary Refill: Brisk, 3 seconds, normal   Peripheral Pulses: +2 palpable, equal bilaterally       Labs:   No results for input(s): WBC, HGB, HCT, PLT in the last 72 hours. Recent Labs     10/26/22  0657 10/27/22  0640 10/28/22  0950    137 136   K 4.9 4.4 4.6   CL 95* 95* 93*   CO2 33* 29 31   BUN 20 28* 34*   CREATININE 0.8* 0.7* 0.9   CALCIUM 9.8 9.5 9.6     No results for input(s): AST, ALT, BILIDIR, BILITOT, ALKPHOS in the last 72 hours. No results for input(s): INR in the last 72 hours. No results for input(s): Cordelia Macleod in the last 72 hours. Urinalysis:      Lab Results   Component Value Date/Time    NITRU Negative 07/17/2022 06:27 PM    BLOODU Negative 07/17/2022 06:27 PM    SPECGRAV 1.010 07/17/2022 06:27 PM    GLUCOSEU Negative 07/17/2022 06:27 PM       Radiology:  NM Cardiac Stress Test Nuclear Imaging   Final Result      CT CARDIAC CALCIUM SCORING   Final Result   Total Agatston calcium score of 40. This is in the 40th percentile      Findings of fluid overload denoted by small right-sided pleural effusion,   body wall anasarca, and mild abdominopelvic ascites.          Calcium Score Interpretation      0  No identifiable atherosclerotic plaque. Very low cardiovascular disease   risk. Less than 5% chance of presence of coronary artery disease. A   negative examination. 1-10 Minimal plaque burden. Significant coronary artery disease very   unlikely.  Mild plaque burden. Likely mild or minimal coronary stenosis. 101-400 Moderate plaque burden. Moderate non-obstructive coronary artery   disease highly likely. Over 400 Extensive plaque burden. High likelihood of at least one   significant coronary artery stenosis (>50% diameter). CALCIUM SCORING OVERVIEW:      Coronary calcium is a marker for plaque in a blood vessel or atherosclerosis   (hardening of the arteries). The presence and amount of calcium detected in   the coronary artery by the CT scan estimates the presence and amount of   atherosclerotic plaque. These calcium deposits can appear years before the   development of heart disease symptoms such as chest pain and shortness of   breath. A calcium score is computed for each of the coronary arteries based upon the   volume and density of the calcium deposits. This can be referred to as your   calcified plaque burden. It does not correspond directly to the percentage   of narrowing in the artery, but does correlate with the severity of the   overall coronary atherosclerotic burden. This score is then used to determine the calcium percentile which compares   your calcified plaque burden to that of other asymptomatic men and women of   the same age. The calcium score, in combination with the percentile, enables   your physician to determine your risk of developing symptomatic coronary   artery disease, and to measure the progression of disease as well as the   effectiveness of treatment. A score of zero indicates that there is no calcified plaque burden.  This   implies that there is no significant coronary artery narrowing and very low   likelihood of a cardiac event over at least the next 3 years. It does not   absolutely rule out the presence of soft, noncalcified plaque or totally   eliminate the possibility of a cardiac event. A score greater than zero indicates at least some coronary artery disease. As the score increases, so does the likelihood of a significant coronary   narrowing and the likelihood of a coronary event over the next 3 years. Similarly, the likelihood of a coronary event increases with increasing   calcium percentiles. XR CHEST (2 VW)   Final Result   No radiographic evidence of acute pulmonary disease. Assessment/Plan:    Active Hospital Problems    Diagnosis     Enlarged RV (right ventricle) [I51.7]      Priority: Medium    Tricuspid valve insufficiency [I07.1]      Priority: Medium    Chest congestion [R09.89]      Priority: Medium    Uncontrolled type 2 diabetes mellitus with hyperglycemia ([facility] [E11.65]      Priority: Medium    Class 1 obesity in adult [E66.9]      Priority: Medium    COPD exacerbation ([facility] [J44.1]      Priority: Medium    Cor pulmonale (HCC) [I27.81]      Priority: Medium    Acute respiratory failure with hypoxia ([facility]) [J96.01]      Priority: Medium    Severe pulmonary hypertension ([facility] [address] [I27.20]      Priority: Medium    Suspected sleep apnea [R29.818]      Priority: Medium    Former smoker [Z87.891]      Priority: Medium    DM (diabetes mellitus) ([facility] [E11.9]      Priority: Medium    Pitting edema [R60.9]      Priority: Medium    Stage 3 severe COPD by GOLD classification ([facility] [J44.9]     CHF (congestive heart failure) ([facility]) [I50.9]     Essential hypertension [I10]        Acute diastolic Heart failure - with rt heart failure(cor pulmonale) with bilateral lower extremity edema and urinary retention in setting of known pulmonary hypertension PASP 74 mmHg and EF 55% by echo (July 2022). Continued IV diuresis per cardiology, goal weight appears to be around 185 pounds.   Ischemia evaluation with [employer] nuclear stress test and CT calcium score were abn  -s/p right and left heart catheterization 10/24, noted severe pulm htn, mild nonobstructive CAD, preserved EF  -echo done(ef 40%, rv severe enlarged, severe TR, severe PH)   -repeat RHC planned 10/28     Acute respiratory failure requiring supplemental oxygen. Known history of COPD but not on home oxygen. Uses inhalers at home and getting relief from Duonebs. Echocardiogram in 2021 as well as 2022 which showed normal left ventricular function however there was right-sided enlargement and significant pulmonary hypertension noted. -on iv steroids, switched to po steroids, rec taper over few weeks   -pulm consulted, apprec recs, empiric doxycycline started 10/26, end 10/30  -pulm rec flu shot      Type 2 Diabetes (HgbA1c 7.0% on 8/12/22). Metformin held on admission. Cover with a \"sliding scale\" lispro moderate scale prandial correction insulin. Primary hypertension - on losartan 50 mg daily. Dyslipidemia on atorvastatin 20 mg nightly. Class I obesity (BMI 32.4). DVT Prophylaxis: lovenox  Diet: ADULT DIET;  Regular  Code Status: Full Code  PT/OT Eval Status: not needed    Dispo - continue diuresis, RHC monday    Appropriate for A1 Discharge Unit: Tiana Fraser MD

## 2022-10-28 NOTE — PLAN OF CARE
Problem: Pain  Goal: Verbalizes/displays adequate comfort level or baseline comfort level  10/28/2022 0940 by Joann Tovar RN  Outcome: Progressing     Problem: Safety - Adult  Goal: Free from fall injury  Outcome: Progressing     Problem: Chronic Conditions and Co-morbidities  Goal: Patient's chronic conditions and co-morbidity symptoms are monitored and maintained or improved  10/28/2022 0940 by Joann Tovar RN  Outcome: Progressing  Note:   Patient's EF (Ejection Fraction) is 40%    Heart Failure Medications:  Diuretics[de-identified] Furosemide and Spironolactone    (One of the following REQUIRED for EF </= 40%/SYSTOLIC FAILURE but MAY be used in EF% >40%/DIASTOLIC FAILURE)        ACE[de-identified] None        ARB[de-identified] Losartan         ARNI[de-identified] None    (Beta Blockers)  NON- Evidenced Based Beta Blocker (for EF% >40%/DIASTOLIC FAILURE): None    Evidenced Based Beta Blocker::(REQUIRED for EF% <40%/SYSTOLIC FAILURE) None  . .................................................................................................................................................. Patient's weights and intake/output reviewed: Yes    Patient's Last Weight: 230 lbs obtained by standing scale. Difference of 2 lbs less than last documented weight. Intake/Output Summary (Last 24 hours) at 10/28/2022 4312  Last data filed at 10/28/2022 6473  Gross per 24 hour   Intake 960 ml   Output 3650 ml   Net -2690 ml       Education Booklet Provided: yes    Comorbidities Reviewed Yes    Patient has a past medical history of Acute systolic congestive heart failure ([address] Diabetes mellitus ([address] Essential hypertension, and Thrombocytopenia ([address]      >>For CHF and Comorbidity documentation on Education Time and Topics, please see Education Tab    Progressive Mobility Assessment:  What is this patient's Current Level of Mobility?: Ambulatory-Up [facility] Dolores  How was this patient Mobilized today?: Edge of Bed,  Up to Toilet/Shower, Up in Room, and Up in Radhika, ambulated 50 ft                 With Whom? Nurse, PCA, and Self                 Level of Difficulty/Assistance: Independent     Pt sitting in bed at this time on  1 L O2. Pt denies shortness of breath. Pt with pitting lower extremity edema. Patient and/or Family's stated Goal of Care this Admission: reduce shortness of breath, increase activity tolerance, better understand heart failure and disease management, be more comfortable, and reduce lower extremity edema prior to discharge        :     Pt will have accuchecks before meals and at bedtime with sliding scale insulin in place for coverage. Will continue to monitor for signs and symptoms of hypoglycemia and hyperglycemia throughout shift.

## 2022-10-28 NOTE — PROGRESS NOTES
INPATIENT PULMONARY CRITICAL CARE PROGRESS NOTE      Reason for visit    SOB, severe pulmonary hypertension on [facility], assistance with workup and follow up. SUBJECTIVE: Patient when seen this morning states that he just woke up and patient had a comfortable night, patient states that he slept like a rock, patient's blood pressure when seen this morning was on the higher side with review sugar being 134 /101 mmHg; patient was on 2 L of nasal cannula oxygen with saturation of 93 to 94%, patient does not have any significant expectoration wheezing chest pain or abdominal symptoms of concern, patient was afebrile and had sinus rhythm on the monitor, patient blood sugars are still suboptimally controlled, patient has had good urine output with cumulative fluid balance of -13.7 L, patient was alert and communicative, no other pertinent review of system of concern         Physical Exam:  Blood pressure (!) 134/104, pulse 91, temperature 97.7 °F (36.5 °C), temperature source Oral, resp. rate 20, height 6' (1.829 m), weight 230 lb (104.3 kg), SpO2 90 %.'     Constitutional:  No acute distress. HENT:  Oropharynx is clear and moist. No thyromegaly. Eyes:  Conjunctivae are normal. Pupils equal, round, and reactive to light. No scleral icterus. Neck: . No tracheal deviation present. No obvious thyroid mass. Cardiovascular: Normal rate, regular rhythm, LLSB murmur   No right ventricular heave. Some lower extremity edema. Pulmonary/Chest: Decreased scattered wheezes. Decreased bilateral rales. Some chest congestion chest wall is not dull to percussion. No accessory muscle usage or stridor. Decreased breath sound intensity with prolonged expiration  Abdominal: Soft. Bowel sounds present. No distension or hernia. No tenderness. Musculoskeletal: No cyanosis. No clubbing. No obvious joint deformity. Lymphadenopathy: No cervical or supraclavicular adenopathy. Skin: Skin is warm and dry.  No rash or nodules on the exposed extremities. Psychiatric: Normal mood and affect. Behavior is normal.  No anxiety. Neurologic: Alert, awake and oriented. PERRL. Speech fluent        Results:      BMP:   Recent Labs     10/26/22  0657 10/27/22  0640    137   K 4.9 4.4   CL 95* 95*   CO2 33* 29   BUN 20 28*   CREATININE 0.8* 0.7*             Imaging:  I have reviewed radiology images personally. NM Cardiac Stress Test Nuclear Imaging   Final Result      CT CARDIAC CALCIUM SCORING   Final Result   Total Agatston calcium score of 40. This is in the 40th percentile      Findings of fluid overload denoted by small right-sided pleural effusion,   body wall anasarca, and mild abdominopelvic ascites. Calcium Score Interpretation      0  No identifiable atherosclerotic plaque. Very low cardiovascular disease   risk. Less than 5% chance of presence of coronary artery disease. A   negative examination. 1-10 Minimal plaque burden. Significant coronary artery disease very   unlikely.  Mild plaque burden. Likely mild or minimal coronary stenosis. 101-400 Moderate plaque burden. Moderate non-obstructive coronary artery   disease highly likely. Over 400 Extensive plaque burden. High likelihood of at least one   significant coronary artery stenosis (>50% diameter). CALCIUM SCORING OVERVIEW:      Coronary calcium is a marker for plaque in a blood vessel or atherosclerosis   (hardening of the arteries). The presence and amount of calcium detected in   the coronary artery by the CT scan estimates the presence and amount of   atherosclerotic plaque. These calcium deposits can appear years before the   development of heart disease symptoms such as chest pain and shortness of   breath. A calcium score is computed for each of the coronary arteries based upon the   volume and density of the calcium deposits. This can be referred to as your   calcified plaque burden.   It does not correspond directly to the percentage   of narrowing in the artery, but does correlate with the severity of the   overall coronary atherosclerotic burden. This score is then used to determine the calcium percentile which compares   your calcified plaque burden to that of other asymptomatic men and women of   the same age. The calcium score, in combination with the percentile, enables   your physician to determine your risk of developing symptomatic coronary   artery disease, and to measure the progression of disease as well as the   effectiveness of treatment. A score of zero indicates that there is no calcified plaque burden. This   implies that there is no significant coronary artery narrowing and very low   likelihood of a cardiac event over at least the next 3 years. It does not   absolutely rule out the presence of soft, noncalcified plaque or totally   eliminate the possibility of a cardiac event. A score greater than zero indicates at least some coronary artery disease. As the score increases, so does the likelihood of a significant coronary   narrowing and the likelihood of a coronary event over the next 3 years. Similarly, the likelihood of a coronary event increases with increasing   calcium percentiles. XR CHEST (2 VW)   Final Result   No radiographic evidence of acute pulmonary disease. PFT 9/2/2021:  FVC 3.29 L, 63% predicted, FEV1 1.33 L, 33% predicted with ratio 40%.   Lung volumes showed air trapping, diffusion capacity was normal    Assessment:  Principal Problem:    Pitting edema  Active Problems:    DM (diabetes mellitus) ([facility])    Acute respiratory failure with hypoxia ([facility])    Severe pulmonary hypertension ([facility])    Suspected sleep apnea    Former smoker    Enlarged RV (right ventricle)    Tricuspid valve insufficiency    Chest congestion    Uncontrolled type 2 diabetes mellitus with hyperglycemia ([facility])    Class 1 obesity in adult    COPD exacerbation ([facility])    Cor pulmonale [facility])    Essential hypertension    CHF (congestive heart failure) ([facility])    Stage 3 severe COPD by GOLD classification ([facility]  Resolved Problems:    * No resolved hospital problems. *          Plan:   Oxygen supplementation to keep saturation between 90 and 94% only  Please titrate the oxygen as per the above parameters  Patient was still on 2 L of nasal can oxygen with saturation of 93% and was decreased to 1 L/min via nasal cannula and nursing to titrate further depending on the parameters of the  Pulmonary toilet  Bronchodilators  Mucinex to continue  IV Solu-Medrol was changed to p.o. prednisone which can be tapered over the course of next few weeks  Empiric doxycycline started on the patient  Diuretics as per cardiology continue-patient's BUN is climbing  Monitor input output and BMP  Correct electrolytes on whenever necessary basis  No need for any bronchoscopy  Blood glucose monitoring with sliding scale insulin  Clinically patient appears to have LALA and will benefit from a sleep study as an outpatient  Patient has only been taking albuterol at home but patient's PFT from last year says that patient has severe COPD and may require LABA plus LAMA plus ICS combination at some point(like Trelegy ellipta or Breztri inhaler or combinations depending on patient's insurance)  Diet and lifestyle modifications  Patient needs to lose weight  Patient to remain abstinent from smoking  Patient does have history of alcohol use-monitor for any alcohol withdrawal symptoms  Patient's blood sugars are not optimally controlled and management as per IM  Patient's TSH in the past was normal  Please make sure that patient's flu shot is up-to-date prior to discharge from the hospital  PUD and DVT prophylaxis as per IM  PT/OT    ? Discharge planning     No other recommendations from pulmonary/critical care standpoint of view-patient can follow-up with Dr. Neela Castellano in 2 to 3 weeks time after discharge        Electronically signed by: Coy Watters MD    10/28/2022    8:49 AM.

## 2022-10-28 NOTE — TELEPHONE ENCOUNTER
----- Message from Ryan Hope MD sent at 10/28/2022  1:50 PM EDT -----    Patient to follow-up with Dr. Genna Grimes in 2 to 3 weeks time after discharge

## 2022-10-28 NOTE — PLAN OF CARE
Problem: Chronic Conditions and Co-morbidities  Goal: Patient's chronic conditions and co-morbidity symptoms are monitored and maintained or improved  Outcome: Progressing     Patient's EF (Ejection Fraction) is greater than 40%    Heart Failure Medications:  Diuretics[de-identified] Furosemide  (One of the following REQUIRED for EF </= 40%/SYSTOLIC FAILURE but MAY be used in EF% >40%/DIASTOLIC FAILURE)        ACE[de-identified] None        ARB[de-identified] Losartan         ARNI[de-identified] None    (Beta Blockers)  NON- Evidenced Based Beta Blocker (for EF% >40%/DIASTOLIC FAILURE): None    Evidenced Based Beta Blocker::(REQUIRED for EF% <40%/SYSTOLIC FAILURE) None  . .................................................................................................................................................. Patient's weights and intake/output reviewed: Yes    Patient's Last Weight: 230 lbs obtained by standing scale. Difference of 2 lbs less than last documented weight. Intake/Output Summary (Last 24 hours) at 10/28/2022 0551  Last data filed at 10/28/2022 3393  Gross per 24 hour   Intake 720 ml   Output 3300 ml   Net -2580 ml       Education Booklet Provided: yes    Comorbidities Reviewed Yes    Patient has a past medical history of Acute systolic congestive heart failure ([address] Diabetes mellitus ([address] Essential hypertension, and Thrombocytopenia ([address] >>For CHF and Comorbidity documentation on Education Time and Topics, please see Education Tab    Progressive Mobility Assessment:  What is this patient's Current Level of Mobility?: Ambulatory-Up [facility] Dolores  How was this patient Mobilized today?: Edge of Bed, Up to Chair, Bedside Commode,  Up to Toilet/Shower, Up in Room, and Up in Hallway, ambulated 500 ft                 With Whom? Nurse, PCA, and Self                 Level of Difficulty/Assistance: Independent     Pt resting in bed at this time on  2 L O2. Pt denies shortness of breath. Pt with pitting lower extremity edema.      Patient and/or Family's stated Goal of Care this Admission: reduce shortness of breath, increase activity tolerance, better understand heart failure and disease management, be more comfortable, and reduce lower extremity edema    Problem: Pain  Goal: Verbalizes/displays adequate comfort level or baseline comfort level  Outcome: Progressing    prior to discharge  Pt will be satisfied with pain control. Pt uses numeric pain rating scale with reassessments after pain med administration.  Will continue to monitor progression throughout shift.        :

## 2022-10-28 NOTE — PROGRESS NOTES
CARDIOLOGY PROGRESS NOTE      Patient Name: Duran Diaz  Date of admission: 10/21/2022  2:13 PM  Admission Dx: Urinary retention [R33.9]  Anasarca [R60.1]  Pitting edema [R60.9]  Elevated BUN [R79.9]  Reason for Consult:  Congestive heart failure   Requesting Physician: No admitting provider for patient encounter. Primary Care physician: Rhys Marti DO    Subjective:     Duran Diaz is a 62 y.o. patient with a prior medical history notable for HFpEF, hypertension, diabetes, severe COPD and concern for pulmonary hypertension, who presented to the hospital with complaints of increasing shortness of breath and edema times a few weeks. Echo on admission showed RV failure and LVEF 40%, volume overload. He had abnormal stress testing which prompted LHC on 10/24/22 showed mild non-obstructive CAD. Weight at 8/2022 appt was 217lb  Weight prior to admission 238lbs    -14.5 L this admission. Wt down another 2 lbs overnight. Remains on oxygen, weaned to 1L. Significant dyspnea on walking back from bathroom prior to my eval today. Still with persistent tense LE edema and abd fullness. Home Medications:  Were reviewed and are listed in nursing record and/or below  Prior to Admission medications    Medication Sig Start Date End Date Taking? Authorizing Provider   albuterol-ipratropium (COMBIVENT RESPIMAT)  MCG/ACT AERS inhaler Inhale 1 puff into the lungs in the morning and 1 puff at noon and 1 puff in the evening and 1 puff before bedtime.  10/14/22   Fern Conrad DO   KLOR-CON M20 20 MEQ extended release tablet TAKE ONE TABLET BY MOUTH TWICE A DAY WHILE ON INCREASED DOSE OF FUROSEMIDE 7/17/22 8/13/22  SANCHEZ Villanueva   furosemide (LASIX) 80 MG tablet Take 1 tablet by mouth 2 times daily 6/24/22   Sachin Vallejo MD   losartan (COZAAR) 50 mg tablet TAKE ONE TABLET BY MOUTH DAILY 6/1/22   Fern Conrad DO   ASPIRIN LOW DOSE 81 MG EC tablet TAKE ONE TABLET BY MOUTH DAILY 6/1/22 Marija Conrad, DO   atorvastatin (LIPITOR) 20 MG tablet TAKE ONE TABLET BY MOUTH ONCE NIGHTLY 6/1/22   SANCHEZ Dong - CNP   metFORMIN (GLUCOPHAGE) 500 MG tablet TAKE ONE TABLET BY MOUTH DAILY WITH BREAKFAST 6/1/22   Marija Conrad, DO   umeclidinium-vilanterol (ANORO ELLIPTA) 62.5-25 MCG/INH AEPB inhaler Inhale 1 puff into the lungs daily 5/12/22   Marija Conrad, DO   albuterol sulfate  (90 Base) MCG/ACT inhaler INHALE 2 PUFFS BY MOUTH FOUR TIMES A DAY AS NEEDED FOR WHEEZING 4/23/22   SANCHEZ Polanco        CURRENT Medications:  predniSONE (DELTASONE) tablet 40 mg, Daily  spironolactone (ALDACTONE) tablet 25 mg, Daily  guaiFENesin (MUCINEX) extended release tablet 600 mg, BID  doxycycline hyclate (VIBRA-TABS) tablet 100 mg, 2 times per day  furosemide (LASIX) injection 80 mg, TID  losartan (COZAAR) tablet 25 mg, Daily  sodium chloride flush 0.9 % injection 5-40 mL, 2 times per day  sodium chloride flush 0.9 % injection 5-40 mL, PRN  0.9 % sodium chloride infusion, PRN  hydrALAZINE (APRESOLINE) injection 10 mg, Q10 Min PRN  albuterol sulfate HFA (PROVENTIL;VENTOLIN;PROAIR) 108 (90 Base) MCG/ACT inhaler 2 puff, Q6H PRN  ipratropium-albuterol (DUONEB) nebulizer solution 1 ampule, Q4H WA  perflutren lipid microspheres (DEFINITY) injection 1.65 mg, ONCE PRN  lidocaine (XYLOCAINE) 2 % uro-jet, PRN  albuterol sulfate HFA (PROVENTIL;VENTOLIN;PROAIR) 108 (90 Base) MCG/ACT inhaler 2 puff, Q4H PRN  aspirin EC tablet 81 mg, Daily  atorvastatin (LIPITOR) tablet 20 mg, Nightly  sodium chloride flush 0.9 % injection 5-40 mL, 2 times per day  sodium chloride flush 0.9 % injection 5-40 mL, PRN  0.9 % sodium chloride infusion, PRN  ondansetron (ZOFRAN-ODT) disintegrating tablet 4 mg, Q8H PRN   Or  ondansetron (ZOFRAN) injection 4 mg, Q6H PRN  polyethylene glycol (GLYCOLAX) packet 17 g, Daily PRN  acetaminophen (TYLENOL) tablet 650 mg, Q6H PRN   Or  acetaminophen (TYLENOL) suppository 650 mg, Q6H PRN  enoxaparin (LOVENOX) injection 40 mg, Daily  insulin lispro (HUMALOG) injection vial 0-8 Units, TID WC  insulin lispro (HUMALOG) injection vial 0-4 Units, Nightly  glucose chewable tablet 16 g, PRN  dextrose bolus 10% 125 mL, PRN   Or  dextrose bolus 10% 250 mL, PRN  glucagon (rDNA) injection 1 mg, PRN  dextrose 10 % infusion, Continuous PRN        Allergies:  Patient has no known allergies. Review of Systems:   A 14 point review of symptoms completed. Pertinent positives identified in the HPI, all other review of symptoms negative. Objective:     Vitals:    10/28/22 0839 10/28/22 1155 10/28/22 1220 10/28/22 1227   BP:   (!) 126/92    Pulse:       Resp:       Temp:       TempSrc:       SpO2: 90% 91%     Weight:       Height:    6' (1.829 m)      Weight: 230 lb (104.3 kg)       PHYSICAL EXAM:    General:  Alert, cooperative, no distress, appears stated age   Head:  Normocephalic, atraumatic   Eyes:  Conjunctiva/corneas clear, anicteric sclerae    Nose: Nares normal, no drainage or sinus tenderness   Throat: No abnormalities of the lips, oral mucosa or tongue. Neck: Trachea midline. Neck supple with no lymphadenopathy, thyroid not enlarged, symmetric, no tenderness/mass/nodules, Jugular venous pressure elevation  to ear lobe, EJ distention    Lungs:   Distant BS, no wheezes, no discrete rales   Chest Wall:  No deformity or tenderness to palpation   Heart:  Regular rate and rhythm, normal S1, normal S2, no murmur, no rub, no S3/S4, PMI non-displaced. Abdomen:   Tense abd, NT, with distant bowel sounds. No masses, no hepatosplenomegaly   Extremities: No cyanosis, clubbing , tense pitting edema bilat LE   Vascular: 2+ radial, 2+ dorsalis pedis and posterior tibial pulses bilaterally. Brisk carotid upstrokes without carotid bruit. Skin: Skin color, texture, turgor are normal with no rashes or ulceration. Pysch: Euthymic mood, appropriate affect   Neurologic: Oriented to person, place and time. pressure (SPAP) is elevated and estimated at 56   mmHg (right atrial pressure 15 mmHg) consistent with moderate pulmonary   hypertension. The IVC is dilated in size (>2.1 cm) and collapses <50% with respiration   consistent with markedly elevated right atrial pressures (15 mmHg) . Echo 7/29/22     Summary   Limited echo for left ventricular function and pulmonary pressures      Left ventricular cavity size is normal.   There is mild concentric left ventricular hypertrophy. Left ventricular function appears to be normal with an estimated ejection   fraction of 55%. There is systolic and diastolic septal flattening consistent with right   ventricular pressure and volume overload. The right ventricle is enlarged and hypokinetic. Severe tricuspid regurgitation. The right atrium is severely dilated. Estimated pulmonary artery systolic pressure is at 74 mmHg assuming a right   atrial pressure of 15 mmHg. There is a small pericardial effusion noted without any hemodynamic   implications. Cardiac calcium CT 10/24/22  TECHNIQUE:   CT of the heart was obtained without intravenous contrast.  Calcium scoring   analysis was performed using a separate  workstation. Dose modulation,   iterative reconstruction, and/or weight based adjustment of the mA/kV was   utilized to reduce the radiation dose to as low as reasonably achievable. FINDINGS:   LEFT MAIN: 23       LEFT ANTERIOR DESCENDING: 10       CIRCUMFLEX: 7       RIGHT CORONARY ARTERY: Zero (0). TOTAL AGATSTON CALCIUM SCORE: 36       EXTRACARDIAC STRUCTURES: Trace aortic valve and aortic annulus calcification   is seen. No aortic aneurysm. Ascending aorta measures 3.5 cm. Small   pericardial effusion is seen. No pericardial calcification. Small hiatal   hernia is seen. There is nonspecific thickening at the GE junction. Small mediastinal nodes are noted, likely reactive. A few calcified   mediastinal nodes are also seen. Respiratory motion artifact limits evaluation of pulmonary parenchymal   change. Scattered areas of bronchial wall thickening are seen. Small   right-sided pleural effusion is seen. There is adjacent consolidation at the   right lung base. .  This pleural effusion is slightly increased in size   compared to chest CT 08/05/2021       There is body wall anasarca. There is mild ascites seen in the upper   abdomen, partially imaged. Spurring is seen in the spine           Echo 10/24/22   Summary   Limited echo for LV/RV function & PHTN with limited doppler/no color. Global left ventricular systolic function is moderately decreased with   ejection fraction estimated at 40%. Normal left ventricle size and wall thickness. There is diastolic septal flattening (\"D-shaped\" septum) consistent with   right ventricular volume overload. Diastolic dysfunction grade and filling pressure are indeterminate. The right ventricle is severely enlarged in size with reduced function. The right atrium is moderately dilated. The tricuspid valve is normal in structure with tenting of the valve   leaflets. Moderate-severe tricuspid valve regurgitation. Systolic pulmonary artery pressure (SPAP) estimated at 70 mmHg (RA pressure   15 mmHg), consistent with severe pulmonary hypertension. Stress test 10/24/22   Summary    Normal LVEF 60%    Normal wall motion    Moderate inferior defect noted, suggestive of ischemia, less likely    attenuation artifact    Summed stress scores may underestimate perfusion defects        Overall, this would be considered an abnormal, intermediate risk, study        Procedures Performed: 10/24/22  1. Left heart catheterization  2. Selective left and right coronary angiogram  3. Left ventriculography   5. Right heart catheterization     Procedure Findings:  1. Mild non-obstructive coronary artery disease. 2. Normal left ventricular function with EF estimated at 55-60%  3.  Normal left heart hemodynamics  4. Severe pulmonary hypertension     Findings:     1. Left main coronary artery was normal. It gave off the left anterior descending artery and left circumflex. 2. Left anterior descending artery has mild atherosclerotic disease. It was moderate in size. It gave off septal perforators and a moderate sized diagonal branch. The LAD covered the entire apex of the left ventricle. 3. Left circumflex has mild atherosclerotic disease. It was moderate in size. There was a moderate sized obtuse marginal branch. 4. Right coronary artery has mild atherosclerotic disease. It was moderate in size and was the dominant artery. 5. Left ventriculogram showed normal LVEF at 55-60%. Wall motion was normal . There was no significant mitral valve or aortic valve disease noted. LVEDP was normal. There was no gradient noted across the aortic valve during pullback of the catheter. 6.  Right heart catheterization was performed. Right atrial pressure of 25  RV pressure 70/13  PA pressure of 78/37 with a mean of 50  Pulmonary artery wedge pressure mean of 12  Cardiac output of 5.32  Cardiac index 2.33  Aortic saturation 88%  PA saturation 59%  SVR of 917        Impression and Plan      Acute on chronic HFpEF with Right heart failure (cor pulmonale)  -baseline weight 217 lbs  - admission weight 238 lbs  -Continue Lasix 80 mg TID; good UOP nearly 2L with one dose lasix today. Receive PM dose. -Continue losartan, spironolactone  -BMP daily; monitor developing azotemia. Remains significant volume up with a pure RV failure presentation. Severe pulmonary hypertension -  WHO class 3 (TPG 38, PVR >7; secondary pulm vasc remodeling suspected).     -Plan to repeat right heart catheterization once clinically reaches euvolemic  -plan PH clinic eval Dr. William Reid OP     Severe TR    Hypertension    Mild nonobstructive coronary artery disease  -Aspirin, statin    Hyperlipidemia -statin    Very severe COPD  Acute respiratory failure due to pleural effusion, CHF and COPD  Diabetes mellitus  Tobacco use disorder  -I Strongly advised complete smoking cessation. Resources given to assist quitting including the followin-800-QUIT NOW. We will follow. Patient Active Problem List   Diagnosis    Tobacco abuse    Daily consumption of alcohol    QT prolongation    Right axis deviation    Macrocytosis    Essential hypertension    Acute decompensated heart failure (HCC)    CHF (congestive heart failure) (HCC)    Stage 3 severe COPD by GOLD classification ([address]    Calculus of gallbladder without cholecystitis without obstruction    DM (diabetes mellitus) ([address]    Pitting edema    Acute respiratory failure with hypoxia (HCC)    Severe pulmonary hypertension ([address]    Suspected sleep apnea    Former smoker    Enlarged RV (right ventricle)    Tricuspid valve insufficiency    Chest congestion    Uncontrolled type 2 diabetes mellitus with hyperglycemia ([address]    Class 1 obesity in adult    COPD exacerbation ([address]    Cor pulmonale ([address]           I will address the patient's cardiac risk factors and adjusted pharmacologic treatment as needed. In addition, I have reinforced the need for patient directed risk factor modification. All questions and concerns were addressed to the patient/family. Alternatives to my treatment were discussed. Thank you for allowing us to participate in the care of Kvng Gould. Please call me with any questions [phone].     Emili Cabrera MD, [facility]  Cardiovascular Disease  [place] [address]  [phone] [facility]  [phone] [address] [place] [address]  10/28/2022 3:19 PM

## 2022-10-28 NOTE — PROGRESS NOTES
Nutrition Assessment     Type and Reason for Visit: Initial, RD Nutrition Re-Screen/LOS    Nutrition Recommendations/Plan:   Modify diet to no added salt - monitor need for card control diet  Monitor further diet education needs   Monitor nutrition adequacy, pertinent labs, bowel habits, wt changes, and clinical progress     Malnutrition Assessment:  Malnutrition Status: No malnutrition    Nutrition Assessment:  Pt admitted with CHF. Hx of DM. Pt nutritionally stable AEB good appetite and PO intakes of % this admission. Pt denies any N/V/D/C. Encouraged continued good PO intakes. Weights trending down 2/2 diuresis. Discussed low sodium diet and how to read nutrition labels for sodium content of food. Handouts provided. Will continue to monitor. Nutrition Related Findings:   -13 L per I&Os. +2 pitting BLE edema. BM on 10/27. -200 pst 24 hrs. A1c of 7.0% 8/12/22. Wound Type: None    Current Nutrition Therapies:    ADULT DIET;  Regular    Anthropometric Measures:  Height: 6' (182.9 cm)  Current Body Wt: 230 lb (104.3 kg)   BMI: 31.2    Nutrition Diagnosis:   No nutrition diagnosis at this time     Nutrition Interventions:   Food and/or Nutrient Delivery: Modify Current Diet  Nutrition Education/Counseling: Education initiated  Coordination of Nutrition Care: Continue to monitor while inpatient       Discharge Planning:    Continue current diet     Dia Alford [address], [address], [place]  Contact: [phone]

## 2022-10-28 NOTE — PROGRESS NOTES
ELAINE Steiner paged, \"FYI pt had 8 beats of Vtach. Pt had just came back from a walk and is asymptomatic. Cardioloy is on board. K+ today was 4.4 and Mg yesterday was 2.1. Thank you! \"

## 2022-10-28 NOTE — CARE COORDINATION
Spoke with both RN and MD both who state plan is for patient to get cardiac cath on Monday. Patient is normally independent at home. CM will continue to follow should any needs arise.

## 2022-10-29 ENCOUNTER — APPOINTMENT (OUTPATIENT)
Dept: GENERAL RADIOLOGY | Age: 57
DRG: 286 | End: 2022-10-29
Payer: COMMERCIAL

## 2022-10-29 LAB
ANION GAP SERPL CALCULATED.3IONS-SCNC: 10 MMOL/L (ref 3–16)
BUN BLDV-MCNC: 42 MG/DL (ref 7–20)
CALCIUM SERPL-MCNC: 9.6 MG/DL (ref 8.3–10.6)
CHLORIDE BLD-SCNC: 94 MMOL/L (ref 99–110)
CO2: 34 MMOL/L (ref 21–32)
CREAT SERPL-MCNC: 0.9 MG/DL (ref 0.9–1.3)
GFR SERPL CREATININE-BSD FRML MDRD: >60 ML/MIN/{1.73_M2}
GLUCOSE BLD-MCNC: 132 MG/DL (ref 70–99)
GLUCOSE BLD-MCNC: 137 MG/DL (ref 70–99)
GLUCOSE BLD-MCNC: 140 MG/DL (ref 70–99)
GLUCOSE BLD-MCNC: 164 MG/DL (ref 70–99)
GLUCOSE BLD-MCNC: 182 MG/DL (ref 70–99)
PERFORMED ON: ABNORMAL
POTASSIUM SERPL-SCNC: 4.1 MMOL/L (ref 3.5–5.1)
SODIUM BLD-SCNC: 138 MMOL/L (ref 136–145)

## 2022-10-29 PROCEDURE — 36415 COLL VENOUS BLD VENIPUNCTURE: CPT

## 2022-10-29 PROCEDURE — 94640 AIRWAY INHALATION TREATMENT: CPT

## 2022-10-29 PROCEDURE — 2580000003 HC RX 258: Performed by: INTERNAL MEDICINE

## 2022-10-29 PROCEDURE — 94669 MECHANICAL CHEST WALL OSCILL: CPT

## 2022-10-29 PROCEDURE — 6370000000 HC RX 637 (ALT 250 FOR IP): Performed by: NURSE PRACTITIONER

## 2022-10-29 PROCEDURE — 99233 SBSQ HOSP IP/OBS HIGH 50: CPT | Performed by: NURSE PRACTITIONER

## 2022-10-29 PROCEDURE — 6370000000 HC RX 637 (ALT 250 FOR IP): Performed by: INTERNAL MEDICINE

## 2022-10-29 PROCEDURE — 6360000002 HC RX W HCPCS: Performed by: INTERNAL MEDICINE

## 2022-10-29 PROCEDURE — 6360000002 HC RX W HCPCS: Performed by: NURSE PRACTITIONER

## 2022-10-29 PROCEDURE — 94761 N-INVAS EAR/PLS OXIMETRY MLT: CPT

## 2022-10-29 PROCEDURE — 80048 BASIC METABOLIC PNL TOTAL CA: CPT

## 2022-10-29 PROCEDURE — 1200000000 HC SEMI PRIVATE

## 2022-10-29 PROCEDURE — 2700000000 HC OXYGEN THERAPY PER DAY

## 2022-10-29 PROCEDURE — 71046 X-RAY EXAM CHEST 2 VIEWS: CPT

## 2022-10-29 RX ADMIN — Medication 10 ML: at 08:12

## 2022-10-29 RX ADMIN — FUROSEMIDE 80 MG: 10 INJECTION, SOLUTION INTRAMUSCULAR; INTRAVENOUS at 20:47

## 2022-10-29 RX ADMIN — FUROSEMIDE 80 MG: 10 INJECTION, SOLUTION INTRAMUSCULAR; INTRAVENOUS at 08:12

## 2022-10-29 RX ADMIN — PREDNISONE 40 MG: 20 TABLET ORAL at 08:12

## 2022-10-29 RX ADMIN — FUROSEMIDE 80 MG: 10 INJECTION, SOLUTION INTRAMUSCULAR; INTRAVENOUS at 13:20

## 2022-10-29 RX ADMIN — LOSARTAN POTASSIUM 25 MG: 25 TABLET, FILM COATED ORAL at 08:13

## 2022-10-29 RX ADMIN — ENOXAPARIN SODIUM 40 MG: 100 INJECTION SUBCUTANEOUS at 08:12

## 2022-10-29 RX ADMIN — Medication 10 ML: at 20:48

## 2022-10-29 RX ADMIN — SODIUM CHLORIDE, PRESERVATIVE FREE 10 ML: 5 INJECTION INTRAVENOUS at 08:17

## 2022-10-29 RX ADMIN — IPRATROPIUM BROMIDE AND ALBUTEROL SULFATE 1 AMPULE: 2.5; .5 SOLUTION RESPIRATORY (INHALATION) at 19:52

## 2022-10-29 RX ADMIN — GUAIFENESIN 600 MG: 600 TABLET, EXTENDED RELEASE ORAL at 08:16

## 2022-10-29 RX ADMIN — IPRATROPIUM BROMIDE AND ALBUTEROL SULFATE 1 AMPULE: 2.5; .5 SOLUTION RESPIRATORY (INHALATION) at 12:28

## 2022-10-29 RX ADMIN — IPRATROPIUM BROMIDE AND ALBUTEROL SULFATE 1 AMPULE: 2.5; .5 SOLUTION RESPIRATORY (INHALATION) at 08:22

## 2022-10-29 RX ADMIN — ATORVASTATIN CALCIUM 20 MG: 10 TABLET, FILM COATED ORAL at 20:46

## 2022-10-29 RX ADMIN — DOXYCYCLINE HYCLATE 100 MG: 100 TABLET, COATED ORAL at 08:13

## 2022-10-29 RX ADMIN — SPIRONOLACTONE 25 MG: 25 TABLET ORAL at 08:13

## 2022-10-29 RX ADMIN — ASPIRIN 81 MG: 81 TABLET, COATED ORAL at 08:13

## 2022-10-29 RX ADMIN — DOXYCYCLINE HYCLATE 100 MG: 100 TABLET, COATED ORAL at 20:46

## 2022-10-29 RX ADMIN — IPRATROPIUM BROMIDE AND ALBUTEROL SULFATE 1 AMPULE: 2.5; .5 SOLUTION RESPIRATORY (INHALATION) at 15:22

## 2022-10-29 RX ADMIN — GUAIFENESIN 600 MG: 600 TABLET, EXTENDED RELEASE ORAL at 20:46

## 2022-10-29 ASSESSMENT — PAIN SCALES - GENERAL
PAINLEVEL_OUTOF10: 0

## 2022-10-29 NOTE — PROGRESS NOTES
Pt ambulated in hallway without issue, no dyspnea noted, pt on 3.5 liters o2 during ambulation. Pt o2 saturation during ambulation remained at 90%. Pt denies any sob at this time, diuresing appropriately today 3000 ml out. Pt wt down 10 lbs from yesterday currently 220.6 lbs. VSS no further issues.

## 2022-10-29 NOTE — PLAN OF CARE
Problem: Discharge Planning  Goal: Discharge to home or other facility with appropriate resources  10/29/2022 0351 by Jesús Long RN  Outcome: Progressing     Problem: Pain  Goal: Verbalizes/displays adequate comfort level or baseline comfort level  10/29/2022 0351 by Jesús Long RN  Outcome: Progressing  Problem: Safety - Adult    Goal: Free from fall injury  10/29/2022 0351 by Jesús Long RN  Outcome: Progressing       Problem: Metabolic/Fluid and Electrolytes - Adult  Goal: Glucose maintained within prescribed range  10/29/2022 0351 by Jseús Long RN  Outcome: Progressing     Problem: Chronic Conditions and Co-morbidities  Goal: Patient's chronic conditions and co-morbidity symptoms are monitored and maintained or improved  10/29/2022 0351 by Jesús Long RN  Outcome: Progressing

## 2022-10-29 NOTE — PLAN OF CARE
Problem: Discharge Planning  Goal: Discharge to home or other facility with appropriate resources  Outcome: Not Progressing     Problem: Pain  Goal: Verbalizes/displays adequate comfort level or baseline comfort level  Outcome: Not Progressing     Problem: Safety - Adult  Goal: Free from fall injury  Outcome: Not Progressing     Problem: Metabolic/Fluid and Electrolytes - Adult  Goal: Glucose maintained within prescribed range  Outcome: Not Progressing     Problem: Chronic Conditions and Co-morbidities  Goal: Patient's chronic conditions and co-morbidity symptoms are monitored and maintained or improved  Outcome: Not Progressing

## 2022-10-29 NOTE — PROGRESS NOTES
[facility]   Daily Progress Note    Admit Date:  10/21/2022  HPI:    Chief Complaint   Patient presents with    Edema     Pt reports BLE edema and ascites x \"few weeks\". Pt reports COPD and CHF, on lasix 80mg BID. Pt reports that he called his cardiologist which told him to come to ED>       Shari Mo presented with worsening swelling, difficulty urination, shortness of breath and chest pressure. PMH of hypertension, CHF, DM2, severe COPD    Subjective:  Mr. Lola Rodriguez Sitting up in chair, reports breathing much improved, still with leg edema and abdominal distention. His any lightheadedness, cramping or chest pain.     Objective:   Patient Vitals for the past 24 hrs:   BP Temp Temp src Pulse Resp SpO2 Weight   10/29/22 1206 125/85 97.6 °F (36.4 °C) Oral 81 14 90 % --   10/29/22 1201 -- -- -- -- -- -- 220 lb 9.6 oz (100.1 kg)   10/29/22 0824 -- -- -- -- -- 93 % --   10/29/22 0809 (!) 134/101 97.7 °F (36.5 °C) Axillary -- 16 94 % --   10/29/22 0640 128/85 97.7 °F (36.5 °C) Axillary -- 18 94 % --   10/28/22 2116 118/82 97.5 °F (36.4 °C) Oral 82 19 90 % --   10/28/22 2003 -- -- -- -- -- 90 % --   10/28/22 1645 121/79 97.5 °F (36.4 °C) Oral 85 18 91 % --   10/28/22 1602 -- -- -- -- -- 90 % --       Intake/Output Summary (Last 24 hours) at 10/29/2022 1355  Last data filed at 10/29/2022 1231  Gross per 24 hour   Intake 580 ml   Output 4750 ml   Net -4170 ml     Wt Readings from Last 3 Encounters:   10/29/22 (S) 220 lb 9.6 oz (100.1 kg)   09/01/22 219 lb (99.3 kg)   08/14/22 222 lb 10.6 oz (101 kg)         ASSESSMENT:   CHF, acute on chronic diastolic with right heart failure: Weight down 10 pounds overnight, total 18 pounds net -16.3 L, symptoms improved but still with significant pitting edema and pitting lower abdominal wall  Pulmonary hypertension, severe: Suspect who group 3  Severe TR  HYPERTENSION: Stable  CAD, mild nonobstructive: No chest pain  COPD, severe  DM2      PLAN:  Continue Lasix 80 mg IV 3 times daily-consider changing to Lasix drip 10 mg an hour  Continue spironolactone 25 mg daily and losartan 25 mg daily  Continue low-dose aspirin and statin for nonobstructive CAD  Repeat RHC being planned for Monday to reevaluate pressures  Daily weights, daily labs, strict I/O    SANCHEZ Kaiser CNP, 10/29/2022, 1:55 PM  [facility]   [phone]       Telemetry: SR , 8 beats NSVT  NYHA: III    Physical Exam:  General:  Awake, alert, NAD  Skin:  Warm and dry  Neck:  JVP full to jaw, + HJR  Chest: Clear to auscultation posteriorly though diminished in bases  Cardiovascular:  RRR, normal S1S2, + murmur, no GR  Abdomen: Firm, distended, dependent pitting edema, nontender, +bowel sounds  Extremities: 2+ pitting BLE edema up to thighs and abdomen        Medications:    predniSONE  40 mg Oral Daily    spironolactone  25 mg Oral Daily    guaiFENesin  600 mg Oral BID    doxycycline hyclate  100 mg Oral 2 times per day    furosemide  80 mg IntraVENous TID    losartan  25 mg Oral Daily    sodium chloride flush  5-40 mL IntraVENous 2 times per day    ipratropium-albuterol  1 ampule Inhalation Q4H WA    aspirin  81 mg Oral Daily    atorvastatin  20 mg Oral Nightly    sodium chloride flush  5-40 mL IntraVENous 2 times per day    enoxaparin  40 mg SubCUTAneous Daily    insulin lispro  0-8 Units SubCUTAneous TID WC    insulin lispro  0-4 Units SubCUTAneous Nightly      sodium chloride      sodium chloride      dextrose         Lab Data: Lab results independently reviewed and analyzed by myself 10/29/2022    CBC: No results for input(s): WBC, HGB, PLT in the last 72 hours. BMP:    Recent Labs     10/27/22  0640 10/28/22  0950 10/29/22  0601    136 138   K 4.4 4.6 4.1   CO2 29 31 34*   BUN 28* 34* 42*   CREATININE 0.7* 0.9 0.9     INR:  No results for input(s): INR in the last 72 hours.   BNP:    Recent Labs     10/27/22  0640   PROBNP 1,825*     Cardiac Enzymes: No results for input(s): TROPONINI in the last 72 hours. Lipids:   Lab Results   Component Value Date/Time    TRIG 73 10/22/2022 07:47 AM    TRIG 49 08/06/2021 06:55 AM    HDL 29 10/22/2022 07:47 AM    HDL 41 08/06/2021 06:55 AM    LDLCALC 33 10/22/2022 07:47 AM    LDLCALC 70 08/06/2021 06:55 AM       Cardiac Imaging:    Cardiac calcium CT 10/24/22  TECHNIQUE:   CT of the heart was obtained without intravenous contrast.  Calcium scoring   analysis was performed using a separate  workstation. Dose modulation,   iterative reconstruction, and/or weight based adjustment of the mA/kV was   utilized to reduce the radiation dose to as low as reasonably achievable. FINDINGS:   LEFT MAIN: 23       LEFT ANTERIOR DESCENDING: 10       CIRCUMFLEX: 7       RIGHT CORONARY ARTERY: Zero (0). TOTAL AGATSTON CALCIUM SCORE: 36       EXTRACARDIAC STRUCTURES: Trace aortic valve and aortic annulus calcification   is seen. No aortic aneurysm. Ascending aorta measures 3.5 cm. Small   pericardial effusion is seen. No pericardial calcification. Small hiatal   hernia is seen. There is nonspecific thickening at the GE junction. Small mediastinal nodes are noted, likely reactive. A few calcified   mediastinal nodes are also seen. Respiratory motion artifact limits evaluation of pulmonary parenchymal   change. Scattered areas of bronchial wall thickening are seen. Small   right-sided pleural effusion is seen. There is adjacent consolidation at the   right lung base. .  This pleural effusion is slightly increased in size   compared to chest CT 08/05/2021       There is body wall anasarca. There is mild ascites seen in the upper   abdomen, partially imaged. Spurring is seen in the spine            Echo 10/24/22   Summary   Limited echo for LV/RV function & PHTN with limited doppler/no color. Global left ventricular systolic function is moderately decreased with   ejection fraction estimated at 40%.    Normal left ventricle size and wall thickness. There is diastolic septal flattening (\"D-shaped\" septum) consistent with   right ventricular volume overload. Diastolic dysfunction grade and filling pressure are indeterminate. The right ventricle is severely enlarged in size with reduced function. The right atrium is moderately dilated. The tricuspid valve is normal in structure with tenting of the valve   leaflets. Moderate-severe tricuspid valve regurgitation. Systolic pulmonary artery pressure (SPAP) estimated at 70 mmHg (RA pressure   15 mmHg), consistent with severe pulmonary hypertension. Stress test 10/24/22   Summary    Normal LVEF 60%    Normal wall motion    Moderate inferior defect noted, suggestive of ischemia, less likely    attenuation artifact    Summed stress scores may underestimate perfusion defects      Overall, this would be considered an abnormal, intermediate risk, study         Procedures Performed: 10/24/22  1. Left heart catheterization  2. Selective left and right coronary angiogram  3. Left ventriculography   5. Right heart catheterization   Procedure Findings:  1. Mild non-obstructive coronary artery disease. 2. Normal left ventricular function with EF estimated at 55-60%  3. Normal left heart hemodynamics  4. Severe pulmonary hypertension   Findings:   1. Left main coronary artery was normal. It gave off the left anterior descending artery and left circumflex. 2. Left anterior descending artery has mild atherosclerotic disease. It was moderate in size. It gave off septal perforators and a moderate sized diagonal branch. The LAD covered the entire apex of the left ventricle. 3. Left circumflex has mild atherosclerotic disease. It was moderate in size. There was a moderate sized obtuse marginal branch. 4. Right coronary artery has mild atherosclerotic disease. It was moderate in size and was the dominant artery. 5. Left ventriculogram showed normal LVEF at 55-60%.  Wall motion was normal . There was no significant mitral valve or aortic valve disease noted. LVEDP was normal. There was no gradient noted across the aortic valve during pullback of the catheter. 6.  Right heart catheterization was performed. Right atrial pressure of 25  RV pressure 70/13  PA pressure of 78/37 with a mean of 50  Pulmonary artery wedge pressure mean of 12  Cardiac output of 5.32  Cardiac index 2.33  Aortic saturation 88%  PA saturation 59%  SVR of 917         Echo 7/29/22   Summary   Limited echo for left ventricular function and pulmonary pressures    Left ventricular cavity size is normal.   There is mild concentric left ventricular hypertrophy. Left ventricular function appears to be normal with an estimated ejection   fraction of 55%. There is systolic and diastolic septal flattening consistent with right   ventricular pressure and volume overload. The right ventricle is enlarged and hypokinetic. Severe tricuspid regurgitation. The right atrium is severely dilated. Estimated pulmonary artery systolic pressure is at 74 mmHg assuming a right   atrial pressure of 15 mmHg. There is a small pericardial effusion noted without any hemodynamic   implications. Echo: 8/5/2021  Summary   Left ventricular systolic function is low normal with a visually estimated   ejection fraction of 50-55%. EF estimated by 3D at 52 %. The left ventricle is normal in size with normal wall thickness. Flattened in diastole and systole (\"D-shaped septum\") consistent with right   ventricular volume and pressure overload. Normal left ventricular diastolic function. The right ventricle is severely dilated. Right ventricular systolic function is reduced. The right atrium is severely enlarged. Mild eccentric tricuspid regurgitation. Systolic pulmonary artery pressure (SPAP) is elevated and estimated at 56   mmHg (right atrial pressure 15 mmHg) consistent with moderate pulmonary   hypertension.    The IVC is dilated in size (>2.1 cm) and collapses <50% with respiration   consistent with markedly elevated right atrial pressures (15 mmHg) .

## 2022-10-29 NOTE — PROGRESS NOTES
Hospitalist Progress Note      PCP: Toby Ojeda DO    Date of Admission: 10/21/2022    Chief Complaint: Pitting edema and urinary retention     Hospital Course: reviewed     Subjective: resting in bed, back to 2L today, denies sob/cp currently       Medications:  Reviewed    Infusion Medications    sodium chloride      sodium chloride      dextrose       Scheduled Medications    predniSONE  40 mg Oral Daily    spironolactone  25 mg Oral Daily    guaiFENesin  600 mg Oral BID    doxycycline hyclate  100 mg Oral 2 times per day    furosemide  80 mg IntraVENous TID    losartan  25 mg Oral Daily    sodium chloride flush  5-40 mL IntraVENous 2 times per day    ipratropium-albuterol  1 ampule Inhalation Q4H WA    aspirin  81 mg Oral Daily    atorvastatin  20 mg Oral Nightly    sodium chloride flush  5-40 mL IntraVENous 2 times per day    enoxaparin  40 mg SubCUTAneous Daily    insulin lispro  0-8 Units SubCUTAneous TID WC    insulin lispro  0-4 Units SubCUTAneous Nightly     PRN Meds: sodium chloride flush, sodium chloride, hydrALAZINE, albuterol sulfate HFA **AND** [DISCONTINUED] ipratropium, perflutren lipid microspheres, lidocaine, albuterol sulfate HFA, sodium chloride flush, sodium chloride, ondansetron **OR** ondansetron, polyethylene glycol, acetaminophen **OR** acetaminophen, glucose, dextrose bolus **OR** dextrose bolus, glucagon (rDNA), dextrose      Intake/Output Summary (Last 24 hours) at 10/29/2022 0839  Last data filed at 10/29/2022 0640  Gross per 24 hour   Intake 1060 ml   Output 4275 ml   Net -3215 ml       Physical Exam Performed:    BP (!) 134/101   Pulse 82   Temp 97.7 °F (36.5 °C) (Axillary)   Resp 16   Ht 6' (1.829 m)   Wt 230 lb (104.3 kg)   SpO2 93%   BMI 31.19 kg/m²     General appearance: No apparent distress, appears stated age and cooperative. HEENT: Pupils equal, round, and reactive to light. Conjunctivae/corneas clear. Neck: Supple, with full range of motion.  No jugular venous distention. Trachea midline. Respiratory:  Normal respiratory effort. Dec'd sounds at rt base, bilaterally without Rales/Wheezes/Rhonchi. Cardiovascular: Regular rate and rhythm with normal S1/S2 without murmurs, rubs or gallops. Abdomen: Soft, non-tender, non-distended with normal bowel sounds. Tightness noted in lower abd is improved  Musculoskeletal: No clubbing, cyanosis or edema bilaterally. Full range of motion without deformity. Skin: Skin color, texture, turgor normal.  No rashes or lesions. Neurologic:  Neurovascularly intact without any focal sensory/motor deficits. Cranial nerves: II-XII intact, grossly non-focal.  Psychiatric: Alert and oriented, thought content appropriate, normal insight  Capillary Refill: Brisk, 3 seconds, normal   Peripheral Pulses: +2 palpable, equal bilaterally       Labs:   No results for input(s): WBC, HGB, HCT, PLT in the last 72 hours. Recent Labs     10/27/22  0640 10/28/22  0950 10/29/22  0601    136 138   K 4.4 4.6 4.1   CL 95* 93* 94*   CO2 29 31 34*   BUN 28* 34* 42*   CREATININE 0.7* 0.9 0.9   CALCIUM 9.5 9.6 9.6     No results for input(s): AST, ALT, BILIDIR, BILITOT, ALKPHOS in the last 72 hours. No results for input(s): INR in the last 72 hours. No results for input(s): Rick Stephon in the last 72 hours. Urinalysis:      Lab Results   Component Value Date/Time    NITRU Negative 07/17/2022 06:27 PM    BLOODU Negative 07/17/2022 06:27 PM    SPECGRAV 1.010 07/17/2022 06:27 PM    GLUCOSEU Negative 07/17/2022 06:27 PM       Radiology:  NM Cardiac Stress Test Nuclear Imaging   Final Result      CT CARDIAC CALCIUM SCORING   Final Result   Total Agatston calcium score of 40. This is in the 40th percentile      Findings of fluid overload denoted by small right-sided pleural effusion,   body wall anasarca, and mild abdominopelvic ascites. Calcium Score Interpretation      0  No identifiable atherosclerotic plaque.  Very low cardiovascular disease   risk. Less than 5% chance of presence of coronary artery disease. A   negative examination. 1-10 Minimal plaque burden. Significant coronary artery disease very   unlikely.  Mild plaque burden. Likely mild or minimal coronary stenosis. 101-400 Moderate plaque burden. Moderate non-obstructive coronary artery   disease highly likely. Over 400 Extensive plaque burden. High likelihood of at least one   significant coronary artery stenosis (>50% diameter). CALCIUM SCORING OVERVIEW:      Coronary calcium is a marker for plaque in a blood vessel or atherosclerosis   (hardening of the arteries). The presence and amount of calcium detected in   the coronary artery by the CT scan estimates the presence and amount of   atherosclerotic plaque. These calcium deposits can appear years before the   development of heart disease symptoms such as chest pain and shortness of   breath. A calcium score is computed for each of the coronary arteries based upon the   volume and density of the calcium deposits. This can be referred to as your   calcified plaque burden. It does not correspond directly to the percentage   of narrowing in the artery, but does correlate with the severity of the   overall coronary atherosclerotic burden. This score is then used to determine the calcium percentile which compares   your calcified plaque burden to that of other asymptomatic men and women of   the same age. The calcium score, in combination with the percentile, enables   your physician to determine your risk of developing symptomatic coronary   artery disease, and to measure the progression of disease as well as the   effectiveness of treatment. A score of zero indicates that there is no calcified plaque burden. This   implies that there is no significant coronary artery narrowing and very low   likelihood of a cardiac event over at least the next 3 years.  It does not   absolutely rule out the presence of soft, noncalcified plaque or totally   eliminate the possibility of a cardiac event. A score greater than zero indicates at least some coronary artery disease. As the score increases, so does the likelihood of a significant coronary   narrowing and the likelihood of a coronary event over the next 3 years. Similarly, the likelihood of a coronary event increases with increasing   calcium percentiles. XR CHEST (2 VW)   Final Result   No radiographic evidence of acute pulmonary disease. Assessment/Plan:    Active Hospital Problems    Diagnosis     Anasarca [R60.1]      Priority: Medium    Enlarged RV (right ventricle) [I51.7]      Priority: Medium    Tricuspid valve insufficiency [I07.1]      Priority: Medium    Chest congestion [R09.89]      Priority: Medium    Uncontrolled type 2 diabetes mellitus with hyperglycemia ([address] [E11.65]      Priority: Medium    Class 1 obesity in adult [E66.9]      Priority: Medium    COPD exacerbation ([address] [J44.1]      Priority: Medium    Cor pulmonale ([address] [I27.81]      Priority: Medium    Acute respiratory failure with hypoxia (HCC) [J96.01]      Priority: Medium    Severe pulmonary hypertension ([address] [I27.20]      Priority: Medium    Suspected sleep apnea [R29.818]      Priority: Medium    Former smoker [Z87.891]      Priority: Medium    DM (diabetes mellitus) ([address] [E11.9]      Priority: Medium    Pitting edema [R60.9]      Priority: Medium    Stage 3 severe COPD by GOLD classification ([address] [J44.9]     CHF (congestive heart failure) (HCC) [I50.9]     Essential hypertension [I10]           Acute diastolic Heart failure - with rt heart failure(cor pulmonale) with bilateral lower extremity edema and urinary retention in setting of known pulmonary hypertension PASP 74 mmHg and EF 55% by echo (July 2022). Continued IV diuresis per cardiology, goal weight appears to be around 185 pounds.   Ischemia evaluation with Rohini Chris nuclear stress test and CT calcium score were abn  -s/p right and left heart catheterization 10/24, noted severe pulm htn, mild nonobstructive CAD, preserved EF  -echo done(ef 40%, rv severe enlarged, severe TR, severe PH)   -repeat RHC planned 10/28     Acute respiratory failure requiring supplemental oxygen. Known history of COPD but not on home oxygen. Uses inhalers at home and getting relief from Duonebs. Echocardiogram in 2021 as well as 2022 which showed normal left ventricular function however there was right-sided enlargement and significant pulmonary hypertension noted. -on iv steroids, switched to po steroids, rec taper over few weeks   -pulm consulted, apprec recs, empiric doxycycline started 10/26, end 10/30  -pulm rec flu shot      Type 2 Diabetes (HgbA1c 7.0% on 8/12/22). Metformin held on admission. Cover with a \"sliding scale\" lispro moderate scale prandial correction insulin. Primary hypertension - on losartan 50 mg daily. Dyslipidemia on atorvastatin 20 mg nightly. Class I obesity (BMI 32.4). DVT Prophylaxis: lovenox  Diet: ADULT DIET; Regular;  No Added Salt (3-4 gm)  Code Status: Full Code    PT/OT Eval Status: not needed     Dispo - continue diuresis, RHC Monday, check cxr    Appropriate for A1 Discharge Unit: Tiana Bell MD

## 2022-10-29 NOTE — PLAN OF CARE
Problem: Discharge Planning  Goal: Discharge to home or other facility with appropriate resources  10/29/2022 0951 by Bing Sears RN

## 2022-10-29 NOTE — RT PROTOCOL NOTE
RT Inhaler-Nebulizer Bronchodilator Protocol Note    There is a bronchodilator order in the chart from a provider indicating to follow the RT Bronchodilator Protocol and there is an Initiate RT Inhaler-Nebulizer Bronchodilator Protocol order as well (see protocol at bottom of note). CXR Findings:  No results found. The findings from the last RT Protocol Assessment were as follows:   History Pulmonary Disease: Chronic pulmonary disease  Respiratory Pattern: Dyspnea on exertion or RR 21-25 bpm  Breath Sounds: Inspiratory and expiratory or bilateral wheezing and/or rhonchi  Cough: Strong, spontaneous, non-productive  Indication for Bronchodilator Therapy: Wheezing associated with pulm disorder  Bronchodilator Assessment Score: 10    Aerosolized bronchodilator medication orders have been revised according to the RT Inhaler-Nebulizer Bronchodilator Protocol below. Respiratory Therapist to perform RT Therapy Protocol Assessment initially then follow the protocol. Repeat RT Therapy Protocol Assessment PRN for score 0-3 or on second treatment, BID, and PRN for scores above 3. No Indications - adjust the frequency to every 6 hours PRN wheezing or bronchospasm, if no treatments needed after 48 hours then discontinue using Per Protocol order mode. If indication present, adjust the RT bronchodilator orders based on the Bronchodilator Assessment Score as indicated below. Use Inhaler orders unless patient has one or more of the following: on home nebulizer, not able to hold breath for 10 seconds, is not alert and oriented, cannot activate and use MDI correctly, or respiratory rate 25 breaths per minute or more, then use the equivalent nebulizer order(s) with same Frequency and PRN reasons based on the score. If a patient is on this medication at home then do not decrease Frequency below that used at home.     0-3 - enter or revise RT bronchodilator order(s) to equivalent RT Bronchodilator order with Frequency of every 4 hours PRN for wheezing or increased work of breathing using Per Protocol order mode. 4-6 - enter or revise RT Bronchodilator order(s) to two equivalent RT bronchodilator orders with one order with BID Frequency and one order with Frequency of every 4 hours PRN wheezing or increased work of breathing using Per Protocol order mode. 7-10 - enter or revise RT Bronchodilator order(s) to two equivalent RT bronchodilator orders with one order with TID Frequency and one order with Frequency of every 4 hours PRN wheezing or increased work of breathing using Per Protocol order mode. 11-13 - enter or revise RT Bronchodilator order(s) to one equivalent RT bronchodilator order with QID Frequency and an Albuterol order with Frequency of every 4 hours PRN wheezing or increased work of breathing using Per Protocol order mode. Greater than 13 - enter or revise RT Bronchodilator order(s) to one equivalent RT bronchodilator order with every 4 hours Frequency and an Albuterol order with Frequency of every 2 hours PRN wheezing or increased work of breathing using Per Protocol order mode. RT to enter RT Home Evaluation for COPD & MDI Assessment order using Per Protocol order mode.     Electronically signed by Sara Benson RCP on 10/29/2022 at 8:27 AM

## 2022-10-29 NOTE — PROGRESS NOTES
10/29/22 0800   RT Protocol   History Pulmonary Disease 2   Respiratory pattern 2   Breath sounds 6   Cough 0   Indications for Bronchodilator Therapy Wheezing associated with pulm disorder   Bronchodilator Assessment Score 10

## 2022-10-30 LAB
ANION GAP SERPL CALCULATED.3IONS-SCNC: 9 MMOL/L (ref 3–16)
BUN BLDV-MCNC: 38 MG/DL (ref 7–20)
CALCIUM SERPL-MCNC: 9.5 MG/DL (ref 8.3–10.6)
CHLORIDE BLD-SCNC: 97 MMOL/L (ref 99–110)
CO2: 36 MMOL/L (ref 21–32)
CREAT SERPL-MCNC: 0.8 MG/DL (ref 0.9–1.3)
GFR SERPL CREATININE-BSD FRML MDRD: >60 ML/MIN/{1.73_M2}
GLUCOSE BLD-MCNC: 102 MG/DL (ref 70–99)
GLUCOSE BLD-MCNC: 111 MG/DL (ref 70–99)
GLUCOSE BLD-MCNC: 143 MG/DL (ref 70–99)
GLUCOSE BLD-MCNC: 159 MG/DL (ref 70–99)
GLUCOSE BLD-MCNC: 97 MG/DL (ref 70–99)
MAGNESIUM: 2.3 MG/DL (ref 1.8–2.4)
PERFORMED ON: ABNORMAL
PERFORMED ON: NORMAL
POTASSIUM SERPL-SCNC: 4.1 MMOL/L (ref 3.5–5.1)
PRO-BNP: 1236 PG/ML (ref 0–124)
SODIUM BLD-SCNC: 142 MMOL/L (ref 136–145)

## 2022-10-30 PROCEDURE — 80048 BASIC METABOLIC PNL TOTAL CA: CPT

## 2022-10-30 PROCEDURE — 2580000003 HC RX 258: Performed by: INTERNAL MEDICINE

## 2022-10-30 PROCEDURE — 94640 AIRWAY INHALATION TREATMENT: CPT

## 2022-10-30 PROCEDURE — 99232 SBSQ HOSP IP/OBS MODERATE 35: CPT | Performed by: NURSE PRACTITIONER

## 2022-10-30 PROCEDURE — 6360000002 HC RX W HCPCS: Performed by: INTERNAL MEDICINE

## 2022-10-30 PROCEDURE — 1200000000 HC SEMI PRIVATE

## 2022-10-30 PROCEDURE — 6370000000 HC RX 637 (ALT 250 FOR IP): Performed by: INTERNAL MEDICINE

## 2022-10-30 PROCEDURE — 94761 N-INVAS EAR/PLS OXIMETRY MLT: CPT

## 2022-10-30 PROCEDURE — 94669 MECHANICAL CHEST WALL OSCILL: CPT

## 2022-10-30 PROCEDURE — 83735 ASSAY OF MAGNESIUM: CPT

## 2022-10-30 PROCEDURE — 2700000000 HC OXYGEN THERAPY PER DAY

## 2022-10-30 PROCEDURE — 6370000000 HC RX 637 (ALT 250 FOR IP): Performed by: NURSE PRACTITIONER

## 2022-10-30 PROCEDURE — 83880 ASSAY OF NATRIURETIC PEPTIDE: CPT

## 2022-10-30 PROCEDURE — 6360000002 HC RX W HCPCS: Performed by: NURSE PRACTITIONER

## 2022-10-30 PROCEDURE — 36415 COLL VENOUS BLD VENIPUNCTURE: CPT

## 2022-10-30 RX ORDER — IPRATROPIUM BROMIDE AND ALBUTEROL SULFATE 2.5; .5 MG/3ML; MG/3ML
1 SOLUTION RESPIRATORY (INHALATION) 2 TIMES DAILY
Status: DISCONTINUED | OUTPATIENT
Start: 2022-10-31 | End: 2022-11-02

## 2022-10-30 RX ADMIN — PREDNISONE 40 MG: 20 TABLET ORAL at 09:25

## 2022-10-30 RX ADMIN — GUAIFENESIN 600 MG: 600 TABLET, EXTENDED RELEASE ORAL at 09:24

## 2022-10-30 RX ADMIN — Medication 10 ML: at 09:24

## 2022-10-30 RX ADMIN — Medication 10 ML: at 21:01

## 2022-10-30 RX ADMIN — IPRATROPIUM BROMIDE AND ALBUTEROL SULFATE 1 AMPULE: 2.5; .5 SOLUTION RESPIRATORY (INHALATION) at 11:26

## 2022-10-30 RX ADMIN — ATORVASTATIN CALCIUM 20 MG: 10 TABLET, FILM COATED ORAL at 21:01

## 2022-10-30 RX ADMIN — SPIRONOLACTONE 25 MG: 25 TABLET ORAL at 09:24

## 2022-10-30 RX ADMIN — DOXYCYCLINE HYCLATE 100 MG: 100 TABLET, COATED ORAL at 21:01

## 2022-10-30 RX ADMIN — FUROSEMIDE 80 MG: 10 INJECTION, SOLUTION INTRAMUSCULAR; INTRAVENOUS at 09:24

## 2022-10-30 RX ADMIN — FUROSEMIDE 80 MG: 10 INJECTION, SOLUTION INTRAMUSCULAR; INTRAVENOUS at 14:27

## 2022-10-30 RX ADMIN — LOSARTAN POTASSIUM 25 MG: 25 TABLET, FILM COATED ORAL at 09:25

## 2022-10-30 RX ADMIN — IPRATROPIUM BROMIDE AND ALBUTEROL SULFATE 1 AMPULE: 2.5; .5 SOLUTION RESPIRATORY (INHALATION) at 15:53

## 2022-10-30 RX ADMIN — GUAIFENESIN 600 MG: 600 TABLET, EXTENDED RELEASE ORAL at 21:01

## 2022-10-30 RX ADMIN — ASPIRIN 81 MG: 81 TABLET, COATED ORAL at 09:25

## 2022-10-30 RX ADMIN — IPRATROPIUM BROMIDE AND ALBUTEROL SULFATE 1 AMPULE: 2.5; .5 SOLUTION RESPIRATORY (INHALATION) at 19:48

## 2022-10-30 RX ADMIN — DOXYCYCLINE HYCLATE 100 MG: 100 TABLET, COATED ORAL at 09:24

## 2022-10-30 RX ADMIN — ENOXAPARIN SODIUM 40 MG: 100 INJECTION SUBCUTANEOUS at 09:25

## 2022-10-30 RX ADMIN — SODIUM CHLORIDE, PRESERVATIVE FREE 10 ML: 5 INJECTION INTRAVENOUS at 21:06

## 2022-10-30 RX ADMIN — IPRATROPIUM BROMIDE AND ALBUTEROL SULFATE 1 AMPULE: 2.5; .5 SOLUTION RESPIRATORY (INHALATION) at 08:20

## 2022-10-30 RX ADMIN — FUROSEMIDE 80 MG: 10 INJECTION, SOLUTION INTRAMUSCULAR; INTRAVENOUS at 21:01

## 2022-10-30 ASSESSMENT — PAIN SCALES - GENERAL
PAINLEVEL_OUTOF10: 0

## 2022-10-30 NOTE — PLAN OF CARE
Problem: Discharge Planning  Goal: Discharge to home or other facility with appropriate resources  Outcome: Progressing  Note:   Patient's EF (Ejection Fraction) is  40%    Heart Failure Medications:  Diuretics[de-identified] Furosemide and Spironolactone    (One of the following REQUIRED for EF </= 40%/SYSTOLIC FAILURE but MAY be used in EF% >40%/DIASTOLIC FAILURE)        ACE[de-identified] None        ARB[de-identified] Losartan         ARNI[de-identified] None    (Beta Blockers)  NON- Evidenced Based Beta Blocker (for EF% >40%/DIASTOLIC FAILURE): None    Evidenced Based Beta Blocker::(REQUIRED for EF% <40%/SYSTOLIC FAILURE) None  . .................................................................................................................................................. Patient's weights and intake/output reviewed: Yes    Patient's Last Weight: 216 lbs obtained by standing scale. Difference of 4 lbs less than last documented weight. Intake/Output Summary (Last 24 hours) at 10/30/2022 1243  Last data filed at 10/30/2022 1202  Gross per 24 hour   Intake 1820 ml   Output 7750 ml   Net -5930 ml       Education Booklet Provided: yes    Comorbidities Reviewed Yes    Patient has a past medical history of Acute systolic congestive heart failure ([address] Diabetes mellitus ([address] Essential hypertension, and Thrombocytopenia ([address] >>For CHF and Comorbidity documentation on Education Time and Topics, please see Education Tab    Progressive Mobility Assessment:  What is this patient's Current Level of Mobility?: Ambulatory-Up [facility]  How was this patient Mobilized today?: Edge of Bed, Up to Chair,  Up to Toilet/Shower, and Up in Room, ambulated 20 ft                 With Whom? Self                 Level of Difficulty/Assistance: Independent     Pt resting in bed at this time on  2 L O2. Pt with complaints of shortness of breath. Pt with pitting lower extremity edema.      Patient and/or Family's stated Goal of Care this Admission: reduce shortness of breath, increase activity tolerance, better understand heart failure and disease management, be more comfortable, and reduce lower extremity edema prior to discharge        :      Problem: Pain  Goal: Verbalizes/displays adequate comfort level or baseline comfort level  Outcome: Progressing  Note: Pt will be satisfied with pain control. Pt uses numeric pain rating scale with reassessments after pain med administration. Will continue to monitor progression throughout shift. Problem: Safety - Adult  Goal: Free from fall injury  Outcome: Progressing  Note: Pt will remain free from falls throughout hospital stay. Fall precautions in place, bed in lowest position with wheels locked and side rails 2/4 up. Room door open and hourly rounding completed. Will continue to monitor throughout shift. Problem: Chronic Conditions and Co-morbidities  Goal: Patient's chronic conditions and co-morbidity symptoms are monitored and maintained or improved  10/30/2022 1242 by Elizabeth Benton RN  Outcome: Progressing  Note: Pt will have accuchecks before meals and at bedtime with sliding scale insulin in place for coverage. Will continue to monitor for signs and symptoms of hypoglycemia and hyperglycemia throughout shift.

## 2022-10-30 NOTE — PROGRESS NOTES
End of shift report given to [employer]. Call light within reach, bed in lowest position, no needs at this time.

## 2022-10-30 NOTE — PLAN OF CARE
Patient's EF (Ejection Fraction) is 40%    Heart Failure Medications:  Diuretics[de-identified] Furosemide and Spironolactone    (One of the following REQUIRED for EF </= 40%/SYSTOLIC FAILURE but MAY be used in EF% >40%/DIASTOLIC FAILURE)        ACE[de-identified] None        ARB[de-identified] Losartan         ARNI[de-identified] None    (Beta Blockers)  NON- Evidenced Based Beta Blocker (for EF% >40%/DIASTOLIC FAILURE): None    Evidenced Based Beta Blocker::(REQUIRED for EF% <40%/SYSTOLIC FAILURE) None  . .................................................................................................................................................. Patient's weights and intake/output reviewed: Yes    Patient's Last Weight: 220 lbs obtained by standing scale. Difference of 10 lbs less than last documented weight. Intake/Output Summary (Last 24 hours) at 10/30/2022 0033  Last data filed at 10/29/2022 2230  Gross per 24 hour   Intake 2020 ml   Output 7000 ml   Net -4980 ml       Education Booklet Provided: yes    Comorbidities Reviewed Yes    Patient has a past medical history of Acute systolic congestive heart failure ([address] Diabetes mellitus ([address] Essential hypertension, and Thrombocytopenia ([address] >>For CHF and Comorbidity documentation on Education Time and Topics, please see Education Tab    Progressive Mobility Assessment:  What is this patient's Current Level of Mobility?: Ambulatory-Up [facility]  How was this patient Mobilized today?: Edge of Bed, Up to Chair,  Up to Toilet/Shower, Up in Room, and Up in Hallway, ambulated 50 ft                 With Whom? Self                 Level of Difficulty/Assistance: Independent     Pt resting in bed at this time on  3 L O2. Pt denies shortness of breath. Pt with pitting lower extremity edema.      Patient and/or Family's stated Goal of Care this Admission: reduce shortness of breath, increase activity tolerance, better understand heart failure and disease management, be more comfortable, and reduce lower extremity edema prior to discharge        :

## 2022-10-30 NOTE — PROGRESS NOTES
Hospitalist Progress Note      PCP: Karen Odonnell DO    Date of Admission: 10/21/2022    Chief Complaint: Pitting edema and urinary retention     Hospital Course: reviewed        Subjective: resting in bed, on 3L, denies sob/cp, swelling overall improved       Medications:  Reviewed    Infusion Medications    sodium chloride      sodium chloride      dextrose       Scheduled Medications    predniSONE  40 mg Oral Daily    spironolactone  25 mg Oral Daily    guaiFENesin  600 mg Oral BID    doxycycline hyclate  100 mg Oral 2 times per day    furosemide  80 mg IntraVENous TID    losartan  25 mg Oral Daily    sodium chloride flush  5-40 mL IntraVENous 2 times per day    ipratropium-albuterol  1 ampule Inhalation Q4H WA    aspirin  81 mg Oral Daily    atorvastatin  20 mg Oral Nightly    sodium chloride flush  5-40 mL IntraVENous 2 times per day    enoxaparin  40 mg SubCUTAneous Daily    insulin lispro  0-8 Units SubCUTAneous TID WC    insulin lispro  0-4 Units SubCUTAneous Nightly     PRN Meds: sodium chloride flush, sodium chloride, hydrALAZINE, albuterol sulfate HFA **AND** [DISCONTINUED] ipratropium, perflutren lipid microspheres, lidocaine, albuterol sulfate HFA, sodium chloride flush, sodium chloride, ondansetron **OR** ondansetron, polyethylene glycol, acetaminophen **OR** acetaminophen, glucose, dextrose bolus **OR** dextrose bolus, glucagon (rDNA), dextrose      Intake/Output Summary (Last 24 hours) at 10/30/2022 0834  Last data filed at 10/30/2022 0825  Gross per 24 hour   Intake 1440 ml   Output 7650 ml   Net -6210 ml       Physical Exam Performed:    BP (!) 124/98   Pulse 74   Temp 97.7 °F (36.5 °C) (Axillary)   Resp 18   Ht 6' (1.829 m)   Wt 216 lb 1.6 oz (98 kg)   SpO2 94% Comment: Simultaneous filing. User may not have seen previous data. BMI 29.31 kg/m²   General appearance: No apparent distress, appears stated age and cooperative. HEENT: Pupils equal, round, and reactive to light. Conjunctivae/corneas clear. Neck: Supple, with full range of motion. No jugular venous distention. Trachea midline. Respiratory:  Normal respiratory effort. Dec'd sounds at rt base, bilaterally without Rales/Wheezes/Rhonchi. Cardiovascular: Regular rate and rhythm with normal S1/S2 without murmurs, rubs or gallops. Abdomen: Soft, non-tender, non-distended with normal bowel sounds. Tightness noted in lower abd/upper thighs is improved  Musculoskeletal: No clubbing, cyanosis or edema bilaterally. Full range of motion without deformity. Skin: Skin color, texture, turgor normal.  No rashes or lesions. Neurologic:  Neurovascularly intact without any focal sensory/motor deficits. Cranial nerves: II-XII intact, grossly non-focal.  Psychiatric: Alert and oriented, thought content appropriate, normal insight  Capillary Refill: Brisk, 3 seconds, normal   Peripheral Pulses: +2 palpable, equal bilaterally       Labs:   No results for input(s): WBC, HGB, HCT, PLT in the last 72 hours. Recent Labs     10/28/22  0950 10/29/22  0601    138   K 4.6 4.1   CL 93* 94*   CO2 31 34*   BUN 34* 42*   CREATININE 0.9 0.9   CALCIUM 9.6 9.6     No results for input(s): AST, ALT, BILIDIR, BILITOT, ALKPHOS in the last 72 hours. No results for input(s): INR in the last 72 hours. No results for input(s): Juan Beets in the last 72 hours. Urinalysis:      Lab Results   Component Value Date/Time    NITRU Negative 07/17/2022 06:27 PM    BLOODU Negative 07/17/2022 06:27 PM    SPECGRAV 1.010 07/17/2022 06:27 PM    GLUCOSEU Negative 07/17/2022 06:27 PM       Radiology:  XR CHEST (2 VW)   Final Result   Mild cardiomegaly without evidence of CHF. Small right pleural effusion. NM Cardiac Stress Test Nuclear Imaging   Final Result      CT CARDIAC CALCIUM SCORING   Final Result   Total Agatston calcium score of 40.   This is in the 40th percentile      Findings of fluid overload denoted by small right-sided pleural effusion,   body wall anasarca, and mild abdominopelvic ascites. Calcium Score Interpretation      0  No identifiable atherosclerotic plaque. Very low cardiovascular disease   risk. Less than 5% chance of presence of coronary artery disease. A   negative examination. 1-10 Minimal plaque burden. Significant coronary artery disease very   unlikely.  Mild plaque burden. Likely mild or minimal coronary stenosis. 101-400 Moderate plaque burden. Moderate non-obstructive coronary artery   disease highly likely. Over 400 Extensive plaque burden. High likelihood of at least one   significant coronary artery stenosis (>50% diameter). CALCIUM SCORING OVERVIEW:      Coronary calcium is a marker for plaque in a blood vessel or atherosclerosis   (hardening of the arteries). The presence and amount of calcium detected in   the coronary artery by the CT scan estimates the presence and amount of   atherosclerotic plaque. These calcium deposits can appear years before the   development of heart disease symptoms such as chest pain and shortness of   breath. A calcium score is computed for each of the coronary arteries based upon the   volume and density of the calcium deposits. This can be referred to as your   calcified plaque burden. It does not correspond directly to the percentage   of narrowing in the artery, but does correlate with the severity of the   overall coronary atherosclerotic burden. This score is then used to determine the calcium percentile which compares   your calcified plaque burden to that of other asymptomatic men and women of   the same age. The calcium score, in combination with the percentile, enables   your physician to determine your risk of developing symptomatic coronary   artery disease, and to measure the progression of disease as well as the   effectiveness of treatment.       A score of zero indicates that there is no calcified plaque burden. This   implies that there is no significant coronary artery narrowing and very low   likelihood of a cardiac event over at least the next 3 years. It does not   absolutely rule out the presence of soft, noncalcified plaque or totally   eliminate the possibility of a cardiac event. A score greater than zero indicates at least some coronary artery disease. As the score increases, so does the likelihood of a significant coronary   narrowing and the likelihood of a coronary event over the next 3 years. Similarly, the likelihood of a coronary event increases with increasing   calcium percentiles. XR CHEST (2 VW)   Final Result   No radiographic evidence of acute pulmonary disease.                  Assessment/Plan:    Active Hospital Problems    Diagnosis     Anasarca [R60.1]      Priority: Medium    Enlarged RV (right ventricle) [I51.7]      Priority: Medium    Tricuspid valve insufficiency [I07.1]      Priority: Medium    Chest congestion [R09.89]      Priority: Medium    Uncontrolled type 2 diabetes mellitus with hyperglycemia ([address] [E11.65]      Priority: Medium    Class 1 obesity in adult [E66.9]      Priority: Medium    COPD exacerbation ([address] [J44.1]      Priority: Medium    Cor pulmonale ([address] [I27.81]      Priority: Medium    Acute respiratory failure with hypoxia (HCC) [J96.01]      Priority: Medium    Severe pulmonary hypertension (HCC) [I27.20]      Priority: Medium    Suspected sleep apnea [R29.818]      Priority: Medium    Former smoker [Z87.891]      Priority: Medium    DM (diabetes mellitus) ([address] [E11.9]      Priority: Medium    Pitting edema [R60.9]      Priority: Medium    Stage 3 severe COPD by GOLD classification ([address] [J44.9]     CHF (congestive heart failure) (HCC) [I50.9]     Essential hypertension [I10]        Acute diastolic Heart failure - with rt heart failure(cor pulmonale) with bilateral lower extremity edema and urinary retention in setting of known pulmonary hypertension PASP 74 mmHg and EF 55% by echo (July 2022). Continued IV diuresis per cardiology, goal weight appears to be around 185 pounds. Ischemia evaluation with Lexiscan nuclear stress test and CT calcium score were abn  -s/p right and left heart catheterization 10/24, noted severe pulm htn, mild nonobstructive CAD, preserved EF  -echo done(ef 40%, rv severe enlarged, severe TR, severe PH)   -repeat RHC planned 10/28     Acute respiratory failure requiring supplemental oxygen. Known history of COPD but not on home oxygen. Uses inhalers at home and getting relief from Duonebs. Echocardiogram in 2021 as well as 2022 which showed normal left ventricular function however there was right-sided enlargement and significant pulmonary hypertension noted. -on iv steroids, switched to po steroids, rec taper over few weeks   -pulm consulted, apprec recs, empiric doxycycline started 10/26, ended 10/30  -pulm rec flu shot prior to dc   -cxr 10/29 noted small rt effusion    Type 2 Diabetes (HgbA1c 7.0% on 8/12/22). Metformin held on admission. Cover with a \"sliding scale\" lispro moderate scale prandial correction insulin. Primary hypertension - on losartan 50 mg daily. Dyslipidemia on atorvastatin 20 mg nightly. Class I obesity (BMI 32.4). DVT Prophylaxis: lovenox  Diet: ADULT DIET; Regular;  No Added Salt (3-4 gm)  Code Status: Full Code       PT/OT Eval Status: not needed     Dispo - continue diuresis, RHC Monday,    Appropriate for A1 Discharge Unit: No      Kaleb Allison MD

## 2022-10-30 NOTE — PLAN OF CARE
Problem: Chronic Conditions and Co-morbidities  Goal: Patient's chronic conditions and co-morbidity symptoms are monitored and maintained or improved  Outcome: Progressing   Continuing to monitor blood glucose levels and administer ordered insulin as appropriate.

## 2022-10-30 NOTE — PROGRESS NOTES
Pt has 21 second run of v-tach on telemetry. Asymptomatic, VSS. Notified Sherif Lyons MD. Received order to check magnesium. Will continue to monitor.

## 2022-10-30 NOTE — PROGRESS NOTES
[address]   Daily Progress Note    Admit Date:  10/21/2022  HPI:    Chief Complaint   Patient presents with    Edema     Pt reports BLE edema and ascites x \"few weeks\". Pt reports COPD and CHF, on lasix 80mg BID. Pt reports that he called his cardiologist which told him to come to ED>       Justice Ganser presented with worsening swelling, difficulty urination, shortness of breath and chest pressure. PMH of hypertension, CHF, DM2, severe COPD    Subjective:  Mr. April Rodrigues lying in bed, breathing continues to improve as does lower extremity and abdominal swelling.     Objective:   Patient Vitals for the past 24 hrs:   BP Temp Temp src Pulse Resp SpO2 Weight   10/30/22 1201 119/89 97.7 °F (36.5 °C) Oral 80 18 91 % --   10/30/22 1015 -- -- -- -- -- 94 % --   10/30/22 0821 (!) 124/98 97.7 °F (36.5 °C) Axillary 74 18 94 % --   10/30/22 0546 -- -- -- -- -- -- 216 lb 1.6 oz (98 kg)   10/30/22 0453 (!) 122/96 97.7 °F (36.5 °C) Oral 66 18 93 % --   10/30/22 0100 113/86 97.7 °F (36.5 °C) Oral 79 17 97 % --   10/29/22 2029 (!) 131/96 97.5 °F (36.4 °C) Oral 86 18 95 % --   10/29/22 1952 -- -- -- 81 20 95 % --   10/29/22 1603 120/85 98 °F (36.7 °C) Oral 78 18 95 % --         Intake/Output Summary (Last 24 hours) at 10/30/2022 1237  Last data filed at 10/30/2022 1202  Gross per 24 hour   Intake 1820 ml   Output 7750 ml   Net -5930 ml       Wt Readings from Last 3 Encounters:   10/30/22 216 lb 1.6 oz (98 kg)   09/01/22 219 lb (99.3 kg)   08/14/22 222 lb 10.6 oz (101 kg)         ASSESSMENT:   CHF, acute on chronic diastolic with right heart failure: Weight down another 4 pounds overnight, total 22 pounds net -22 L, symptoms improved but still with significant pitting edema to lower abdomen and legs   Pulmonary hypertension, severe: Suspect who group 3  Severe TR  HYPERTENSION: Stable  CAD, mild nonobstructive: No chest pain  COPD, severe  DM2      PLAN:  Continue Lasix 80 mg IV 3 times daily -renal function stable, BP stable, no cramping or lightheadedness  Continue spironolactone 25 mg daily and losartan 25 mg daily  Continue low-dose aspirin and statin for nonobstructive CAD  Repeat RHC being tomorrow to reevaluate pressures  Daily weights, daily labs, strict I/O    SANCHEZ Meraz CNP, 10/30/2022, 12:37 PM  [address]   [phone]       Telemetry: SR 60-80, NSVT X 10 sec  NYHA: III    Physical Exam:  General:  Awake, alert, NAD  Skin:  Warm and dry  Neck:  JVP full to jaw, + HJR  Chest: Scattered inspiratory and expiratory wheezes  Cardiovascular:  RRR, normal S1S2, + murmur, no GR  Abdomen: Softer, distended, nontender, +bowel sounds  Extremities: 1-2+ pitting BLE edema up to thighs        Medications:    predniSONE  40 mg Oral Daily    spironolactone  25 mg Oral Daily    guaiFENesin  600 mg Oral BID    doxycycline hyclate  100 mg Oral 2 times per day    furosemide  80 mg IntraVENous TID    losartan  25 mg Oral Daily    sodium chloride flush  5-40 mL IntraVENous 2 times per day    ipratropium-albuterol  1 ampule Inhalation Q4H WA    aspirin  81 mg Oral Daily    atorvastatin  20 mg Oral Nightly    sodium chloride flush  5-40 mL IntraVENous 2 times per day    enoxaparin  40 mg SubCUTAneous Daily    insulin lispro  0-8 Units SubCUTAneous TID     insulin lispro  0-4 Units SubCUTAneous Nightly      sodium chloride      sodium chloride      dextrose         Lab Data: Lab results independently reviewed and analyzed by myself 10/30/2022    CBC: No results for input(s): WBC, HGB, PLT in the last 72 hours. BMP:    Recent Labs     10/28/22  0950 10/29/22  0601 10/30/22  0801    138 142   K 4.6 4.1 4.1   CO2 31 34* 36*   BUN 34* 42* 38*   CREATININE 0.9 0.9 0.8*       INR:  No results for input(s): INR in the last 72 hours.   BNP:     Latest Reference Range & Units 10/21/22 14:48 10/24/22 07:59 10/27/22 06:40 10/30/22 08:01   Pro-BNP 0 - 124 pg/mL 945 (H) 991 (H) 1,825 (H) 1,236 (H)   (H): Data is abnormally high    Cardiac Enzymes: No results for input(s): TROPONINI in the last 72 hours. Lipids:   Lab Results   Component Value Date/Time    TRIG 73 10/22/2022 07:47 AM    TRIG 49 08/06/2021 06:55 AM    HDL 29 10/22/2022 07:47 AM    HDL 41 08/06/2021 06:55 AM    LDLCALC 33 10/22/2022 07:47 AM    LDLCALC 70 08/06/2021 06:55 AM       Cardiac Imaging:    Cardiac calcium CT 10/24/22  TECHNIQUE:   CT of the heart was obtained without intravenous contrast.  Calcium scoring   analysis was performed using a separate  workstation. Dose modulation,   iterative reconstruction, and/or weight based adjustment of the mA/kV was   utilized to reduce the radiation dose to as low as reasonably achievable. FINDINGS:   LEFT MAIN: 23       LEFT ANTERIOR DESCENDING: 10       CIRCUMFLEX: 7       RIGHT CORONARY ARTERY: Zero (0). TOTAL AGATSTON CALCIUM SCORE: 36       EXTRACARDIAC STRUCTURES: Trace aortic valve and aortic annulus calcification   is seen. No aortic aneurysm. Ascending aorta measures 3.5 cm. Small   pericardial effusion is seen. No pericardial calcification. Small hiatal   hernia is seen. There is nonspecific thickening at the GE junction. Small mediastinal nodes are noted, likely reactive. A few calcified   mediastinal nodes are also seen. Respiratory motion artifact limits evaluation of pulmonary parenchymal   change. Scattered areas of bronchial wall thickening are seen. Small   right-sided pleural effusion is seen. There is adjacent consolidation at the   right lung base. .  This pleural effusion is slightly increased in size   compared to chest CT 08/05/2021       There is body wall anasarca. There is mild ascites seen in the upper   abdomen, partially imaged. Spurring is seen in the spine            Echo 10/24/22   Summary   Limited echo for LV/RV function & PHTN with limited doppler/no color.    Global left ventricular systolic function is moderately decreased with   ejection fraction estimated at 40%. Normal left ventricle size and wall thickness. There is diastolic septal flattening (\"D-shaped\" septum) consistent with   right ventricular volume overload. Diastolic dysfunction grade and filling pressure are indeterminate. The right ventricle is severely enlarged in size with reduced function. The right atrium is moderately dilated. The tricuspid valve is normal in structure with tenting of the valve   leaflets. Moderate-severe tricuspid valve regurgitation. Systolic pulmonary artery pressure (SPAP) estimated at 70 mmHg (RA pressure   15 mmHg), consistent with severe pulmonary hypertension. Stress test 10/24/22   Summary    Normal LVEF 60%    Normal wall motion    Moderate inferior defect noted, suggestive of ischemia, less likely    attenuation artifact    Summed stress scores may underestimate perfusion defects      Overall, this would be considered an abnormal, intermediate risk, study         Procedures Performed: 10/24/22  1. Left heart catheterization  2. Selective left and right coronary angiogram  3. Left ventriculography   5. Right heart catheterization   Procedure Findings:  1. Mild non-obstructive coronary artery disease. 2. Normal left ventricular function with EF estimated at 55-60%  3. Normal left heart hemodynamics  4. Severe pulmonary hypertension   Findings:   1. Left main coronary artery was normal. It gave off the left anterior descending artery and left circumflex. 2. Left anterior descending artery has mild atherosclerotic disease. It was moderate in size. It gave off septal perforators and a moderate sized diagonal branch. The LAD covered the entire apex of the left ventricle. 3. Left circumflex has mild atherosclerotic disease. It was moderate in size. There was a moderate sized obtuse marginal branch. 4. Right coronary artery has mild atherosclerotic disease. It was moderate in size and was the dominant artery.    5. Left ventriculogram showed normal LVEF at 55-60%. Wall motion was normal . There was no significant mitral valve or aortic valve disease noted. LVEDP was normal. There was no gradient noted across the aortic valve during pullback of the catheter. 6.  Right heart catheterization was performed. Right atrial pressure of 25  RV pressure 70/13  PA pressure of 78/37 with a mean of 50  Pulmonary artery wedge pressure mean of 12  Cardiac output of 5.32  Cardiac index 2.33  Aortic saturation 88%  PA saturation 59%  SVR of 917         Echo 7/29/22   Summary   Limited echo for left ventricular function and pulmonary pressures    Left ventricular cavity size is normal.   There is mild concentric left ventricular hypertrophy. Left ventricular function appears to be normal with an estimated ejection   fraction of 55%. There is systolic and diastolic septal flattening consistent with right   ventricular pressure and volume overload. The right ventricle is enlarged and hypokinetic. Severe tricuspid regurgitation. The right atrium is severely dilated. Estimated pulmonary artery systolic pressure is at 74 mmHg assuming a right   atrial pressure of 15 mmHg. There is a small pericardial effusion noted without any hemodynamic   implications. Echo: 8/5/2021  Summary   Left ventricular systolic function is low normal with a visually estimated   ejection fraction of 50-55%. EF estimated by 3D at 52 %. The left ventricle is normal in size with normal wall thickness. Flattened in diastole and systole (\"D-shaped septum\") consistent with right   ventricular volume and pressure overload. Normal left ventricular diastolic function. The right ventricle is severely dilated. Right ventricular systolic function is reduced. The right atrium is severely enlarged. Mild eccentric tricuspid regurgitation.    Systolic pulmonary artery pressure (SPAP) is elevated and estimated at 56   mmHg (right atrial pressure 15 mmHg) consistent with moderate pulmonary   hypertension. The IVC is dilated in size (>2.1 cm) and collapses <50% with respiration   consistent with markedly elevated right atrial pressures (15 mmHg) .

## 2022-10-31 LAB
ANION GAP SERPL CALCULATED.3IONS-SCNC: 8 MMOL/L (ref 3–16)
BUN BLDV-MCNC: 31 MG/DL (ref 7–20)
CALCIUM SERPL-MCNC: 10 MG/DL (ref 8.3–10.6)
CHLORIDE BLD-SCNC: 91 MMOL/L (ref 99–110)
CO2: 39 MMOL/L (ref 21–32)
CREAT SERPL-MCNC: 0.9 MG/DL (ref 0.9–1.3)
GFR SERPL CREATININE-BSD FRML MDRD: >60 ML/MIN/{1.73_M2}
GLUCOSE BLD-MCNC: 107 MG/DL (ref 70–99)
GLUCOSE BLD-MCNC: 110 MG/DL (ref 70–99)
GLUCOSE BLD-MCNC: 120 MG/DL (ref 70–99)
GLUCOSE BLD-MCNC: 142 MG/DL (ref 70–99)
GLUCOSE BLD-MCNC: 176 MG/DL (ref 70–99)
PERFORMED ON: ABNORMAL
POTASSIUM SERPL-SCNC: 3.9 MMOL/L (ref 3.5–5.1)
PRO-BNP: 1216 PG/ML (ref 0–124)
SODIUM BLD-SCNC: 138 MMOL/L (ref 136–145)

## 2022-10-31 PROCEDURE — 6370000000 HC RX 637 (ALT 250 FOR IP): Performed by: NURSE PRACTITIONER

## 2022-10-31 PROCEDURE — 99999 PR OFFICE/OUTPT VISIT,PROCEDURE ONLY: CPT | Performed by: INTERNAL MEDICINE

## 2022-10-31 PROCEDURE — 2580000003 HC RX 258: Performed by: INTERNAL MEDICINE

## 2022-10-31 PROCEDURE — 6360000002 HC RX W HCPCS

## 2022-10-31 PROCEDURE — 1200000000 HC SEMI PRIVATE

## 2022-10-31 PROCEDURE — 94669 MECHANICAL CHEST WALL OSCILL: CPT

## 2022-10-31 PROCEDURE — 80048 BASIC METABOLIC PNL TOTAL CA: CPT

## 2022-10-31 PROCEDURE — 94640 AIRWAY INHALATION TREATMENT: CPT

## 2022-10-31 PROCEDURE — 36415 COLL VENOUS BLD VENIPUNCTURE: CPT

## 2022-10-31 PROCEDURE — 6370000000 HC RX 637 (ALT 250 FOR IP): Performed by: INTERNAL MEDICINE

## 2022-10-31 PROCEDURE — 6360000002 HC RX W HCPCS: Performed by: NURSE PRACTITIONER

## 2022-10-31 PROCEDURE — 2500000003 HC RX 250 WO HCPCS

## 2022-10-31 PROCEDURE — C1894 INTRO/SHEATH, NON-LASER: HCPCS

## 2022-10-31 PROCEDURE — 94761 N-INVAS EAR/PLS OXIMETRY MLT: CPT

## 2022-10-31 PROCEDURE — 93451 RIGHT HEART CATH: CPT

## 2022-10-31 PROCEDURE — 4A023N6 MEASUREMENT OF CARDIAC SAMPLING AND PRESSURE, RIGHT HEART, PERCUTANEOUS APPROACH: ICD-10-PCS | Performed by: INTERNAL MEDICINE

## 2022-10-31 PROCEDURE — 2700000000 HC OXYGEN THERAPY PER DAY

## 2022-10-31 PROCEDURE — C1751 CATH, INF, PER/CENT/MIDLINE: HCPCS

## 2022-10-31 PROCEDURE — 83880 ASSAY OF NATRIURETIC PEPTIDE: CPT

## 2022-10-31 RX ORDER — ACETAMINOPHEN 325 MG/1
650 TABLET ORAL EVERY 4 HOURS PRN
Status: DISCONTINUED | OUTPATIENT
Start: 2022-10-31 | End: 2022-11-03 | Stop reason: HOSPADM

## 2022-10-31 RX ORDER — SODIUM CHLORIDE 9 MG/ML
INJECTION, SOLUTION INTRAVENOUS PRN
Status: DISCONTINUED | OUTPATIENT
Start: 2022-10-31 | End: 2022-11-03 | Stop reason: HOSPADM

## 2022-10-31 RX ORDER — SODIUM CHLORIDE 0.9 % (FLUSH) 0.9 %
5-40 SYRINGE (ML) INJECTION PRN
Status: DISCONTINUED | OUTPATIENT
Start: 2022-10-31 | End: 2022-11-03 | Stop reason: HOSPADM

## 2022-10-31 RX ORDER — SODIUM CHLORIDE 0.9 % (FLUSH) 0.9 %
5-40 SYRINGE (ML) INJECTION EVERY 12 HOURS SCHEDULED
Status: DISCONTINUED | OUTPATIENT
Start: 2022-10-31 | End: 2022-11-03 | Stop reason: HOSPADM

## 2022-10-31 RX ADMIN — FUROSEMIDE 80 MG: 10 INJECTION, SOLUTION INTRAMUSCULAR; INTRAVENOUS at 14:15

## 2022-10-31 RX ADMIN — LOSARTAN POTASSIUM 25 MG: 25 TABLET, FILM COATED ORAL at 07:43

## 2022-10-31 RX ADMIN — ATORVASTATIN CALCIUM 20 MG: 10 TABLET, FILM COATED ORAL at 20:32

## 2022-10-31 RX ADMIN — GUAIFENESIN 600 MG: 600 TABLET, EXTENDED RELEASE ORAL at 20:32

## 2022-10-31 RX ADMIN — ASPIRIN 81 MG: 81 TABLET, COATED ORAL at 07:42

## 2022-10-31 RX ADMIN — SODIUM CHLORIDE, PRESERVATIVE FREE 10 ML: 5 INJECTION INTRAVENOUS at 20:33

## 2022-10-31 RX ADMIN — PREDNISONE 40 MG: 20 TABLET ORAL at 07:42

## 2022-10-31 RX ADMIN — IPRATROPIUM BROMIDE AND ALBUTEROL SULFATE 1 AMPULE: .5; 2.5 SOLUTION RESPIRATORY (INHALATION) at 21:11

## 2022-10-31 RX ADMIN — FUROSEMIDE 80 MG: 10 INJECTION, SOLUTION INTRAMUSCULAR; INTRAVENOUS at 20:32

## 2022-10-31 RX ADMIN — FUROSEMIDE 80 MG: 10 INJECTION, SOLUTION INTRAMUSCULAR; INTRAVENOUS at 09:42

## 2022-10-31 RX ADMIN — SODIUM CHLORIDE, PRESERVATIVE FREE 5 ML: 5 INJECTION INTRAVENOUS at 07:43

## 2022-10-31 RX ADMIN — SPIRONOLACTONE 25 MG: 25 TABLET ORAL at 07:42

## 2022-10-31 RX ADMIN — GUAIFENESIN 600 MG: 600 TABLET, EXTENDED RELEASE ORAL at 07:42

## 2022-10-31 ASSESSMENT — PAIN SCALES - GENERAL
PAINLEVEL_OUTOF10: 0

## 2022-10-31 NOTE — H&P
Brief Pre-Op Note/Sedation Assessment      Suma Tse  1965  0212/02[birth year]-01      [id number]  8:40 AM    Planned Procedure: Cardiac Catheterization Procedure    Post Procedure Plan: Return to same level of care    Consent: I have discussed with the patient and/or the patient representative the indication, alternatives, and the possible risks and/or complications of the planned procedure and the anesthesia methods. The patient and/or patient representative appear to understand and agree to proceed. Chief Complaint: Dyspnea, congestive heart failure , pulmonary hypertension      Indications for Cath Procedure:  Cardiomyopathy and Other  Anginal Classification within 2 weeks:  No symptoms  NYHA Heart Failure Class within 2 weeks: Class IV - Symptoms of HF at rest, Newly Diagnosed?  Yes, Heart Failure Type: Systolic  Is Cath Lab Visit Valve-related?: No  Surgical Risk: N/A  Functional Type: N/A    Anti- Anginal Meds within 2 weeks:   Yes: Aspirin and Statin (Any)    Stress or Imaging Studies Performed:  None     Vital Signs:  BP (!) 124/99   Pulse 87   Temp 98.7 °F (37.1 °C) (Oral)   Resp 18   Ht 6' (1.829 m)   Wt 207 lb 1.6 oz (93.9 kg)   SpO2 94%   BMI 28.09 kg/m²     Allergies:  No Known Allergies    Past Medical History:  Past Medical History:   Diagnosis Date    Acute systolic congestive heart failure (HCC)     Diabetes mellitus (HCC)     Essential hypertension     Thrombocytopenia (HCC)          Surgical History:  Past Surgical History:   Procedure Laterality Date    CHOLECYSTECTOMY, LAPAROSCOPIC N/A 8/12/2022    ROBOTIC CHOLECYSTECTOMY WITH INTRAOPERATIVE CHOLANGIOGRAM performed by Madison Sosa MD at [address] [place] [address] OR         Medications:  Current Facility-Administered Medications   Medication Dose Route Frequency Provider Last Rate Last Admin    ipratropium-albuterol (DUONEB) nebulizer solution 1 ampule  1 ampule Inhalation BID Nery Dowell MD        predniSONE (DELTASONE) tablet 40 mg  40 mg Oral Daily Mayo Seaman MD   40 mg at 10/31/22 2852    spironolactone (ALDACTONE) tablet 25 mg  25 mg Oral Daily Stanley Major, APRN - CNP   25 mg at 10/31/22 0742    guaiFENesin [facility]) extended release tablet 600 mg  600 mg Oral BID Mayo Seaman MD   600 mg at 10/31/22 2567    furosemide (LASIX) injection 80 mg  80 mg IntraVENous TID Stanley Major, APRN - CNP   80 mg at 10/30/22 2101    losartan (COZAAR) tablet 25 mg  25 mg Oral Daily Stanley Major, APRN - CNP   25 mg at 10/31/22 0743    sodium chloride flush 0.9 % injection 5-40 mL  5-40 mL IntraVENous 2 times per day Mumtaz Delacruz MD   10 mL at 10/30/22 2101    sodium chloride flush 0.9 % injection 5-40 mL  5-40 mL IntraVENous PRN Mumtaz Delacruz MD        0.9 % sodium chloride infusion   IntraVENous PRN Mumtaz Delacruz MD        hydrALAZINE (APRESOLINE) injection 10 mg  10 mg IntraVENous Q10 Min PRN Mumtaz Delacruz MD        albuterol sulfate HFA (PROVENTIL;VENTOLIN;PROAIR) 108 (90 Base) MCG/ACT inhaler 2 puff  2 puff Inhalation Q6H PRN Mumtaz Delacruz MD        perflutren lipid microspheres (DEFINITY) injection 1.65 mg  1.5 mL IntraVENous ONCE PRN Mumtaz Delacruz MD        lidocaine (XYLOCAINE) 2 % uro-jet   Topical PRN Mumtaz Delacruz MD        albuterol sulfate HFA (PROVENTIL;VENTOLIN;PROAIR) 108 (90 Base) MCG/ACT inhaler 2 puff  2 puff Inhalation Q4H PRN Mumtaz Delacruz MD        aspirin EC tablet 81 mg  81 mg Oral Daily Mumtaz Delacruz MD   81 mg at 10/31/22 3319    atorvastatin (LIPITOR) tablet 20 mg  20 mg Oral Nightly Mumtaz Delacruz MD   20 mg at 10/30/22 2101    sodium chloride flush 0.9 % injection 5-40 mL  5-40 mL IntraVENous 2 times per day Mumtaz Delacruz MD   5 mL at 10/31/22 0743    sodium chloride flush 0.9 % injection 5-40 mL  5-40 mL IntraVENous PRN Mumtaz Delacruz MD        0.9 % sodium chloride infusion   IntraVENous PRN Mumtaz Delacruz MD        ondansetron (ZOFRAN-ODT) disintegrating tablet 4 mg  4 mg Oral Q8H PRN Rosy Hagan MD        Or    [facility]) injection 4 mg  4 mg IntraVENous Q6H PRN Rosy Hagan MD        polyethylene glycol (GLYCOLAX) packet 17 g  17 g Oral Daily PRN Rosy Hagan MD        acetaminophen (TYLENOL) tablet 650 mg  650 mg Oral Q6H PRN Rosy Hagan MD        Or    acetaminophen (TYLENOL) suppository 650 mg  650 mg Rectal Q6H PRN Rosy Hagan MD        enoxaparin (LOVENOX) injection 40 mg  40 mg SubCUTAneous Daily Rosy Hagan MD   40 mg at 10/30/22 1309    insulin lispro (HUMALOG) injection vial 0-8 Units  0-8 Units SubCUTAneous TID WC Rosy Hagan MD   2 Units at 10/27/22 1217    insulin lispro (HUMALOG) injection vial 0-4 Units  0-4 Units SubCUTAneous Nightly Rosy Hagan MD        glucose chewable tablet 16 g  4 tablet Oral PRN Rosy Hagan MD        dextrose bolus 10% 125 mL  125 mL IntraVENous PRN Rosy Hagan MD        Or    dextrose bolus 10% 250 mL  250 mL IntraVENous PRN Rosy Hagan MD        glucagon (rDNA) injection 1 mg  1 mg SubCUTAneous PRN Rosy Hagan MD        dextrose 10 % infusion   IntraVENous Continuous PRN Rosy Hagan MD               Pre-Sedation:    Pre-Sedation Documentation and Exam:  I have personally completed a history, physical exam & review of systems for this patient (see notes). Prior History of Anesthesia Complications:   none    Modified Mallampati:  II (soft palate, uvula, fauces visible)    ASA Classification:  Class 4 - A patient with an incapacitating systemic disease that is a constant threat to life      Roque Scale:   Activity:  2 - Able to move 4 extremities voluntarily on command  Respiration:  2 - Able to breathe deeply and cough freely  Circulation:  2 - BP+/- 20mmHg of normal  Consciousness:  2 - Fully awake  Oxygen Saturation (color):  2 - Able to maintain oxygen saturation >92% on room air    Sedation/Anesthesia Plan:  Guard the patient's safety and welfare. Minimize physical discomfort and pain. Minimize negative psychological responses to treatment by providing sedation and analgesia and maximize the potential amnesia. Patient to meet pre-procedure discharge plan. Medication Planned:  midazolam intravenously and fentanyl intravenously    Patient is an appropriate candidate for plan of sedation: yes    I discussed the risks/benefits/alternatives of cardiac catheterization today. Risks discussed in detail including infection, bleeding, need for transfusion, vessel injury, internal bleeding, valvular damage, cardiac perforation, pericardial effusion, dissection, pneumothorax emergent cardiac or vascular surgery, risk of myocardial infarction, stroke, or death. The patient accepts these risks and would like to proceed.          Electronically signed by Kenyatta Sunshine MD on 10/31/2022 at 8:40 AM

## 2022-10-31 NOTE — PROGRESS NOTES
Bedside report received from day shift RN. Patient resting comfortably in bed. No signs of discomfort or distress. Bed is in lowest position, wheels locked, 2/2 side rails up. Bedside table and call light within reach. White board updated. Will continue to monitor patient. Bethany Duenas RN    11:14 PM  Shift assessment complete. (See findings in flowsheet). Med pass complete. (See MAR). VSS. Patient with no complaints at this time.  Bethany Duenas RN

## 2022-10-31 NOTE — PLAN OF CARE
Problem: Pain  Goal: Verbalizes/displays adequate comfort level or baseline comfort level  10/31/2022 1132 by Ronni Velarde RN  Outcome: Progressing     Problem: Safety - Adult  Goal: Free from fall injury  10/31/2022 1132 by Ronni Velarde RN  Outcome: Progressing     Problem: Chronic Conditions and Co-morbidities  Goal: Patient's chronic conditions and co-morbidity symptoms are monitored and maintained or improved  10/31/2022 1132 by Ronni Velarde RN  Outcome: Progressing     Patient's EF (Ejection Fraction) is greater than 40%    Heart Failure Medications:  Diuretics[de-identified] Furosemide and Spironolactone    (One of the following REQUIRED for EF </= 40%/SYSTOLIC FAILURE but MAY be used in EF% >40%/DIASTOLIC FAILURE)        ACE[de-identified] None        ARB[de-identified] Losartan         ARNI[de-identified] None    (Beta Blockers)  NON- Evidenced Based Beta Blocker (for EF% >40%/DIASTOLIC FAILURE): None    Evidenced Based Beta Blocker::(REQUIRED for EF% <40%/SYSTOLIC FAILURE) None  . .................................................................................................................................................. Patient's weights and intake/output reviewed: Yes    Patient's Last Weight: 207 lbs obtained by standing scale. Difference of 9 lbs less than last documented weight. Intake/Output Summary (Last 24 hours) at 10/31/2022 1133  Last data filed at 10/31/2022 1119  Gross per 24 hour   Intake 1403 ml   Output 8125 ml   Net -6722 ml       Education Booklet Provided: yes    Comorbidities Reviewed Yes    Patient has a past medical history of Acute systolic congestive heart failure ([facility] [address] Diabetes mellitus ([facility] [address] Essential hypertension, and Thrombocytopenia ([facility] [address]      >>For CHF and Comorbidity documentation on Education Time and Topics, please see Education Tab    Progressive Mobility Assessment:  What is this patient's Current Level of Mobility?: Ambulatory-Up Ad Maria Dolores  How was this patient Mobilized today?: Edge of Bed, Up to Chair, and Up in Room, ambulated 15 ft                 With Whom? Nurse and Self                 Level of Difficulty/Assistance: Independent     Pt resting in bed at this time on  2 L O2. Pt denies shortness of breath. Pt with nonpitting lower extremity edema.      Patient and/or Family's stated Goal of Care this Admission: increase activity tolerance, better understand heart failure and disease management, be more comfortable, and reduce lower extremity edema prior to discharge        :

## 2022-10-31 NOTE — RT PROTOCOL NOTE
RT Inhaler-Nebulizer Bronchodilator Protocol Note    There is a bronchodilator order in the chart from a provider indicating to follow the RT Bronchodilator Protocol and there is an Initiate RT Inhaler-Nebulizer Bronchodilator Protocol order as well (see protocol at bottom of note). CXR Findings:  No results found. The findings from the last RT Protocol Assessment were as follows:   History Pulmonary Disease: Chronic pulmonary disease  Respiratory Pattern: Regular pattern and RR 12-20 bpm  Breath Sounds: Slightly diminished and/or crackles  Cough: Strong, spontaneous, non-productive  Indication for Bronchodilator Therapy: On home bronchodilators  Bronchodilator Assessment Score: 4    Aerosolized bronchodilator medication orders have been revised according to the RT Inhaler-Nebulizer Bronchodilator Protocol below. Respiratory Therapist to perform RT Therapy Protocol Assessment initially then follow the protocol. Repeat RT Therapy Protocol Assessment PRN for score 0-3 or on second treatment, BID, and PRN for scores above 3. No Indications - adjust the frequency to every 6 hours PRN wheezing or bronchospasm, if no treatments needed after 48 hours then discontinue using Per Protocol order mode. If indication present, adjust the RT bronchodilator orders based on the Bronchodilator Assessment Score as indicated below. Use Inhaler orders unless patient has one or more of the following: on home nebulizer, not able to hold breath for 10 seconds, is not alert and oriented, cannot activate and use MDI correctly, or respiratory rate 25 breaths per minute or more, then use the equivalent nebulizer order(s) with same Frequency and PRN reasons based on the score. If a patient is on this medication at home then do not decrease Frequency below that used at home.     0-3 - enter or revise RT bronchodilator order(s) to equivalent RT Bronchodilator order with Frequency of every 4 hours PRN for wheezing or increased work of breathing using Per Protocol order mode. 4-6 - enter or revise RT Bronchodilator order(s) to two equivalent RT bronchodilator orders with one order with BID Frequency and one order with Frequency of every 4 hours PRN wheezing or increased work of breathing using Per Protocol order mode. 7-10 - enter or revise RT Bronchodilator order(s) to two equivalent RT bronchodilator orders with one order with TID Frequency and one order with Frequency of every 4 hours PRN wheezing or increased work of breathing using Per Protocol order mode. 11-13 - enter or revise RT Bronchodilator order(s) to one equivalent RT bronchodilator order with QID Frequency and an Albuterol order with Frequency of every 4 hours PRN wheezing or increased work of breathing using Per Protocol order mode. Greater than 13 - enter or revise RT Bronchodilator order(s) to one equivalent RT bronchodilator order with every 4 hours Frequency and an Albuterol order with Frequency of every 2 hours PRN wheezing or increased work of breathing using Per Protocol order mode. RT to enter RT Home Evaluation for COPD & MDI Assessment order using Per Protocol order mode.     Electronically signed by Dontrell Stevens RCP on 10/30/2022 at 8:11 PM

## 2022-10-31 NOTE — PROGRESS NOTES
Hospitalist Progress Note      PCP: Devante Dacosta DO    Date of Admission: 10/21/2022    Chief Complaint: pitting edema, urinary retention    Hospital Course: 62 y.o. male, with past medical history of CHF, obesity and diabetes who presented to [facility] with pitting edema and urinary retention. History obtained from the patient and review of EMR. The patient stated over the last week he has noticed extreme swelling to both of his legs. He stated the swelling is coming up both legs to his groin and all over his abdomen. The patient stated he does take 80 mg of Lasix twice daily and has been doing so. However, he stated he does not feel like he is urinating as much as he usually does. The patient stated his abdomen has not been the same since he left the hospital after his cholecystectomy roughly 3 months ago. Although he does have a history of CHF, he denies any shortness of breath and/or chest pain. He stated in the beginning of the week he did have roughly 12 pound weight gain, but stated he last 7 pounds yesterday. The patient stated his skin feels extremely tight and it is extremely painful for him to walk and bend over. In the emergency room a chest x-ray was obtained that revealed no acute cardiopulmonary findings. And the patient's proBNP was 945. He was given 80 mg of IV Lasix. A Lucia catheter was ordered to be placed, but the patient refused. He was admitted for further evaluation and treatment. The patient denied any other associated symptoms as well as any aggravating and/or alleviating factors. At the time of this assessment, the patient was resting comfortably in bed. He currently denies any chest pain, back pain, abdominal pain, shortness of breath, numbness, tingling, N/V/C/D, fever and/or chills. Subjective: denies chest pain, dyspnea, n/v/d, dysuria, fever, chills, headache.  Reports leg edema has improved       Medications: Reviewed    Infusion Medications    sodium chloride      sodium chloride      sodium chloride      dextrose       Scheduled Medications    sodium chloride flush  5-40 mL IntraVENous 2 times per day    ipratropium-albuterol  1 ampule Inhalation BID    predniSONE  40 mg Oral Daily    spironolactone  25 mg Oral Daily    guaiFENesin  600 mg Oral BID    furosemide  80 mg IntraVENous TID    losartan  25 mg Oral Daily    sodium chloride flush  5-40 mL IntraVENous 2 times per day    aspirin  81 mg Oral Daily    atorvastatin  20 mg Oral Nightly    sodium chloride flush  5-40 mL IntraVENous 2 times per day    enoxaparin  40 mg SubCUTAneous Daily    insulin lispro  0-8 Units SubCUTAneous TID WC    insulin lispro  0-4 Units SubCUTAneous Nightly     PRN Meds: sodium chloride flush, sodium chloride, acetaminophen, sodium chloride flush, sodium chloride, hydrALAZINE, albuterol sulfate HFA **AND** [DISCONTINUED] ipratropium, perflutren lipid microspheres, lidocaine, albuterol sulfate HFA, sodium chloride flush, sodium chloride, ondansetron **OR** ondansetron, polyethylene glycol, acetaminophen **OR** acetaminophen, glucose, dextrose bolus **OR** dextrose bolus, glucagon (rDNA), dextrose      Intake/Output Summary (Last 24 hours) at 10/31/2022 1534  Last data filed at 10/31/2022 1505  Gross per 24 hour   Intake 1643 ml   Output 7975 ml   Net -6332 ml       Physical Exam Performed:    BP (!) 122/95   Pulse 83   Temp 96.8 °F (36 °C) (Oral)   Resp 18   Ht 6' (1.829 m)   Wt 207 lb 1.6 oz (93.9 kg)   SpO2 93%   BMI 28.09 kg/m²     General appearance: Resting in bed, comfortable. No apparent distress, appears stated age and cooperative. HEENT: Pupils equal, round, and reactive to light. Conjunctivae/corneas clear. Neck: Supple, with full range of motion. No jugular venous distention. Trachea midline. Respiratory: Normal respiratory effort. Clear to auscultation, bilaterally without Rales/Wheezes/Rhonchi.  2L per NC  Cardiovascular: Regular rate and rhythm with normal S1/S2 without murmurs, rubs or gallops. Abdomen: Soft, non-tender, non-distended with normal bowel sounds. Musculoskeletal: No clubbing, cyanosis. BLE with 1+, slight pitting edema. Full range of motion without deformity. Skin: Skin color, texture, turgor normal. No rashes or lesions. Neurologic: Neurovascularly intact without any focal sensory/motor deficits. Cranial nerves: II-XII intact, grossly non-focal.  Psychiatric: Alert and oriented, thought content appropriate, normal insight  Capillary Refill: Brisk, 3 seconds, normal   Peripheral Pulses: +2 palpable, equal bilaterally       Labs:   No results for input(s): WBC, HGB, HCT, PLT in the last 72 hours. Recent Labs     10/29/22  0601 10/30/22  0801 10/31/22  1031    142 138   K 4.1 4.1 3.9   CL 94* 97* 91*   CO2 34* 36* 39*   BUN 42* 38* 31*   CREATININE 0.9 0.8* 0.9   CALCIUM 9.6 9.5 10.0     No results for input(s): AST, ALT, BILIDIR, BILITOT, ALKPHOS in the last 72 hours. No results for input(s): INR in the last 72 hours. No results for input(s): Shaquille Bigness in the last 72 hours. Urinalysis:      Lab Results   Component Value Date/Time    NITRU Negative 07/17/2022 06:27 PM    BLOODU Negative 07/17/2022 06:27 PM    SPECGRAV 1.010 07/17/2022 06:27 PM    GLUCOSEU Negative 07/17/2022 06:27 PM       Radiology:  XR CHEST (2 VW)   Final Result   Mild cardiomegaly without evidence of CHF. Small right pleural effusion. NM Cardiac Stress Test Nuclear Imaging   Final Result      CT CARDIAC CALCIUM SCORING   Final Result   Total Agatston calcium score of 40. This is in the 40th percentile      Findings of fluid overload denoted by small right-sided pleural effusion,   body wall anasarca, and mild abdominopelvic ascites. Calcium Score Interpretation      0  No identifiable atherosclerotic plaque. Very low cardiovascular disease   risk. Less than 5% chance of presence of coronary artery disease. A   negative examination. 1-10 Minimal plaque burden. Significant coronary artery disease very   unlikely.  Mild plaque burden. Likely mild or minimal coronary stenosis. 101-400 Moderate plaque burden. Moderate non-obstructive coronary artery   disease highly likely. Over 400 Extensive plaque burden. High likelihood of at least one   significant coronary artery stenosis (>50% diameter). CALCIUM SCORING OVERVIEW:      Coronary calcium is a marker for plaque in a blood vessel or atherosclerosis   (hardening of the arteries). The presence and amount of calcium detected in   the coronary artery by the CT scan estimates the presence and amount of   atherosclerotic plaque. These calcium deposits can appear years before the   development of heart disease symptoms such as chest pain and shortness of   breath. A calcium score is computed for each of the coronary arteries based upon the   volume and density of the calcium deposits. This can be referred to as your   calcified plaque burden. It does not correspond directly to the percentage   of narrowing in the artery, but does correlate with the severity of the   overall coronary atherosclerotic burden. This score is then used to determine the calcium percentile which compares   your calcified plaque burden to that of other asymptomatic men and women of   the same age. The calcium score, in combination with the percentile, enables   your physician to determine your risk of developing symptomatic coronary   artery disease, and to measure the progression of disease as well as the   effectiveness of treatment. A score of zero indicates that there is no calcified plaque burden. This   implies that there is no significant coronary artery narrowing and very low   likelihood of a cardiac event over at least the next 3 years.  It does not   absolutely rule out the presence of soft, noncalcified plaque or totally   eliminate the possibility of a cardiac event.      A score greater than zero indicates at least some coronary artery disease. As the score increases, so does the likelihood of a significant coronary   narrowing and the likelihood of a coronary event over the next 3 years. Similarly, the likelihood of a coronary event increases with increasing   calcium percentiles. XR CHEST (2 VW)   Final Result   No radiographic evidence of acute pulmonary disease. Consults:  IP CONSULT TO CARDIOLOGY  IP CONSULT TO PULMONOLOGY  IP CONSULT TO CARDIAC REHAB    Assessment/Plan:    Active Hospital Problems    Diagnosis     Anasarca [R60.1]      Priority: Medium    Enlarged RV (right ventricle) [I51.7]      Priority: Medium    Tricuspid valve insufficiency [I07.1]      Priority: Medium    Chest congestion [R09.89]      Priority: Medium    Uncontrolled type 2 diabetes mellitus with hyperglycemia ([facility] [E11.65]      Priority: Medium    Class 1 obesity in adult [E66.9]      Priority: Medium    COPD exacerbation ([address] [J44.1]      Priority: Medium    Cor pulmonale ([facility] [I27.81]      Priority: Medium    Acute respiratory failure with hypoxia (HCC) [J96.01]      Priority: Medium    Severe pulmonary hypertension ([address] [I27.20]      Priority: Medium    Suspected sleep apnea [R29.818]      Priority: Medium    Former smoker [Z87.891]      Priority: Medium    DM (diabetes mellitus) ([address] [E11.9]      Priority: Medium    Pitting edema [R60.9]      Priority: Medium    Stage 3 severe COPD by GOLD classification ([facility] [J44.9]     CHF (congestive heart failure) (HCC) [I50.9]     Essential hypertension [I10]      Acute diastolic heart failure  -Limited echo (10/2022): EF 40%; RV overload; mod-severe TR; severe pHTN; difficult to determine DD grade and filling pressure  -Daily weights.  On admission 108.2kg, currently 93.9kg  -Dry weight ~185lbs  -Strict I&O  -Continue ARB, IV lasix 80mg TID, and spironolactone  - on admission, currently 1216  -[facility] 10/31 w/  Sekou Brown: elevated left and right sided heart pressures; severe pHTN; concern for cor pulmonale; preserved CO/CI  -CHF orderset in place    Acute hypoxic respiratory failure  -Likely due to heart failure exacerbation, pHTN  -Does not require oxygen at home, currently requiring 2L  -Hypoxic on room air 10/31 when attempted to wean off  -Titrate as able for oxygen saturation 90-94%  -Encourage pulmonary toilet    COPD, stable  -No evidence of exacerbation  -Continue scheduled duonebs, mucinex  -PRN albuterol    Diabetes type II, sub optimal control  -A1C 7 (8/2022)  -Hold home regimen while admitted  -Sliding scale insulin, monitor and adjust as needed  -Carb control diet    Hypertension, controlled/uncontrolled/sub optimal control  -Continue losartan  -Monitor    Hyperlipidemia, controlled  -LDL 33 (10/2022)  -Continue statin therapy    Obesity  -BMI 42.1  -This complicates assessment and treatment. Placing patient at risk for multiple co-morbidities as well as early death and contributing to the patient's presentation  -Counseled on weight loss      DVT Prophylaxis: lovenox  Diet: ADULT DIET;  Regular; Low Fat/Low Chol/High Fiber/2 gm Na  Code Status: Full Code  PT/OT Eval Status: not indicated    Dispo - 1-2 days pending course, and [facility] on 10/31    Appropriate for A1 Discharge Unit: No, actual d/c not clear at this time      SANCHEZ Rob - CNP

## 2022-10-31 NOTE — PLAN OF CARE
Problem: Chronic Conditions and Co-morbidities  Goal: Patient's chronic conditions and co-morbidity symptoms are monitored and maintained or improved  Outcome: Progressing     Patient's EF (Ejection Fraction) is  40%    Heart Failure Medications:  Diuretics[de-identified] Furosemide and Spironolactone    (One of the following REQUIRED for EF </= 40%/SYSTOLIC FAILURE but MAY be used in EF% >40%/DIASTOLIC FAILURE)        ACE[de-identified] None        ARB[de-identified] Losartan         ARNI[de-identified] None    (Beta Blockers)  NON- Evidenced Based Beta Blocker (for EF% >40%/DIASTOLIC FAILURE): None    Evidenced Based Beta Blocker::(REQUIRED for EF% <40%/SYSTOLIC FAILURE) None  . .................................................................................................................................................. Patient's weights and intake/output reviewed: Yes    Patient's Last Weight: 207 lbs obtained by standing scale. Difference of 9 lbs less than last documented weight. Intake/Output Summary (Last 24 hours) at 10/31/2022 0445  Last data filed at 10/31/2022 0425  Gross per 24 hour   Intake 1878 ml   Output 8700 ml   Net -6822 ml       Education Booklet Provided: yes    Comorbidities Reviewed Yes    Patient has a past medical history of Acute systolic congestive heart failure ([address] Diabetes mellitus ([address] Essential hypertension, and Thrombocytopenia ([address] >>For CHF and Comorbidity documentation on Education Time and Topics, please see Education Tab    Progressive Mobility Assessment:  What is this patient's Current Level of Mobility?: Ambulatory-Up Ad Maria Dolores  How was this patient Mobilized today?: Edge of Bed, Up to Chair,  Up to Toilet/Shower, Up in Room, and Up in Hallway, ambulated 10 ft                 With Whom? Nurse, PCA, and Self                 Level of Difficulty/Assistance: Independent     Pt resting in bed at this time on  2 L O2. Pt denies shortness of breath. Pt with pitting lower extremity edema.      Patient and/or Family's stated Goal of Care this Admission: reduce shortness of breath, increase activity tolerance, better understand heart failure and disease management, be more comfortable, and reduce lower extremity edema prior to discharge        Problem: Safety - Adult  Goal: Free from fall injury  Outcome: Progressing   Pt will remain free from falls throughout hospital stay. Fall precautions in place, bed in lowest position with wheels locked and side rails 2/4 up. Hourly rounding completed. Will continue to monitor throughout shift.    :  Problem: Pain  Goal: Verbalizes/displays adequate comfort level or baseline comfort level  Outcome: Progressing   Pt will be satisfied with pain control. Pt uses numeric pain rating scale with reassessments after pain med administration. Will continue to monitor progression throughout shift.

## 2022-10-31 NOTE — PROGRESS NOTES
Patient return from cath lab. CMU made aware. VSS. 80mg IV Lasix administered. Right brachial venous site. Site clean, dry and intact. Will continue to monitor.

## 2022-10-31 NOTE — PROCEDURES
CARDIAC CATHETERIZATION REPORT    Date of Procedure: 10/31/22  : Bella Mancini MD   Primary Indication: Congestive heart failure with reduced EF, PH    Procedures Performed:  1. Right heart catheterization      Findings:  Hemodynamics:  A. Right heart catheterization                   1. RA:  19 mmHg                   2. RV:  68/13 mmHG                   3. PA:  70/43/53 mmHG                   4. PCWP: 21 mmHg                   5. Annika CO: 5.21 L/min                   6. Annika CI:  2.41 L/min*m2                   7. Annika SVR: 1367 D/S                    8. Transpulmonary gradient: 32 mmhg       9. Annika PVR: 6 wood units          10. Keena 1.42        11. CVP/PCW 0.9    Technical Factors:  Complications:  None  Estimated blood loss: <15cc  Sedation: Moderate conscious sedation was administered by qaulified nursing personnel under continuous hemodynamic monitoring, starting at 9:00 and ending at 9:15. Medications: 0 mg Versed, 0 mcg Fentanyl      Impression:  1. Elevated left and right sided filling pressures   2. Severe pulmonary hypertension, mixed pre/post capillary   3. CVP/PCW ratio concerning for Cor Pulmonale/Right heart failure  4. Overall Preserved CO/CI    Plan:  1. Continue aggressive diuresis with careful attention to renal function and electrolytes  2. Will need pulmonary hypertension evaluation - [facility]/Dr. Marcello Forman appropriate on OP basis.    3.  Continue GDMT    Bella Mancini MD, [address] [facility]

## 2022-11-01 LAB
ANION GAP SERPL CALCULATED.3IONS-SCNC: 8 MMOL/L (ref 3–16)
BUN BLDV-MCNC: 36 MG/DL (ref 7–20)
CALCIUM SERPL-MCNC: 9.9 MG/DL (ref 8.3–10.6)
CHLORIDE BLD-SCNC: 91 MMOL/L (ref 99–110)
CO2: 38 MMOL/L (ref 21–32)
CREAT SERPL-MCNC: 0.9 MG/DL (ref 0.9–1.3)
GFR SERPL CREATININE-BSD FRML MDRD: >60 ML/MIN/{1.73_M2}
GLUCOSE BLD-MCNC: 110 MG/DL (ref 70–99)
GLUCOSE BLD-MCNC: 113 MG/DL (ref 70–99)
GLUCOSE BLD-MCNC: 117 MG/DL (ref 70–99)
GLUCOSE BLD-MCNC: 129 MG/DL (ref 70–99)
GLUCOSE BLD-MCNC: 135 MG/DL (ref 70–99)
MAGNESIUM: 2.3 MG/DL (ref 1.8–2.4)
PERFORMED ON: ABNORMAL
POC ACT LR: 155 SEC
POC ACT LR: 251 SEC
POTASSIUM REFLEX MAGNESIUM: 3.8 MMOL/L (ref 3.5–5.1)
SODIUM BLD-SCNC: 137 MMOL/L (ref 136–145)

## 2022-11-01 PROCEDURE — 6370000000 HC RX 637 (ALT 250 FOR IP): Performed by: INTERNAL MEDICINE

## 2022-11-01 PROCEDURE — 99233 SBSQ HOSP IP/OBS HIGH 50: CPT | Performed by: INTERNAL MEDICINE

## 2022-11-01 PROCEDURE — 2700000000 HC OXYGEN THERAPY PER DAY

## 2022-11-01 PROCEDURE — 6370000000 HC RX 637 (ALT 250 FOR IP): Performed by: NURSE PRACTITIONER

## 2022-11-01 PROCEDURE — 1200000000 HC SEMI PRIVATE

## 2022-11-01 PROCEDURE — 2580000003 HC RX 258: Performed by: INTERNAL MEDICINE

## 2022-11-01 PROCEDURE — 83735 ASSAY OF MAGNESIUM: CPT

## 2022-11-01 PROCEDURE — 6360000002 HC RX W HCPCS: Performed by: INTERNAL MEDICINE

## 2022-11-01 PROCEDURE — 94640 AIRWAY INHALATION TREATMENT: CPT

## 2022-11-01 PROCEDURE — 94761 N-INVAS EAR/PLS OXIMETRY MLT: CPT

## 2022-11-01 PROCEDURE — 80048 BASIC METABOLIC PNL TOTAL CA: CPT

## 2022-11-01 PROCEDURE — 36415 COLL VENOUS BLD VENIPUNCTURE: CPT

## 2022-11-01 PROCEDURE — 94669 MECHANICAL CHEST WALL OSCILL: CPT

## 2022-11-01 PROCEDURE — 6360000002 HC RX W HCPCS: Performed by: NURSE PRACTITIONER

## 2022-11-01 RX ORDER — FUROSEMIDE 10 MG/ML
80 INJECTION INTRAMUSCULAR; INTRAVENOUS 2 TIMES DAILY
Status: DISCONTINUED | OUTPATIENT
Start: 2022-11-01 | End: 2022-11-03

## 2022-11-01 RX ADMIN — IPRATROPIUM BROMIDE AND ALBUTEROL SULFATE 1 AMPULE: .5; 2.5 SOLUTION RESPIRATORY (INHALATION) at 08:51

## 2022-11-01 RX ADMIN — SODIUM CHLORIDE, PRESERVATIVE FREE 10 ML: 5 INJECTION INTRAVENOUS at 23:51

## 2022-11-01 RX ADMIN — GUAIFENESIN 600 MG: 600 TABLET, EXTENDED RELEASE ORAL at 23:45

## 2022-11-01 RX ADMIN — ENOXAPARIN SODIUM 40 MG: 100 INJECTION SUBCUTANEOUS at 09:01

## 2022-11-01 RX ADMIN — GUAIFENESIN 600 MG: 600 TABLET, EXTENDED RELEASE ORAL at 09:00

## 2022-11-01 RX ADMIN — SODIUM CHLORIDE, PRESERVATIVE FREE 5 ML: 5 INJECTION INTRAVENOUS at 09:07

## 2022-11-01 RX ADMIN — ASPIRIN 81 MG: 81 TABLET, COATED ORAL at 09:00

## 2022-11-01 RX ADMIN — IPRATROPIUM BROMIDE AND ALBUTEROL SULFATE 1 AMPULE: .5; 2.5 SOLUTION RESPIRATORY (INHALATION) at 20:20

## 2022-11-01 RX ADMIN — SPIRONOLACTONE 25 MG: 25 TABLET ORAL at 10:54

## 2022-11-01 RX ADMIN — FUROSEMIDE 80 MG: 10 INJECTION, SOLUTION INTRAMUSCULAR; INTRAVENOUS at 10:54

## 2022-11-01 RX ADMIN — PREDNISONE 40 MG: 20 TABLET ORAL at 09:06

## 2022-11-01 RX ADMIN — ATORVASTATIN CALCIUM 20 MG: 10 TABLET, FILM COATED ORAL at 23:45

## 2022-11-01 RX ADMIN — LOSARTAN POTASSIUM 25 MG: 25 TABLET, FILM COATED ORAL at 09:00

## 2022-11-01 RX ADMIN — FUROSEMIDE 80 MG: 10 INJECTION, SOLUTION INTRAMUSCULAR; INTRAVENOUS at 15:17

## 2022-11-01 ASSESSMENT — PAIN SCALES - GENERAL: PAINLEVEL_OUTOF10: 0

## 2022-11-01 NOTE — CARE COORDINATION
CASE MANAGEMENT INITIAL ASSESSMENT      Reviewed chart and completed assessment with patient  Family present: none      Health Care Decision Maker :   Primary Decision Maker: Brandy Damian - Parent - [phone]          Can this person be reached and be able to respond quickly, such as within a few minutes or hours? Yes      Admit date/status:10/21/22 Inpatient   Diagnosis: urinary retention   Is this a Readmission?:  No      Insurance: EBMS   Precert required for SNF: Yes       3 night stay required: No    Living arrangements, Adls, care needs, prior to admission: he lives alone in an apartment     [address] [facility] at home:  none    Services in the home and/or outpatient, prior to admission:none    Current PCP:  Sophie Martins                     Transportation needs:  patient      Dialysis Facility (if applicable)   Name:  Address:  Dialysis Schedule:  Phone:  Fax:    PT/OT recs: none    Hospital Exemption Notification (HEN): not initiated     Barriers to discharge: none    Plan/comments: Patient is independent at home but states his brother and father live right around the corner. Spoke with RN and NP both who state patient is still being heavily diuresed. Per Cardiology:  Continue aggressive diuresis with careful attention to renal function and electrolytes  2. Will need pulmonary hypertension evaluation - [facility]/Dr. Fletcher Gil appropriate on OP basis. 3.  Continue GDMT    Spoke with patient at bedside. He is currently on 1.5 liters of oxygen but does not wear at home. Discussed home care again but he is still refusing. Discussed with CHF RN, Marina. She states she will put some extra follow up when patient discharges to home. Will continue to follow.         [facility] on chart for MD signature

## 2022-11-01 NOTE — PROGRESS NOTES
CARDIOLOGY PROGRESS NOTE      Patient Name: José Frazier  Date of admission: 10/21/2022  2:13 PM  Admission Dx: Urinary retention [R33.9]  Anasarca [R60.1]  Pitting edema [R60.9]  Elevated BUN [R79.9]  Reason for Consult:  Congestive heart failure   Requesting Physician: Wing Fei MD  Primary Care physician: Daniella Richards DO    Subjective:     José Frazier is a 62 y.o. patient with a prior medical history notable for HFpEF, hypertension, diabetes, severe COPD and concern for pulmonary hypertension, who presented to the hospital with complaints of increasing shortness of breath and edema times a few weeks. Echo on admission showed RV failure and LVEF 40%, volume overload. He had abnormal stress testing which prompted LHC on 10/24/22 showed mild non-obstructive CAD. Repeat right heart catheterization 10/31 with significant volume overload, persistent, severe pulmonary hypertension. Steep weight reduction over the last 48 hours 2016 196 pounds. 6.7 L urine output yesterday. Net -35 L. BUN increasing, creatinine stable, oxygen needs reduced to 1L. Notes lower extremity edema and shortness of breath are continually improving. Sleeping nearly flat. He did have 3 runs of NSVT up to 20 beats this morning. Urine output yesterday 6.7 L        Home Medications:  Were reviewed and are listed in nursing record and/or below  Prior to Admission medications    Medication Sig Start Date End Date Taking? Authorizing Provider   albuterol-ipratropium (COMBIVENT RESPIMAT)  MCG/ACT AERS inhaler Inhale 1 puff into the lungs in the morning and 1 puff at noon and 1 puff in the evening and 1 puff before bedtime.  10/14/22   Myriam Conrad DO   KLOR-CON M20 20 MEQ extended release tablet TAKE ONE TABLET BY MOUTH TWICE A DAY WHILE ON INCREASED DOSE OF FUROSEMIDE 7/17/22 8/13/22  SANCHEZ Allison   furosemide (LASIX) 80 MG tablet Take 1 tablet by mouth 2 times daily 6/24/22   Janeth Madrigal MD losartan (COZAAR) 50 mg tablet TAKE ONE TABLET BY MOUTH DAILY 6/1/22   Timmy Conrad, DO   ASPIRIN LOW DOSE 81 MG EC tablet TAKE ONE TABLET BY MOUTH DAILY 6/1/22   Timmy Conrad, DO   atorvastatin (LIPITOR) 20 MG tablet TAKE ONE TABLET BY MOUTH ONCE NIGHTLY 6/1/22   SANCHEZ Lopez - CNP   metFORMIN (GLUCOPHAGE) 500 MG tablet TAKE ONE TABLET BY MOUTH DAILY WITH BREAKFAST 6/1/22   Timmy Conrad,    umeclidinium-vilanterol (ANORO ELLIPTA) 62.5-25 MCG/INH AEPB inhaler Inhale 1 puff into the lungs daily 5/12/22   Timmy Conrad, DO   albuterol sulfate  (90 Base) MCG/ACT inhaler INHALE 2 PUFFS BY MOUTH FOUR TIMES A DAY AS NEEDED FOR WHEEZING 4/23/22   SANCHEZ Garcia        CURRENT Medications:  sodium chloride flush 0.9 % injection 5-40 mL, 2 times per day  sodium chloride flush 0.9 % injection 5-40 mL, PRN  0.9 % sodium chloride infusion, PRN  acetaminophen (TYLENOL) tablet 650 mg, Q4H PRN  ipratropium-albuterol (DUONEB) nebulizer solution 1 ampule, BID  predniSONE (DELTASONE) tablet 40 mg, Daily  spironolactone (ALDACTONE) tablet 25 mg, Daily  guaiFENesin (MUCINEX) extended release tablet 600 mg, BID  furosemide (LASIX) injection 80 mg, TID  losartan (COZAAR) tablet 25 mg, Daily  sodium chloride flush 0.9 % injection 5-40 mL, 2 times per day  sodium chloride flush 0.9 % injection 5-40 mL, PRN  0.9 % sodium chloride infusion, PRN  hydrALAZINE (APRESOLINE) injection 10 mg, Q10 Min PRN  albuterol sulfate HFA (PROVENTIL;VENTOLIN;PROAIR) 108 (90 Base) MCG/ACT inhaler 2 puff, Q6H PRN  perflutren lipid microspheres (DEFINITY) injection 1.65 mg, ONCE PRN  lidocaine (XYLOCAINE) 2 % uro-jet, PRN  albuterol sulfate HFA (PROVENTIL;VENTOLIN;PROAIR) 108 (90 Base) MCG/ACT inhaler 2 puff, Q4H PRN  aspirin EC tablet 81 mg, Daily  atorvastatin (LIPITOR) tablet 20 mg, Nightly  sodium chloride flush 0.9 % injection 5-40 mL, 2 times per day  sodium chloride flush 0.9 % injection 5-40 mL, PRN  0.9 % sodium chloride infusion, PRN  ondansetron (ZOFRAN-ODT) disintegrating tablet 4 mg, Q8H PRN   Or  ondansetron (ZOFRAN) injection 4 mg, Q6H PRN  polyethylene glycol (GLYCOLAX) packet 17 g, Daily PRN  acetaminophen (TYLENOL) tablet 650 mg, Q6H PRN   Or  acetaminophen (TYLENOL) suppository 650 mg, Q6H PRN  enoxaparin (LOVENOX) injection 40 mg, Daily  insulin lispro (HUMALOG) injection vial 0-8 Units, TID WC  insulin lispro (HUMALOG) injection vial 0-4 Units, Nightly  glucose chewable tablet 16 g, PRN  dextrose bolus 10% 125 mL, PRN   Or  dextrose bolus 10% 250 mL, PRN  glucagon (rDNA) injection 1 mg, PRN  dextrose 10 % infusion, Continuous PRN      Allergies:  Patient has no known allergies. Review of Systems:   A 14 point review of symptoms completed. Pertinent positives identified in the HPI, all other review of symptoms negative. Objective:     Vitals:    11/01/22 0515 11/01/22 0800 11/01/22 0845 11/01/22 0853   BP: 111/78 117/80     Pulse: 81 81     Resp: 16 16     Temp: 97.4 °F (36.3 °C) 98.1 °F (36.7 °C)     TempSrc: Oral Oral     SpO2: 93% 92%  92%   Weight:   196 lb 11.2 oz (89.2 kg)    Height:          Weight: 196 lb 11.2 oz (89.2 kg)       PHYSICAL EXAM:    General:  Alert, cooperative, no distress, appears stated age   Head:  Normocephalic, atraumatic   Eyes:  Conjunctiva/corneas clear, anicteric sclerae    Nose: Nares normal, no drainage or sinus tenderness   Throat: No abnormalities of the lips, oral mucosa or tongue. Neck: Trachea midline. Neck supple with no lymphadenopathy, thyroid not enlarged, symmetric, no tenderness/mass/nodules, Jugular venous pressure elevated   Lungs:   Distant BS, no wheezes, no discrete rales   Chest Wall:  No deformity or tenderness to palpation   Heart:  Regular rate and rhythm, normal S1, normal S2, holosystolic apical murmur grade 3/6, grade 2/6 systolic ejection murmur at the left sternal border increased with inspiration, no rub, no S3/S4, PMI non-displaced. Abdomen:   Tense abd, NT, with distant bowel sounds. No masses, no hepatosplenomegaly   Extremities: No cyanosis, clubbing , improving edema bilateral lower extremities now trace   Vascular: 2+ radial, 2+ dorsalis pedis and posterior tibial pulses bilaterally. Brisk carotid upstrokes without carotid bruit. Skin: Skin color, texture, turgor are normal with no rashes or ulceration. Pysch: Euthymic mood, appropriate affect   Neurologic: Oriented to person, place and time. No slurred speech or facial asymmetry. No motor or sensory deficits on gross examination. Labs:   CBC:   Lab Results   Component Value Date/Time    WBC 8.8 10/24/2022 01:08 PM    RBC 4.61 10/24/2022 01:08 PM    HGB 14.5 10/24/2022 01:08 PM    HCT 45.1 10/24/2022 01:08 PM    MCV 97.8 10/24/2022 01:08 PM    RDW 14.4 10/24/2022 01:08 PM     10/24/2022 01:08 PM     CMP:  Lab Results   Component Value Date/Time     11/01/2022 12:01 PM    K 3.8 11/01/2022 12:01 PM    CL 91 11/01/2022 12:01 PM    CO2 38 11/01/2022 12:01 PM    BUN 36 11/01/2022 12:01 PM    CREATININE 0.9 11/01/2022 12:01 PM    GFRAA >60 08/13/2022 04:53 AM    AGRATIO 1.2 10/21/2022 02:48 PM    LABGLOM >60 11/01/2022 12:01 PM    GLUCOSE 113 11/01/2022 12:01 PM    PROT 7.2 10/21/2022 02:48 PM    CALCIUM 9.9 11/01/2022 12:01 PM    BILITOT 1.2 10/21/2022 02:48 PM    ALKPHOS 208 10/21/2022 02:48 PM    AST 34 10/21/2022 02:48 PM    ALT 29 10/21/2022 02:48 PM     PT/INR:  No results found for: PTINR  HgBA1c:  Lab Results   Component Value Date    LABA1C 7.0 08/12/2022     Lab Results   Component Value Date    TROPONINI <0.01 10/21/2022         Interval Testing/Data:     Telemetry personally reviewed; normal sinus rhythm 80s, NSVT x3 episodes, longest 20 beats    Echo: 8/5/2021  Summary   Left ventricular systolic function is low normal with a visually estimated   ejection fraction of 50-55%. EF estimated by 3D at 52 %.    The left ventricle is normal in size with normal wall thickness. Flattened in diastole and systole (\"D-shaped septum\") consistent with right   ventricular volume and pressure overload. Normal left ventricular diastolic function. The right ventricle is severely dilated. Right ventricular systolic function is reduced. The right atrium is severely enlarged. Mild eccentric tricuspid regurgitation. Systolic pulmonary artery pressure (SPAP) is elevated and estimated at 56   mmHg (right atrial pressure 15 mmHg) consistent with moderate pulmonary   hypertension. The IVC is dilated in size (>2.1 cm) and collapses <50% with respiration   consistent with markedly elevated right atrial pressures (15 mmHg) . Echo 7/29/22     Summary   Limited echo for left ventricular function and pulmonary pressures      Left ventricular cavity size is normal.   There is mild concentric left ventricular hypertrophy. Left ventricular function appears to be normal with an estimated ejection   fraction of 55%. There is systolic and diastolic septal flattening consistent with right   ventricular pressure and volume overload. The right ventricle is enlarged and hypokinetic. Severe tricuspid regurgitation. The right atrium is severely dilated. Estimated pulmonary artery systolic pressure is at 74 mmHg assuming a right   atrial pressure of 15 mmHg. There is a small pericardial effusion noted without any hemodynamic   implications. Cardiac calcium CT 10/24/22  TECHNIQUE:   CT of the heart was obtained without intravenous contrast.  Calcium scoring   analysis was performed using a separate  workstation. Dose modulation,   iterative reconstruction, and/or weight based adjustment of the mA/kV was   utilized to reduce the radiation dose to as low as reasonably achievable. FINDINGS:   LEFT MAIN: 23       LEFT ANTERIOR DESCENDING: 10       CIRCUMFLEX: 7       RIGHT CORONARY ARTERY: Zero (0).        TOTAL AGATSTON CALCIUM SCORE: 36       EXTRACARDIAC STRUCTURES: Trace aortic valve and aortic annulus calcification   is seen. No aortic aneurysm. Ascending aorta measures 3.5 cm. Small   pericardial effusion is seen. No pericardial calcification. Small hiatal   hernia is seen. There is nonspecific thickening at the GE junction. Small mediastinal nodes are noted, likely reactive. A few calcified   mediastinal nodes are also seen. Respiratory motion artifact limits evaluation of pulmonary parenchymal   change. Scattered areas of bronchial wall thickening are seen. Small   right-sided pleural effusion is seen. There is adjacent consolidation at the   right lung base. .  This pleural effusion is slightly increased in size   compared to chest CT 08/05/2021       There is body wall anasarca. There is mild ascites seen in the upper   abdomen, partially imaged. Spurring is seen in the spine           Echo 10/24/22   Summary   Limited echo for LV/RV function & PHTN with limited doppler/no color. Global left ventricular systolic function is moderately decreased with   ejection fraction estimated at 40%. Normal left ventricle size and wall thickness. There is diastolic septal flattening (\"D-shaped\" septum) consistent with   right ventricular volume overload. Diastolic dysfunction grade and filling pressure are indeterminate. The right ventricle is severely enlarged in size with reduced function. The right atrium is moderately dilated. The tricuspid valve is normal in structure with tenting of the valve   leaflets. Moderate-severe tricuspid valve regurgitation. Systolic pulmonary artery pressure (SPAP) estimated at 70 mmHg (RA pressure   15 mmHg), consistent with severe pulmonary hypertension.      Stress test 10/24/22   Summary    Normal LVEF 60%    Normal wall motion    Moderate inferior defect noted, suggestive of ischemia, less likely    attenuation artifact    Summed stress scores may underestimate perfusion defects Overall, this would be considered an abnormal, intermediate risk, study        Procedures Performed: 10/24/22  1. Left heart catheterization  2. Selective left and right coronary angiogram  3. Left ventriculography   5. Right heart catheterization     Procedure Findings:  1. Mild non-obstructive coronary artery disease. 2. Normal left ventricular function with EF estimated at 55-60%  3. Normal left heart hemodynamics  4. Severe pulmonary hypertension     Findings:     1. Left main coronary artery was normal. It gave off the left anterior descending artery and left circumflex. 2. Left anterior descending artery has mild atherosclerotic disease. It was moderate in size. It gave off septal perforators and a moderate sized diagonal branch. The LAD covered the entire apex of the left ventricle. 3. Left circumflex has mild atherosclerotic disease. It was moderate in size. There was a moderate sized obtuse marginal branch. 4. Right coronary artery has mild atherosclerotic disease. It was moderate in size and was the dominant artery. 5. Left ventriculogram showed normal LVEF at 55-60%. Wall motion was normal . There was no significant mitral valve or aortic valve disease noted. LVEDP was normal. There was no gradient noted across the aortic valve during pullback of the catheter. 6.  Right heart catheterization was performed. Right atrial pressure of 25  RV pressure 70/13  PA pressure of 78/37 with a mean of 50  Pulmonary artery wedge pressure mean of 12  Cardiac output of 5.32  Cardiac index 2.33  Aortic saturation 88%  PA saturation 59%  SVR of 917        Impression and Plan      Acute on chronic HFpEF with Right heart failure (cor pulmonale)  -Slow diuresis given significant urine output yesterday and rising BUN, reduced to twice daily dosing  -Continue losartan, spironolactone  -BMP daily; monitor developing azotemia.     Severe pulmonary hypertension -mixed pre-/postcapillary  -plan PH clinic eval Dr. Radha Dunlap OP     Severe TR    NSVT  -Start low-dose beta-blocker for myopathy and NSVT suppression    Hypertension-controlled    Mild nonobstructive coronary artery disease  -Aspirin, statin    Hyperlipidemia   -statin    Very severe COPD  Acute respiratory failure due to pleural effusion, CHF and COPD -improving oxygen requirement  Diabetes mellitus  Tobacco use disorder  -I Strongly advised complete smoking cessation. Resources given to assist quitting including the followin-800-QUIT NOW. We will follow. Patient Active Problem List   Diagnosis    Tobacco abuse    Daily consumption of alcohol    QT prolongation    Right axis deviation    Macrocytosis    Essential hypertension    Acute decompensated heart failure (HCC)    CHF (congestive heart failure) (HCC)    Stage 3 severe COPD by GOLD classification ([address]    Calculus of gallbladder without cholecystitis without obstruction    DM (diabetes mellitus) ([address]    Pitting edema    Acute respiratory failure with hypoxia (HCC)    Severe pulmonary hypertension ([address]    Suspected sleep apnea    Former smoker    Enlarged RV (right ventricle)    Tricuspid valve insufficiency    Chest congestion    Uncontrolled type 2 diabetes mellitus with hyperglycemia ([address]    Class 1 obesity in adult    COPD exacerbation ([address]    Cor pulmonale ([address]    Anasarca           I will address the patient's cardiac risk factors and adjusted pharmacologic treatment as needed. In addition, I have reinforced the need for patient directed risk factor modification. All questions and concerns were addressed to the patient/family. Alternatives to my treatment were discussed. Thank you for allowing us to participate in the care of Celsa Ji. Please call me with any questions [phone].     Tori Hooker MD, [facility]  Cardiovascular Disease  [facility], [place], operated by [facility]  [phone] [facility]  [phone] [address] [place] [address]  2022 2:52 PM

## 2022-11-01 NOTE — PROGRESS NOTES
Hospitalist Progress Note      PCP: Terence Hernandez DO    Date of Admission: 10/21/2022    Chief Complaint: pitting edema, urinary retention    Hospital Course: 62 y.o. male, with past medical history of CHF, obesity and diabetes who presented to [facility] with pitting edema and urinary retention. History obtained from the patient and review of EMR. The patient stated over the last week he has noticed extreme swelling to both of his legs. He stated the swelling is coming up both legs to his groin and all over his abdomen. The patient stated he does take 80 mg of Lasix twice daily and has been doing so. However, he stated he does not feel like he is urinating as much as he usually does. The patient stated his abdomen has not been the same since he left the hospital after his cholecystectomy roughly 3 months ago. Although he does have a history of CHF, he denies any shortness of breath and/or chest pain. He stated in the beginning of the week he did have roughly 12 pound weight gain, but stated he last 7 pounds yesterday. The patient stated his skin feels extremely tight and it is extremely painful for him to walk and bend over. In the emergency room a chest x-ray was obtained that revealed no acute cardiopulmonary findings. And the patient's proBNP was 945. He was given 80 mg of IV Lasix. A Lucia catheter was ordered to be placed, but the patient refused. He was admitted for further evaluation and treatment. The patient denied any other associated symptoms as well as any aggravating and/or alleviating factors. At the time of this assessment, the patient was resting comfortably in bed. He currently denies any chest pain, back pain, abdominal pain, shortness of breath, numbness, tingling, N/V/C/D, fever and/or chills.       Subjective: denies chest pain, dyspnea, n/v/d, dysuria, fever, chills, headache      Medications: Reviewed    Infusion Medications    sodium chloride      sodium chloride sodium chloride      dextrose       Scheduled Medications    furosemide  80 mg IntraVENous BID    sodium chloride flush  5-40 mL IntraVENous 2 times per day    ipratropium-albuterol  1 ampule Inhalation BID    predniSONE  40 mg Oral Daily    spironolactone  25 mg Oral Daily    guaiFENesin  600 mg Oral BID    losartan  25 mg Oral Daily    sodium chloride flush  5-40 mL IntraVENous 2 times per day    aspirin  81 mg Oral Daily    atorvastatin  20 mg Oral Nightly    sodium chloride flush  5-40 mL IntraVENous 2 times per day    enoxaparin  40 mg SubCUTAneous Daily    insulin lispro  0-8 Units SubCUTAneous TID WC    insulin lispro  0-4 Units SubCUTAneous Nightly     PRN Meds: sodium chloride flush, sodium chloride, acetaminophen, sodium chloride flush, sodium chloride, hydrALAZINE, albuterol sulfate HFA **AND** [DISCONTINUED] ipratropium, perflutren lipid microspheres, lidocaine, albuterol sulfate HFA, sodium chloride flush, sodium chloride, ondansetron **OR** ondansetron, polyethylene glycol, acetaminophen **OR** acetaminophen, glucose, dextrose bolus **OR** dextrose bolus, glucagon (rDNA), dextrose      Intake/Output Summary (Last 24 hours) at 11/1/2022 1614  Last data filed at 11/1/2022 1447  Gross per 24 hour   Intake 720 ml   Output 4450 ml   Net -3730 ml       Physical Exam Performed:    /80   Pulse 81   Temp 98.1 °F (36.7 °C) (Oral)   Resp 16   Ht 6' (1.829 m)   Wt 196 lb 11.2 oz (89.2 kg)   SpO2 92%   BMI 26.68 kg/m²     General appearance: Resting in chair, comfortable. No apparent distress, appears stated age and cooperative. HEENT: Pupils equal, round, and reactive to light. Conjunctivae/corneas clear. Neck: Supple, with full range of motion. No jugular venous distention. Trachea midline. Respiratory: Normal respiratory effort. Clear to auscultation, bilaterally without Rales/Wheezes/Rhonchi.  2L per NC  Cardiovascular: Regular rate and rhythm with normal S1/S2 without murmurs, rubs or gallops. Abdomen: Soft, non-tender, non-distended with normal bowel sounds. Musculoskeletal: No clubbing, cyanosis. BLE with 1+, slight pitting edema. Full range of motion without deformity. Skin: Skin color, texture, turgor normal. No rashes or lesions. Neurologic: Neurovascularly intact without any focal sensory/motor deficits. Cranial nerves: II-XII intact, grossly non-focal.  Psychiatric: Alert and oriented, thought content appropriate, normal insight  Capillary Refill: Brisk, 3 seconds, normal   Peripheral Pulses: +2 palpable, equal bilaterally       Labs:   No results for input(s): WBC, HGB, HCT, PLT in the last 72 hours. Recent Labs     10/30/22  0801 10/31/22  1031 11/01/22  1201    138 137   K 4.1 3.9 3.8   CL 97* 91* 91*   CO2 36* 39* 38*   BUN 38* 31* 36*   CREATININE 0.8* 0.9 0.9   CALCIUM 9.5 10.0 9.9     No results for input(s): AST, ALT, BILIDIR, BILITOT, ALKPHOS in the last 72 hours. No results for input(s): INR in the last 72 hours. No results for input(s): [place] Ill in the last 72 hours. Urinalysis:      Lab Results   Component Value Date/Time    NITRU Negative 07/17/2022 06:27 PM    BLOODU Negative 07/17/2022 06:27 PM    SPECGRAV 1.010 07/17/2022 06:27 PM    GLUCOSEU Negative 07/17/2022 06:27 PM       Radiology:  XR CHEST (2 VW)   Final Result   Mild cardiomegaly without evidence of CHF. Small right pleural effusion. NM Cardiac Stress Test Nuclear Imaging   Final Result      CT CARDIAC CALCIUM SCORING   Final Result   Total Agatston calcium score of 40. This is in the 40th percentile      Findings of fluid overload denoted by small right-sided pleural effusion,   body wall anasarca, and mild abdominopelvic ascites. Calcium Score Interpretation      0  No identifiable atherosclerotic plaque. Very low cardiovascular disease   risk. Less than 5% chance of presence of coronary artery disease. A   negative examination. 1-10 Minimal plaque burden. Significant coronary artery disease very   unlikely.  Mild plaque burden. Likely mild or minimal coronary stenosis. 101-400 Moderate plaque burden. Moderate non-obstructive coronary artery   disease highly likely. Over 400 Extensive plaque burden. High likelihood of at least one   significant coronary artery stenosis (>50% diameter). CALCIUM SCORING OVERVIEW:      Coronary calcium is a marker for plaque in a blood vessel or atherosclerosis   (hardening of the arteries). The presence and amount of calcium detected in   the coronary artery by the CT scan estimates the presence and amount of   atherosclerotic plaque. These calcium deposits can appear years before the   development of heart disease symptoms such as chest pain and shortness of   breath. A calcium score is computed for each of the coronary arteries based upon the   volume and density of the calcium deposits. This can be referred to as your   calcified plaque burden. It does not correspond directly to the percentage   of narrowing in the artery, but does correlate with the severity of the   overall coronary atherosclerotic burden. This score is then used to determine the calcium percentile which compares   your calcified plaque burden to that of other asymptomatic men and women of   the same age. The calcium score, in combination with the percentile, enables   your physician to determine your risk of developing symptomatic coronary   artery disease, and to measure the progression of disease as well as the   effectiveness of treatment. A score of zero indicates that there is no calcified plaque burden. This   implies that there is no significant coronary artery narrowing and very low   likelihood of a cardiac event over at least the next 3 years. It does not   absolutely rule out the presence of soft, noncalcified plaque or totally   eliminate the possibility of a cardiac event.       A score greater than zero indicates at least some coronary artery disease. As the score increases, so does the likelihood of a significant coronary   narrowing and the likelihood of a coronary event over the next 3 years. Similarly, the likelihood of a coronary event increases with increasing   calcium percentiles. XR CHEST (2 VW)   Final Result   No radiographic evidence of acute pulmonary disease. Consults:  IP CONSULT TO CARDIOLOGY  IP CONSULT TO PULMONOLOGY  IP CONSULT TO CARDIAC REHAB    Assessment/Plan:    Active Hospital Problems    Diagnosis     Anasarca [R60.1]      Priority: Medium    Enlarged RV (right ventricle) [I51.7]      Priority: Medium    Tricuspid valve insufficiency [I07.1]      Priority: Medium    Chest congestion [R09.89]      Priority: Medium    Uncontrolled type 2 diabetes mellitus with hyperglycemia ([address] [E11.65]      Priority: Medium    Class 1 obesity in adult [E66.9]      Priority: Medium    COPD exacerbation ([address] [J44.1]      Priority: Medium    Cor pulmonale ([address] [I27.81]      Priority: Medium    Acute respiratory failure with hypoxia (HCC) [J96.01]      Priority: Medium    Severe pulmonary hypertension ([address] [I27.20]      Priority: Medium    Suspected sleep apnea [R29.818]      Priority: Medium    Former smoker [Z87.891]      Priority: Medium    DM (diabetes mellitus) ([address] [E11.9]      Priority: Medium    Pitting edema [R60.9]      Priority: Medium    Stage 3 severe COPD by GOLD classification ([address] [J44.9]     CHF (congestive heart failure) ([facility]) [I50.9]     Essential hypertension [I10]      Acute diastolic heart failure  -Limited echo (10/2022): EF 40%; RV overload; mod-severe TR; severe pHTN; difficult to determine DD grade and filling pressure  -Daily weights.  On admission 108.2kg, currently 89.2kg  -Dry weight ~185lbs  -Strict I&O  -Continue ARB, IV lasix 80mg BID, and spironolactone  - on admission, currently [address] 10/31 w/ Dr Magdaleno Hart: elevated left and right sided heart pressures; severe pHTN; concern for cor pulmonale; preserved CO/CI  -CHF orderset in place    Acute hypoxic respiratory failure  -Likely due to heart failure exacerbation, pHTN  -Does not require oxygen at home, currently requiring 2L  -Hypoxic on room air 10/31 when attempted to wean off  -Titrate as able for oxygen saturation 90-94%  -Encourage pulmonary toilet    COPD, stable  -No evidence of exacerbation  -Continue scheduled duonebs, mucinex  -PRN albuterol    Diabetes type II, sub optimal control  -A1C 7 (8/2022)  -Hold home regimen while admitted  -Sliding scale insulin, monitor and adjust as needed  -Carb control diet    Hypertension, controlled/uncontrolled/sub optimal control  -Continue losartan  -Monitor    Hyperlipidemia, controlled  -LDL 33 (10/2022)  -Continue statin therapy    Obesity  -BMI 65.2  -This complicates assessment and treatment. Placing patient at risk for multiple co-morbidities as well as early death and contributing to the patient's presentation  -Counseled on weight loss      DVT Prophylaxis: lovenox  Diet: ADULT DIET;  Regular; Low Fat/Low Chol/High Fiber/2 gm Na  Code Status: Full Code  PT/OT Eval Status: not indicated    Dispo - ~2 days pending course    Appropriate for A1 Discharge Unit: No, actual d/c not clear at this time      SANCHEZ Valerio - CNP

## 2022-11-01 NOTE — PLAN OF CARE
Problem: Pain  Goal: Verbalizes/displays adequate comfort level or baseline comfort level  Outcome: Progressing     Problem: Safety - Adult  Goal: Free from fall injury  Outcome: Progressing     Problem: Chronic Conditions and Co-morbidities  Goal: Patient's chronic conditions and co-morbidity symptoms are monitored and maintained or improved  Outcome: Progressing  Flowsheets (Taken 10/31/2022 2130 by Beulah Merritt RN)  Care Plan - Patient's Chronic Conditions and Co-Morbidity Symptoms are Monitored and Maintained or Improved: Monitor and assess patient's chronic conditions and comorbid symptoms for stability, deterioration, or improvement  Note:   Patient's EF (Ejection Fraction) is 40%    Heart Failure Medications:  Diuretics[de-identified] Furosemide and Spironolactone    (One of the following REQUIRED for EF </= 40%/SYSTOLIC FAILURE but MAY be used in EF% >40%/DIASTOLIC FAILURE)        ACE[de-identified] None        ARB[de-identified] Losartan         ARNI[de-identified] None    (Beta Blockers)  NON- Evidenced Based Beta Blocker (for EF% >40%/DIASTOLIC FAILURE): None    Evidenced Based Beta Blocker::(REQUIRED for EF% <40%/SYSTOLIC FAILURE) None  . .................................................................................................................................................. Patient's weights and intake/output reviewed: Yes    Patient's Last Weight: 196 lbs obtained by standing scale. Difference of 11 lbs less than last documented weight. Intake/Output Summary (Last 24 hours) at 11/1/2022 1014  Last data filed at 11/1/2022 0912  Gross per 24 hour   Intake 1080 ml   Output 7225 ml   Net -6145 ml       Education Booklet Provided: yes    Comorbidities Reviewed Yes    Patient has a past medical history of Acute systolic congestive heart failure ([facility] Diabetes mellitus ([address] Essential hypertension, and Thrombocytopenia ([facility]      >>For CHF and Comorbidity documentation on Education Time and Topics, please see Education Tab    Progressive Mobility Assessment:  What is this patient's Current Level of Mobility?: Ambulatory-Up Ad Maria Dolores  How was this patient Mobilized today?: Edge of Bed, Up to Chair,  Up to Toilet/Shower, and Up in Room, ambulated 10 ft                 With Whom? Self                 Level of Difficulty/Assistance: Independent     Pt up in chair at this time on  1 L O2. Pt denies shortness of breath. Pt with nonpitting lower extremity edema. Patient and/or Family's stated Goal of Care this Admission: reduce shortness of breath, increase activity tolerance, better understand heart failure and disease management, be more comfortable, and reduce lower extremity edema prior to discharge        :     Pt will have accuchecks before meals and at bedtime with sliding scale insulin in place for coverage. Will continue to monitor for signs and symptoms of hypoglycemia and hyperglycemia throughout shift.

## 2022-11-02 LAB
ANION GAP SERPL CALCULATED.3IONS-SCNC: 9 MMOL/L (ref 3–16)
BASOPHILS ABSOLUTE: 0.1 K/UL (ref 0–0.2)
BASOPHILS RELATIVE PERCENT: 0.5 %
BUN BLDV-MCNC: 33 MG/DL (ref 7–20)
CALCIUM SERPL-MCNC: 10.1 MG/DL (ref 8.3–10.6)
CHLORIDE BLD-SCNC: 92 MMOL/L (ref 99–110)
CO2: 36 MMOL/L (ref 21–32)
CREAT SERPL-MCNC: 0.8 MG/DL (ref 0.9–1.3)
EOSINOPHILS ABSOLUTE: 0.3 K/UL (ref 0–0.6)
EOSINOPHILS RELATIVE PERCENT: 2.6 %
GFR SERPL CREATININE-BSD FRML MDRD: >60 ML/MIN/{1.73_M2}
GLUCOSE BLD-MCNC: 120 MG/DL (ref 70–99)
GLUCOSE BLD-MCNC: 158 MG/DL (ref 70–99)
GLUCOSE BLD-MCNC: 183 MG/DL (ref 70–99)
GLUCOSE BLD-MCNC: 94 MG/DL (ref 70–99)
GLUCOSE BLD-MCNC: 98 MG/DL (ref 70–99)
HCT VFR BLD CALC: 51.9 % (ref 40.5–52.5)
HEMOGLOBIN: 16.7 G/DL (ref 13.5–17.5)
LYMPHOCYTES ABSOLUTE: 2.7 K/UL (ref 1–5.1)
LYMPHOCYTES RELATIVE PERCENT: 27.9 %
MAGNESIUM: 2.5 MG/DL (ref 1.8–2.4)
MCH RBC QN AUTO: 30.9 PG (ref 26–34)
MCHC RBC AUTO-ENTMCNC: 32.1 G/DL (ref 31–36)
MCV RBC AUTO: 96 FL (ref 80–100)
MONOCYTES ABSOLUTE: 0.7 K/UL (ref 0–1.3)
MONOCYTES RELATIVE PERCENT: 7.5 %
NEUTROPHILS ABSOLUTE: 6 K/UL (ref 1.7–7.7)
NEUTROPHILS RELATIVE PERCENT: 61.5 %
PDW BLD-RTO: 13.9 % (ref 12.4–15.4)
PERFORMED ON: ABNORMAL
PERFORMED ON: NORMAL
PLATELET # BLD: 197 K/UL (ref 135–450)
PMV BLD AUTO: 8.6 FL (ref 5–10.5)
POTASSIUM REFLEX MAGNESIUM: 3.5 MMOL/L (ref 3.5–5.1)
RBC # BLD: 5.4 M/UL (ref 4.2–5.9)
SODIUM BLD-SCNC: 137 MMOL/L (ref 136–145)
WBC # BLD: 9.7 K/UL (ref 4–11)

## 2022-11-02 PROCEDURE — 94640 AIRWAY INHALATION TREATMENT: CPT

## 2022-11-02 PROCEDURE — 83735 ASSAY OF MAGNESIUM: CPT

## 2022-11-02 PROCEDURE — 85025 COMPLETE CBC W/AUTO DIFF WBC: CPT

## 2022-11-02 PROCEDURE — 6370000000 HC RX 637 (ALT 250 FOR IP): Performed by: INTERNAL MEDICINE

## 2022-11-02 PROCEDURE — 99233 SBSQ HOSP IP/OBS HIGH 50: CPT | Performed by: INTERNAL MEDICINE

## 2022-11-02 PROCEDURE — 36415 COLL VENOUS BLD VENIPUNCTURE: CPT

## 2022-11-02 PROCEDURE — 6370000000 HC RX 637 (ALT 250 FOR IP): Performed by: NURSE PRACTITIONER

## 2022-11-02 PROCEDURE — 80048 BASIC METABOLIC PNL TOTAL CA: CPT

## 2022-11-02 PROCEDURE — 94761 N-INVAS EAR/PLS OXIMETRY MLT: CPT

## 2022-11-02 PROCEDURE — 2580000003 HC RX 258: Performed by: INTERNAL MEDICINE

## 2022-11-02 PROCEDURE — 6360000002 HC RX W HCPCS: Performed by: INTERNAL MEDICINE

## 2022-11-02 PROCEDURE — 94669 MECHANICAL CHEST WALL OSCILL: CPT

## 2022-11-02 PROCEDURE — 1200000000 HC SEMI PRIVATE

## 2022-11-02 PROCEDURE — 2700000000 HC OXYGEN THERAPY PER DAY

## 2022-11-02 RX ORDER — IPRATROPIUM BROMIDE AND ALBUTEROL SULFATE 2.5; .5 MG/3ML; MG/3ML
1 SOLUTION RESPIRATORY (INHALATION) EVERY 4 HOURS PRN
Status: DISCONTINUED | OUTPATIENT
Start: 2022-11-02 | End: 2022-11-03 | Stop reason: HOSPADM

## 2022-11-02 RX ADMIN — GUAIFENESIN 600 MG: 600 TABLET, EXTENDED RELEASE ORAL at 20:26

## 2022-11-02 RX ADMIN — ASPIRIN 81 MG: 81 TABLET, COATED ORAL at 09:39

## 2022-11-02 RX ADMIN — SODIUM CHLORIDE, PRESERVATIVE FREE 10 ML: 5 INJECTION INTRAVENOUS at 09:43

## 2022-11-02 RX ADMIN — FUROSEMIDE 80 MG: 10 INJECTION, SOLUTION INTRAMUSCULAR; INTRAVENOUS at 09:40

## 2022-11-02 RX ADMIN — ATORVASTATIN CALCIUM 20 MG: 10 TABLET, FILM COATED ORAL at 20:26

## 2022-11-02 RX ADMIN — IPRATROPIUM BROMIDE AND ALBUTEROL SULFATE 1 AMPULE: .5; 2.5 SOLUTION RESPIRATORY (INHALATION) at 07:38

## 2022-11-02 RX ADMIN — SPIRONOLACTONE 25 MG: 25 TABLET ORAL at 09:40

## 2022-11-02 RX ADMIN — GUAIFENESIN 600 MG: 600 TABLET, EXTENDED RELEASE ORAL at 09:40

## 2022-11-02 RX ADMIN — FUROSEMIDE 80 MG: 10 INJECTION, SOLUTION INTRAMUSCULAR; INTRAVENOUS at 17:56

## 2022-11-02 RX ADMIN — LOSARTAN POTASSIUM 25 MG: 25 TABLET, FILM COATED ORAL at 09:40

## 2022-11-02 RX ADMIN — PREDNISONE 40 MG: 20 TABLET ORAL at 09:40

## 2022-11-02 NOTE — PROGRESS NOTES
Patient's EF (Ejection Fraction) is equal to 40%    Heart Failure Medications:  Diuretics[de-identified] Furosemide and Spironolactone    (One of the following REQUIRED for EF </= 40%/SYSTOLIC FAILURE but MAY be used in EF% >40%/DIASTOLIC FAILURE)        ACE[de-identified] None        ARB[de-identified] Losartan         ARNI[de-identified] None    (Beta Blockers)  NON- Evidenced Based Beta Blocker (for EF% >40%/DIASTOLIC FAILURE): None    Evidenced Based Beta Blocker::(REQUIRED for EF% <40%/SYSTOLIC FAILURE) None  . .................................................................................................................................................. Patient's weights and intake/output reviewed: Yes    Patient's Last Weight: 193lbs obtained by standing scale. Difference of 3 lbs less than last documented weight. Intake/Output Summary (Last 24 hours) at 11/2/2022 1119  Last data filed at 11/2/2022 1056  Gross per 24 hour   Intake 720 ml   Output 3850 ml   Net -3130 ml       Education Booklet Provided: yes    Comorbidities Reviewed Yes    Patient has a past medical history of Acute systolic congestive heart failure ([address] Diabetes mellitus ([address] Essential hypertension, and Thrombocytopenia ([address] >>For CHF and Comorbidity documentation on Education Time and Topics, please see Education Tab    Progressive Mobility Assessment:  What is this patient's Current Level of Mobility?: Ambulatory-Up Ad Maria Dolores  How was this patient Mobilized today?: Edge of Bed, Up to Chair,  Up to Toilet/Shower, Up in Room, Up in [place], Unable to Mobilize, and Patient Refuses to Mobilize, ambulated  ft                 With Whom? Self                 Level of Difficulty/Assistance: Independent     Pt resting in bed at this time on  1.5 L O2. Pt denies shortness of breath. Pt without lower extremity edema.      Patient and/or Family's stated Goal of Care this Admission: reduce shortness of breath, increase activity tolerance, better understand heart failure and disease management, be more comfortable, and reduce lower extremity edema prior to discharge    Melia Longo RN      :

## 2022-11-02 NOTE — PROGRESS NOTES
11/02/22 0700   RT Protocol   History Pulmonary Disease 2   Respiratory pattern 0   Breath sounds 0   Cough 0   Indications for Bronchodilator Therapy None   Bronchodilator Assessment Score 2

## 2022-11-02 NOTE — CARE COORDINATION
Chart reviewed day #12. Per chart review, NP and RN, patient still being diuresed and we are watching labs. Patient likely will not need anything from [facility] as he is refusing home care. CHF RN, Peace Bernstein, following and will reach out to patient once patient returns home.

## 2022-11-02 NOTE — PROGRESS NOTES
Hospitalist Progress Note      PCP: Shannon Santiago DO    Date of Admission: 10/21/2022    Chief Complaint: pitting edema, urinary retention    Hospital Course: 62 y.o. male, with past medical history of CHF, obesity and diabetes who presented to [facility] with pitting edema and urinary retention. History obtained from the patient and review of EMR. The patient stated over the last week he has noticed extreme swelling to both of his legs. He stated the swelling is coming up both legs to his groin and all over his abdomen. The patient stated he does take 80 mg of Lasix twice daily and has been doing so. However, he stated he does not feel like he is urinating as much as he usually does. The patient stated his abdomen has not been the same since he left the hospital after his cholecystectomy roughly 3 months ago. Although he does have a history of CHF, he denies any shortness of breath and/or chest pain. He stated in the beginning of the week he did have roughly 12 pound weight gain, but stated he last 7 pounds yesterday. The patient stated his skin feels extremely tight and it is extremely painful for him to walk and bend over. In the emergency room a chest x-ray was obtained that revealed no acute cardiopulmonary findings. And the patient's proBNP was 945. He was given 80 mg of IV Lasix. A Lucia catheter was ordered to be placed, but the patient refused. He was admitted for further evaluation and treatment. The patient denied any other associated symptoms as well as any aggravating and/or alleviating factors. At the time of this assessment, the patient was resting comfortably in bed. He currently denies any chest pain, back pain, abdominal pain, shortness of breath, numbness, tingling, N/V/C/D, fever and/or chills.       Subjective: denies chest pain, dyspnea, n/v/d, dysuria, fever, chills, headache      Medications: Reviewed    Infusion Medications    sodium chloride      sodium chloride sodium chloride      dextrose       Scheduled Medications    furosemide  80 mg IntraVENous BID    sodium chloride flush  5-40 mL IntraVENous 2 times per day    predniSONE  40 mg Oral Daily    spironolactone  25 mg Oral Daily    guaiFENesin  600 mg Oral BID    losartan  25 mg Oral Daily    sodium chloride flush  5-40 mL IntraVENous 2 times per day    aspirin  81 mg Oral Daily    atorvastatin  20 mg Oral Nightly    sodium chloride flush  5-40 mL IntraVENous 2 times per day    enoxaparin  40 mg SubCUTAneous Daily    insulin lispro  0-8 Units SubCUTAneous TID WC    insulin lispro  0-4 Units SubCUTAneous Nightly     PRN Meds: ipratropium-albuterol, sodium chloride flush, sodium chloride, acetaminophen, sodium chloride flush, sodium chloride, hydrALAZINE, albuterol sulfate HFA **AND** [DISCONTINUED] ipratropium, perflutren lipid microspheres, lidocaine, albuterol sulfate HFA, sodium chloride flush, sodium chloride, ondansetron **OR** ondansetron, polyethylene glycol, acetaminophen **OR** acetaminophen, glucose, dextrose bolus **OR** dextrose bolus, glucagon (rDNA), dextrose      Intake/Output Summary (Last 24 hours) at 11/2/2022 1500  Last data filed at 11/2/2022 1333  Gross per 24 hour   Intake 720 ml   Output 2950 ml   Net -2230 ml       Physical Exam Performed:    /81   Pulse 83   Temp 97.9 °F (36.6 °C) (Oral)   Resp 16   Ht 6' (1.829 m)   Wt 193 lb 14.4 oz (88 kg)   SpO2 92%   BMI 26.30 kg/m²     General appearance: Resting in bed, comfortable. No apparent distress, appears stated age and cooperative. HEENT: Pupils equal, round, and reactive to light. Conjunctivae/corneas clear. Neck: Supple, with full range of motion. No jugular venous distention. Trachea midline. Respiratory: Normal respiratory effort. Clear to auscultation, bilaterally without Rales/Wheezes/Rhonchi. 1L per NC  Cardiovascular: Regular rate and rhythm with normal S1/S2 without murmurs, rubs or gallops.   Abdomen: Soft, non-tender, non-distended with normal bowel sounds. Musculoskeletal: No clubbing, cyanosis. BLE with 1+, slight pitting edema. Full range of motion without deformity. Skin: Skin color, texture, turgor normal. No rashes or lesions. Neurologic: Neurovascularly intact without any focal sensory/motor deficits. Cranial nerves: II-XII intact, grossly non-focal.  Psychiatric: Alert and oriented, thought content appropriate, normal insight  Capillary Refill: Brisk, 3 seconds, normal   Peripheral Pulses: +2 palpable, equal bilaterally       Labs:   Recent Labs     11/02/22  0844   WBC 9.7   HGB 16.7   HCT 51.9        Recent Labs     10/31/22  1031 11/01/22  1201 11/02/22  0844    137 137   K 3.9 3.8 3.5   CL 91* 91* 92*   CO2 39* 38* 36*   BUN 31* 36* 33*   CREATININE 0.9 0.9 0.8*   CALCIUM 10.0 9.9 10.1     No results for input(s): AST, ALT, BILIDIR, BILITOT, ALKPHOS in the last 72 hours. No results for input(s): INR in the last 72 hours. No results for input(s): Donna Nasuti in the last 72 hours. Urinalysis:      Lab Results   Component Value Date/Time    NITRU Negative 07/17/2022 06:27 PM    BLOODU Negative 07/17/2022 06:27 PM    SPECGRAV 1.010 07/17/2022 06:27 PM    GLUCOSEU Negative 07/17/2022 06:27 PM       Radiology:  XR CHEST (2 VW)   Final Result   Mild cardiomegaly without evidence of CHF. Small right pleural effusion. NM Cardiac Stress Test Nuclear Imaging   Final Result      CT CARDIAC CALCIUM SCORING   Final Result   Total Agatston calcium score of 40. This is in the 40th percentile      Findings of fluid overload denoted by small right-sided pleural effusion,   body wall anasarca, and mild abdominopelvic ascites. Calcium Score Interpretation      0  No identifiable atherosclerotic plaque. Very low cardiovascular disease   risk. Less than 5% chance of presence of coronary artery disease. A   negative examination. 1-10 Minimal plaque burden.   Significant coronary artery disease very   unlikely.  Mild plaque burden. Likely mild or minimal coronary stenosis. 101-400 Moderate plaque burden. Moderate non-obstructive coronary artery   disease highly likely. Over 400 Extensive plaque burden. High likelihood of at least one   significant coronary artery stenosis (>50% diameter). CALCIUM SCORING OVERVIEW:      Coronary calcium is a marker for plaque in a blood vessel or atherosclerosis   (hardening of the arteries). The presence and amount of calcium detected in   the coronary artery by the CT scan estimates the presence and amount of   atherosclerotic plaque. These calcium deposits can appear years before the   development of heart disease symptoms such as chest pain and shortness of   breath. A calcium score is computed for each of the coronary arteries based upon the   volume and density of the calcium deposits. This can be referred to as your   calcified plaque burden. It does not correspond directly to the percentage   of narrowing in the artery, but does correlate with the severity of the   overall coronary atherosclerotic burden. This score is then used to determine the calcium percentile which compares   your calcified plaque burden to that of other asymptomatic men and women of   the same age. The calcium score, in combination with the percentile, enables   your physician to determine your risk of developing symptomatic coronary   artery disease, and to measure the progression of disease as well as the   effectiveness of treatment. A score of zero indicates that there is no calcified plaque burden. This   implies that there is no significant coronary artery narrowing and very low   likelihood of a cardiac event over at least the next 3 years. It does not   absolutely rule out the presence of soft, noncalcified plaque or totally   eliminate the possibility of a cardiac event.       A score greater than zero indicates at least some coronary artery disease. As the score increases, so does the likelihood of a significant coronary   narrowing and the likelihood of a coronary event over the next 3 years. Similarly, the likelihood of a coronary event increases with increasing   calcium percentiles. XR CHEST (2 VW)   Final Result   No radiographic evidence of acute pulmonary disease. Consults:  IP CONSULT TO CARDIOLOGY  IP CONSULT TO PULMONOLOGY  IP CONSULT TO CARDIAC REHAB    Assessment/Plan:    Active Hospital Problems    Diagnosis     Anasarca [R60.1]      Priority: Medium    Enlarged RV (right ventricle) [I51.7]      Priority: Medium    Tricuspid valve insufficiency [I07.1]      Priority: Medium    Chest congestion [R09.89]      Priority: Medium    Uncontrolled type 2 diabetes mellitus with hyperglycemia ([facility] [address] [E11.65]      Priority: Medium    Class 1 obesity in adult [E66.9]      Priority: Medium    COPD exacerbation ([address] [J44.1]      Priority: Medium    Cor pulmonale ([address] [I27.81]      Priority: Medium    Acute respiratory failure with hypoxia (HCC) [J96.01]      Priority: Medium    Severe pulmonary hypertension ([address] [I27.20]      Priority: Medium    Suspected sleep apnea [R29.818]      Priority: Medium    Former smoker [Z87.891]      Priority: Medium    DM (diabetes mellitus) ([address] [E11.9]      Priority: Medium    Pitting edema [R60.9]      Priority: Medium    Stage 3 severe COPD by GOLD classification ([facility] [address] [J44.9]     CHF (congestive heart failure) (HCC) [I50.9]     Essential hypertension [I10]      Acute diastolic heart failure  -Limited echo (10/2022): EF 40%; RV overload; mod-severe TR; severe pHTN; difficult to determine DD grade and filling pressure  -Daily weights.  On admission 108.2kg, currently 88kg  -Dry weight ~185lbs  -Strict I&O  -Continue ARB, IV lasix 80mg BID, and spironolactone  - on admission, currently [address] 10/31 w/ Dr Victor Manuel Hummel: elevated left and right sided heart pressures; severe pHTN; concern for cor pulmonale; preserved CO/CI  -CHF orderset in place    Acute hypoxic respiratory failure  -Likely due to heart failure exacerbation, pHTN  -Does not require oxygen at home, currently requiring 1L  -Wean as able for oxygen saturation 90-94%  -Encourage pulmonary toilet    COPD, stable  -No evidence of exacerbation  -Continue scheduled duonebs, mucinex  -PRN albuterol    Diabetes type II, sub optimal control  -A1C 7 (8/2022)  -Hold home regimen while admitted  -Sliding scale insulin, monitor and adjust as needed  -Carb control diet    Hypertension, controlled  -Continue losartan  -Monitor    Hyperlipidemia, controlled  -LDL 33 (10/2022)  -Continue statin therapy    Obesity  -BMI 99.9  -This complicates assessment and treatment. Placing patient at risk for multiple co-morbidities as well as early death and contributing to the patient's presentation  -Counseled on weight loss      DVT Prophylaxis: lovenox  Diet: ADULT DIET;  Regular; Low Fat/Low Chol/High Fiber/2 gm Na  Code Status: Full Code  PT/OT Eval Status: not indicated    Dispo - possibly 11/3      Appropriate for A1 Discharge Unit: No, actual d/c not clear at this time      Quan Gonsales, APRN - CNP

## 2022-11-02 NOTE — PLAN OF CARE
Problem: Skin/Tissue Integrity  Goal: Absence of new skin breakdown  Description: 1. Monitor for areas of redness and/or skin breakdown  2. Assess vascular access sites hourly  3. Every 4-6 hours minimum:  Change oxygen saturation probe site  4. Every 4-6 hours:  If on nasal continuous positive airway pressure, respiratory therapy assess nares and determine need for appliance change or resting period.   Outcome: Progressing     Problem: Chronic Conditions and Co-morbidities  Goal: Patient's chronic conditions and co-morbidity symptoms are monitored and maintained or improved  Outcome: Progressing     Problem: Metabolic/Fluid and Electrolytes - Adult  Goal: Glucose maintained within prescribed range  Outcome: Progressing     Problem: Safety - Adult  Goal: Free from fall injury  Outcome: Progressing

## 2022-11-02 NOTE — PROGRESS NOTES
CARDIOLOGY PROGRESS NOTE      Patient Name: Raji Sandy  Date of admission: 10/21/2022  2:13 PM  Admission Dx: Urinary retention [R33.9]  Anasarca [R60.1]  Pitting edema [R60.9]  Elevated BUN [R79.9]  Reason for Consult:  Congestive heart failure   Requesting Physician: Lindy Malik MD  Primary Care physician: Adams Robison DO    Subjective:     Raji Sandy is a 62 y.o. patient with a prior medical history notable for HFpEF, hypertension, diabetes, severe COPD and concern for pulmonary hypertension, who presented to the hospital with complaints of increasing shortness of breath and edema times a few weeks. Echo on admission showed RV failure and LVEF 40%, volume overload. He had abnormal stress testing which prompted LHC on 10/24/22 showed mild non-obstructive CAD. Repeat right heart catheterization 10/31 with significant volume overload, persistent, severe pulmonary hypertension. 3.9 L urine output yesterday. Net -36.6 L. Bicarb/BUN elevated but stable, creatinine stable, oxygen needs reduced to 1L. Notes lower extremity edema and shortness of breath are continually improving. Sleeping nearly flat. No recurrence of NSVT. Home Medications:  Were reviewed and are listed in nursing record and/or below  Prior to Admission medications    Medication Sig Start Date End Date Taking? Authorizing Provider   albuterol-ipratropium (COMBIVENT RESPIMAT)  MCG/ACT AERS inhaler Inhale 1 puff into the lungs in the morning and 1 puff at noon and 1 puff in the evening and 1 puff before bedtime.  10/14/22   Denisse Conrad DO   KLOR-CON M20 20 MEQ extended release tablet TAKE ONE TABLET BY MOUTH TWICE A DAY WHILE ON INCREASED DOSE OF FUROSEMIDE 7/17/22 8/13/22  SANCHEZ Easley   furosemide (LASIX) 80 MG tablet Take 1 tablet by mouth 2 times daily 6/24/22   Constance Allen MD   losartan (COZAAR) 50 mg tablet TAKE ONE TABLET BY MOUTH DAILY 6/1/22   Densise Conrad DO   ASPIRIN LOW DOSE 81 MG EC tablet TAKE ONE TABLET BY MOUTH DAILY 6/1/22   David Conrad DO   atorvastatin (LIPITOR) 20 MG tablet TAKE ONE TABLET BY MOUTH ONCE NIGHTLY 6/1/22   SANCHEZ Daniels CNP   metFORMIN (GLUCOPHAGE) 500 MG tablet TAKE ONE TABLET BY MOUTH DAILY WITH BREAKFAST 6/1/22   David Conrad DO   umeclidinium-vilanterol (ANORO ELLIPTA) 62.5-25 MCG/INH AEPB inhaler Inhale 1 puff into the lungs daily 5/12/22   David Conrad DO   albuterol sulfate  (90 Base) MCG/ACT inhaler INHALE 2 PUFFS BY MOUTH FOUR TIMES A DAY AS NEEDED FOR WHEEZING 4/23/22   SANCHEZ Gray        CURRENT Medications:  ipratropium-albuterol (DUONEB) nebulizer solution 1 ampule, Q4H PRN  furosemide (LASIX) injection 80 mg, BID  sodium chloride flush 0.9 % injection 5-40 mL, 2 times per day  sodium chloride flush 0.9 % injection 5-40 mL, PRN  0.9 % sodium chloride infusion, PRN  acetaminophen (TYLENOL) tablet 650 mg, Q4H PRN  predniSONE (DELTASONE) tablet 40 mg, Daily  spironolactone (ALDACTONE) tablet 25 mg, Daily  guaiFENesin (MUCINEX) extended release tablet 600 mg, BID  losartan (COZAAR) tablet 25 mg, Daily  sodium chloride flush 0.9 % injection 5-40 mL, 2 times per day  sodium chloride flush 0.9 % injection 5-40 mL, PRN  0.9 % sodium chloride infusion, PRN  hydrALAZINE (APRESOLINE) injection 10 mg, Q10 Min PRN  albuterol sulfate HFA (PROVENTIL;VENTOLIN;PROAIR) 108 (90 Base) MCG/ACT inhaler 2 puff, Q6H PRN  perflutren lipid microspheres (DEFINITY) injection 1.65 mg, ONCE PRN  lidocaine (XYLOCAINE) 2 % uro-jet, PRN  albuterol sulfate HFA (PROVENTIL;VENTOLIN;PROAIR) 108 (90 Base) MCG/ACT inhaler 2 puff, Q4H PRN  aspirin EC tablet 81 mg, Daily  atorvastatin (LIPITOR) tablet 20 mg, Nightly  sodium chloride flush 0.9 % injection 5-40 mL, 2 times per day  sodium chloride flush 0.9 % injection 5-40 mL, PRN  0.9 % sodium chloride infusion, PRN  ondansetron (ZOFRAN-ODT) disintegrating tablet 4 mg, Q8H PRN   Or  ondansetron (ZOFRAN) injection 4 mg, Q6H PRN  polyethylene glycol (GLYCOLAX) packet 17 g, Daily PRN  acetaminophen (TYLENOL) tablet 650 mg, Q6H PRN   Or  acetaminophen (TYLENOL) suppository 650 mg, Q6H PRN  enoxaparin (LOVENOX) injection 40 mg, Daily  insulin lispro (HUMALOG) injection vial 0-8 Units, TID WC  insulin lispro (HUMALOG) injection vial 0-4 Units, Nightly  glucose chewable tablet 16 g, PRN  dextrose bolus 10% 125 mL, PRN   Or  dextrose bolus 10% 250 mL, PRN  glucagon (rDNA) injection 1 mg, PRN  dextrose 10 % infusion, Continuous PRN      Allergies:  Patient has no known allergies. Review of Systems:   A 14 point review of symptoms completed. Pertinent positives identified in the HPI, all other review of symptoms negative. Objective:     Vitals:    11/02/22 0533 11/02/22 0638 11/02/22 0730 11/02/22 0740   BP:  104/83 112/85    Pulse:  74 84    Resp:  18 16    Temp:  97.9 °F (36.6 °C) 97.9 °F (36.6 °C)    TempSrc:   Oral    SpO2:  92% 95% 95%   Weight: 193 lb 14.4 oz (88 kg)      Height:          Weight: 193 lb 14.4 oz (88 kg)       PHYSICAL EXAM:    General:  Alert, cooperative, no distress, appears stated age   Head:  Normocephalic, atraumatic   Eyes:  Conjunctiva/corneas clear, anicteric sclerae    Nose: Nares normal, no drainage or sinus tenderness   Throat: No abnormalities of the lips, oral mucosa or tongue. Neck: Trachea midline. Neck supple with no lymphadenopathy, thyroid not enlarged, symmetric, no tenderness/mass/nodules, Jugular venous pressure elevated/+HPJ   Lungs:   Distant BS, no wheezes, no discrete rales   Chest Wall:  No deformity or tenderness to palpation   Heart:  Regular rate and rhythm, normal S1, normal S2,  grade 2/6 systolic ejection murmur at the left sternal border increased with inspiration, no rub, no S3/S4, PMI non-displaced. Abdomen:   Tense abd, NT, with distant bowel sounds.  No masses, no hepatosplenomegaly   Extremities: No cyanosis, clubbing , improving edema bilateral lower extremities now trace   Vascular: 2+ radial, 2+ dorsalis pedis and posterior tibial pulses bilaterally. Brisk carotid upstrokes without carotid bruit. Skin: Skin color, texture, turgor are normal with no rashes or ulceration. Pysch: Euthymic mood, appropriate affect   Neurologic: Oriented to person, place and time. No slurred speech or facial asymmetry. No motor or sensory deficits on gross examination. Labs:   CBC:   Lab Results   Component Value Date/Time    WBC 9.7 11/02/2022 08:44 AM    RBC 5.40 11/02/2022 08:44 AM    HGB 16.7 11/02/2022 08:44 AM    HCT 51.9 11/02/2022 08:44 AM    MCV 96.0 11/02/2022 08:44 AM    RDW 13.9 11/02/2022 08:44 AM     11/02/2022 08:44 AM     CMP:  Lab Results   Component Value Date/Time     11/02/2022 08:44 AM    K 3.5 11/02/2022 08:44 AM    CL 92 11/02/2022 08:44 AM    CO2 36 11/02/2022 08:44 AM    BUN 33 11/02/2022 08:44 AM    CREATININE 0.8 11/02/2022 08:44 AM    GFRAA >60 08/13/2022 04:53 AM    AGRATIO 1.2 10/21/2022 02:48 PM    LABGLOM >60 11/02/2022 08:44 AM    GLUCOSE 98 11/02/2022 08:44 AM    PROT 7.2 10/21/2022 02:48 PM    CALCIUM 10.1 11/02/2022 08:44 AM    BILITOT 1.2 10/21/2022 02:48 PM    ALKPHOS 208 10/21/2022 02:48 PM    AST 34 10/21/2022 02:48 PM    ALT 29 10/21/2022 02:48 PM     PT/INR:  No results found for: PTINR  HgBA1c:  Lab Results   Component Value Date    LABA1C 7.0 08/12/2022     Lab Results   Component Value Date    TROPONINI <0.01 10/21/2022         Interval Testing/Data:     Telemetry personally reviewed; normal sinus rhythm 80s, no recurrence NSVT since yesterday AM    Echo: 8/5/2021  Summary   Left ventricular systolic function is low normal with a visually estimated   ejection fraction of 50-55%. EF estimated by 3D at 52 %. The left ventricle is normal in size with normal wall thickness. Flattened in diastole and systole (\"D-shaped septum\") consistent with right   ventricular volume and pressure overload. Normal left ventricular diastolic function. The right ventricle is severely dilated. Right ventricular systolic function is reduced. The right atrium is severely enlarged. Mild eccentric tricuspid regurgitation. Systolic pulmonary artery pressure (SPAP) is elevated and estimated at 56   mmHg (right atrial pressure 15 mmHg) consistent with moderate pulmonary   hypertension. The IVC is dilated in size (>2.1 cm) and collapses <50% with respiration   consistent with markedly elevated right atrial pressures (15 mmHg) . Echo 7/29/22     Summary   Limited echo for left ventricular function and pulmonary pressures      Left ventricular cavity size is normal.   There is mild concentric left ventricular hypertrophy. Left ventricular function appears to be normal with an estimated ejection   fraction of 55%. There is systolic and diastolic septal flattening consistent with right   ventricular pressure and volume overload. The right ventricle is enlarged and hypokinetic. Severe tricuspid regurgitation. The right atrium is severely dilated. Estimated pulmonary artery systolic pressure is at 74 mmHg assuming a right   atrial pressure of 15 mmHg. There is a small pericardial effusion noted without any hemodynamic   implications. Cardiac calcium CT 10/24/22  TECHNIQUE:   CT of the heart was obtained without intravenous contrast.  Calcium scoring   analysis was performed using a separate  workstation. Dose modulation,   iterative reconstruction, and/or weight based adjustment of the mA/kV was   utilized to reduce the radiation dose to as low as reasonably achievable. FINDINGS:   LEFT MAIN: 23       LEFT ANTERIOR DESCENDING: 10       CIRCUMFLEX: 7       RIGHT CORONARY ARTERY: Zero (0). TOTAL AGATSTON CALCIUM SCORE: 36       EXTRACARDIAC STRUCTURES: Trace aortic valve and aortic annulus calcification   is seen. No aortic aneurysm. Ascending aorta measures 3.5 cm.   Small   pericardial effusion is seen. No pericardial calcification. Small hiatal   hernia is seen. There is nonspecific thickening at the GE junction. Small mediastinal nodes are noted, likely reactive. A few calcified   mediastinal nodes are also seen. Respiratory motion artifact limits evaluation of pulmonary parenchymal   change. Scattered areas of bronchial wall thickening are seen. Small   right-sided pleural effusion is seen. There is adjacent consolidation at the   right lung base. .  This pleural effusion is slightly increased in size   compared to chest CT 08/05/2021       There is body wall anasarca. There is mild ascites seen in the upper   abdomen, partially imaged. Spurring is seen in the spine           Echo 10/24/22   Summary   Limited echo for LV/RV function & PHTN with limited doppler/no color. Global left ventricular systolic function is moderately decreased with   ejection fraction estimated at 40%. Normal left ventricle size and wall thickness. There is diastolic septal flattening (\"D-shaped\" septum) consistent with   right ventricular volume overload. Diastolic dysfunction grade and filling pressure are indeterminate. The right ventricle is severely enlarged in size with reduced function. The right atrium is moderately dilated. The tricuspid valve is normal in structure with tenting of the valve   leaflets. Moderate-severe tricuspid valve regurgitation. Systolic pulmonary artery pressure (SPAP) estimated at 70 mmHg (RA pressure   15 mmHg), consistent with severe pulmonary hypertension. Stress test 10/24/22   Summary    Normal LVEF 60%    Normal wall motion    Moderate inferior defect noted, suggestive of ischemia, less likely    attenuation artifact    Summed stress scores may underestimate perfusion defects        Overall, this would be considered an abnormal, intermediate risk, study        Procedures Performed: 10/24/22  1. Left heart catheterization  2.  Selective left and right coronary angiogram  3. Left ventriculography   5. Right heart catheterization     Procedure Findings:  1. Mild non-obstructive coronary artery disease. 2. Normal left ventricular function with EF estimated at 55-60%  3. Normal left heart hemodynamics  4. Severe pulmonary hypertension     Findings:     1. Left main coronary artery was normal. It gave off the left anterior descending artery and left circumflex. 2. Left anterior descending artery has mild atherosclerotic disease. It was moderate in size. It gave off septal perforators and a moderate sized diagonal branch. The LAD covered the entire apex of the left ventricle. 3. Left circumflex has mild atherosclerotic disease. It was moderate in size. There was a moderate sized obtuse marginal branch. 4. Right coronary artery has mild atherosclerotic disease. It was moderate in size and was the dominant artery. 5. Left ventriculogram showed normal LVEF at 55-60%. Wall motion was normal . There was no significant mitral valve or aortic valve disease noted. LVEDP was normal. There was no gradient noted across the aortic valve during pullback of the catheter. 6.  Right heart catheterization was performed. Right atrial pressure of 25  RV pressure 70/13  PA pressure of 78/37 with a mean of 50  Pulmonary artery wedge pressure mean of 12  Cardiac output of 5.32  Cardiac index 2.33  Aortic saturation 88%  PA saturation 59%  SVR of 917        Impression and Plan      Acute on chronic HFpEF with Right heart failure (cor pulmonale)  -Will Slow diuresis given significant urine output yesterday and rising BUN, reduced to twice daily dosing for today.   -Continue losartan, spironolactone  -BMP daily; monitor developing azotemia.     Severe pulmonary hypertension -mixed pre-/postcapillary  -plan PH clinic eval Dr. Emmanuel Seaman OP     Moderate-Severe TR    NSVT  -Start low-dose beta-blocker for myopathy and NSVT

## 2022-11-02 NOTE — RT PROTOCOL NOTE
RT Inhaler-Nebulizer Bronchodilator Protocol Note    There is a bronchodilator order in the chart from a provider indicating to follow the RT Bronchodilator Protocol and there is an Initiate RT Inhaler-Nebulizer Bronchodilator Protocol order as well (see protocol at bottom of note). CXR Findings:  No results found. The findings from the last RT Protocol Assessment were as follows:   History Pulmonary Disease: Chronic pulmonary disease  Respiratory Pattern: Regular pattern and RR 12-20 bpm  Breath Sounds: Slightly diminished and/or crackles  Cough: Strong, spontaneous, non-productive  Indication for Bronchodilator Therapy: On home bronchodilators  Bronchodilator Assessment Score: 4    Aerosolized bronchodilator medication orders have been revised according to the RT Inhaler-Nebulizer Bronchodilator Protocol below. Respiratory Therapist to perform RT Therapy Protocol Assessment initially then follow the protocol. Repeat RT Therapy Protocol Assessment PRN for score 0-3 or on second treatment, BID, and PRN for scores above 3. No Indications - adjust the frequency to every 6 hours PRN wheezing or bronchospasm, if no treatments needed after 48 hours then discontinue using Per Protocol order mode. If indication present, adjust the RT bronchodilator orders based on the Bronchodilator Assessment Score as indicated below. Use Inhaler orders unless patient has one or more of the following: on home nebulizer, not able to hold breath for 10 seconds, is not alert and oriented, cannot activate and use MDI correctly, or respiratory rate 25 breaths per minute or more, then use the equivalent nebulizer order(s) with same Frequency and PRN reasons based on the score. If a patient is on this medication at home then do not decrease Frequency below that used at home.     0-3 - enter or revise RT bronchodilator order(s) to equivalent RT Bronchodilator order with Frequency of every 4 hours PRN for wheezing or increased work of breathing using Per Protocol order mode. 4-6 - enter or revise RT Bronchodilator order(s) to two equivalent RT bronchodilator orders with one order with BID Frequency and one order with Frequency of every 4 hours PRN wheezing or increased work of breathing using Per Protocol order mode. 7-10 - enter or revise RT Bronchodilator order(s) to two equivalent RT bronchodilator orders with one order with TID Frequency and one order with Frequency of every 4 hours PRN wheezing or increased work of breathing using Per Protocol order mode. 11-13 - enter or revise RT Bronchodilator order(s) to one equivalent RT bronchodilator order with QID Frequency and an Albuterol order with Frequency of every 4 hours PRN wheezing or increased work of breathing using Per Protocol order mode. Greater than 13 - enter or revise RT Bronchodilator order(s) to one equivalent RT bronchodilator order with every 4 hours Frequency and an Albuterol order with Frequency of every 2 hours PRN wheezing or increased work of breathing using Per Protocol order mode. RT to enter RT Home Evaluation for COPD & MDI Assessment order using Per Protocol order mode.     Electronically signed by Tiana Sol RCP on 11/2/2022 at 7:45 AM

## 2022-11-03 VITALS
HEART RATE: 84 BPM | HEIGHT: 72 IN | SYSTOLIC BLOOD PRESSURE: 115 MMHG | TEMPERATURE: 98.2 F | BODY MASS INDEX: 25.98 KG/M2 | OXYGEN SATURATION: 89 % | WEIGHT: 191.8 LBS | DIASTOLIC BLOOD PRESSURE: 81 MMHG | RESPIRATION RATE: 18 BRPM

## 2022-11-03 LAB
ANION GAP SERPL CALCULATED.3IONS-SCNC: 8 MMOL/L (ref 3–16)
BASOPHILS ABSOLUTE: 0 K/UL (ref 0–0.2)
BASOPHILS RELATIVE PERCENT: 0.4 %
BUN BLDV-MCNC: 34 MG/DL (ref 7–20)
CALCIUM SERPL-MCNC: 9.6 MG/DL (ref 8.3–10.6)
CHLORIDE BLD-SCNC: 95 MMOL/L (ref 99–110)
CO2: 33 MMOL/L (ref 21–32)
CREAT SERPL-MCNC: 0.7 MG/DL (ref 0.9–1.3)
EOSINOPHILS ABSOLUTE: 0.2 K/UL (ref 0–0.6)
EOSINOPHILS RELATIVE PERCENT: 1.7 %
GFR SERPL CREATININE-BSD FRML MDRD: >60 ML/MIN/{1.73_M2}
GLUCOSE BLD-MCNC: 113 MG/DL (ref 70–99)
GLUCOSE BLD-MCNC: 116 MG/DL (ref 70–99)
GLUCOSE BLD-MCNC: 128 MG/DL (ref 70–99)
GLUCOSE BLD-MCNC: 159 MG/DL (ref 70–99)
HCT VFR BLD CALC: 49.3 % (ref 40.5–52.5)
HEMOGLOBIN: 16.4 G/DL (ref 13.5–17.5)
LYMPHOCYTES ABSOLUTE: 2.4 K/UL (ref 1–5.1)
LYMPHOCYTES RELATIVE PERCENT: 24.5 %
MCH RBC QN AUTO: 31.5 PG (ref 26–34)
MCHC RBC AUTO-ENTMCNC: 33.4 G/DL (ref 31–36)
MCV RBC AUTO: 94.5 FL (ref 80–100)
MONOCYTES ABSOLUTE: 0.9 K/UL (ref 0–1.3)
MONOCYTES RELATIVE PERCENT: 9.1 %
NEUTROPHILS ABSOLUTE: 6.4 K/UL (ref 1.7–7.7)
NEUTROPHILS RELATIVE PERCENT: 64.3 %
PDW BLD-RTO: 13.7 % (ref 12.4–15.4)
PERFORMED ON: ABNORMAL
PLATELET # BLD: 177 K/UL (ref 135–450)
PMV BLD AUTO: 8.6 FL (ref 5–10.5)
POTASSIUM REFLEX MAGNESIUM: 3.9 MMOL/L (ref 3.5–5.1)
RBC # BLD: 5.21 M/UL (ref 4.2–5.9)
SODIUM BLD-SCNC: 136 MMOL/L (ref 136–145)
WBC # BLD: 9.9 K/UL (ref 4–11)

## 2022-11-03 PROCEDURE — 6360000002 HC RX W HCPCS: Performed by: INTERNAL MEDICINE

## 2022-11-03 PROCEDURE — 80048 BASIC METABOLIC PNL TOTAL CA: CPT

## 2022-11-03 PROCEDURE — 6370000000 HC RX 637 (ALT 250 FOR IP): Performed by: INTERNAL MEDICINE

## 2022-11-03 PROCEDURE — 85025 COMPLETE CBC W/AUTO DIFF WBC: CPT

## 2022-11-03 PROCEDURE — 6370000000 HC RX 637 (ALT 250 FOR IP): Performed by: NURSE PRACTITIONER

## 2022-11-03 PROCEDURE — 99233 SBSQ HOSP IP/OBS HIGH 50: CPT | Performed by: INTERNAL MEDICINE

## 2022-11-03 PROCEDURE — 36415 COLL VENOUS BLD VENIPUNCTURE: CPT

## 2022-11-03 PROCEDURE — 2580000003 HC RX 258: Performed by: INTERNAL MEDICINE

## 2022-11-03 RX ORDER — METOPROLOL SUCCINATE 25 MG/1
25 TABLET, EXTENDED RELEASE ORAL DAILY
Status: DISCONTINUED | OUTPATIENT
Start: 2022-11-03 | End: 2022-11-03 | Stop reason: HOSPADM

## 2022-11-03 RX ORDER — METOPROLOL SUCCINATE 25 MG/1
25 TABLET, EXTENDED RELEASE ORAL DAILY
Qty: 30 TABLET | Refills: 0 | Status: SHIPPED | OUTPATIENT
Start: 2022-11-04 | End: 2022-11-30 | Stop reason: SDUPTHER

## 2022-11-03 RX ORDER — SPIRONOLACTONE 25 MG/1
25 TABLET ORAL DAILY
Qty: 30 TABLET | Refills: 0 | Status: SHIPPED | OUTPATIENT
Start: 2022-11-04 | End: 2022-11-30 | Stop reason: SDUPTHER

## 2022-11-03 RX ORDER — LOSARTAN POTASSIUM 25 MG/1
25 TABLET ORAL DAILY
Qty: 30 TABLET | Refills: 3 | Status: SHIPPED | OUTPATIENT
Start: 2022-11-04

## 2022-11-03 RX ORDER — TORSEMIDE 20 MG/1
40 TABLET ORAL 2 TIMES DAILY
Status: DISCONTINUED | OUTPATIENT
Start: 2022-11-03 | End: 2022-11-03 | Stop reason: HOSPADM

## 2022-11-03 RX ORDER — GUAIFENESIN 600 MG/1
600 TABLET, EXTENDED RELEASE ORAL 2 TIMES DAILY
Qty: 14 TABLET | Refills: 0 | Status: SHIPPED | OUTPATIENT
Start: 2022-11-03

## 2022-11-03 RX ORDER — PREDNISONE 10 MG/1
TABLET ORAL
Qty: 26 TABLET | Refills: 0 | Status: SHIPPED | OUTPATIENT
Start: 2022-11-03 | End: 2022-11-22 | Stop reason: ALTCHOICE

## 2022-11-03 RX ADMIN — GUAIFENESIN 600 MG: 600 TABLET, EXTENDED RELEASE ORAL at 09:24

## 2022-11-03 RX ADMIN — ASPIRIN 81 MG: 81 TABLET, COATED ORAL at 09:24

## 2022-11-03 RX ADMIN — SPIRONOLACTONE 25 MG: 25 TABLET ORAL at 09:25

## 2022-11-03 RX ADMIN — LOSARTAN POTASSIUM 25 MG: 25 TABLET, FILM COATED ORAL at 09:24

## 2022-11-03 RX ADMIN — FUROSEMIDE 80 MG: 10 INJECTION, SOLUTION INTRAMUSCULAR; INTRAVENOUS at 09:24

## 2022-11-03 RX ADMIN — PREDNISONE 40 MG: 20 TABLET ORAL at 09:24

## 2022-11-03 RX ADMIN — METOPROLOL SUCCINATE 25 MG: 25 TABLET, EXTENDED RELEASE ORAL at 14:27

## 2022-11-03 RX ADMIN — TORSEMIDE 40 MG: 20 TABLET ORAL at 14:27

## 2022-11-03 RX ADMIN — SODIUM CHLORIDE, PRESERVATIVE FREE 10 ML: 5 INJECTION INTRAVENOUS at 09:25

## 2022-11-03 ASSESSMENT — PAIN SCALES - GENERAL: PAINLEVEL_OUTOF10: 0

## 2022-11-03 NOTE — PROGRESS NOTES
Pt d/c'd home in private vehicle. Removed PIV and stopped bleeding. Catheter intact. Pt tolerated well. No redness noted at site. Notified [facility] and removed tele box. Reviewed d/c instructions, home meds, and  f/u information utilizing teach-back method. [facility]s for medications sent to [facility] and instructed to  this evening. Patient verbalized understanding.

## 2022-11-03 NOTE — PROGRESS NOTES
CARDIOLOGY PROGRESS NOTE      Patient Name: Luciano Gonzalez  Date of admission: 10/21/2022  2:13 PM  Admission Dx: Urinary retention [R33.9]  Anasarca [R60.1]  Pitting edema [R60.9]  Elevated BUN [R79.9]  Reason for Consult:  Congestive heart failure   Requesting Physician: Manny Sr MD  Primary Care physician: Becky Browne DO    Subjective:     Luciano Gonzalez is a 62 y.o. patient with a prior medical history notable for HFpEF, hypertension, diabetes, severe COPD and concern for pulmonary hypertension, who presented to the hospital with complaints of increasing shortness of breath and edema times a few weeks. Echo on admission showed RV failure and LVEF 40%, volume overload. He had abnormal stress testing which prompted LHC on 10/24/22 showed mild non-obstructive CAD. Repeat right heart catheterization 10/31 with significant volume overload, persistent, severe pulmonary hypertension. Lost another 2 lbs overnight. Net -39.5 L. Bicarb/BUN elevated but stable, creatinine stable, oxygen needs reduced to 1L. Notes lower extremity edema resolved. Walking veras with no significant shortness of breath. Sleeping HOB 15 degrees. No paroxysmal nocturnal dyspnea . Home Medications:  Were reviewed and are listed in nursing record and/or below  Prior to Admission medications    Medication Sig Start Date End Date Taking? Authorizing Provider   albuterol-ipratropium (COMBIVENT RESPIMAT)  MCG/ACT AERS inhaler Inhale 1 puff into the lungs in the morning and 1 puff at noon and 1 puff in the evening and 1 puff before bedtime.  10/14/22   James Conrda DO   KLOR-CON M20 20 MEQ extended release tablet TAKE ONE TABLET BY MOUTH TWICE A DAY WHILE ON INCREASED DOSE OF FUROSEMIDE 7/17/22 8/13/22  SANCHEZ Beltran   furosemide (LASIX) 80 MG tablet Take 1 tablet by mouth 2 times daily 6/24/22   Sarahy Martinez MD   losartan (COZAAR) 50 mg tablet TAKE ONE TABLET BY MOUTH DAILY 6/1/22   James Peraza Anamaria,    ASPIRIN LOW DOSE 81 MG EC tablet TAKE ONE TABLET BY MOUTH DAILY 6/1/22   Hiram Conrad DO   atorvastatin (LIPITOR) 20 MG tablet TAKE ONE TABLET BY MOUTH ONCE NIGHTLY 6/1/22   Easter Kayser, APRN - CNP   metFORMIN (GLUCOPHAGE) 500 MG tablet TAKE ONE TABLET BY MOUTH DAILY WITH BREAKFAST 6/1/22   Hiram Conrad DO   umeclidinium-vilanterol (ANORO ELLIPTA) 62.5-25 MCG/INH AEPB inhaler Inhale 1 puff into the lungs daily 5/12/22   Hiram Conrad,    albuterol sulfate  (90 Base) MCG/ACT inhaler INHALE 2 PUFFS BY MOUTH FOUR TIMES A DAY AS NEEDED FOR WHEEZING 4/23/22   Johny March, APRN        CURRENT Medications:  ipratropium-albuterol (DUONEB) nebulizer solution 1 ampule, Q4H PRN  furosemide (LASIX) injection 80 mg, BID  sodium chloride flush 0.9 % injection 5-40 mL, 2 times per day  sodium chloride flush 0.9 % injection 5-40 mL, PRN  0.9 % sodium chloride infusion, PRN  acetaminophen (TYLENOL) tablet 650 mg, Q4H PRN  predniSONE (DELTASONE) tablet 40 mg, Daily  spironolactone (ALDACTONE) tablet 25 mg, Daily  guaiFENesin (MUCINEX) extended release tablet 600 mg, BID  losartan (COZAAR) tablet 25 mg, Daily  sodium chloride flush 0.9 % injection 5-40 mL, 2 times per day  sodium chloride flush 0.9 % injection 5-40 mL, PRN  0.9 % sodium chloride infusion, PRN  hydrALAZINE (APRESOLINE) injection 10 mg, Q10 Min PRN  albuterol sulfate HFA (PROVENTIL;VENTOLIN;PROAIR) 108 (90 Base) MCG/ACT inhaler 2 puff, Q6H PRN  perflutren lipid microspheres (DEFINITY) injection 1.65 mg, ONCE PRN  lidocaine (XYLOCAINE) 2 % uro-jet, PRN  albuterol sulfate HFA (PROVENTIL;VENTOLIN;PROAIR) 108 (90 Base) MCG/ACT inhaler 2 puff, Q4H PRN  aspirin EC tablet 81 mg, Daily  atorvastatin (LIPITOR) tablet 20 mg, Nightly  sodium chloride flush 0.9 % injection 5-40 mL, 2 times per day  sodium chloride flush 0.9 % injection 5-40 mL, PRN  0.9 % sodium chloride infusion, PRN  ondansetron (ZOFRAN-ODT) disintegrating tablet 4 mg, Q8H PRN   Or  ondansetron (ZOFRAN) injection 4 mg, Q6H PRN  polyethylene glycol (GLYCOLAX) packet 17 g, Daily PRN  acetaminophen (TYLENOL) tablet 650 mg, Q6H PRN   Or  acetaminophen (TYLENOL) suppository 650 mg, Q6H PRN  enoxaparin (LOVENOX) injection 40 mg, Daily  insulin lispro (HUMALOG) injection vial 0-8 Units, TID WC  insulin lispro (HUMALOG) injection vial 0-4 Units, Nightly  glucose chewable tablet 16 g, PRN  dextrose bolus 10% 125 mL, PRN   Or  dextrose bolus 10% 250 mL, PRN  glucagon (rDNA) injection 1 mg, PRN  dextrose 10 % infusion, Continuous PRN      Allergies:  Patient has no known allergies. Review of Systems:   A 14 point review of symptoms completed. Pertinent positives identified in the HPI, all other review of symptoms negative. Objective:     Vitals:    11/03/22 0442 11/03/22 0655 11/03/22 0924 11/03/22 1126   BP: 106/81  121/89 109/79   Pulse: 82  88 85   Resp:   20 16   Temp: 97.5 °F (36.4 °C)  97.7 °F (36.5 °C) 97.2 °F (36.2 °C)   TempSrc:   Oral Oral   SpO2: 91%  94% (!) 89%   Weight:  191 lb 12.8 oz (87 kg)     Height:          Weight: 191 lb 12.8 oz (87 kg)       PHYSICAL EXAM:    General:  Alert, cooperative, no distress, appears stated age   Head:  Normocephalic, atraumatic   Eyes:  Conjunctiva/corneas clear, anicteric sclerae    Nose: Nares normal, no drainage or sinus tenderness   Throat: No abnormalities of the lips, oral mucosa or tongue. Neck: Trachea midline. Neck supple with no lymphadenopathy, thyroid not enlarged, symmetric, no tenderness/mass/nodules, Jugular venous pressure elevated ~8-10 cm H2O   Lungs:   Distant BS, no wheezes, no discrete rales   Chest Wall:  No deformity or tenderness to palpation   Heart:  Regular rate and rhythm, normal S1, normal S2,  grade 2/6 systolic ejection murmur at the left sternal border increased with inspiration, no rub, no S3/S4, PMI non-displaced. Abdomen:   Tense abd, NT, with distant bowel sounds.  No masses, no hepatosplenomegaly   Extremities: No cyanosis, clubbing, resolved edema     Vascular: 2+ radial, 2+ dorsalis pedis and posterior tibial pulses bilaterally. Brisk carotid upstrokes without carotid bruit. Skin: Skin color, texture, turgor are normal with no rashes or ulceration. Pysch: Euthymic mood, appropriate affect   Neurologic: Oriented to person, place and time. No slurred speech or facial asymmetry. No motor or sensory deficits on gross examination. Labs:   CBC:   Lab Results   Component Value Date/Time    WBC 9.9 11/03/2022 05:51 AM    RBC 5.21 11/03/2022 05:51 AM    HGB 16.4 11/03/2022 05:51 AM    HCT 49.3 11/03/2022 05:51 AM    MCV 94.5 11/03/2022 05:51 AM    RDW 13.7 11/03/2022 05:51 AM     11/03/2022 05:51 AM     CMP:  Lab Results   Component Value Date/Time     11/03/2022 05:51 AM    K 3.9 11/03/2022 05:51 AM    CL 95 11/03/2022 05:51 AM    CO2 33 11/03/2022 05:51 AM    BUN 34 11/03/2022 05:51 AM    CREATININE 0.7 11/03/2022 05:51 AM    GFRAA >60 08/13/2022 04:53 AM    AGRATIO 1.2 10/21/2022 02:48 PM    LABGLOM >60 11/03/2022 05:51 AM    GLUCOSE 128 11/03/2022 05:51 AM    PROT 7.2 10/21/2022 02:48 PM    CALCIUM 9.6 11/03/2022 05:51 AM    BILITOT 1.2 10/21/2022 02:48 PM    ALKPHOS 208 10/21/2022 02:48 PM    AST 34 10/21/2022 02:48 PM    ALT 29 10/21/2022 02:48 PM     PT/INR:  No results found for: PTINR  HgBA1c:  Lab Results   Component Value Date    LABA1C 7.0 08/12/2022     Lab Results   Component Value Date    TROPONINI <0.01 10/21/2022         Interval Testing/Data:     Telemetry personally reviewed     Echo: 8/5/2021  Summary   Left ventricular systolic function is low normal with a visually estimated   ejection fraction of 50-55%. EF estimated by 3D at 52 %. The left ventricle is normal in size with normal wall thickness. Flattened in diastole and systole (\"D-shaped septum\") consistent with right   ventricular volume and pressure overload.    Normal left ventricular diastolic function. The right ventricle is severely dilated. Right ventricular systolic function is reduced. The right atrium is severely enlarged. Mild eccentric tricuspid regurgitation. Systolic pulmonary artery pressure (SPAP) is elevated and estimated at 56   mmHg (right atrial pressure 15 mmHg) consistent with moderate pulmonary   hypertension. The IVC is dilated in size (>2.1 cm) and collapses <50% with respiration   consistent with markedly elevated right atrial pressures (15 mmHg) . Echo 7/29/22     Summary   Limited echo for left ventricular function and pulmonary pressures      Left ventricular cavity size is normal.   There is mild concentric left ventricular hypertrophy. Left ventricular function appears to be normal with an estimated ejection   fraction of 55%. There is systolic and diastolic septal flattening consistent with right   ventricular pressure and volume overload. The right ventricle is enlarged and hypokinetic. Severe tricuspid regurgitation. The right atrium is severely dilated. Estimated pulmonary artery systolic pressure is at 74 mmHg assuming a right   atrial pressure of 15 mmHg. There is a small pericardial effusion noted without any hemodynamic   implications. Cardiac calcium CT 10/24/22  TECHNIQUE:   CT of the heart was obtained without intravenous contrast.  Calcium scoring   analysis was performed using a separate  workstation. Dose modulation,   iterative reconstruction, and/or weight based adjustment of the mA/kV was   utilized to reduce the radiation dose to as low as reasonably achievable. FINDINGS:   LEFT MAIN: 23       LEFT ANTERIOR DESCENDING: 10       CIRCUMFLEX: 7       RIGHT CORONARY ARTERY: Zero (0). TOTAL AGATSTON CALCIUM SCORE: 36       EXTRACARDIAC STRUCTURES: Trace aortic valve and aortic annulus calcification   is seen. No aortic aneurysm. Ascending aorta measures 3.5 cm. Small   pericardial effusion is seen.   No pericardial calcification. Small hiatal   hernia is seen. There is nonspecific thickening at the GE junction. Small mediastinal nodes are noted, likely reactive. A few calcified   mediastinal nodes are also seen. Respiratory motion artifact limits evaluation of pulmonary parenchymal   change. Scattered areas of bronchial wall thickening are seen. Small   right-sided pleural effusion is seen. There is adjacent consolidation at the   right lung base. .  This pleural effusion is slightly increased in size   compared to chest CT 08/05/2021       There is body wall anasarca. There is mild ascites seen in the upper   abdomen, partially imaged. Spurring is seen in the spine           Echo 10/24/22   Summary   Limited echo for LV/RV function & PHTN with limited doppler/no color. Global left ventricular systolic function is moderately decreased with   ejection fraction estimated at 40%. Normal left ventricle size and wall thickness. There is diastolic septal flattening (\"D-shaped\" septum) consistent with   right ventricular volume overload. Diastolic dysfunction grade and filling pressure are indeterminate. The right ventricle is severely enlarged in size with reduced function. The right atrium is moderately dilated. The tricuspid valve is normal in structure with tenting of the valve   leaflets. Moderate-severe tricuspid valve regurgitation. Systolic pulmonary artery pressure (SPAP) estimated at 70 mmHg (RA pressure   15 mmHg), consistent with severe pulmonary hypertension. Stress test 10/24/22   Summary    Normal LVEF 60%    Normal wall motion    Moderate inferior defect noted, suggestive of ischemia, less likely    attenuation artifact    Summed stress scores may underestimate perfusion defects        Overall, this would be considered an abnormal, intermediate risk, study        Procedures Performed: 10/24/22  1. Left heart catheterization  2.  Selective left and right coronary angiogram  3. Left ventriculography   5. Right heart catheterization     Procedure Findings:  1. Mild non-obstructive coronary artery disease. 2. Normal left ventricular function with EF estimated at 55-60%  3. Normal left heart hemodynamics  4. Severe pulmonary hypertension     Findings:     1. Left main coronary artery was normal. It gave off the left anterior descending artery and left circumflex. 2. Left anterior descending artery has mild atherosclerotic disease. It was moderate in size. It gave off septal perforators and a moderate sized diagonal branch. The LAD covered the entire apex of the left ventricle. 3. Left circumflex has mild atherosclerotic disease. It was moderate in size. There was a moderate sized obtuse marginal branch. 4. Right coronary artery has mild atherosclerotic disease. It was moderate in size and was the dominant artery. 5. Left ventriculogram showed normal LVEF at 55-60%. Wall motion was normal . There was no significant mitral valve or aortic valve disease noted. LVEDP was normal. There was no gradient noted across the aortic valve during pullback of the catheter. 6.  Right heart catheterization was performed. Right atrial pressure of 25  RV pressure 70/13  PA pressure of 78/37 with a mean of 50  Pulmonary artery wedge pressure mean of 12  Cardiac output of 5.32  Cardiac index 2.33  Aortic saturation 88%  PA saturation 59%  SVR of 917        Impression and Plan      Acute on chronic HFpEF with Right heart failure (cor pulmonale)  -transition lasix IV to PO torsemide 40 mg - trial this PM, assess UOP at 2 hrs, if >400-500cc output, plan to DC on this dose BID   -Continue losartan, spironolactone; add low dose Metop XL 25 mg daily for myopathy/NSVT  -BMP daily; monitor developing azotemia. Diamox as needed to continue diuresis.   -will need to follow strict daily weights OP and keep log  -low sodium diet discussed at length    Severe pulmonary hypertension -mixed pre-/postcapillary  -plan PH clinic eval Dr. Emelina FUNK    NSVT  -no recurrence; as above    Hypertension-controlled    Mild nonobstructive coronary artery disease  -Aspirin, statin, BB as above    Hyperlipidemia   -statin    Very severe COPD  Acute respiratory failure due to pleural effusion, CHF and COPD -improving oxygen requirement  Diabetes mellitus  Tobacco use disorder  -I Strongly advised complete smoking cessation. Resources given to assist quitting including the followin-800-QUIT NOW. Follow up 2-3 weeks with APNP. DC today/tomorrow     Patient Active Problem List   Diagnosis    Tobacco abuse    Daily consumption of alcohol    QT prolongation    Right axis deviation    Macrocytosis    Essential hypertension    Acute decompensated heart failure (HCC)    CHF (congestive heart failure) (HCC)    Stage 3 severe COPD by GOLD classification ([address]    Calculus of gallbladder without cholecystitis without obstruction    DM (diabetes mellitus) ([address]    Pitting edema    Acute respiratory failure with hypoxia (HCC)    Severe pulmonary hypertension ([address]    Suspected sleep apnea    Former smoker    Enlarged RV (right ventricle)    Tricuspid valve insufficiency    Chest congestion    Uncontrolled type 2 diabetes mellitus with hyperglycemia ([address]    Class 1 obesity in adult    COPD exacerbation ([address]    Cor pulmonale ([address]    Anasarca           I will address the patient's cardiac risk factors and adjusted pharmacologic treatment as needed. In addition, I have reinforced the need for patient directed risk factor modification. All questions and concerns were addressed to the patient/family. Alternatives to my treatment were discussed. Thank you for allowing us to participate in the care of Nika Roberts. Please call me with any questions [phone].     Leslee Rider MD, [facility]  Cardiovascular [phone] [facility] Office  [phone] [facility]  11/3/2022 1:50 PM

## 2022-11-03 NOTE — PLAN OF CARE
Problem: Discharge Planning  Goal: Discharge to home or other facility with appropriate resources  Outcome: Adequate for Discharge     Problem: Pain  Goal: Verbalizes/displays adequate comfort level or baseline comfort level  Outcome: Adequate for Discharge     Problem: Safety - Adult  Goal: Free from fall injury  Outcome: Adequate for Discharge     Problem: Metabolic/Fluid and Electrolytes - Adult  Goal: Glucose maintained within prescribed range  Outcome: Adequate for Discharge     Problem: Chronic Conditions and Co-morbidities  Goal: Patient's chronic conditions and co-morbidity symptoms are monitored and maintained or improved  Outcome: Adequate for Discharge     Problem: Skin/Tissue Integrity  Goal: Absence of new skin breakdown  Description: 1. Monitor for areas of redness and/or skin breakdown  2. Assess vascular access sites hourly  3. Every 4-6 hours minimum:  Change oxygen saturation probe site  4. Every 4-6 hours:  If on nasal continuous positive airway pressure, respiratory therapy assess nares and determine need for appliance change or resting period.   Outcome: Adequate for Discharge

## 2022-11-03 NOTE — PLAN OF CARE
Patient's EF (Ejection Fraction) is equal to 40%    Heart Failure Medications:  Diuretics[de-identified] Torsemide and Spironolactone    (One of the following REQUIRED for EF </= 40%/SYSTOLIC FAILURE but MAY be used in EF% >40%/DIASTOLIC FAILURE)        ACE[de-identified] None        ARB[de-identified] Losartan         ARNI[de-identified] None    (Beta Blockers)  NON- Evidenced Based Beta Blocker (for EF% >40%/DIASTOLIC FAILURE): None    Evidenced Based Beta Blocker::(REQUIRED for EF% <40%/SYSTOLIC FAILURE) None  . .................................................................................................................................................. Patient's weights and intake/output reviewed: Yes    Patient's Last Weight: 191 lbs obtained by standing scale. Difference of 2 lbs less than last documented weight. Intake/Output Summary (Last 24 hours) at 11/3/2022 1640  Last data filed at 11/3/2022 1550  Gross per 24 hour   Intake 600 ml   Output 3550 ml   Net -2950 ml       Education Booklet Provided: yes    Comorbidities Reviewed Yes    Patient has a past medical history of Acute systolic congestive heart failure ([address] Diabetes mellitus ([address] Essential hypertension, and Thrombocytopenia ([address] >>For CHF and Comorbidity documentation on Education Time and Topics, please see Education Tab    Progressive Mobility Assessment:  What is this patient's Current Level of Mobility?: Ambulatory-Up [address] Maria Dolores  How was this patient Mobilized today?: Edge of Bed, Up to Chair,  Up to Toilet/Shower, Up in Room, Up in [place], and Patient Refuses to Mobilize, ambulated 100 ft                 With Whom? Self                 Level of Difficulty/Assistance: Independent     Pt resting in bed at this time on room air. Pt denies shortness of breath. Pt with nonpitting lower extremity edema.      Patient and/or Family's stated Goal of Care this Admission: reduce shortness of breath, increase activity tolerance, better understand heart failure and disease management, be more comfortable, and reduce lower extremity edema prior to discharge        :Patient with diagnosis of Diabetes. Active orders for ACHS glucose monitoring, SSI and Lantus. Instructed importance of following carb control diet to maintain stable gluocose levels.

## 2022-11-03 NOTE — PROGRESS NOTES
[address] FAILURE PROGRAM      Luciano Gonzalez 1965    History:  Past Medical History:   Diagnosis Date    Acute systolic congestive heart failure [address]     Diabetes mellitus ([facility]     Essential hypertension     Thrombocytopenia (HCC)        ECHO:  7/29/22   EF 55%  HgA1C:  8/12/22   7.0  Iron Saturation:  Ferritin:     ACE/ARB/ARNI: Losartan 25mg daily  BB:   Spironolactone: 25mg daily  SGLT2:    Last Hospital Admission:  8/12/22  post op pain  Discharge plans:  home independently    Advanced Directives: patient  full code  Patient's stated goal of care: better understand heart failure and disease management prior to discharge; long term goals include diet management  Lifestyle goal discussed: diet management    Patient up in chair at this time on room air. Pt denies shortness of breath; no complaints of chest pain. Pt states he lives at home. Mentioned his diet largely consists of fast easy meals. Pt states he drinks unknown amount of fluid per day. States he takes all his home medications as prescribed. Patient has a scale at home. Patient denies concerns paying for medications. Spoke to patient at bedside for CHF education. Pt has CHF education book and dietary had provided education. Pt asking many questions regarding diet and what foods are ok. Discussed meal planning ideas. 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 Suggested packing lunch to avoid eating there as it is high in sodium. Verbalized understanding.         Patient recent weights and intake/output reviewed:    Patient Vitals for the past 96 hrs (Last 3 readings):   Weight   11/03/22 0655 191 lb 12.8 oz (87 kg)   11/02/22 0533 193 lb 14.4 oz (88 kg)   11/01/22 0845 196 lb 11.2 oz (89.2 kg)         Intake/Output Summary (Last 24 hours) at 11/3/2022 1309  Last data filed at 11/3/2022 1017  Gross per 24 hour   Intake 600 ml   Output 2350 ml   Net -1750 ml       Notified patient to call the doctor post discharge if he experiences shortness of breath, chest pain, swelling, cough, or weight gain of 2-3 pounds in a day / 5 pounds in a week. Also notified patient to call the doctor if he feels dizzy, increased fatigue, decreased or difficulty urinating. Reviewed the red, yellow, green zones of heart failure self management. Encouraged patient to call the MD with early signs of an acute heart failure exacerbation or when in the yellow zone. Patient provided with both written and verbal education on CHF signs/symptoms, causes, discharge medications, daily weights, low sodium diet, activity, fluid restriction, and follow-up. Pt verbalized understanding. No additional questions at this time. Stated he will alert his nurse with any questions. PATIENT/CAREGIVER TEACHING:    Level of patient/caregiver understanding able to:   [ Krystle Chicas Verbalize understanding [ ] Demonstrate understanding [ ] Teach back   [ ] Needs reinforcement [ ] Other:     Education Time: 15 min    Recommendations:   1. Encourage follow-up appointment compliance. 2. Educate further on fluid restriction 48 oz - 64 oz during inpatient stay so he can understand how to measure intake at home. 3. Review sodium restrictions. Encourage patient to not add table salt to his foods and avoid foods that are high in sodium. 4. Continue to educate on S/S. Stress the importance of calling the MD with the earliest signs of an acute exacerbation. 5. Emphasize daily weights - instruct patient to call the MD if he gains 2-3 lb in a day or 5 lb in a week. 6. Provided patient with CHF Resource Line for questions and concerns.        Lisa aPtino RN   11/3/2022 1:09 PM

## 2022-11-03 NOTE — PROGRESS NOTES
Hospitalist Progress Note      PCP: Charles Palacios DO    Date of Admission: 10/21/2022    Chief Complaint: pitting edema, urinary retention    Hospital Course: 62 y.o. male, with past medical history of CHF, obesity and diabetes who presented to [facility] with pitting edema and urinary retention. History obtained from the patient and review of EMR. The patient stated over the last week he has noticed extreme swelling to both of his legs. He stated the swelling is coming up both legs to his groin and all over his abdomen. The patient stated he does take 80 mg of Lasix twice daily and has been doing so. However, he stated he does not feel like he is urinating as much as he usually does. The patient stated his abdomen has not been the same since he left the hospital after his cholecystectomy roughly 3 months ago. Although he does have a history of CHF, he denies any shortness of breath and/or chest pain. He stated in the beginning of the week he did have roughly 12 pound weight gain, but stated he last 7 pounds yesterday. The patient stated his skin feels extremely tight and it is extremely painful for him to walk and bend over. In the emergency room a chest x-ray was obtained that revealed no acute cardiopulmonary findings. And the patient's proBNP was 945. He was given 80 mg of IV Lasix. A Lucia catheter was ordered to be placed, but the patient refused. He was admitted for further evaluation and treatment. The patient denied any other associated symptoms as well as any aggravating and/or alleviating factors. At the time of this assessment, the patient was resting comfortably in bed. He currently denies any chest pain, back pain, abdominal pain, shortness of breath, numbness, tingling, N/V/C/D, fever and/or chills.       Subjective: denies chest pain, dyspnea, n/v/d, dysuria, fever, chills, headache      Medications: Reviewed    Infusion Medications    sodium chloride      sodium chloride sodium chloride      dextrose       Scheduled Medications    furosemide  80 mg IntraVENous BID    sodium chloride flush  5-40 mL IntraVENous 2 times per day    predniSONE  40 mg Oral Daily    spironolactone  25 mg Oral Daily    guaiFENesin  600 mg Oral BID    losartan  25 mg Oral Daily    sodium chloride flush  5-40 mL IntraVENous 2 times per day    aspirin  81 mg Oral Daily    atorvastatin  20 mg Oral Nightly    sodium chloride flush  5-40 mL IntraVENous 2 times per day    enoxaparin  40 mg SubCUTAneous Daily    insulin lispro  0-8 Units SubCUTAneous TID WC    insulin lispro  0-4 Units SubCUTAneous Nightly     PRN Meds: ipratropium-albuterol, sodium chloride flush, sodium chloride, acetaminophen, sodium chloride flush, sodium chloride, hydrALAZINE, albuterol sulfate HFA **AND** [DISCONTINUED] ipratropium, perflutren lipid microspheres, lidocaine, albuterol sulfate HFA, sodium chloride flush, sodium chloride, ondansetron **OR** ondansetron, polyethylene glycol, acetaminophen **OR** acetaminophen, glucose, dextrose bolus **OR** dextrose bolus, glucagon (rDNA), dextrose      Intake/Output Summary (Last 24 hours) at 11/3/2022 1217  Last data filed at 11/3/2022 1017  Gross per 24 hour   Intake 600 ml   Output 2350 ml   Net -1750 ml       Physical Exam Performed:    /79   Pulse 85   Temp 97.2 °F (36.2 °C) (Oral)   Resp 16   Ht 6' (1.829 m)   Wt 191 lb 12.8 oz (87 kg)   SpO2 (!) 89%   BMI 26.01 kg/m²     General appearance: sitting in chair, comfortable. No apparent distress, appears stated age and cooperative. HEENT: Pupils equal, round, and reactive to light. Conjunctivae/corneas clear. Neck: Supple, with full range of motion. No jugular venous distention. Trachea midline. Respiratory: Normal respiratory effort. Clear to auscultation, bilaterally without Rales/Wheezes/Rhonchi. Room air  Cardiovascular: Regular rate and rhythm with normal S1/S2 without murmurs, rubs or gallops.   Abdomen: Soft, non-tender, non-distended with normal bowel sounds. Musculoskeletal: No clubbing, cyanosis. BLE with 1+, slight pitting edema. Full range of motion without deformity. Skin: Skin color, texture, turgor normal. No rashes or lesions. Neurologic: Neurovascularly intact without any focal sensory/motor deficits. Cranial nerves: II-XII intact, grossly non-focal.  Psychiatric: Alert and oriented, thought content appropriate, normal insight  Capillary Refill: Brisk, 3 seconds, normal   Peripheral Pulses: +2 palpable, equal bilaterally       Labs:   Recent Labs     11/02/22  0844 11/03/22  0551   WBC 9.7 9.9   HGB 16.7 16.4   HCT 51.9 49.3    177     Recent Labs     11/01/22  1201 11/02/22  0844 11/03/22  0551    137 136   K 3.8 3.5 3.9   CL 91* 92* 95*   CO2 38* 36* 33*   BUN 36* 33* 34*   CREATININE 0.9 0.8* 0.7*   CALCIUM 9.9 10.1 9.6     No results for input(s): AST, ALT, BILIDIR, BILITOT, ALKPHOS in the last 72 hours. No results for input(s): INR in the last 72 hours. No results for input(s): [place] in the last 72 hours. Urinalysis:      Lab Results   Component Value Date/Time    NITRU Negative 07/17/2022 06:27 PM    BLOODU Negative 07/17/2022 06:27 PM    SPECGRAV 1.010 07/17/2022 06:27 PM    GLUCOSEU Negative 07/17/2022 06:27 PM       Radiology:  XR CHEST (2 VW)   Final Result   Mild cardiomegaly without evidence of CHF. Small right pleural effusion. NM Cardiac Stress Test Nuclear Imaging   Final Result      CT CARDIAC CALCIUM SCORING   Final Result   Total Agatston calcium score of 40. This is in the 40th percentile      Findings of fluid overload denoted by small right-sided pleural effusion,   body wall anasarca, and mild abdominopelvic ascites. Calcium Score Interpretation      0  No identifiable atherosclerotic plaque. Very low cardiovascular disease   risk. Less than 5% chance of presence of coronary artery disease. A   negative examination.       1-10 Minimal plaque burden. Significant coronary artery disease very   unlikely.  Mild plaque burden. Likely mild or minimal coronary stenosis. 101-400 Moderate plaque burden. Moderate non-obstructive coronary artery   disease highly likely. Over 400 Extensive plaque burden. High likelihood of at least one   significant coronary artery stenosis (>50% diameter). CALCIUM SCORING OVERVIEW:      Coronary calcium is a marker for plaque in a blood vessel or atherosclerosis   (hardening of the arteries). The presence and amount of calcium detected in   the coronary artery by the CT scan estimates the presence and amount of   atherosclerotic plaque. These calcium deposits can appear years before the   development of heart disease symptoms such as chest pain and shortness of   breath. A calcium score is computed for each of the coronary arteries based upon the   volume and density of the calcium deposits. This can be referred to as your   calcified plaque burden. It does not correspond directly to the percentage   of narrowing in the artery, but does correlate with the severity of the   overall coronary atherosclerotic burden. This score is then used to determine the calcium percentile which compares   your calcified plaque burden to that of other asymptomatic men and women of   the same age. The calcium score, in combination with the percentile, enables   your physician to determine your risk of developing symptomatic coronary   artery disease, and to measure the progression of disease as well as the   effectiveness of treatment. A score of zero indicates that there is no calcified plaque burden. This   implies that there is no significant coronary artery narrowing and very low   likelihood of a cardiac event over at least the next 3 years. It does not   absolutely rule out the presence of soft, noncalcified plaque or totally   eliminate the possibility of a cardiac event. A score greater than zero indicates at least some coronary artery disease. As the score increases, so does the likelihood of a significant coronary   narrowing and the likelihood of a coronary event over the next 3 years. Similarly, the likelihood of a coronary event increases with increasing   calcium percentiles. XR CHEST (2 VW)   Final Result   No radiographic evidence of acute pulmonary disease. Consults:  IP CONSULT TO CARDIOLOGY  IP CONSULT TO PULMONOLOGY  IP CONSULT TO CARDIAC REHAB    Assessment/Plan:    Active Hospital Problems    Diagnosis     Anasarca [R60.1]      Priority: Medium    Enlarged RV (right ventricle) [I51.7]      Priority: Medium    Tricuspid valve insufficiency [I07.1]      Priority: Medium    Chest congestion [R09.89]      Priority: Medium    Uncontrolled type 2 diabetes mellitus with hyperglycemia ([facility] [E11.65]      Priority: Medium    Class 1 obesity in adult [E66.9]      Priority: Medium    COPD exacerbation ([address] [J44.1]      Priority: Medium    Cor pulmonale ([facility] [I27.81]      Priority: Medium    Acute respiratory failure with hypoxia (HCC) [J96.01]      Priority: Medium    Severe pulmonary hypertension ([address] [I27.20]      Priority: Medium    Suspected sleep apnea [R29.818]      Priority: Medium    Former smoker [Z87.891]      Priority: Medium    DM (diabetes mellitus) ([address] [E11.9]      Priority: Medium    Pitting edema [R60.9]      Priority: Medium    Stage 3 severe COPD by GOLD classification ([facility] [J44.9]     CHF (congestive heart failure) (HCC) [I50.9]     Essential hypertension [I10]      Acute diastolic heart failure  -Limited echo (10/2022): EF 40%; RV overload; mod-severe TR; severe pHTN; difficult to determine DD grade and filling pressure  -Daily weights.  On admission 108.2kg, currently 87kg  -Dry weight ~185lbs  -Strict I&O  -Continue ARB, IV lasix 80mg BID, and spironolactone  - on admission, currently [address] 10/31 w/ Dr Fer Sanabria: elevated left and right sided heart pressures; severe pHTN; concern for cor pulmonale; preserved CO/CI  -CHF orderset in place    Acute hypoxic respiratory failure, resolved  -Likely due to heart failure exacerbation, pHTN  -Does not require oxygen at home, currently requiring 1L  -Wean as able for oxygen saturation 90-94%  -Encourage pulmonary toilet    COPD, stable  -No evidence of exacerbation  -Continue scheduled duonebs, mucinex  -PRN albuterol    Diabetes type II, sub optimal control  -A1C 7 (8/2022)  -Hold home regimen while admitted  -Sliding scale insulin, monitor and adjust as needed  -Carb control diet    Hypertension, controlled  -Continue losartan  -Monitor    Hyperlipidemia, controlled  -LDL 33 (10/2022)  -Continue statin therapy    Obesity  -BMI 41.4  -This complicates assessment and treatment. Placing patient at risk for multiple co-morbidities as well as early death and contributing to the patient's presentation  -Counseled on weight loss      DVT Prophylaxis: lovenox  Diet: ADULT DIET;  Regular; Low Fat/Low Chol/High Fiber/2 gm Na  Code Status: Full Code  PT/OT Eval Status: not indicated    Dispo - possibly 11/4      Appropriate for A1 Discharge Unit: Yes, on 11/4      SANCHEZ Salazar - CNP

## 2022-11-03 NOTE — PLAN OF CARE
Problem: Discharge Planning  Goal: Discharge to home or other facility with appropriate resources  Outcome: Not Progressing     Problem: Pain  Goal: Verbalizes/displays adequate comfort level or baseline comfort level  Outcome: Not Progressing     Problem: Safety - Adult  Goal: Free from fall injury  Outcome: Not Progressing     Problem: Metabolic/Fluid and Electrolytes - Adult  Goal: Glucose maintained within prescribed range  Outcome: Not Progressing     Problem: Chronic Conditions and Co-morbidities  Goal: Patient's chronic conditions and co-morbidity symptoms are monitored and maintained or improved  Outcome: Not Progressing     Problem: Skin/Tissue Integrity  Goal: Absence of new skin breakdown  Description: 1. Monitor for areas of redness and/or skin breakdown  2. Assess vascular access sites hourly  3. Every 4-6 hours minimum:  Change oxygen saturation probe site  4. Every 4-6 hours:  If on nasal continuous positive airway pressure, respiratory therapy assess nares and determine need for appliance change or resting period.   Outcome: Not Progressing

## 2022-11-04 ENCOUNTER — FOLLOWUP TELEPHONE ENCOUNTER (OUTPATIENT)
Dept: TELEMETRY | Age: 57
End: 2022-11-04

## 2022-11-04 ENCOUNTER — TELEPHONE (OUTPATIENT)
Dept: FAMILY MEDICINE CLINIC | Age: 57
End: 2022-11-04

## 2022-11-04 ENCOUNTER — CARE COORDINATION (OUTPATIENT)
Dept: CASE MANAGEMENT | Age: 57
End: 2022-11-04

## 2022-11-04 DIAGNOSIS — I50.21 ACUTE SYSTOLIC CONGESTIVE HEART FAILURE (HCC): Primary | ICD-10-CM

## 2022-11-04 NOTE — TELEPHONE ENCOUNTER
[facility] Transitions Initial Follow Up Call    Outreach made within 2 business days of discharge: Yes    Patient: Ron Augustin Patient : 1965   MRN: [id number]  Reason for Admission: There are no discharge diagnoses documented for the most recent discharge. Discharge Date: 11/3/22       Spoke with PT scheduled TCM Appt. Discharge department/facility: [facility]    Non-face-to-face services provided:  Scheduled appointment with PCP-2022  Obtained and reviewed discharge summary and/or continuity of care documents    TCM Interactive Patient Contact:  Was patient able to fill all prescriptions: Yes  Was patient instructed to bring all medications to the follow-up visit: Yes  Is patient taking all medications as directed in the discharge summary?  Yes  Does patient understand their discharge instructions: Yes  Does patient have questions or concerns that need addressed prior to 7-14 day follow up office visit: no    Scheduled appointment with PCP within 7-14 days    Follow Up  Future Appointments   Date Time Provider Patricia Rust   2022 11:30 AM DO LOWELL Jeronimo Cinci - DYD   2022  9:30 AM Peace Combs, APRN - CNP Jeevan Salcedo [facility]   2022  9:30 AM Peace Median, APRN - CNP Jeevan Salcedo [facility]   1/3/2023  8:15 AM MD Jeevan Chaudhary [facility]       Nanciana Curtis LPN

## 2022-11-04 NOTE — CARE COORDINATION
[facility] Care Transitions Initial Follow Up Call    Call within 2 business days of discharge: Yes    Care Transition Nurse contacted the patient by telephone to perform post hospital discharge assessment. Verified name and  with patient as identifiers. Provided introduction to self, and explanation of the Care Transition Nurse role. Patient: Shireen Bryant Patient : 1965   MRN: [id number]  Reason for Admission: CHF   Discharge Date: 11/3/22 RARS: Readmission Risk Score: 10.7      Last Discharge  Street       Date Complaint Diagnosis Description Type Department Provider    10/21/22 Edema Anasarca . .. ED to Hosp-Admission (Discharged) (ADMITTED) [facility] Duncan Rhoades DO; David Manuel. Was this an external facility discharge? No Discharge Facility: n.a    Challenges to be reviewed by the provider   Additional needs identified to be addressed with provider: No  none               Method of communication with provider: none. CTN spoke with patient who reported he is doing well today and reported his weight was 189 lbs. Patient denied any sob, cp, or swelling. Patient reported he has to go to his pharmacy today and  his new prescriptions as they weren't ready when he was discharged. Patient reported he has apt with Cardio on  and CTN will send message to PCP for apt. Patient is aware of when to contact cardio regarding CHF symptoms. Care Transition Nurse reviewed discharge instructions, medical action plan, and red flags with patient who verbalized understanding. The patient was given an opportunity to ask questions and does not have any further questions or concerns at this time. Were discharge instructions available to patient? Yes. Reviewed appropriate site of care based on symptoms and resources available to patient including: PCP  Specialist  When to call 911. The patient agrees to contact the PCP office for questions related to their healthcare.      Advance Care Planning:   Does patient have an Advance Directive: patient declined education. Advance Care Planning   Healthcare Decision Maker:    Primary Decision Maker: Silvano Sherwood - Tristan - [phone]    Click here to complete Healthcare Decision Makers including selection of the Healthcare Decision Maker Relationship (ie \"Primary\"). Today we discussed Healthcare Decision Makers. The patient is considering options. Medication reconciliation was performed with patient, who verbalizes understanding of administration of home medications. Medications reviewed, 1111F entered: yes    Was patient discharged with a pulse oximeter? no    Non-face-to-face services provided:  Obtained and reviewed discharge summary and/or continuity of care documents  Education of patient/family/caregiver/guardian to support self-management-. Offered patient enrollment in the Remote Patient Monitoring (RPM) program for in-home monitoring: Patient is not eligible for RPM program.    Care Transitions 24 Hour Call    Do you have a copy of your discharge instructions?: Yes  Do you have all of your prescriptions and are they filled?: Yes  Have you been contacted by a [employer] [address]?: No  Have you scheduled your follow up appointment?: Yes  How are you going to get to your appointment?: Car - family or friend to transport  Do you have support at home?: Alone  Do you feel like you have everything you need to keep you well at home?: Yes  Are you an active caregiver in your home?: No  Care Transitions Interventions         Follow Up  Future Appointments   Date Time Provider Patricia Rust   11/22/2022  9:30 AM SANCHEZ Patel CNP [facility]   11/30/2022  9:30 AM SANCHEZ Patel CNP [facility]   1/3/2023  8:15 AM MD Chilo Slade Transition Nurse provided contact information. Plan for follow-up call in 3-5 days based on severity of symptoms and risk factors.   Plan for next call: symptom management-CHF  self management-weight vitals  follow-up appointment-PCP     Bhupinder GOLDBERG, RN, [facility]  Care Transition Nurse  [phone] mobile

## 2022-11-04 NOTE — DISCHARGE SUMMARY
Hospital Medicine Discharge Summary    Patient ID: Nisreen Hernández      Patient's PCP: Marisa Byrd DO    Admit Date: 10/21/2022     Discharge Date:11/3/2022    Admitting Provider: Kristy Morales MD     Discharge Provider: SANCHEZ Peck - CNP     Discharge Diagnoses: Active Hospital Problems    Diagnosis     Anasarca [R60.1]      Priority: Medium    Enlarged RV (right ventricle) [I51.7]      Priority: Medium    Tricuspid valve insufficiency [I07.1]      Priority: Medium    Chest congestion [R09.89]      Priority: Medium    Uncontrolled type 2 diabetes mellitus with hyperglycemia (HCC) [E11.65]      Priority: Medium    Class 1 obesity in adult [E66.9]      Priority: Medium    COPD exacerbation ([address] [J44.1]      Priority: Medium    Cor pulmonale ([address] [I27.81]      Priority: Medium    Acute respiratory failure with hypoxia (HCC) [J96.01]      Priority: Medium    Severe pulmonary hypertension ([address] [I27.20]      Priority: Medium    Suspected sleep apnea [R29.818]      Priority: Medium    Former smoker [Z87.891]      Priority: Medium    DM (diabetes mellitus) ([address] [E11.9]      Priority: Medium    Pitting edema [R60.9]      Priority: Medium    Stage 3 severe COPD by GOLD classification ([address] [J44.9]     CHF (congestive heart failure) ([address] [I50.9]     Essential hypertension [I10]        The patient was seen and examined on day of discharge and this discharge summary is in conjunction with any daily progress note from day of discharge. Hospital Course: 62 y.o. male, with past medical history of CHF, obesity and diabetes who presented to [facility] with pitting edema and urinary retention. History obtained from the patient and review of EMR. The patient stated over the last week he has noticed extreme swelling to both of his legs. He stated the swelling is coming up both legs to his groin and all over his abdomen.   The patient stated he does take 80 mg of Lasix twice daily and has been doing so. However, he stated he does not feel like he is urinating as much as he usually does. The patient stated his abdomen has not been the same since he left the hospital after his cholecystectomy roughly 3 months ago. Although he does have a history of CHF, he denies any shortness of breath and/or chest pain. He stated in the beginning of the week he did have roughly 12 pound weight gain, but stated he last 7 pounds yesterday. The patient stated his skin feels extremely tight and it is extremely painful for him to walk and bend over. In the emergency room a chest x-ray was obtained that revealed no acute cardiopulmonary findings. And the patient's proBNP was 945. He was given 80 mg of IV Lasix. A Lucia catheter was ordered to be placed, but the patient refused. He was admitted for further evaluation and treatment. The patient denied any other associated symptoms as well as any aggravating and/or alleviating factors. At the time of this assessment, the patient was resting comfortably in bed. He currently denies any chest pain, back pain, abdominal pain, shortness of breath, numbness, tingling, N/V/C/D, fever and/or chills. Acute diastolic heart failure, clinically improving. Limited echo (10/2022): EF 40%; RV overload; mod-severe TR; severe pHTN; difficult to determine DD grade and filling pressure. Daily weights. On admission 108.2kg, at discharge 87kg. Dry weight ~185lbs. Strict I&O was monitored. Treated with IV Lasix during admission. Continued home dose ARB, spironolactone. BNP at discharge 1216. Will discharge home on torsemide, spironolactone, toprol-xl, and losartan. [address] 10/31 w/ Dr Peter Hidalgo: elevated left and right sided heart pressures; severe pHTN; concern for cor pulmonale; preserved CO/CI. CHF orderset was in place. Follow-up with cardiology on 11/22 as already scheduled     Acute hypoxic respiratory failure, resolved. Likely due to heart failure exacerbation, pHTN.  Does not require oxygen at home. Required supplemental oxygen during admission, but stable on room air at discharge. Wean as able for oxygen saturation 90-94%. Encouraged pulmonary toilet     COPD, stable. No evidence of exacerbation. Continue scheduled duonebs, mucinex, PRN albuterol. Follow-up with pulmonology for further assessment and treatment of COPD and pulmonary hypertension     Diabetes type II, sub optimal control. A1C 7 (8/2022). Held home regimen while admitted, may resume on discharge. Sliding scale insulin, monitor and adjust as needed. Recommend following up with PCP for further monitoring and treatment adjustments     Hypertension, controlled. Continue losartan. Recommend monitoring BP at home, and keeping a log to further discuss treatment with PCP     Hyperlipidemia, controlled. LDL 33 (10/2022). Continued home dose statin     Obesity, on admission. Likely due to hypervolemia. Weight loss as above. Physical Exam Performed:     /81   Pulse 84   Temp 98.2 °F (36.8 °C) (Oral)   Resp 18   Ht 6' (1.829 m)   Wt 191 lb 12.8 oz (87 kg)   SpO2 (!) 89%   BMI 26.01 kg/m²       General appearance: No apparent distress, appears stated age and cooperative. HEENT: Normal cephalic, atraumatic without obvious deformity. Pupils equal, round, and reactive to light. Extra ocular muscles intact. Conjunctivae/corneas clear. Neck: Supple, with full range of motion. No jugular venous distention. Trachea midline. Respiratory: Normal respiratory effort. Clear to auscultation, bilaterally without Rales/Wheezes/Rhonchi. Cardiovascular: Regular rate and rhythm with normal S1/S2 without murmurs, rubs or gallops. Abdomen: Soft, non-tender, non-distended with normal bowel sounds. Musculoskeletal: No clubbing, cyanosis or edema bilaterally. Full range of motion without deformity. Skin: Skin color, texture, turgor normal. No rashes or lesions. Neurologic: Neurovascularly intact without any focal sensory/motor deficits. Cranial nerves: II-XII intact, grossly non-focal.  Psychiatric: Alert and oriented, thought content appropriate, normal insight  Capillary Refill: Brisk,< 3 seconds   Peripheral Pulses: +2 palpable, equal bilaterally       Labs: For convenience and continuity at follow-up the following most recent labs are provided:      CBC:    Lab Results   Component Value Date/Time    WBC 9.9 11/03/2022 05:51 AM    HGB 16.4 11/03/2022 05:51 AM    HCT 49.3 11/03/2022 05:51 AM     11/03/2022 05:51 AM       Renal:    Lab Results   Component Value Date/Time     11/03/2022 05:51 AM    K 3.9 11/03/2022 05:51 AM    CL 95 11/03/2022 05:51 AM    CO2 33 11/03/2022 05:51 AM    BUN 34 11/03/2022 05:51 AM    CREATININE 0.7 11/03/2022 05:51 AM    CALCIUM 9.6 11/03/2022 05:51 AM    PHOS 3.8 05/12/2022 12:13 PM         Significant Diagnostic Studies    Radiology:   XR CHEST (2 VW)   Final Result   Mild cardiomegaly without evidence of CHF. Small right pleural effusion. NM Cardiac Stress Test Nuclear Imaging   Final Result      CT CARDIAC CALCIUM SCORING   Final Result   Total Agatston calcium score of 40. This is in the 40th percentile      Findings of fluid overload denoted by small right-sided pleural effusion,   body wall anasarca, and mild abdominopelvic ascites. Calcium Score Interpretation      0  No identifiable atherosclerotic plaque. Very low cardiovascular disease   risk. Less than 5% chance of presence of coronary artery disease. A   negative examination. 1-10 Minimal plaque burden. Significant coronary artery disease very   unlikely.  Mild plaque burden. Likely mild or minimal coronary stenosis. 101-400 Moderate plaque burden. Moderate non-obstructive coronary artery   disease highly likely. Over 400 Extensive plaque burden. High likelihood of at least one   significant coronary artery stenosis (>50% diameter).       CALCIUM SCORING OVERVIEW:      Coronary calcium is a marker for plaque in a blood vessel or atherosclerosis   (hardening of the arteries). The presence and amount of calcium detected in   the coronary artery by the CT scan estimates the presence and amount of   atherosclerotic plaque. These calcium deposits can appear years before the   development of heart disease symptoms such as chest pain and shortness of   breath. A calcium score is computed for each of the coronary arteries based upon the   volume and density of the calcium deposits. This can be referred to as your   calcified plaque burden. It does not correspond directly to the percentage   of narrowing in the artery, but does correlate with the severity of the   overall coronary atherosclerotic burden. This score is then used to determine the calcium percentile which compares   your calcified plaque burden to that of other asymptomatic men and women of   the same age. The calcium score, in combination with the percentile, enables   your physician to determine your risk of developing symptomatic coronary   artery disease, and to measure the progression of disease as well as the   effectiveness of treatment. A score of zero indicates that there is no calcified plaque burden. This   implies that there is no significant coronary artery narrowing and very low   likelihood of a cardiac event over at least the next 3 years. It does not   absolutely rule out the presence of soft, noncalcified plaque or totally   eliminate the possibility of a cardiac event. A score greater than zero indicates at least some coronary artery disease. As the score increases, so does the likelihood of a significant coronary   narrowing and the likelihood of a coronary event over the next 3 years. Similarly, the likelihood of a coronary event increases with increasing   calcium percentiles. XR CHEST (2 VW)   Final Result   No radiographic evidence of acute pulmonary disease.                 Consults: IP CONSULT TO CARDIOLOGY  IP CONSULT TO PULMONOLOGY  IP CONSULT TO CARDIAC REHAB    Disposition: Home    Condition at Discharge: Stable    Discharge Instructions/Follow-up:  PCP within 1 week. Cardiology is already scheduled. Make appoint with pulmonary    Code Status:  Prior     Activity: activity as tolerated    Diet: cardiac diet      Discharge Medications:     Discharge Medication List as of 11/3/2022  5:06 PM             Details   metoprolol succinate (TOPROL XL) 25 MG extended release tablet Take 1 tablet by mouth daily, Disp-30 tablet, R-0Normal      spironolactone (ALDACTONE) 25 MG tablet Take 1 tablet by mouth daily, Disp-30 tablet, R-0Normal      torsemide 40 MG TABS Take 40 mg by mouth in the morning and at bedtime, Disp-30 tablet, R-0Normal      predniSONE (DELTASONE) 10 MG tablet Take 3 tabs daily for 4 days. Then two tabs daily for 4 days. Then 1 tab daily for 4 days. Then 1/2 tab daily for 4 days. Then stop., Disp-26 tablet, R-0Normal      guaiFENesin (MUCINEX) 600 MG extended release tablet Take 1 tablet by mouth 2 times daily, Disp-14 tablet, R-0Normal                Details   losartan (COZAAR) 25 MG tablet Take 1 tablet by mouth daily, Disp-30 tablet, R-3Normal                Details   albuterol-ipratropium (COMBIVENT RESPIMAT)  MCG/ACT AERS inhaler Inhale 1 puff into the lungs in the morning and 1 puff at noon and 1 puff in the evening and 1 puff before bedtime. , Disp-1 each, R-3Normal      ASPIRIN LOW DOSE 81 MG EC tablet TAKE ONE TABLET BY MOUTH DAILY, Disp-90 tablet, R-1Normal      atorvastatin (LIPITOR) 20 MG tablet TAKE ONE TABLET BY MOUTH ONCE NIGHTLY, Disp-90 tablet, R-1Normal      metFORMIN (GLUCOPHAGE) 500 MG tablet TAKE ONE TABLET BY MOUTH DAILY WITH BREAKFAST, Disp-90 tablet, R-1Normal      umeclidinium-vilanterol (ANORO ELLIPTA) 62.5-25 MCG/INH AEPB inhaler Inhale 1 puff into the lungs daily, Disp-60 each, R-1Normal      albuterol sulfate  (90 Base) MCG/ACT inhaler INHALE 2 PUFFS BY MOUTH FOUR TIMES A DAY AS NEEDED FOR WHEEZING, Disp-1 each, R-2Normal             Time Spent on discharge is more than 45 minutes in the examination, evaluation, counseling and review of medications and discharge plan. Signed:    SANCHEZ Armstrong CNP   11/4/2022      Thank you Alcides Lizarraga DO for the opportunity to be involved in this patient's care. If you have any questions or concerns, please feel free to contact me at [phone].

## 2022-11-04 NOTE — TELEPHONE ENCOUNTER
[facility] Transitions Initial Follow Up Call    Outreach made within 2 business days of discharge: Yes    Patient: Suma Tse Patient : 1965   MRN: [id number]  Reason for Admission: There are no discharge diagnoses documented for the most recent discharge. Discharge Date: 11/3/22       Spoke with: PT to schedule post hospital follow up. PT stated he chooses to follow up with Cardiology and not make TCM Appt. At this time. Discharge department/facility: [facility]    Non-face-to-face services provided:  Obtained and reviewed discharge summary and/or continuity of care documents    TCM Interactive Patient Contact:  Was patient able to fill all prescriptions: Yes  Was patient instructed to bring all medications to the follow-up visit: Yes  Is patient taking all medications as directed in the discharge summary?  Yes  Does patient understand their discharge instructions: Yes  Does patient have questions or concerns that need addressed prior to 7-14 day follow up office visit: no    Scheduled appointment with PCP within 7-14 days    Follow Up  Future Appointments   Date Time Provider Patricia Rust   2022  9:30 AM SANCHEZ Chan CNP   2022  9:30 AM SANCHEZ Chan CNP   1/3/2023  8:15 AM MD Michelle Martins LPN

## 2022-11-04 NOTE — TELEPHONE ENCOUNTER
Attempted to contact Pt to schedule again for TCM visit. Pt stated he could not have afternoon Appt. I offered an11:30 am slot and he still declined. Awaiting Call back to schedule.

## 2022-11-08 ENCOUNTER — CARE COORDINATION (OUTPATIENT)
Dept: CASE MANAGEMENT | Age: 57
End: 2022-11-08

## 2022-11-08 NOTE — CARE COORDINATION
[facility] Care Transitions Follow Up Call    Care Transition Nurse contacted the patient by telephone. Verified name and  with patient as identifiers. Patient: Shireen Bryant  Patient : 1965   MRN: [id number]  Reason for Admission: CHF   Discharge Date: 11/3/22 RARS: Readmission Risk Score: 10.7      Needs to be reviewed by the provider   Additional needs identified to be addressed with provider: No  none             Method of communication with provider: none. CTN spoke with patient who reported he is doing great. Patient reported his breathing is stable and his weight has been staying at 190 lbs. Patient denied any swelling or issues with urination. Denies any acute needs at present time. Agreeable to f/u calls. Educated on the use of urgent care or physicians 24 hr access line if assistance is needed after hours. Addressed changes since last contact:  none  Discussed follow-up appointments. If no appointment was previously scheduled, appointment scheduling offered: Yes. Is follow up appointment scheduled within 7 days of discharge? Yes. Follow Up  Future Appointments   Date Time Provider Patricia Rust   2022 11:30 AM DO LOWELL Schmitt  [place] - [facility]   2022  9:30 AM SANCHEZ King CNP   2022  9:30 AM SANCHEZ King CNP   2022  9:40 AM Tomi Colvin MD AND [facility]AL[facility]   1/3/2023  8:15 AM MD Shaniqua Trujillo [facility]     Non-[facility] follow up appointment(s):     Care Transition Nurse reviewed medical action plan with patient and discussed any barriers to care and/or understanding of plan of care after discharge. Discussed appropriate site of care based on symptoms and resources available to patient including: PCP  Specialist  When to call 911. The patient agrees to contact the PCP office for questions related to their healthcare. Advance Care Planning:   patient declined education. Patients top risk factors for readmission: medical condition-. Interventions to address risk factors: Education of patient/family/caregiver/guardian to support self-management-. Offered patient enrollment in the Remote Patient Monitoring (RPM) program for in-home monitoring: Patient is not eligible for RPM program.     Care Transitions Subsequent and Final Call    Subsequent and Final Calls  Do you have any ongoing symptoms?: No  Have your medications changed?: No  Do you have any questions related to your medications?: No  Do you currently have any active services?: No  Do you have any needs or concerns that I can assist you with?: No  Identified Barriers: None  Care Transitions Interventions  Disease Association: Completed      Other Interventions:             Care Transition Nurse provided contact information for future needs. Plan for follow-up call in 5-7 days based on severity of symptoms and risk factors.   Plan for next call: self management-CHF     Diego GOLDBERG, RN, [facility]  Care Transition Nurse  [phone] mobile

## 2022-11-11 ENCOUNTER — CARE COORDINATION (OUTPATIENT)
Dept: CASE MANAGEMENT | Age: 57
End: 2022-11-11

## 2022-11-14 ENCOUNTER — OFFICE VISIT (OUTPATIENT)
Dept: FAMILY MEDICINE CLINIC | Age: 57
End: 2022-11-14

## 2022-11-14 VITALS
SYSTOLIC BLOOD PRESSURE: 108 MMHG | HEART RATE: 75 BPM | OXYGEN SATURATION: 95 % | DIASTOLIC BLOOD PRESSURE: 64 MMHG | BODY MASS INDEX: 26.37 KG/M2 | WEIGHT: 194.4 LBS

## 2022-11-14 DIAGNOSIS — R73.09 ELEVATED GLUCOSE: ICD-10-CM

## 2022-11-14 DIAGNOSIS — N17.9 AKI (ACUTE KIDNEY INJURY) (HCC): ICD-10-CM

## 2022-11-14 DIAGNOSIS — I50.813 ACUTE ON CHRONIC RIGHT-SIDED CONGESTIVE HEART FAILURE (HCC): ICD-10-CM

## 2022-11-14 DIAGNOSIS — I27.20 SEVERE PULMONARY HYPERTENSION (HCC): ICD-10-CM

## 2022-11-14 DIAGNOSIS — E11.65 UNCONTROLLED TYPE 2 DIABETES MELLITUS WITH HYPERGLYCEMIA (HCC): ICD-10-CM

## 2022-11-14 DIAGNOSIS — Z09 HOSPITAL DISCHARGE FOLLOW-UP: Primary | ICD-10-CM

## 2022-11-14 DIAGNOSIS — J44.9 CHRONIC OBSTRUCTIVE PULMONARY DISEASE, UNSPECIFIED COPD TYPE (HCC): ICD-10-CM

## 2022-11-14 LAB
ANION GAP SERPL CALCULATED.3IONS-SCNC: 15 MMOL/L (ref 3–16)
BUN BLDV-MCNC: 18 MG/DL (ref 7–20)
CALCIUM SERPL-MCNC: 9.8 MG/DL (ref 8.3–10.6)
CHLORIDE BLD-SCNC: 97 MMOL/L (ref 99–110)
CO2: 25 MMOL/L (ref 21–32)
CREAT SERPL-MCNC: 0.7 MG/DL (ref 0.9–1.3)
GFR SERPL CREATININE-BSD FRML MDRD: >60 ML/MIN/{1.73_M2}
GLUCOSE BLD-MCNC: 112 MG/DL (ref 70–99)
POTASSIUM SERPL-SCNC: 4.4 MMOL/L (ref 3.5–5.1)
SODIUM BLD-SCNC: 137 MMOL/L (ref 136–145)

## 2022-11-14 NOTE — TELEPHONE ENCOUNTER
Refill Request     CONFIRM preferrred pharmacy with the patient. If Mail Order Rx - Pend for 90 day refill. Last Seen: Last Seen Department: 11/14/2022  Last Seen by PCP: 11/14/2022    Last Written: 180 with 1 11/14/2022     If no future appointment scheduled, route STAFF MESSAGE with patient name to the [facility] for scheduling. Next Appointment:   Future Appointments   Date Time Provider Patricia Rust   11/22/2022  9:30 AM SANCHEZ Parrish CNP [employer] [facility]   11/30/2022  9:30 AM SANCHEZ Parrish CNP [employer] [facility]   12/8/2022  9:40 AM Rebecca Porter MD AND [facility]AL[facility]   1/3/2023  8:15 AM Simone Castorena MD [employer] [facility]       Message sent to [employer] to schedule appt with patient?   NO      Requested Prescriptions     Pending Prescriptions Disp Refills    Torsemide 40 MG TABS 180 tablet 1     Sig: Take 20 mg by mouth in the morning and at bedtime

## 2022-11-14 NOTE — PROGRESS NOTES
Post-Discharge Transitional Care  Follow Up      Lorena Danielle   YOB: 1965    Date of Office Visit:  11/14/2022  Date of Hospital Admission: 10/21/22  Date of Hospital Discharge: 11/3/22  Risk of hospital readmission (high >=14%. Medium >=10%) :Readmission Risk Score: 10.7      Care management risk score Rising risk (score 2-5) and Complex Care (Scores >=6): No Risk Score On File     Non face to face  following discharge, date last encounter closed (first attempt may have been earlier): 11/04/2022    Call initiated 2 business days of discharge: Yes    ASSESSMENT/PLAN:   Hospital discharge follow-up  -     [place] DISCHARGE MEDS RECONCILED W/ CURRENT OUTPATIENT MED LIST  Acute on chronic right-sided congestive heart failure (HCC)  -     Torsemide 40 MG TABS; Take 40 mg by mouth in the morning and at bedtime, Disp-180 tablet, R-1Normal  Chronic obstructive pulmonary disease, unspecified COPD type ([facility]  -     umeclidinium-vilanterol (ANORO ELLIPTA) 62.5-25 MCG/INH AEPB inhaler; Inhale 1 puff into the lungs daily, Disp-60 each, R-1Normal  STEPYH (acute kidney injury) ([facility]  -     Basic Metabolic Panel; Future  Elevated glucose  -     Hemoglobin A1C; Future  Severe pulmonary hypertension (HCC)  Uncontrolled type 2 diabetes mellitus with hyperglycemia (HCC)  -     Hemoglobin A1C; Future  Patient reports that he was unable to  torsemide following discharge and has not been taking any diuretic pills. Will resend to pharmacy and addressed importance of taking this medication consistently. We will also try and restart previously prescribed inhaler which was not picked up due to expense. Has follow-up with pulmonology next month. A1c appears to be worsening, will recheck today. May consider SGLT2 medication given concurrent heart failure. We will also recheck BMP given STEPHY during hospitalization. Medical Decision Making: moderate complexity  No follow-ups on file.            Subjective:   HPI:  Follow up of Hospital problems/diagnosis(es):     Inpatient course: Discharge summary reviewed- see chart. Interval history/Current status:     Diabetes mellitus      HF  States that he is weighing himself daily and has been staying 191-193 lbs. Watching fluid intake and staying under 64ozs  Reports that his breathing, leg pain, swelling has all resolved. COPD  Was never able to  anoro inhaler   Has only been using nebulizer or albuterol inhaler at home. Has appointment on 12/8 with pulmnologist    Pulmonary hypertension  Has appointment with UC specialist in roughly 1 month    Elevated glucose  ON prednisone from hospitalization    Patient Active Problem List   Diagnosis    Tobacco abuse    Daily consumption of alcohol    QT prolongation    Right axis deviation    Macrocytosis    Essential hypertension    Acute decompensated heart failure (HCC)    CHF (congestive heart failure) ([facility])    Stage 3 severe COPD by GOLD classification ([address]    Calculus of gallbladder without cholecystitis without obstruction    DM (diabetes mellitus) ([address]    Pitting edema    Acute respiratory failure with hypoxia ([facility])    Severe pulmonary hypertension ([address]    Suspected sleep apnea    Former smoker    Enlarged RV (right ventricle)    Tricuspid valve insufficiency    Chest congestion    Uncontrolled type 2 diabetes mellitus with hyperglycemia ([address]    Class 1 obesity in adult    COPD exacerbation ([address]    Cor pulmonale ([address]    Anasarca       Medications listed as ordered at the time of discharge from hospital     Medication List            Accurate as of November 14, 2022 12:29 PM. If you have any questions, ask your nurse or doctor.                 CONTINUE taking these medications      albuterol sulfate  (90 Base) MCG/ACT inhaler  Commonly known as: PROVENTIL;VENTOLIN;PROAIR  INHALE 2 PUFFS BY MOUTH FOUR TIMES A DAY AS NEEDED FOR WHEEZING     albuterol-ipratropium  MCG/ACT Aers inhaler  Commonly known as: COMBIVENT RESPIMAT  Inhale 1 puff into the lungs in the morning and 1 puff at noon and 1 puff in the evening and 1 puff before bedtime. Aspirin Low Dose 81 MG EC tablet  Generic drug: aspirin  TAKE ONE TABLET BY MOUTH DAILY     atorvastatin 20 MG tablet  Commonly known as: LIPITOR  TAKE ONE TABLET BY MOUTH ONCE NIGHTLY     guaiFENesin 600 MG extended release tablet  Commonly known as: MUCINEX  Take 1 tablet by mouth 2 times daily     losartan 25 MG tablet  Commonly known as: COZAAR  Take 1 tablet by mouth daily     metFORMIN 500 MG tablet  Commonly known as: GLUCOPHAGE  TAKE ONE TABLET BY MOUTH DAILY WITH BREAKFAST     metoprolol succinate 25 MG extended release tablet  Commonly known as: TOPROL XL  Take 1 tablet by mouth daily     predniSONE 10 MG tablet  Commonly known as: DELTASONE  Take 3 tabs daily for 4 days. Then two tabs daily for 4 days. Then 1 tab daily for 4 days. Then 1/2 tab daily for 4 days. Then stop.      spironolactone 25 MG tablet  Commonly known as: ALDACTONE  Take 1 tablet by mouth daily     Torsemide 40 MG Tabs  Take 40 mg by mouth in the morning and at bedtime     umeclidinium-vilanterol 62.5-25 MCG/INH Aepb inhaler  Commonly known as: ANORO ELLIPTA  Inhale 1 puff into the lungs daily               Where to Get Your Medications        These medications were sent to [facility] [id number] - [place], [address] [phone] Melissa Carry [phone]  [address]      Phone: [phone]   Torsemide 40 MG Tabs  umeclidinium-vilanterol 62.5-25 MCG/INH Aepb inhaler           Medications marked \"taking\" at this time  Outpatient Medications Marked as Taking for the 11/14/22 encounter (Office Visit) with Joe Cruz, DO   Medication Sig Dispense Refill    Torsemide 40 MG TABS Take 40 mg by mouth in the morning and at bedtime 180 tablet 1    umeclidinium-vilanterol (ANORO ELLIPTA) 62.5-25 MCG/INH AEPB inhaler Inhale 1 puff into the lungs daily 60 each 1    losartan (COZAAR) 25 MG tablet Take 1 tablet by mouth daily 30 tablet 3    metoprolol succinate (TOPROL XL) 25 MG extended release tablet Take 1 tablet by mouth daily 30 tablet 0    spironolactone (ALDACTONE) 25 MG tablet Take 1 tablet by mouth daily 30 tablet 0    predniSONE (DELTASONE) 10 MG tablet Take 3 tabs daily for 4 days. Then two tabs daily for 4 days. Then 1 tab daily for 4 days. Then 1/2 tab daily for 4 days. Then stop. 26 tablet 0    guaiFENesin (MUCINEX) 600 MG extended release tablet Take 1 tablet by mouth 2 times daily 14 tablet 0    albuterol-ipratropium (COMBIVENT RESPIMAT)  MCG/ACT AERS inhaler Inhale 1 puff into the lungs in the morning and 1 puff at noon and 1 puff in the evening and 1 puff before bedtime. 1 each 3    ASPIRIN LOW DOSE 81 MG EC tablet TAKE ONE TABLET BY MOUTH DAILY 90 tablet 1    atorvastatin (LIPITOR) 20 MG tablet TAKE ONE TABLET BY MOUTH ONCE NIGHTLY 90 tablet 1    metFORMIN (GLUCOPHAGE) 500 MG tablet TAKE ONE TABLET BY MOUTH DAILY WITH BREAKFAST 90 tablet 1    albuterol sulfate  (90 Base) MCG/ACT inhaler INHALE 2 PUFFS BY MOUTH FOUR TIMES A DAY AS NEEDED FOR WHEEZING 1 each 2        Medications patient taking as of now reconciled against medications ordered at time of hospital discharge: Yes    A comprehensive review of systems was negative except for what was noted in the HPI.     Objective:    /64 (Site: Right Upper Arm, Position: Sitting, Cuff Size: Medium Adult)   Pulse 75   Wt 194 lb 6.4 oz (88.2 kg)   SpO2 95%   BMI 26.37 kg/m²   General Appearance: alert and oriented to person, place and time, well developed and well- nourished, in no acute distress  Skin: warm and dry, no rash or erythema  Head: normocephalic and atraumatic  ENT: tympanic membrane, external ear and ear canal normal bilaterally, nose without deformity, nasal mucosa and turbinates normal without polyps  Neck: supple and non-tender without mass, no thyromegaly or thyroid nodules, no cervical lymphadenopathy  Pulmonary/Chest: clear to auscultation bilaterally- no wheezes, rales or rhonchi, normal air movement, no respiratory distress  Cardiovascular: normal rate, regular rhythm, normal S1 and S2, no murmurs, rubs, clicks, or gallops, distal pulses intact, no carotid bruits  Abdomen: soft, non-tender, non-distended, normal bowel sounds, no masses or organomegaly  Extremities: no cyanosis, clubbing or edema  Musculoskeletal: normal range of motion, no joint swelling, deformity or tenderness  Neurologic: reflexes normal and symmetric, no cranial nerve deficit, gait, coordination and speech normal      An electronic signature was used to authenticate this note.   --Daniella Richards, DO

## 2022-11-15 ENCOUNTER — CARE COORDINATION (OUTPATIENT)
Dept: CASE MANAGEMENT | Age: 57
End: 2022-11-15

## 2022-11-15 LAB
ESTIMATED AVERAGE GLUCOSE: 159.9 MG/DL
HBA1C MFR BLD: 7.2 %

## 2022-11-15 RX ORDER — TORSEMIDE 20 MG/1
20 TABLET ORAL DAILY
Qty: 30 TABLET | Refills: 3 | Status: SHIPPED | OUTPATIENT
Start: 2022-11-15

## 2022-11-15 NOTE — CARE COORDINATION
[facility] Care Transitions Follow Up Call    Care Transition Nurse contacted the patient by telephone. Patient: Nika Roberts  Patient : 1965   MRN: [id number]  Reason for Admission: CHF   Discharge Date: 11/3/22 RARS: Readmission Risk Score: 10.7          Attempted to reach patient via phone for care transition. VM left stating purpose of call along with my contact information requesting a return call.             Follow Up  Future Appointments   Date Time Provider Patricia Rust   2022  9:30 AM SANCHEZ Maldonado CNP   2022  9:30 AM SANCHEZ Maldonado CNP   2022  9:40 AM Alexus Dumont MD AND VINH MORALES   1/3/2023  8:15 AM MD Danya Jessica        Care Transitions Subsequent and Final Call    Subsequent and Final Calls  Care Transitions Interventions  Disease Association: Completed      Other Interventions:           Micehlle GOLDBERG, RN, [facility]  Care Transition Nurse  [phone] mobile

## 2022-11-18 DIAGNOSIS — E11.65 UNCONTROLLED TYPE 2 DIABETES MELLITUS WITH HYPERGLYCEMIA (HCC): ICD-10-CM

## 2022-11-18 DIAGNOSIS — I50.9 CHRONIC CONGESTIVE HEART FAILURE, UNSPECIFIED HEART FAILURE TYPE (HCC): Primary | ICD-10-CM

## 2022-11-21 NOTE — PROGRESS NOTES
Inhale 1 puff into the lungs daily 11/14/22  Yes Hiram Conrad DO   losartan (COZAAR) 25 MG tablet Take 1 tablet by mouth daily 11/4/22  Yes SANCHEZ Rob CNP   metoprolol succinate (TOPROL XL) 25 MG extended release tablet Take 1 tablet by mouth daily 11/4/22  Yes SANCHEZ Rob CNP   spironolactone (ALDACTONE) 25 MG tablet Take 1 tablet by mouth daily 11/4/22  Yes SANCHEZ Rob CNP   guaiFENesin (MUCINEX) 600 MG extended release tablet Take 1 tablet by mouth 2 times daily 11/3/22  Yes SANCHEZ Rob CNP   albuterol-ipratropium (COMBIVENT RESPIMAT)  MCG/ACT AERS inhaler Inhale 1 puff into the lungs in the morning and 1 puff at noon and 1 puff in the evening and 1 puff before bedtime. 10/14/22  Yes Hiram Conrad DO   ASPIRIN LOW DOSE 81 MG EC tablet TAKE ONE TABLET BY MOUTH DAILY 6/1/22  Yes Hiram Conrad DO   atorvastatin (LIPITOR) 20 MG tablet TAKE ONE TABLET BY MOUTH ONCE NIGHTLY 6/1/22  Yes Easter Kayser, APRN - CNP   metFORMIN (GLUCOPHAGE) 500 MG tablet TAKE ONE TABLET BY MOUTH DAILY WITH BREAKFAST 6/1/22  Yes Hiram Conrad DO   albuterol sulfate  (90 Base) MCG/ACT inhaler INHALE 2 PUFFS BY MOUTH FOUR TIMES A DAY AS NEEDED FOR WHEEZING 4/23/22  Yes SANCHEZ Wheeler   predniSONE (DELTASONE) 10 MG tablet Take 3 tabs daily for 4 days. Then two tabs daily for 4 days. Then 1 tab daily for 4 days. Then 1/2 tab daily for 4 days. Then stop. Patient not taking: Reported on 11/22/2022 11/3/22   SANCHEZ Rob CNP   KLOR-CON M20 20 MEQ extended release tablet TAKE ONE TABLET BY MOUTH TWICE A DAY WHILE ON INCREASED DOSE OF FUROSEMIDE 7/17/22 8/13/22  Johny March, APRN      Allergies:  Patient has no known allergies.      Physical Examination:    Vitals:    11/22/22 0928   BP: 94/68   Pulse: 80   SpO2: 96%   Weight: 194 lb (88 kg)   Height: 6' (1.829 m)        Constitutional and General Appearance: no apparent distress  HEENT: non-icteric sclera, oropharynx without exudate, oral mucosa moist  Neck: JVP less than 8 cm H20  Respiratory:  No use of accessory muscles  Clear breath sounds throughout, no wheezing, no crackles, no rhonchi  Cardiovascular:   The apical impulses not displaced  Heart tones are crisp and normal, no murmur/rub/gallop  Regular rate and rhythm, S1,S2 normal  Radial pulses 2+ and equal bilaterally  No edema, paula color of the lower extremities with dry skin   Pedal Pulses: 2+ and equal   Abdomen:  No masses or tenderness  Liver: No Abnormalities Noted  Musculoskeletal/Skin:  Exhibits normal gait balance and coordination  There is no clubbing, cyanosis of the extremities  Skin is warm and dry  Moves all extremities well  Neurological/Psychiatric:  Alert and oriented in all spheres  No abnormalities of mood, affect, memory, mentation, or behavior are noted    Lab Data reviewed and analyzed   CBC:   WBC   Date Value Ref Range Status   11/03/2022 9.9 4.0 - 11.0 K/uL Final   11/02/2022 9.7 4.0 - 11.0 K/uL Final   10/24/2022 8.8 4.0 - 11.0 K/uL Final     RBC   Date Value Ref Range Status   11/03/2022 5.21 4.20 - 5.90 M/uL Final   11/02/2022 5.40 4.20 - 5.90 M/uL Final   10/24/2022 4.61 4.20 - 5.90 M/uL Final     Hemoglobin   Date Value Ref Range Status   11/03/2022 16.4 13.5 - 17.5 g/dL Final   11/02/2022 16.7 13.5 - 17.5 g/dL Final   10/24/2022 14.5 13.5 - 17.5 g/dL Final     Hematocrit   Date Value Ref Range Status   11/03/2022 49.3 40.5 - 52.5 % Final   11/02/2022 51.9 40.5 - 52.5 % Final   10/24/2022 45.1 40.5 - 52.5 % Final     MCV   Date Value Ref Range Status   11/03/2022 94.5 80.0 - 100.0 fL Final   11/02/2022 96.0 80.0 - 100.0 fL Final   10/24/2022 97.8 80.0 - 100.0 fL Final     RDW   Date Value Ref Range Status   11/03/2022 13.7 12.4 - 15.4 % Final   11/02/2022 13.9 12.4 - 15.4 % Final   10/24/2022 14.4 12.4 - 15.4 % Final     Platelets   Date Value Ref Range Status   11/03/2022 177 135 - 450 K/uL Final   11/02/2022 197 135 - 450 K/uL Final   10/24/2022 171 135 - 450 K/uL Final     Iron:  No results found for: IRON, TIBC, FERRITIN  BMP:   Sodium   Date Value Ref Range Status   11/14/2022 137 136 - 145 mmol/L Final   11/03/2022 136 136 - 145 mmol/L Final   11/02/2022 137 136 - 145 mmol/L Final     Potassium   Date Value Ref Range Status   11/14/2022 4.4 3.5 - 5.1 mmol/L Final     Potassium reflex Magnesium   Date Value Ref Range Status   11/03/2022 3.9 3.5 - 5.1 mmol/L Final   11/02/2022 3.5 3.5 - 5.1 mmol/L Final     Chloride   Date Value Ref Range Status   11/14/2022 97 (L) 99 - 110 mmol/L Final   11/03/2022 95 (L) 99 - 110 mmol/L Final   11/02/2022 92 (L) 99 - 110 mmol/L Final     CO2   Date Value Ref Range Status   11/14/2022 25 21 - 32 mmol/L Final   11/03/2022 33 (H) 21 - 32 mmol/L Final   11/02/2022 36 (H) 21 - 32 mmol/L Final     Phosphorus   Date Value Ref Range Status   05/12/2022 3.8 2.5 - 4.9 mg/dL Final     BUN   Date Value Ref Range Status   11/14/2022 18 7 - 20 mg/dL Final   11/03/2022 34 (H) 7 - 20 mg/dL Final   11/02/2022 33 (H) 7 - 20 mg/dL Final     Creatinine   Date Value Ref Range Status   11/14/2022 0.7 (L) 0.9 - 1.3 mg/dL Final   11/03/2022 0.7 (L) 0.9 - 1.3 mg/dL Final   11/02/2022 0.8 (L) 0.9 - 1.3 mg/dL Final     BNP:   Lab Results   Component Value Date    PROBNP 1,216 (H) 10/31/2022    PROBNP 1,236 (H) 10/30/2022    PROBNP 1,825 (H) 10/27/2022     Lipids: No components found for: T                Triglycerides   Date Value Ref Range Status   10/22/2022 73 0 - 150 mg/dL Final                   HDL   Date Value Ref Range Status   10/22/2022 29 (L) 40 - 60 mg/dL Final     Comment:     An HDL cholesterol less than 40 mg/dL is low and  constitutes a coronary heart disease risk factor. An HDL cholesterol greater than 60 mg/dL is a  negative risk factor for coronary heart disease.                      LDL Calculated   Date Value Ref Range Status   10/22/2022 33 <100 mg/dL Final                   VLDL Cholesterol Calculated Date Value Ref Range Status   10/22/2022 15 Not Established mg/dL Final                 No results found for: CHOLHDLRATIO    EF:   Lab Results   Component Value Date/Time    LVEF 40 10/24/2022 12:39 PM    LVEF 60 10/24/2022 11:53 AM       Testing reviewed:   Echo 10/24/22   Summary   Limited echo for LV/RV function & PHTN with limited doppler/no color. Global left ventricular systolic function is moderately decreased with ejection fraction estimated at 40%. Normal left ventricle size and wall thickness. There is diastolic septal flattening (\"D-shaped\" septum) consistent with right ventricular volume overload. Diastolic dysfunction grade and filling pressure are indeterminate. The right ventricle is severely enlarged in size with reduced function. The right atrium is moderately dilated. The tricuspid valve is normal in structure with tenting of the valve leaflets. Moderate-severe tricuspid valve regurgitation. Systolic pulmonary artery pressure (SPAP) estimated at 70 mmHg (RA pressure   15 mmHg), consistent with severe pulmonary hypertension. Stress test 10/24/22   Summary    Normal LVEF 60%    Normal wall motion    Moderate inferior defect noted, suggestive of ischemia, less likely attenuation artifact    Summed stress scores may underestimate perfusion defects    Overall, this would be considered an abnormal, intermediate risk, study        Procedures Performed: 10/24/22  1. Left heart catheterization  2. Selective left and right coronary angiogram  3. Left ventriculography   5. Right heart catheterization  Procedure Findings:  1. Mild non-obstructive coronary artery disease. 2. Normal left ventricular function with EF estimated at 55-60%  3. Normal left heart hemodynamics  4. Severe pulmonary hypertension  Findings  1. Left main coronary artery was normal. It gave off the left anterior descending artery and left circumflex.   2. Left anterior descending artery has mild atherosclerotic undue fatigue, palpitation, dyspnea (shortness of breath). ACC/ AHA Stage:      Stage C:  Structural heart disease with prior or current symptoms of HF    Assessment:  HFpEF LVEF >= 50%  Right heart failure   Cor pulmonale  Severe pulmonary HTN  Mod-severe TR  NSVT  HTN  Mild non-obstructive CAD  HLD, DM   Very severe COPD      Visit Diagnosis:    1. Chronic heart failure with preserved ejection fraction (HCC)    2. Pulmonary hypertension ([facility]    3. Primary hypertension    4. Chronic obstructive pulmonary disease, unspecified COPD type ([facility] [facility]    5. LALA (obstructive sleep apnea)    6. Tobacco abuse         Plan:   Continue the daily weights  Continue torsemide 20mg daily   Continue Spironolactone (aldactone)  Instead of 5mg farxiga, I want you to take 10mg farxiga which will help with the blood sugars and the water retention for the heart. If you have dizziness or lightheadedness- let me know and then will plan to reduce the torsemide to 10mg daily   Repeat BMP and BNP in about 4 weeks   Follow up with Dr. Arlyn Forman as planned 1/3/23     I appreciate the opportunity for caring for this patient.      SANCHEZ Rose - RICHARD, 11/22/2022, 10:20 AM

## 2022-11-22 ENCOUNTER — OFFICE VISIT (OUTPATIENT)
Dept: CARDIOLOGY CLINIC | Age: 57
End: 2022-11-22
Payer: COMMERCIAL

## 2022-11-22 VITALS
OXYGEN SATURATION: 96 % | SYSTOLIC BLOOD PRESSURE: 94 MMHG | WEIGHT: 194 LBS | DIASTOLIC BLOOD PRESSURE: 68 MMHG | HEIGHT: 72 IN | BODY MASS INDEX: 26.28 KG/M2 | HEART RATE: 80 BPM

## 2022-11-22 DIAGNOSIS — Z72.0 TOBACCO ABUSE: ICD-10-CM

## 2022-11-22 DIAGNOSIS — I50.9 CHRONIC CONGESTIVE HEART FAILURE, UNSPECIFIED HEART FAILURE TYPE (HCC): ICD-10-CM

## 2022-11-22 DIAGNOSIS — I27.20 PULMONARY HYPERTENSION (HCC): ICD-10-CM

## 2022-11-22 DIAGNOSIS — J44.9 CHRONIC OBSTRUCTIVE PULMONARY DISEASE, UNSPECIFIED COPD TYPE (HCC): ICD-10-CM

## 2022-11-22 DIAGNOSIS — I10 PRIMARY HYPERTENSION: ICD-10-CM

## 2022-11-22 DIAGNOSIS — I50.32 CHRONIC HEART FAILURE WITH PRESERVED EJECTION FRACTION (HCC): Primary | ICD-10-CM

## 2022-11-22 DIAGNOSIS — E11.65 UNCONTROLLED TYPE 2 DIABETES MELLITUS WITH HYPERGLYCEMIA (HCC): ICD-10-CM

## 2022-11-22 DIAGNOSIS — G47.33 OSA (OBSTRUCTIVE SLEEP APNEA): ICD-10-CM

## 2022-11-22 PROCEDURE — 3051F HG A1C>EQUAL 7.0%<8.0%: CPT | Performed by: NURSE PRACTITIONER

## 2022-11-22 PROCEDURE — 3074F SYST BP LT 130 MM HG: CPT | Performed by: NURSE PRACTITIONER

## 2022-11-22 PROCEDURE — 3078F DIAST BP <80 MM HG: CPT | Performed by: NURSE PRACTITIONER

## 2022-11-22 PROCEDURE — 99214 OFFICE O/P EST MOD 30 MIN: CPT | Performed by: NURSE PRACTITIONER

## 2022-11-22 NOTE — PATIENT INSTRUCTIONS
Plan:   Continue the daily weights  Continue torsemide 20mg daily   Continue Spironolactone (aldactone)  Instead of 5mg farxiga, I want you to take 10mg farxiga which will help with the blood sugars and the water retention for the heart. If you have dizziness or lightheadedness- let me know and then will plan to reduce the torsemide to 10mg daily   Repeat BMP and BNP in about 4 weeks   Follow up with Dr. Dc Estrada as planned 1/3/23   You also have follow up with DR. Landa on 1/19/2023

## 2022-11-22 NOTE — Clinical Note
Hi, mutual patient. He looks great! Instead of the farxiga 5mg, I started on the 10mg tablets, gave samples and sent in script for heart failure. Repeating renal panel in a few weeks. May be able to reduce torsemide further. Thanks!  Enmanuel Jean, RICHARD, 11/22/2022, 10:21 AM

## 2022-11-23 ENCOUNTER — CARE COORDINATION (OUTPATIENT)
Dept: CASE MANAGEMENT | Age: 57
End: 2022-11-23

## 2022-11-23 NOTE — CARE COORDINATION
[facility] Transitions Follow Up Call    2022    Patient: Whitley Gresham  Patient : 1965   MRN: <[id number]>  Reason for Admission:  CHF   Discharge Date: 11/3/22 RARS: Readmission Risk Score: 10.7         Attempted to contact patient for follow up transition call. Left voicemail message to return call with an update on condition since discharge. Contact information provided. Will continue to follow up. Care Transitions Subsequent and Final Call    Subsequent and Final Calls  Care Transitions Interventions  Disease Association: Completed      Other Interventions:              Follow Up  Future Appointments   Date Time Provider Patricia Rust   2022  9:30 AM SANCHEZ [place] - RICHARD MORALES   2022  9:40 AM Katherine Najjar, MD AND VINH MORALES   1/3/2023  8:15 AM MD Christine Young LPN

## 2022-11-30 ENCOUNTER — OFFICE VISIT (OUTPATIENT)
Dept: CARDIOLOGY CLINIC | Age: 57
End: 2022-11-30
Payer: COMMERCIAL

## 2022-11-30 ENCOUNTER — CARE COORDINATION (OUTPATIENT)
Dept: CASE MANAGEMENT | Age: 57
End: 2022-11-30

## 2022-11-30 ENCOUNTER — HOSPITAL ENCOUNTER (OUTPATIENT)
Age: 57
Discharge: HOME | End: 2022-11-30
Payer: COMMERCIAL

## 2022-11-30 VITALS
OXYGEN SATURATION: 98 % | HEART RATE: 58 BPM | WEIGHT: 194.5 LBS | DIASTOLIC BLOOD PRESSURE: 62 MMHG | BODY MASS INDEX: 26.34 KG/M2 | HEIGHT: 72 IN | SYSTOLIC BLOOD PRESSURE: 98 MMHG

## 2022-11-30 DIAGNOSIS — I50.32 CHRONIC HEART FAILURE WITH PRESERVED EJECTION FRACTION (HCC): ICD-10-CM

## 2022-11-30 DIAGNOSIS — J44.9 CHRONIC OBSTRUCTIVE PULMONARY DISEASE, UNSPECIFIED COPD TYPE (HCC): ICD-10-CM

## 2022-11-30 DIAGNOSIS — I27.20 PULMONARY HYPERTENSION (HCC): ICD-10-CM

## 2022-11-30 DIAGNOSIS — I50.812 CHRONIC RIGHT-SIDED HEART FAILURE (HCC): ICD-10-CM

## 2022-11-30 DIAGNOSIS — I50.32 CHRONIC HEART FAILURE WITH PRESERVED EJECTION FRACTION (HCC): Primary | ICD-10-CM

## 2022-11-30 LAB
ANION GAP SERPL CALCULATED.3IONS-SCNC: 14 MMOL/L (ref 3–16)
BUN BLDV-MCNC: 22 MG/DL (ref 7–20)
CALCIUM SERPL-MCNC: 9.8 MG/DL (ref 8.3–10.6)
CHLORIDE BLD-SCNC: 98 MMOL/L (ref 99–110)
CO2: 29 MMOL/L (ref 21–32)
CREAT SERPL-MCNC: 0.8 MG/DL (ref 0.9–1.3)
GFR SERPL CREATININE-BSD FRML MDRD: >60 ML/MIN/{1.73_M2}
GLUCOSE BLD-MCNC: 93 MG/DL (ref 70–99)
POTASSIUM SERPL-SCNC: 3.7 MMOL/L (ref 3.5–5.1)
PRO-BNP: 1621 PG/ML (ref 0–124)
SODIUM BLD-SCNC: 141 MMOL/L (ref 136–145)

## 2022-11-30 PROCEDURE — 80048 BASIC METABOLIC PNL TOTAL CA: CPT

## 2022-11-30 PROCEDURE — 3074F SYST BP LT 130 MM HG: CPT | Performed by: NURSE PRACTITIONER

## 2022-11-30 PROCEDURE — 3078F DIAST BP <80 MM HG: CPT | Performed by: NURSE PRACTITIONER

## 2022-11-30 PROCEDURE — 83880 ASSAY OF NATRIURETIC PEPTIDE: CPT

## 2022-11-30 PROCEDURE — 36415 COLL VENOUS BLD VENIPUNCTURE: CPT

## 2022-11-30 PROCEDURE — 99214 OFFICE O/P EST MOD 30 MIN: CPT | Performed by: NURSE PRACTITIONER

## 2022-11-30 RX ORDER — SPIRONOLACTONE 25 MG/1
25 TABLET ORAL DAILY
Qty: 90 TABLET | Refills: 3 | Status: SHIPPED | OUTPATIENT
Start: 2022-11-30

## 2022-11-30 RX ORDER — METOPROLOL SUCCINATE 25 MG/1
25 TABLET, EXTENDED RELEASE ORAL DAILY
Qty: 90 TABLET | Refills: 3 | Status: SHIPPED | OUTPATIENT
Start: 2022-11-30

## 2022-11-30 NOTE — PATIENT INSTRUCTIONS
Plan:   Continue the daily weights  Continue torsemide 20mg daily   Continue Spironolactone (aldactone)- refilled    farxiga 10mg farxiga   Repeat BMP and BNP-   Repeat limited echocardiogram to evaluate right heart function/structures and TR valve   Follow up with Dr. Arlyn Forman as planned 1/3/23     Your provider has ordered testing for further evaluation. An order/prescription has been included in your paper work. To schedule outpatient testing, contact Central Scheduling by calling [facility] ([phone]).

## 2022-11-30 NOTE — PROGRESS NOTES
[facility]  Cardiac Follow-up    Primary Care Doctor: Rhys Marti DO    No chief complaint on file. History of Present Illness:  I had the pleasure of seeing Duran Diaz in follow up for  CHF. Hx of HFpEF, HTN, DM, severe COPD. Echo on admission 10/21/22 showed RV failure and LVEF 40%, volume overload. He had abnormal stress testing which prompted C on 10/24/22 showed mild non-obstructive CAD. Weight prior to admission 238lbs. Diuresed 39.5L     Since last visit, he is doing very well. Weight is very stable. Did go up 1 lb since   Some dizziness with quick position changes. Still working 2nd shift. Edema is stable and remains resolved. Doing well with farxiga samples, to  script. Breathing is normal. No shortness of breath. Some nocturia. Having some left great toe edema, not painful, just notice some swelling. Duran Diaz denies chest pain, dyspnea, palpitations, fatigue, early saiety, edema. Home weights: 192 lbs  Appetite: good  Fluid intake: 64 ounces- doing well with fluid intake   Diet: banana a day and orange juice each day along with low sodium diet. Past Medical History:   has a past medical history of Acute systolic congestive heart failure ([address] Diabetes mellitus ([address] Essential hypertension, and Thrombocytopenia ([address] Surgical History:   has a past surgical history that includes Cholecystectomy, laparoscopic (N/A, 8/12/2022). Social History:   reports that he quit smoking about 16 months ago. His smoking use included cigarettes. He has a 15.00 pack-year smoking history. He has never used smokeless tobacco. He reports that he does not currently use alcohol. He reports that he does not currently use drugs. Family History:   Family History   Family history unknown: Yes     Home Medications:  Prior to Admission medications    Medication Sig Start Date End Date Taking?  Authorizing Provider   dapagliflozin (FARXIGA) 10 MG tablet Take 1 tablet by mouth every morning 11/22/22   SANCHEZ Marcelino CNP   torsemide [facility]) 20 MG tablet Take 1 tablet by mouth daily 11/15/22   Daniel Albright DO   umeclidinium-vilanterol [facility] ELLIPTA) 62.5-25 MCG/INH AEPB inhaler Inhale 1 puff into the lungs daily 11/14/22   Estella Conrad DO   losartan (COZAAR) 25 MG tablet Take 1 tablet by mouth daily 11/4/22   [employer], SANCHEZ Vu CNP   metoprolol succinate (TOPROL XL) 25 MG extended release tablet Take 1 tablet by mouth daily 11/4/22   [place], SANCHEZ Vu CNP   spironolactone (ALDACTONE) 25 MG tablet Take 1 tablet by mouth daily 11/4/22   [place] [place] [employer], SANCHEZ Vu CNP   guaiFENesin (MUCINEX) 600 MG extended release tablet Take 1 tablet by mouth 2 times daily 11/3/22   SANCHEZ Iverson CNP   albuterol-ipratropium (COMBIVENT RESPIMAT)  MCG/ACT AERS inhaler Inhale 1 puff into the lungs in the morning and 1 puff at noon and 1 puff in the evening and 1 puff before bedtime. 10/14/22   Estella Conrad DO   KLOR-CON M20 20 MEQ extended release tablet TAKE ONE TABLET BY MOUTH TWICE A DAY WHILE ON INCREASED DOSE OF FUROSEMIDE 7/17/22 8/13/22  SANCHEZ Lin   ASPIRIN LOW DOSE 81 MG EC tablet TAKE ONE TABLET BY MOUTH DAILY 6/1/22   Estella Conrad DO   atorvastatin (LIPITOR) 20 MG tablet TAKE ONE TABLET BY MOUTH ONCE NIGHTLY 6/1/22   SANCHEZ Hannah CNP   metFORMIN (GLUCOPHAGE) 500 MG tablet TAKE ONE TABLET BY MOUTH DAILY WITH BREAKFAST 6/1/22   Estella Conrad DO   albuterol sulfate  (90 Base) MCG/ACT inhaler INHALE 2 PUFFS BY MOUTH FOUR TIMES A DAY AS NEEDED FOR WHEEZING 4/23/22   SANCHEZ Lin      Allergies:  Patient has no known allergies.      Physical Examination:    Vitals:    11/30/22 0926   BP: 98/62   Pulse: 58   SpO2: 98%   Weight: 194 lb 8 oz (88.2 kg)   Height: 6' (1.829 m)     Physical exam was reviewed and updated as below at the time of the visit:        Constitutional and General Appearance: no apparent distress  HEENT: non-icteric sclera, oropharynx without exudate, oral mucosa moist  Neck: JVP less than 8 cm H20  Respiratory:  No use of accessory muscles  Clear breath sounds throughout, no wheezing, no crackles, no rhonchi  Cardiovascular:   The apical impulses not displaced  + murmur, no r/g   Regular rate and rhythm, S1,S2 normal  Radial pulses 2+ and equal bilaterally  No edema, left great toe without redness, minimal local edema to the toe; + tender to palpation   Pedal Pulses: 2+ and equal   Abdomen:  No masses or tenderness  Liver: No Abnormalities Noted  Musculoskeletal/Skin:  Exhibits normal gait balance and coordination  There is no clubbing, cyanosis of the extremities  Skin is warm and dry  Moves all extremities well  Neurological/Psychiatric:  Alert and oriented in all spheres  No abnormalities of mood, affect, memory, mentation, or behavior are noted    Lab Data reviewed and analyzed   CBC:   WBC   Date Value Ref Range Status   11/03/2022 9.9 4.0 - 11.0 K/uL Final   11/02/2022 9.7 4.0 - 11.0 K/uL Final   10/24/2022 8.8 4.0 - 11.0 K/uL Final     RBC   Date Value Ref Range Status   11/03/2022 5.21 4.20 - 5.90 M/uL Final   11/02/2022 5.40 4.20 - 5.90 M/uL Final   10/24/2022 4.61 4.20 - 5.90 M/uL Final     Hemoglobin   Date Value Ref Range Status   11/03/2022 16.4 13.5 - 17.5 g/dL Final   11/02/2022 16.7 13.5 - 17.5 g/dL Final   10/24/2022 14.5 13.5 - 17.5 g/dL Final     Hematocrit   Date Value Ref Range Status   11/03/2022 49.3 40.5 - 52.5 % Final   11/02/2022 51.9 40.5 - 52.5 % Final   10/24/2022 45.1 40.5 - 52.5 % Final     MCV   Date Value Ref Range Status   11/03/2022 94.5 80.0 - 100.0 fL Final   11/02/2022 96.0 80.0 - 100.0 fL Final   10/24/2022 97.8 80.0 - 100.0 fL Final     RDW   Date Value Ref Range Status   11/03/2022 13.7 12.4 - 15.4 % Final   11/02/2022 13.9 12.4 - 15.4 % Final   10/24/2022 14.4 12.4 - 15.4 % Final     Platelets   Date Value Ref Range Status   11/03/2022 177 135 - 450 K/uL VLDL Cholesterol Calculated   Date Value Ref Range Status   10/22/2022 15 Not Established mg/dL Final                 No results found for: CHOLHDLRATIO    EF:   Lab Results   Component Value Date/Time    LVEF 40 10/24/2022 12:39 PM    LVEF 60 10/24/2022 11:53 AM       Testing reviewed:   Echo 10/24/22   Summary   Limited echo for LV/RV function & PHTN with limited doppler/no color. Global left ventricular systolic function is moderately decreased with ejection fraction estimated at 40%. Normal left ventricle size and wall thickness. There is diastolic septal flattening (\"D-shaped\" septum) consistent with right ventricular volume overload. Diastolic dysfunction grade and filling pressure are indeterminate. The right ventricle is severely enlarged in size with reduced function. The right atrium is moderately dilated. The tricuspid valve is normal in structure with tenting of the valve leaflets. Moderate-severe tricuspid valve regurgitation. Systolic pulmonary artery pressure (SPAP) estimated at 70 mmHg (RA pressure   15 mmHg), consistent with severe pulmonary hypertension. Stress test 10/24/22   Summary    Normal LVEF 60%    Normal wall motion    Moderate inferior defect noted, suggestive of ischemia, less likely attenuation artifact    Summed stress scores may underestimate perfusion defects    Overall, this would be considered an abnormal, intermediate risk, study        Procedures Performed: 10/24/22  1. Left heart catheterization  2. Selective left and right coronary angiogram  3. Left ventriculography   5. Right heart catheterization  Procedure Findings:  1. Mild non-obstructive coronary artery disease. 2. Normal left ventricular function with EF estimated at 55-60%  3. Normal left heart hemodynamics  4. Severe pulmonary hypertension  Findings  1. Left main coronary artery was normal. It gave off the left anterior descending artery and left circumflex.   2. Left anterior descending artery has mild atherosclerotic disease. It was moderate in size. It gave off septal perforators and a moderate sized diagonal branch. The LAD covered the entire apex of the left ventricle. 3. Left circumflex has mild atherosclerotic disease. It was moderate in size. There was a moderate sized obtuse marginal branch. 4. Right coronary artery has mild atherosclerotic disease. It was moderate in size and was the dominant artery. 5. Left ventriculogram showed normal LVEF at 55-60%. Wall motion was normal . There was no significant mitral valve or aortic valve disease noted. LVEDP was normal. There was no gradient noted across the aortic valve during pullback of the catheter. 6.  Right heart catheterization was performed. Right atrial pressure of 25  RV pressure 70/13  PA pressure of 78/37 with a mean of 50  Pulmonary artery wedge pressure mean of 12  Cardiac output of 5.32  Cardiac index 2.33  Aortic saturation 88%  PA saturation 59%  SVR of 917      Date of Procedure: 10/31/22  : Jose Thomas MD   Primary Indication: Congestive heart failure with reduced EF, PH     Procedures Performed:  1. Right heart catheterization     Findings:  Hemodynamics:  A. Right heart catheterization                   1. RA:  19 mmHg                   2. RV:  68/13 mmHG                   3. PA:  70/43/53 mmHG                   4. PCWP: 21 mmHg                   5. Annika CO: 5.21 L/min                   6. Annika CI:  2.41 L/min*m2                   7. Annika SVR: 1367 D/S                    8. Transpulmonary gradient: 32 mmhg       9. Annika PVR: 6 wood units                   10. Keena 1.42                   11. CVP/PCW 0.9    Impression:  1. Elevated left and right sided filling pressures   2. Severe pulmonary hypertension, mixed pre/post capillary   3. CVP/PCW ratio concerning for Cor Pulmonale/Right heart failure  4.   Overall Preserved CO/CI    NYHA:                        Class I:  Ordinary physical activity does not cause undue fatigue, palpitation, dyspnea (shortness of breath). ACC/ AHA Stage:      Stage C:  Structural heart disease with prior or current symptoms of HF    Assessment:  HFpEF LVEF >= 50%  Right heart failure   Cor pulmonale  Severe pulmonary HTN  Mod-severe TR  NSVT  HTN  Mild non-obstructive CAD  HLD, DM   Very severe COPD      Visit Diagnosis:    1. Chronic heart failure with preserved ejection fraction ([address]    2. Chronic right-sided heart failure ([address]    3. Pulmonary hypertension ([address]    4. Chronic obstructive pulmonary disease, unspecified COPD type ([address]         Plan:   Continue the daily weights  Continue torsemide 20mg daily   Continue Spironolactone (aldactone)- refilled    farxiga 10mg farxiga   Repeat BMP and BNP-   Repeat limited echocardiogram to evaluate right heart function/structures and TR valve   Follow up with Dr. Josephine Plasencia as planned 1/3/23     I appreciate the opportunity for caring for this patient.      Jack Miller, SANCHEZ - CNP, 11/30/2022, 10:39 AM

## 2022-11-30 NOTE — LETTER
[address] Cardiology - [address] [facility] [address] [facility]  Phone: [phone]  Fax: [phone]    SANCHEZ Jean CNP    December 1, 2022     Diego Dubon,   [address] [facility] [address]    Patient: Gay Richmond   MR Number: [id number]   YOB: 1965   Date of Visit: 11/30/2022       Dear Gracie Conrad: Thank you for referring Telma Lei to me for evaluation/treatment. Below are the relevant portions of my assessment and plan of care. If you have questions, please do not hesitate to call me. I look forward to following Brady Fletcher along with you.     Sincerely,      SANCHEZ Jean CNP

## 2022-11-30 NOTE — CARE COORDINATION
[facility] Care Transitions Follow Up Call    Patient: Nisreen Hernández  Patient : 1965   MRN: <[id number]>  Reason for Admission: CHF  Discharge Date: 11/3/22 RARS: Readmission Risk Score: 10.7    Attempted to reach pt for follow up call. Left message requesting call back.     Follow Up  Future Appointments   Date Time Provider Patricia Rust   2022  9:40 AM Joanne Contreras MD AND VINH MORALES   2022 11:30 AM [facility] ECHO ROOM [address] [place] [address]   1/3/2023  8:15 AM MD Khushboo Garcia [facility]

## 2022-12-01 ENCOUNTER — CARE COORDINATION (OUTPATIENT)
Dept: CASE MANAGEMENT | Age: 57
End: 2022-12-01

## 2022-12-01 NOTE — CARE COORDINATION
[facility] Care Transitions Follow Up Call    Care Transition Nurse contacted the patient by telephone. Patient: Heidy Brown  Patient : 1965   MRN: [id number]  Reason for Admission: CHF   Discharge Date: 11/3/22 RARS: Readmission Risk Score: 10.7        Final attempt made to reach patient for post hospital follow up call. Left a voice message for patient with my contact information and informed of final outreach attempt.          Follow Up  Future Appointments   Date Time Provider Patricia Rust   2022  9:40 AM John Hanson MD AND VINH MORALES   2022 11:30 AM [facility]   1/3/2023  8:15 AM MD [place]            Care Transitions Subsequent and Final Call    Subsequent and Final Calls  Care Transitions Interventions  Disease Association: Completed      Other Interventions:             Fidel GOLDBERG, RN, [facility]  Care Transition Nurse  [phone] mobile

## 2022-12-02 ENCOUNTER — CARE COORDINATION (OUTPATIENT)
Dept: CARE COORDINATION | Age: 57
End: 2022-12-02

## 2022-12-02 ENCOUNTER — TELEPHONE (OUTPATIENT)
Dept: CARDIOLOGY CLINIC | Age: 57
End: 2022-12-02

## 2022-12-02 NOTE — TELEPHONE ENCOUNTER
Called and spoke with patient. Relayed RB NP results and instructions with patient. He VU to take torsemide 40 mg daily for two days and resume torsemide 20 mg daily thereafter.

## 2022-12-02 NOTE — TELEPHONE ENCOUNTER
----- Message from SANCHEZ Galeano CNP sent at 12/1/2022 10:32 AM EST -----  Please notify patient results. Kidney function is stable. Electrolytes are stable. Pro-BNP (heart failure number) is slightly worse. I would like for him to take an extra torsemide 20mg daily for 2 days for a total of 40mg daily and then go back down to 20mg daily .

## 2022-12-07 ENCOUNTER — CARE COORDINATION (OUTPATIENT)
Dept: CARE COORDINATION | Age: 57
End: 2022-12-07

## 2022-12-07 NOTE — CARE COORDINATION
ACM attempted final outreach attempt to patient without success. [facility]. ACM will make no further outreach to patient.

## 2022-12-08 ENCOUNTER — OFFICE VISIT (OUTPATIENT)
Dept: PULMONOLOGY | Age: 57
End: 2022-12-08
Payer: COMMERCIAL

## 2022-12-08 VITALS
HEIGHT: 72 IN | BODY MASS INDEX: 26.55 KG/M2 | DIASTOLIC BLOOD PRESSURE: 62 MMHG | HEART RATE: 67 BPM | RESPIRATION RATE: 16 BRPM | WEIGHT: 196 LBS | TEMPERATURE: 97.6 F | OXYGEN SATURATION: 94 % | SYSTOLIC BLOOD PRESSURE: 97 MMHG

## 2022-12-08 DIAGNOSIS — Z87.891 FORMER SMOKER: ICD-10-CM

## 2022-12-08 DIAGNOSIS — I27.20 SEVERE PULMONARY HYPERTENSION (HCC): ICD-10-CM

## 2022-12-08 DIAGNOSIS — G47.33 OSA (OBSTRUCTIVE SLEEP APNEA): Primary | ICD-10-CM

## 2022-12-08 DIAGNOSIS — I50.20 HFREF (HEART FAILURE WITH REDUCED EJECTION FRACTION) (HCC): ICD-10-CM

## 2022-12-08 DIAGNOSIS — J44.9 STAGE 3 SEVERE COPD BY GOLD CLASSIFICATION (HCC): ICD-10-CM

## 2022-12-08 DIAGNOSIS — Z72.821 POOR SLEEP HYGIENE: ICD-10-CM

## 2022-12-08 PROCEDURE — 99214 OFFICE O/P EST MOD 30 MIN: CPT | Performed by: INTERNAL MEDICINE

## 2022-12-08 PROCEDURE — 3078F DIAST BP <80 MM HG: CPT | Performed by: INTERNAL MEDICINE

## 2022-12-08 PROCEDURE — 3074F SYST BP LT 130 MM HG: CPT | Performed by: INTERNAL MEDICINE

## 2022-12-08 RX ORDER — ATORVASTATIN CALCIUM 20 MG/1
TABLET, FILM COATED ORAL
Qty: 90 TABLET | Refills: 1 | Status: SHIPPED | OUTPATIENT
Start: 2022-12-08

## 2022-12-08 ASSESSMENT — SLEEP AND FATIGUE QUESTIONNAIRES
HOW LIKELY ARE YOU TO NOD OFF OR FALL ASLEEP WHILE SITTING INACTIVE IN A PUBLIC PLACE: 0
ESS TOTAL SCORE: 4
HOW LIKELY ARE YOU TO NOD OFF OR FALL ASLEEP WHILE SITTING QUIETLY AFTER LUNCH WITHOUT ALCOHOL: 1
HOW LIKELY ARE YOU TO NOD OFF OR FALL ASLEEP WHILE SITTING AND READING: 0
HOW LIKELY ARE YOU TO NOD OFF OR FALL ASLEEP WHILE LYING DOWN TO REST IN THE AFTERNOON WHEN CIRCUMSTANCES PERMIT: 1
NECK CIRCUMFERENCE (INCHES): 17
HOW LIKELY ARE YOU TO NOD OFF OR FALL ASLEEP WHEN YOU ARE A PASSENGER IN A CAR FOR AN HOUR WITHOUT A BREAK: 0
HOW LIKELY ARE YOU TO NOD OFF OR FALL ASLEEP IN A CAR, WHILE STOPPED FOR A FEW MINUTES IN TRAFFIC: 0
HOW LIKELY ARE YOU TO NOD OFF OR FALL ASLEEP WHILE SITTING AND TALKING TO SOMEONE: 0
HOW LIKELY ARE YOU TO NOD OFF OR FALL ASLEEP WHILE WATCHING TV: 2

## 2022-12-08 ASSESSMENT — ENCOUNTER SYMPTOMS
ABDOMINAL DISTENTION: 0
COUGH: 0
STRIDOR: 0
BLOOD IN STOOL: 0
NAUSEA: 0
CHEST TIGHTNESS: 0
WHEEZING: 0
APNEA: 0
DIARRHEA: 0
EYE REDNESS: 0
SHORTNESS OF BREATH: 0
VOICE CHANGE: 0
EYE DISCHARGE: 0
SORE THROAT: 0
SINUS PRESSURE: 0
BACK PAIN: 0
TROUBLE SWALLOWING: 0
RHINORRHEA: 0
ABDOMINAL PAIN: 0
VOMITING: 0
CHOKING: 0
CONSTIPATION: 0
EYE ITCHING: 0

## 2022-12-08 NOTE — PATIENT INSTRUCTIONS
Remember to bring a list of pulmonary medications and any CPAP or BiPAP machines to your next appointment with the office. Please keep all of your future appointments scheduled by [address], [facility] Pulmonary office. Out of respect for other patients and providers, you may be asked to reschedule your appointment if you arrive later than your scheduled appointment time. Appointments cancelled less than 24hrs in advance will be considered a no show. Patients with three missed appointments within 1 year or four missed appointments within 2 years can be dismissed from the practice. Please be aware that our physicians are required to work in the Intensive Care Unit at [facility].  Your appointment may need to be rescheduled if they are designated to work during your appointment time. You may receive a survey regarding the care you received during your visit. Your input is valuable to us. We encourage you to complete and return your survey. We hope you will choose us in the future for your healthcare needs. Pt instructed of all future appointment dates & times, including radiology, labs, procedures & referrals. If procedures were scheduled preparation instructions provided. Instructions on future appointments with [facility] Pulmonary were given. Sleep center will call to schedule you sleep study.  If you have any question their phone  Number is [phone]

## 2022-12-08 NOTE — TELEPHONE ENCOUNTER
Refill Request     CONFIRM preferrred pharmacy with the patient. If Mail Order Rx - Pend for 90 day refill. Last Seen: Last Seen Department: 11/14/2022  Last Seen by PCP: 11/14/2022    Last Written: 90 with 1 6/1/2022     If no future appointment scheduled, route STAFF MESSAGE with patient name to the [facility] for scheduling. Next Appointment:   Future Appointments   Date Time Provider Patricia Rust   12/20/2022 11:30 AM [facility]CHO [facility]OM [facility]   1/3/2023  8:15 AM MD Ana Tavera   1/12/2023  9:40 AM Laverne Blair MD AND PULM [facility]       Message sent to [address] [place] [address] to schedule appt with patient?   NO      Requested Prescriptions     Pending Prescriptions Disp Refills    atorvastatin (LIPITOR) 20 MG tablet 90 tablet 1     Sig: TAKE ONE TABLET BY MOUTH ONCE NIGHTLY

## 2022-12-08 NOTE — PROGRESS NOTES
MA Communication:   The following orders are received by verbal communication from Iain Portillo MD    Orders include:    HST

## 2022-12-08 NOTE — PROGRESS NOTES
Pulmonary Outpatient Note   Joel Pittman MD       12/8/2022    1. LALA (obstructive sleep apnea)    2. Poor sleep hygiene    3. Stage 3 severe COPD by GOLD classification ([address]    4. Severe pulmonary hypertension (HCC)    5. HFrEF (heart failure with reduced ejection fraction) ([address]    6. Former smoker          ASSESSMENT/PLAN:  LALA. The patient has daytime sleepiness when he is not working, was snoring. He has a crowded oropharynx. Due to severe pulmonary hypertension, will recommend sleep study to rule out LALA as a contributing factor. His heart failure is controlled, I believe he can proceed with home sleep study. He was not agreeable to the study in the sleep lab in the past.  He is agreeable  Poor sleep hygiene. Should avoid taking daytime naps, but this may not be possible until his sleep apnea is corrected. Severe COPD. Continue Anoro, he is not very symptomatic at present. Severe pulmonary hypertension. Likely multifactorial from HFrEF, COPD and potential LALA. Correction of these factors could improve his right heart pressures. Former smoker. Does not meet the criteria for screening CT chest.  Prophylaxis. He has received Pneumovax, 2 doses of the COVID-19 vaccine. I recommended he should consider the new bivalent COVID-19 vaccine. He declines the flu shot, was told about the flu outbreak. RTC after completing home sleep study. Orders Placed This Encounter   Procedures    Home Sleep Study       No follow-ups on file. Chief Complaint:   Follow-Up from Hospital and COPD       HPI: Willie Dave is a 62y.o. year old male here for hospital follow-up. His PCP is Dr. Deri Litten, cardiologist Dr. Jaqui Matos, APRN-CNP. Edwena Paget is a pleasant 55-year-old male who was recently seen in the hospital in October. He was admitted with congestive heart failure, was found to have severe pulmonary hypertension.   His other medical problems include diabetes mellitus, hypertension, macrocytosis. He is a former smoker, had severe obstruction by pulmonary function, but was never a heavy smoker. The patient was  very long ago, is single and lives alone. He works as a cook. The patient quit smoking in August 2021. The patient drinks \"rarely\", was drinking in the past, but says he was never a heavy drinker. The patient was diuresed 39.5 L, his weight during hospitalization was 238 pounds. He now has no leg edema. He was seen twice in follow-up by Eddie Dunn. He had no residual orthopnea or leg edema. He was told to adjust his torsemide based on his body weight as BNP was still elevated. During hospitalization, he was suspected to have LALA. The patient tells me that he returns from work between 11:30 and midnight, falls asleep around 3 a.m. He wakes up between 6 and 6:30 AM as his dog wakes him up. He lets the dog out, then sleeps in for another 2 to 3 hours. He is sleepy in the daytime, after a meal he takes a 30 to 60-minute nap on his off days. The patient had been recommended a sleep study after evaluation by Brandon Prado, did not want to do a sleep study in the lab. The patient is on Anoro, does not use his rescue Combivent. He does not have any cough or wheezing at present. He has a good appetite, denies GI or  complaints. The rest of his ROS is negative.       Sleep Medicine 12/8/2022 2/3/2022   Sitting and reading 0 0   Watching TV 2 1   Sitting, inactive in a public place (e.g. a theatre or a meeting) 0 0   As a passenger in a car for an hour without a break 0 0   Lying down to rest in the afternoon when circumstances permit 1 0   Sitting and talking to someone 0 0   Sitting quietly after a lunch without alcohol 1 0   In a car, while stopped for a few minutes in traffic 0 0   [place] Sleepiness Score 4 1   Neck circumference (Inches) 17        Echocardiogram 8/6/2021:  Left ventricular systolic function is low normal with a visually estimated ejection fraction of 50-55%. EF estimated by 3D at 52 %. The left ventricle is normal in size with normal wall thickness. Flattened in diastole and systole (\"D-shaped septum\") consistent with right ventricular volume and pressure overload. Normal left ventricular diastolic function. The right ventricle is severely dilated. Right ventricular systolic function is reduced. The right atrium is severely enlarged. Mild eccentric tricuspid regurgitation. Systolic pulmonary artery pressure (SPAP) is elevated and estimated at 56 mmHg (right atrial pressure 15 mmHg) consistent with moderate pulmonary  hypertension. The IVC is dilated in size (>2.1 cm) and collapses <50% with respiration consistent with markedly elevated right atrial pressures (15 mmHg) . PFT 9/2/2021:  FVC 3.29 L, 63% predicted, FEV1 1.33 L, 33% predicted with ratio 40%. Lung volumes showed air trapping, diffusion capacity was normal    Previous hospital consult note reviewed and edited as necessary. Chief Complaint/Referring Provider:  Patient is being seen at the request of Dr. Maribell Richards for a consultation for SOB, severe pulmonary hypertension on [facility], assistance with workup and follow up. Presenting HPI: Arpan Prado is a 77-year-old male living by himself in [facility]. He was  long ago. He does have a son who is healthy. The patient has smoked 1.5 PPD since his teenage years, quit in August 2020. He does not drink alcohol, drank heavily when he was much younger, although the discharge summary from August 2021 indicates that he was drinking every day. The patient has a history of diabetes mellitus, hypertension, CHF. He was hospitalized in August 2021 for new onset of CHF. He presented with increasing leg edema, penile and scrotal edema. CT chest with PE protocol showed concerns for acute cor pulmonale. He was seen by cardiology.   He underwent an echocardiogram, was diuresed and placed on losartan. He has been followed by cardiology. He was treated for presumed COPD. His hypoxemia resolved, he was discharged home on room air. He was seen by Dr. Kellie Squires in follow-up, was felt to have HFpEF with NYHA I symptoms, moderate pulmonary hypertension with cor pulmonale/RV dilation. Underlying sleep apnea was suspected, PFT confirmed severe COPD. There was a component of left-sided heart failure/diastolic dysfunction. He was seen by KY Lu for suspected sleep apnea, sleep onset insomnia psychophysiological hyperarousal, poor sleep hygiene. The patient was offered NPSG, refused to sleep in the lab. He did not follow-up for his suspected sleep apnea. In the interim, the patient developed acute cholecystitis in July, was seen by general surgery and underwent robotic cholecystectomy with intraoperative cholangiogram on 8/12/2022 after cardiac clearance was obtained. The patient tolerated surgery, was discharged on 8/17/2022. He presented back to the ER on 10/21/2022 for increasing edema extending up to his thigh and abdomen. It was getting difficult to bend over and tie his shoes. The patient does work in [employer]. He also had urinary retention and elevated BUN. He was admitted to the hospitalist service, was seen in consultation by cardiology. On 10/24/2022 he underwent left and right heart catheterization. This showed mild nonobstructive CAD, normal LVEF of 55 to 60%, normal left heart hemodynamics next, severe pulmonary hypertension. The patient was continued with IV diuretics with goal weight of 185 pounds. We are consulted for assistance with management of severe pulmonary hypertension. The patient remains short of breath. He says he has been short of breath with walking for the last 2 to 3 years. He does have cough with occasional phlegm, wheezes every day. He does use his inhalers. He describes chest pain associated with coughing.   He has a good appetite, his weight is stable. He had gained weight, was around 235 pounds, had dropped to 15 pounds with diuresis. He is not sure he snores. He works nights, from 3 PM to 11 PM, sleeps anywhere between 2 and 3 AM.  He does watch TV in bed. His dog wakes him up around 7 AM, he then sleeps again for some time. The rest of his ROS was as above     [facility] 10/24/2022  Right atrial pressure of 25  RV pressure 70/13  PA pressure of 78/37 with a mean of 50  Pulmonary artery wedge pressure mean of 12  Cardiac output of 5.32  Cardiac index 2.33  Aortic saturation 88%  PA saturation 59%  SVR of 917       Assessment and plan:  Severe COPD, possible acute exacerbation. The patient has expiratory wheezes, is tachypneic. PFT shows severe obstruction. Add steroid for now, continue with nebulized bronchodilator  Severe pulmonary hypertension. Less likely to be thromboembolic as CT PE was negative, although CT chest may not be as accurate as VQ scan in detecting CTEPH. Agree with opinion of Dr. Los Aguilar that this is probably a combination of diastolic dysfunction, severe COPD and untreated sleep apnea. We will need to address all 3 of these to control his pulmonary hypertension. Acute respiratory failure. Possibly hypoxemic, will obtain ABG in a.m. to assess for hypercarbia. We will have to assess for home O2, including testing saturation with ambulation at the time of discharge. The patient should not be left hypoxemic due to severe pulm hypertension  Right heart failure. Being diuresed  CHF. Diastolic heart failure, being followed by cardiology  LALA. In order to facilitate treatment for LALA, I would agree with a home sleep study as the patient absolutely declines an in lab study. He is agreeable to home sleep study, this will be set up at the time of discharge  Essential hypertension. Per IM  DM2. Per IM  Alcohol dependence. Do not see evidence of portopulmonary hypertension although he did have ascites  Obesity. Address as an outpatient  DVT prophylaxis. On Lovenox seen to treatment goals.   Works    Past Medical History:   Diagnosis Date    Acute systolic congestive heart failure (HCC)     CAD (coronary artery disease) 10/2022    mild non-obstructive    Diabetes mellitus ([facility]     Essential hypertension     Pulmonary HTN ([facility] 10/2022    RHC    Right heart failure (HCC)     Thrombocytopenia (HCC)        Past Surgical History:   Procedure Laterality Date    CARDIAC CATHETERIZATION  10/24/2022    LHC- mild non-obstructive CAD; RHC- pulm HTN    CHOLECYSTECTOMY, LAPAROSCOPIC N/A 2022    ROBOTIC CHOLECYSTECTOMY WITH INTRAOPERATIVE CHOLANGIOGRAM performed by Gildardo Combs MD at [address] [place] [address] History     Tobacco Use    Smoking status: Former     Packs/day: 1.00     Years: 15.00     Pack years: 15.00     Types: Cigarettes     Start date: 10/11/1980     Quit date: 2021     Years since quittin.3    Smokeless tobacco: Never   Substance Use Topics    Alcohol use: Not Currently     Comment: \"2 beers after work\"       Family History   Family history unknown: Yes         Current Outpatient Medications:     metoprolol succinate (TOPROL XL) 25 MG extended release tablet, Take 1 tablet by mouth daily, Disp: 90 tablet, Rfl: 3    spironolactone (ALDACTONE) 25 MG tablet, Take 1 tablet by mouth daily, Disp: 90 tablet, Rfl: 3    dapagliflozin (FARXIGA) 10 MG tablet, Take 1 tablet by mouth every morning, Disp: 30 tablet, Rfl: 3    torsemide (DEMADEX) 20 MG tablet, Take 1 tablet by mouth daily, Disp: 30 tablet, Rfl: 3    umeclidinium-vilanterol (ANORO ELLIPTA) 62.5-25 MCG/INH AEPB inhaler, Inhale 1 puff into the lungs daily, Disp: 60 each, Rfl: 1    losartan (COZAAR) 25 MG tablet, Take 1 tablet by mouth daily, Disp: 30 tablet, Rfl: 3    albuterol-ipratropium (COMBIVENT RESPIMAT)  MCG/ACT AERS inhaler, Inhale 1 puff into the lungs in the morning and 1 puff at noon and 1 puff in the evening and 1 puff before bedtime. , Disp: 1 each, Rfl: 3    ASPIRIN LOW DOSE 81 MG EC tablet, TAKE ONE TABLET BY MOUTH DAILY, Disp: 90 tablet, Rfl: 1    metFORMIN (GLUCOPHAGE) 500 MG tablet, TAKE ONE TABLET BY MOUTH DAILY WITH BREAKFAST, Disp: 90 tablet, Rfl: 1    albuterol sulfate  (90 Base) MCG/ACT inhaler, INHALE 2 PUFFS BY MOUTH FOUR TIMES A DAY AS NEEDED FOR WHEEZING, Disp: 1 each, Rfl: 2    atorvastatin (LIPITOR) 20 MG tablet, TAKE ONE TABLET BY MOUTH ONCE NIGHTLY, Disp: 90 tablet, Rfl: 1    guaiFENesin (MUCINEX) 600 MG extended release tablet, Take 1 tablet by mouth 2 times daily (Patient not taking: Reported on 11/30/2022), Disp: 14 tablet, Rfl: 0    Patient has no known allergies. Vitals:    12/08/22 0942   BP: 97/62   Pulse: 67   Resp: 16   Temp: 97.6 °F (36.4 °C)   TempSrc: Temporal   SpO2: 94%   Weight: 196 lb (88.9 kg)   Height: 6' (1.829 m)       Review of Systems   Constitutional:  Positive for unexpected weight change. Negative for appetite change, chills, diaphoresis, fatigue and fever. HENT:  Negative for congestion, nosebleeds, postnasal drip, rhinorrhea, sinus pressure, sneezing, sore throat, trouble swallowing and voice change. Eyes:  Negative for discharge, redness, itching and visual disturbance. Respiratory:  Negative for apnea, cough, choking, chest tightness, shortness of breath, wheezing and stridor. Cardiovascular:  Negative for chest pain, palpitations and leg swelling. Gastrointestinal:  Negative for abdominal distention, abdominal pain, blood in stool, constipation, diarrhea, nausea and vomiting. Endocrine: Negative for polyuria. Genitourinary:  Negative for decreased urine volume, difficulty urinating, dysuria, enuresis, frequency, hematuria and urgency. Musculoskeletal:  Negative for arthralgias, back pain, gait problem, joint swelling and myalgias. Skin:  Negative for rash. Allergic/Immunologic: Negative for environmental allergies and immunocompromised state.    Neurological: Negative for dizziness, tremors, seizures, weakness, light-headedness and headaches. Hematological:  Does not bruise/bleed easily. Psychiatric/Behavioral:  Positive for sleep disturbance. Negative for agitation, behavioral problems, confusion and hallucinations. All other systems reviewed and are negative. Physical Exam  Vitals reviewed. Constitutional:       General: He is not in acute distress. Appearance: He is well-developed. He is not ill-appearing, toxic-appearing or diaphoretic. Comments: Pleasant, tall, well-developed   HENT:      Head: Normocephalic and atraumatic. Nose: Nose normal. No congestion or rhinorrhea. Mouth/Throat:      Mouth: Mucous membranes are moist.      Pharynx: Oropharynx is clear. No oropharyngeal exudate or posterior oropharyngeal erythema. Comments: Class III airway  Eyes:      General: No scleral icterus. Right eye: No discharge. Left eye: No discharge. Conjunctiva/sclera: Conjunctivae normal.      Pupils: Pupils are equal, round, and reactive to light. Comments: Glasses   Neck:      Thyroid: No thyromegaly. Vascular: No carotid bruit or JVD. Trachea: No tracheal deviation. Cardiovascular:      Rate and Rhythm: Normal rate and regular rhythm. Heart sounds: Normal heart sounds. No murmur heard. No friction rub. No gallop. Pulmonary:      Effort: Pulmonary effort is normal. No respiratory distress. Breath sounds: Normal breath sounds. No stridor. No wheezing, rhonchi or rales. Abdominal:      Palpations: Abdomen is soft. Tenderness: There is no abdominal tenderness. Comments: Minimally protuberant abdomen   Musculoskeletal:      Cervical back: No tenderness. Right lower leg: No edema. Left lower leg: No edema. Lymphadenopathy:      Cervical: No cervical adenopathy. Skin:     General: Skin is warm and dry. Coloration: Skin is not jaundiced or pale.       Findings: No bruising, erythema, lesion or rash. Neurological:      General: No focal deficit present. Mental Status: He is alert and oriented to person, place, and time.       Comments: Detailed neurologic exam not performed   Psychiatric:         Mood and Affect: Mood normal.         Behavior: Behavior normal.

## 2022-12-09 ENCOUNTER — TELEPHONE (OUTPATIENT)
Dept: FAMILY MEDICINE CLINIC | Age: 57
End: 2022-12-09

## 2022-12-09 ENCOUNTER — CARE COORDINATION (OUTPATIENT)
Dept: CARE COORDINATION | Age: 57
End: 2022-12-09

## 2022-12-09 NOTE — TELEPHONE ENCOUNTER
----- Message from Yudith Liang sent at 12/9/2022 11:20 AM EST -----  Subject: Message to Provider    QUESTIONS  Information for Provider? Benson[employer] from [facility] calling to request 80 supply   of farxiga only accept them via fax or verbal please advise [phone]   option 1 FAX? [phone]  ---------------------------------------------------------------------------  --------------  [address] [employer]  [phone]; OK to leave message on voicemail  ---------------------------------------------------------------------------  --------------  SCRIPT ANSWERS  Relationship to Patient? Third Party  Third Party Type? Pharmacy?    Representative Name? Padmaja Coppola

## 2022-12-09 NOTE — TELEPHONE ENCOUNTER
Spoke to [place] at [facility] and informed her that patient had [address] Dates  refilled on 11/22. Sent rx to [facility] for there records.

## 2022-12-11 DIAGNOSIS — I50.9 CHRONIC CONGESTIVE HEART FAILURE, UNSPECIFIED HEART FAILURE TYPE (HCC): ICD-10-CM

## 2022-12-11 DIAGNOSIS — E11.65 UNCONTROLLED TYPE 2 DIABETES MELLITUS WITH HYPERGLYCEMIA (HCC): ICD-10-CM

## 2022-12-11 NOTE — TELEPHONE ENCOUNTER
----- Message from Julee Poll sent at 12/9/2022 11:20 AM EST -----  Subject: Message to Provider    QUESTIONS  Information for Provider? Nhi Xiong from [facility] calling to request 80 supply   of farxiga only accept them via fax or verbal please advise [phone]   option 1 FAX? [phone]  ---------------------------------------------------------------------------  --------------  [id number] [employer]  [phone]; OK to leave message on voicemail  ---------------------------------------------------------------------------  --------------  SCRIPT ANSWERS  Relationship to Patient? Third Party  Third Party Type? Pharmacy?    Representative Name? Bowen Coppola

## 2022-12-13 ENCOUNTER — TELEPHONE (OUTPATIENT)
Dept: PULMONOLOGY | Age: 57
End: 2022-12-13

## 2022-12-13 NOTE — TELEPHONE ENCOUNTER
Please addend last office note to include Mallampati. Sleep center states they cannot schedule patient due to new guidelines put in place by Dr. Eda Phillips (sleep director).

## 2022-12-20 ENCOUNTER — HOSPITAL ENCOUNTER (OUTPATIENT)
Dept: NON INVASIVE DIAGNOSTICS | Age: 57
Discharge: HOME | End: 2022-12-20
Payer: COMMERCIAL

## 2022-12-20 DIAGNOSIS — I50.32 CHRONIC HEART FAILURE WITH PRESERVED EJECTION FRACTION (HCC): ICD-10-CM

## 2022-12-20 DIAGNOSIS — I50.812 CHRONIC RIGHT-SIDED HEART FAILURE (HCC): ICD-10-CM

## 2022-12-20 PROCEDURE — 93308 TTE F-UP OR LMTD: CPT

## 2022-12-21 ENCOUNTER — TELEPHONE (OUTPATIENT)
Dept: CARDIOLOGY CLINIC | Age: 57
End: 2022-12-21

## 2022-12-21 NOTE — RESULT ENCOUNTER NOTE
Reviewed, echo shows similar findings compared to prior echo. Right heart failure and enlargement.    Continue current regimen and keep follow up

## 2022-12-21 NOTE — TELEPHONE ENCOUNTER
----- Message from SANCHEZ Hall - CNP sent at 12/21/2022  2:43 PM EST -----  Reviewed, echo shows similar findings compared to prior echo. Right heart failure and enlargement.    Continue current regimen and keep follow up

## 2022-12-21 NOTE — TELEPHONE ENCOUNTER
Daniel Caban, SANCHEZ - CNP   12/21/2022  2:43 PM EST       Reviewed, echo shows similar findings compared to prior echo. Right heart failure and enlargement.    Continue current regimen and keep follow up      LVM for pt to call office to relay message, Thank you

## 2022-12-26 NOTE — TELEPHONE ENCOUNTER
Refill Request     CONFIRM preferrred pharmacy with the patient. If Mail Order Rx - Pend for 90 day refill. Last Seen: Last Seen Department: 11/14/2022  Last Seen by PCP: 11/14/2022    Last Written: 4/23/2022    If no future appointment scheduled, route STAFF MESSAGE with patient name to the [facility] for scheduling. Next Appointment:   Future Appointments   Date Time Provider Patricia Rust   1/3/2023  8:15 AM MD Pola Hayes   1/12/2023  9:40 AM Rebecca Porter MD [facility]       Message sent to [facility] to schedule appt with patient?   NO      Requested Prescriptions     Pending Prescriptions Disp Refills    albuterol sulfate HFA (PROVENTIL;VENTOLIN;PROAIR) 108 (90 Base) MCG/ACT inhaler [Pharmacy Med Name: ALBUTEROL HFA 90 MCG INHALER] 1 each 2     Sig: INHALE TWO PUFFS BY MOUTH FOUR TIMES A DAY AS NEEDED FOR WHEEZING

## 2022-12-27 RX ORDER — ALBUTEROL SULFATE 90 UG/1
AEROSOL, METERED RESPIRATORY (INHALATION)
Qty: 1 EACH | Refills: 2 | Status: SHIPPED | OUTPATIENT
Start: 2022-12-27

## 2023-01-12 DIAGNOSIS — J44.9 CHRONIC OBSTRUCTIVE PULMONARY DISEASE, UNSPECIFIED COPD TYPE (HCC): ICD-10-CM

## 2023-01-12 NOTE — TELEPHONE ENCOUNTER
.Refill Request     CONFIRM preferrred pharmacy with the patient. If Mail Order Rx - Pend for 90 day refill. Last Seen: Last Seen Department: 11/14/2022  Last Seen by PCP: 11/14/2022    Last Written: 11/14/22 60 with 1     If no future appointment scheduled, route STAFF MESSAGE with patient name to the [facility] for scheduling. Next Appointment:   Future Appointments   Date Time Provider Patricia Rust   1/12/2023  9:40 AM Arnold Villa MD AND [facility]AL[facility]   3/28/2023 10:45 AM Archer Romney, MD [facility]       Message sent to [address] [place] [address] to schedule appt with patient?   N/A      Requested Prescriptions     Pending Prescriptions Disp Refills    ANORO ELLIPTA 62.5-25 MCG/ACT AEPB [Pharmacy Med Name: ANORO ELLIPTA 62.5-25 MCG INH] 60 each 1     Sig: INHALE ONE DOSE BY MOUTH DAILY

## 2023-01-13 RX ORDER — UMECLIDINIUM BROMIDE AND VILANTEROL TRIFENATATE 62.5; 25 UG/1; UG/1
POWDER RESPIRATORY (INHALATION)
Qty: 60 EACH | Refills: 1 | Status: SHIPPED | OUTPATIENT
Start: 2023-01-13

## 2023-03-11 DIAGNOSIS — I50.813 ACUTE ON CHRONIC RIGHT-SIDED CONGESTIVE HEART FAILURE (HCC): ICD-10-CM

## 2023-03-13 RX ORDER — TORSEMIDE 20 MG/1
TABLET ORAL
Qty: 90 TABLET | Refills: 1 | Status: SHIPPED | OUTPATIENT
Start: 2023-03-13

## 2023-04-19 RX ORDER — ATORVASTATIN CALCIUM 20 MG/1
TABLET, FILM COATED ORAL
Qty: 90 TABLET | Refills: 1 | Status: SHIPPED | OUTPATIENT
Start: 2023-04-19

## 2023-04-19 NOTE — TELEPHONE ENCOUNTER
Refill Request     CONFIRM preferred pharmacy with the patient. If Mail Order Rx - Pend for 90 day refill. Last Seen: Last Seen Department: 11/14/2022  Last Seen by PCP: 11/14/2022    Last Written: 12/08/2022 90 tablet 1 refills     If no future appointment scheduled:  Review the last OV with PCP and review information for follow-up visit,  Route STAFF MESSAGE with patient name to the [facility] for scheduling with the following information:            -  Timing of next visit           -  Visit type ie Physical, OV, etc           -  Diagnoses/Reason ie. COPD, HTN - Do not use MEDICATION, Follow-up or CHECK UP - Give reason for visit      Next Appointment:   No future appointments. Message sent to [employer] to schedule appt with patient?   YES  FD please help pt to sched a physical    Requested Prescriptions     Pending Prescriptions Disp Refills    atorvastatin (LIPITOR) 20 MG tablet [Pharmacy Med Name: ATORVASTATIN 20 MG TABLET] 90 tablet 1     Sig: TAKE ONE TABLET BY MOUTH ONCE NIGHTLY

## 2023-05-04 NOTE — TELEPHONE ENCOUNTER
Annamaria Caldwell ordered patient a PSG and appt after. I called to schedule it and the patient asked if he would have to stay the night for the sleep study and I said yes. He said that his dad didn't have to do that when he did his. .. patient declined to schedule PSG said that he is not up to staying all night. Please advise.
Patient called with message left for patient to call back to office.
Patient called with message left for patient to call back to office.
Patient called with message left for patient to call back to office. Office has called patient multiple times with no answer or return call.  Please advise
Patient has COPD so in lab PSG is recommended
Alert and oriented to person, place, time/situation. normal mood and affect. no apparent risk to self or others.

## 2023-05-31 DIAGNOSIS — I50.9 ACUTE DECOMPENSATED HEART FAILURE (HCC): ICD-10-CM

## 2023-05-31 RX ORDER — ALBUTEROL SULFATE 90 UG/1
AEROSOL, METERED RESPIRATORY (INHALATION)
Qty: 8.5 G | Refills: 5 | Status: SHIPPED | OUTPATIENT
Start: 2023-05-31

## 2023-05-31 RX ORDER — FUROSEMIDE 80 MG
TABLET ORAL
Qty: 180 TABLET | Refills: 1 | OUTPATIENT
Start: 2023-05-31

## 2023-05-31 NOTE — TELEPHONE ENCOUNTER
.Refill Request     CONFIRM preferred pharmacy with the patient. If Mail Order Rx - Pend for 90 day refill. Last Seen: Last Seen Department: 11/14/2022  Last Seen by PCP: Visit date not found    Last Written: 6-24-22 180 with 1     If no future appointment scheduled:  Review the last OV with PCP and review information for follow-up visit,  Route STAFF MESSAGE with patient name to the [facility] for scheduling with the following information:            -  Timing of next visit           -  Visit type ie Physical, OV, etc           -  Diagnoses/Reason ie. COPD, HTN - Do not use MEDICATION, Follow-up or CHECK UP - Give reason for visit      Next Appointment:   No future appointments. Message sent to [employer] [facility] to schedule appt with patient?   YES      Requested Prescriptions     Pending Prescriptions Disp Refills    furosemide (LASIX) 80 MG tablet [Pharmacy Med Name: FUROSEMIDE 80 MG TABLET] 180 tablet 1     Sig: TAKE ONE TABLET BY MOUTH TWICE A DAY

## 2023-05-31 NOTE — TELEPHONE ENCOUNTER
Refill Request     CONFIRM preferred pharmacy with the patient. If Mail Order Rx - Pend for 90 day refill. Last Seen: Last Seen Department: 11/14/2022    Last Seen by PCP: 11/14/2022    Last Written: 12/27/22 1 each 2 refills    If no future appointment scheduled:  Review the last OV with PCP and review information for follow-up visit,  Route STAFF MESSAGE with patient name to the [facility] for scheduling with the following information:            -  Timing of next visit           -  Visit type ie Physical, OV, etc           -  Diagnoses/Reason ie. COPD, HTN - Do not use MEDICATION, Follow-up or CHECK UP - Give reason for visit      Next Appointment: n/a  No future appointments. Message sent to [address] [place] [address] to schedule appt with patient?   NO      Requested Prescriptions     Pending Prescriptions Disp Refills    albuterol sulfate HFA (PROVENTIL;VENTOLIN;PROAIR) 108 (90 Base) MCG/ACT inhaler [Pharmacy Med Name: ALBUTEROL HFA 90 MCG INHALER] 8.5 g      Sig: INHALE TWO PUFFS BY MOUTH FOUR TIMES A DAY AS NEEDED FOR WHEEZING

## 2023-06-05 ENCOUNTER — TELEPHONE (OUTPATIENT)
Dept: FAMILY MEDICINE CLINIC | Age: 58
End: 2023-06-05

## 2023-06-05 NOTE — TELEPHONE ENCOUNTER
Pt called [facility] stating he has had edema in both legs, groin and abdomin for months and needs medication refilled. I contacted PCP, PT is scheduled for 06/06/2023. Attempted to contact the PT to confirm Appt. VM left to return call to confirm.

## 2023-06-06 ENCOUNTER — OFFICE VISIT (OUTPATIENT)
Dept: FAMILY MEDICINE CLINIC | Age: 58
End: 2023-06-06

## 2023-06-06 VITALS
BODY MASS INDEX: 31.94 KG/M2 | WEIGHT: 235.8 LBS | OXYGEN SATURATION: 95 % | DIASTOLIC BLOOD PRESSURE: 102 MMHG | SYSTOLIC BLOOD PRESSURE: 144 MMHG | HEIGHT: 72 IN | HEART RATE: 100 BPM

## 2023-06-06 DIAGNOSIS — I50.9 ACUTE DECOMPENSATED HEART FAILURE (HCC): ICD-10-CM

## 2023-06-06 DIAGNOSIS — I27.20 SEVERE PULMONARY HYPERTENSION (HCC): ICD-10-CM

## 2023-06-06 DIAGNOSIS — I50.813 ACUTE ON CHRONIC RIGHT-SIDED CONGESTIVE HEART FAILURE (HCC): ICD-10-CM

## 2023-06-06 DIAGNOSIS — E11.65 UNCONTROLLED TYPE 2 DIABETES MELLITUS WITH HYPERGLYCEMIA (HCC): Primary | ICD-10-CM

## 2023-06-06 PROBLEM — E11.59 HYPERTENSION ASSOCIATED WITH TYPE 2 DIABETES MELLITUS (HCC): Status: ACTIVE | Noted: 2021-08-05

## 2023-06-06 PROBLEM — I15.2 HYPERTENSION ASSOCIATED WITH TYPE 2 DIABETES MELLITUS (HCC): Status: ACTIVE | Noted: 2021-08-05

## 2023-06-06 LAB
ALBUMIN SERPL-MCNC: 4.4 G/DL (ref 3.4–5)
ALBUMIN/GLOB SERPL: 1.5 {RATIO} (ref 1.1–2.2)
ALP SERPL-CCNC: 238 U/L (ref 40–129)
ALT SERPL-CCNC: 17 U/L (ref 10–40)
ANION GAP SERPL CALCULATED.3IONS-SCNC: 11 MMOL/L (ref 3–16)
AST SERPL-CCNC: 30 U/L (ref 15–37)
BILIRUB SERPL-MCNC: 1.9 MG/DL (ref 0–1)
BUN SERPL-MCNC: 24 MG/DL (ref 7–20)
CALCIUM SERPL-MCNC: 9.5 MG/DL (ref 8.3–10.6)
CHLORIDE SERPL-SCNC: 98 MMOL/L (ref 99–110)
CO2 SERPL-SCNC: 27 MMOL/L (ref 21–32)
CREAT SERPL-MCNC: 0.9 MG/DL (ref 0.9–1.3)
GFR SERPLBLD CREATININE-BSD FMLA CKD-EPI: >60 ML/MIN/{1.73_M2}
GLUCOSE SERPL-MCNC: 117 MG/DL (ref 70–99)
HBA1C MFR BLD: 6.4 %
MAGNESIUM SERPL-MCNC: 1.9 MG/DL (ref 1.8–2.4)
NT-PROBNP SERPL-MCNC: 958 PG/ML (ref 0–124)
POTASSIUM SERPL-SCNC: 4.4 MMOL/L (ref 3.5–5.1)
PROT SERPL-MCNC: 7.3 G/DL (ref 6.4–8.2)
SODIUM SERPL-SCNC: 136 MMOL/L (ref 136–145)

## 2023-06-06 RX ORDER — TORSEMIDE 20 MG/1
20 TABLET ORAL DAILY
Qty: 90 TABLET | Refills: 1 | Status: SHIPPED | OUTPATIENT
Start: 2023-06-06

## 2023-06-06 ASSESSMENT — PATIENT HEALTH QUESTIONNAIRE - PHQ9
SUM OF ALL RESPONSES TO PHQ QUESTIONS 1-9: 0
SUM OF ALL RESPONSES TO PHQ QUESTIONS 1-9: 0
2. FEELING DOWN, DEPRESSED OR HOPELESS: 0
SUM OF ALL RESPONSES TO PHQ9 QUESTIONS 1 & 2: 0
SUM OF ALL RESPONSES TO PHQ QUESTIONS 1-9: 0
1. LITTLE INTEREST OR PLEASURE IN DOING THINGS: 0
SUM OF ALL RESPONSES TO PHQ QUESTIONS 1-9: 0

## 2023-06-06 NOTE — PROGRESS NOTES
Patient: José Frazier is a 62 y.o. male who presents today with the following Chief Complaint(s):  Chief Complaint   Patient presents with    Foot Swelling         HPI    COPD  Taking anoro for last month    DM2  Farxiga  Has been off metformin for months. Reports that he did not think that he had any refills left    HF  Has not taken lipitor  Taking metoprolol, aldactone, demadex daily  Feels that demadex is not working as well as lasix did previously  Has noticed swelling in both feet, legs, scrotum, and abdomen    Current Outpatient Medications   Medication Sig Dispense Refill    torsemide (DEMADEX) 20 MG tablet Take 1 tablet by mouth daily 90 tablet 1    albuterol sulfate HFA (PROVENTIL;VENTOLIN;PROAIR) 108 (90 Base) MCG/ACT inhaler INHALE TWO PUFFS BY MOUTH FOUR TIMES A DAY AS NEEDED FOR WHEEZING 8.5 g 5    ANORO ELLIPTA 62.5-25 MCG/ACT AEPB INHALE ONE DOSE BY MOUTH DAILY 60 each 1    dapagliflozin (FARXIGA) 10 MG tablet Take 1 tablet by mouth every morning 90 tablet 1    metoprolol succinate (TOPROL XL) 25 MG extended release tablet Take 1 tablet by mouth daily 90 tablet 3    spironolactone (ALDACTONE) 25 MG tablet Take 1 tablet by mouth daily 90 tablet 3    atorvastatin (LIPITOR) 20 MG tablet TAKE ONE TABLET BY MOUTH ONCE NIGHTLY (Patient not taking: Reported on 6/6/2023) 90 tablet 1    losartan (COZAAR) 25 MG tablet Take 1 tablet by mouth daily (Patient not taking: Reported on 6/6/2023) 30 tablet 3    guaiFENesin (MUCINEX) 600 MG extended release tablet Take 1 tablet by mouth 2 times daily (Patient not taking: Reported on 11/30/2022) 14 tablet 0    albuterol-ipratropium (COMBIVENT RESPIMAT)  MCG/ACT AERS inhaler Inhale 1 puff into the lungs in the morning and 1 puff at noon and 1 puff in the evening and 1 puff before bedtime.  (Patient not taking: Reported on 6/6/2023) 1 each 3    ASPIRIN LOW DOSE 81 MG EC tablet TAKE ONE TABLET BY MOUTH DAILY (Patient not taking: Reported on 6/6/2023) 90 tablet 1

## 2023-06-08 ENCOUNTER — NURSE ONLY (OUTPATIENT)
Dept: FAMILY MEDICINE CLINIC | Age: 58
End: 2023-06-08

## 2023-06-08 VITALS — WEIGHT: 229 LBS | BODY MASS INDEX: 31.06 KG/M2

## 2023-06-08 DIAGNOSIS — I50.9 ACUTE DECOMPENSATED HEART FAILURE (HCC): ICD-10-CM

## 2023-06-08 DIAGNOSIS — R17 ELEVATED BILIRUBIN: Primary | ICD-10-CM

## 2023-06-08 DIAGNOSIS — I50.9 ACUTE DECOMPENSATED HEART FAILURE (HCC): Primary | ICD-10-CM

## 2023-06-09 LAB
ALBUMIN SERPL-MCNC: 4.3 G/DL (ref 3.4–5)
ALP SERPL-CCNC: 236 U/L (ref 40–129)
ALT SERPL-CCNC: 20 U/L (ref 10–40)
ANION GAP SERPL CALCULATED.3IONS-SCNC: 15 MMOL/L (ref 3–16)
AST SERPL-CCNC: 30 U/L (ref 15–37)
BILIRUB DIRECT SERPL-MCNC: 0.7 MG/DL (ref 0–0.3)
BILIRUB INDIRECT SERPL-MCNC: 1.1 MG/DL (ref 0–1)
BILIRUB SERPL-MCNC: 1.8 MG/DL (ref 0–1)
BUN SERPL-MCNC: 28 MG/DL (ref 7–20)
CALCIUM SERPL-MCNC: 9.4 MG/DL (ref 8.3–10.6)
CHLORIDE SERPL-SCNC: 96 MMOL/L (ref 99–110)
CO2 SERPL-SCNC: 28 MMOL/L (ref 21–32)
CREAT SERPL-MCNC: 1 MG/DL (ref 0.9–1.3)
DEPRECATED RDW RBC AUTO: 14.5 % (ref 12.4–15.4)
GFR SERPLBLD CREATININE-BSD FMLA CKD-EPI: >60 ML/MIN/{1.73_M2}
GLUCOSE SERPL-MCNC: 106 MG/DL (ref 70–99)
HCT VFR BLD AUTO: 49.1 % (ref 40.5–52.5)
HGB BLD-MCNC: 16.1 G/DL (ref 13.5–17.5)
MCH RBC QN AUTO: 31.5 PG (ref 26–34)
MCHC RBC AUTO-ENTMCNC: 32.8 G/DL (ref 31–36)
MCV RBC AUTO: 96.2 FL (ref 80–100)
PHOSPHATE SERPL-MCNC: 3.9 MG/DL (ref 2.5–4.9)
PLATELET # BLD AUTO: 211 K/UL (ref 135–450)
PMV BLD AUTO: 8.1 FL (ref 5–10.5)
POTASSIUM SERPL-SCNC: 3.9 MMOL/L (ref 3.5–5.1)
PROT SERPL-MCNC: 7.4 G/DL (ref 6.4–8.2)
RBC # BLD AUTO: 5.1 M/UL (ref 4.2–5.9)
SODIUM SERPL-SCNC: 139 MMOL/L (ref 136–145)
WBC # BLD AUTO: 7.9 K/UL (ref 4–11)

## 2023-06-12 DIAGNOSIS — I50.9 ACUTE DECOMPENSATED HEART FAILURE (HCC): ICD-10-CM

## 2023-06-12 LAB
ALBUMIN SERPL-MCNC: 4.4 G/DL (ref 3.4–5)
ALBUMIN/GLOB SERPL: 1.5 {RATIO} (ref 1.1–2.2)
ALP SERPL-CCNC: 227 U/L (ref 40–129)
ALT SERPL-CCNC: 20 U/L (ref 10–40)
ANION GAP SERPL CALCULATED.3IONS-SCNC: 11 MMOL/L (ref 3–16)
AST SERPL-CCNC: 30 U/L (ref 15–37)
BILIRUB SERPL-MCNC: 1.7 MG/DL (ref 0–1)
BUN SERPL-MCNC: 24 MG/DL (ref 7–20)
CALCIUM SERPL-MCNC: 9.8 MG/DL (ref 8.3–10.6)
CHLORIDE SERPL-SCNC: 94 MMOL/L (ref 99–110)
CO2 SERPL-SCNC: 30 MMOL/L (ref 21–32)
CREAT SERPL-MCNC: 0.9 MG/DL (ref 0.9–1.3)
GFR SERPLBLD CREATININE-BSD FMLA CKD-EPI: >60 ML/MIN/{1.73_M2}
GLUCOSE SERPL-MCNC: 115 MG/DL (ref 70–99)
MAGNESIUM SERPL-MCNC: 1.9 MG/DL (ref 1.8–2.4)
NT-PROBNP SERPL-MCNC: 918 PG/ML (ref 0–124)
PHOSPHATE SERPL-MCNC: 3.6 MG/DL (ref 2.5–4.9)
POTASSIUM SERPL-SCNC: 3.8 MMOL/L (ref 3.5–5.1)
PROT SERPL-MCNC: 7.4 G/DL (ref 6.4–8.2)
SODIUM SERPL-SCNC: 135 MMOL/L (ref 136–145)

## 2023-06-14 DIAGNOSIS — I50.9 ACUTE DECOMPENSATED HEART FAILURE (HCC): ICD-10-CM

## 2023-06-15 LAB
ALBUMIN SERPL-MCNC: 4.5 G/DL (ref 3.4–5)
ANION GAP SERPL CALCULATED.3IONS-SCNC: 14 MMOL/L (ref 3–16)
BUN SERPL-MCNC: 25 MG/DL (ref 7–20)
CALCIUM SERPL-MCNC: 9.9 MG/DL (ref 8.3–10.6)
CHLORIDE SERPL-SCNC: 90 MMOL/L (ref 99–110)
CO2 SERPL-SCNC: 32 MMOL/L (ref 21–32)
CREAT SERPL-MCNC: 1 MG/DL (ref 0.9–1.3)
GFR SERPLBLD CREATININE-BSD FMLA CKD-EPI: >60 ML/MIN/{1.73_M2}
GLUCOSE SERPL-MCNC: 114 MG/DL (ref 70–99)
PHOSPHATE SERPL-MCNC: 4.5 MG/DL (ref 2.5–4.9)
POTASSIUM SERPL-SCNC: 3.9 MMOL/L (ref 3.5–5.1)
SODIUM SERPL-SCNC: 136 MMOL/L (ref 136–145)

## 2023-06-16 DIAGNOSIS — I50.9 ACUTE DECOMPENSATED HEART FAILURE (HCC): ICD-10-CM

## 2023-06-16 LAB
ALBUMIN SERPL-MCNC: 4.5 G/DL (ref 3.4–5)
ANION GAP SERPL CALCULATED.3IONS-SCNC: 10 MMOL/L (ref 3–16)
BUN SERPL-MCNC: 21 MG/DL (ref 7–20)
CALCIUM SERPL-MCNC: 9.6 MG/DL (ref 8.3–10.6)
CHLORIDE SERPL-SCNC: 95 MMOL/L (ref 99–110)
CO2 SERPL-SCNC: 33 MMOL/L (ref 21–32)
CREAT SERPL-MCNC: 1 MG/DL (ref 0.9–1.3)
GFR SERPLBLD CREATININE-BSD FMLA CKD-EPI: >60 ML/MIN/{1.73_M2}
GLUCOSE SERPL-MCNC: 163 MG/DL (ref 70–99)
PHOSPHATE SERPL-MCNC: 3 MG/DL (ref 2.5–4.9)
POTASSIUM SERPL-SCNC: 4.2 MMOL/L (ref 3.5–5.1)
SODIUM SERPL-SCNC: 138 MMOL/L (ref 136–145)

## 2023-06-19 DIAGNOSIS — I50.9 ACUTE DECOMPENSATED HEART FAILURE (HCC): ICD-10-CM

## 2023-06-19 LAB
ALBUMIN SERPL-MCNC: 4.5 G/DL (ref 3.4–5)
ANION GAP SERPL CALCULATED.3IONS-SCNC: 11 MMOL/L (ref 3–16)
BUN SERPL-MCNC: 25 MG/DL (ref 7–20)
CALCIUM SERPL-MCNC: 10 MG/DL (ref 8.3–10.6)
CHLORIDE SERPL-SCNC: 98 MMOL/L (ref 99–110)
CO2 SERPL-SCNC: 33 MMOL/L (ref 21–32)
CREAT SERPL-MCNC: 0.9 MG/DL (ref 0.9–1.3)
GFR SERPLBLD CREATININE-BSD FMLA CKD-EPI: >60 ML/MIN/{1.73_M2}
GLUCOSE SERPL-MCNC: 116 MG/DL (ref 70–99)
PHOSPHATE SERPL-MCNC: 3.8 MG/DL (ref 2.5–4.9)
POTASSIUM SERPL-SCNC: 4.1 MMOL/L (ref 3.5–5.1)
SODIUM SERPL-SCNC: 142 MMOL/L (ref 136–145)

## 2023-06-21 DIAGNOSIS — I50.9 ACUTE DECOMPENSATED HEART FAILURE (HCC): ICD-10-CM

## 2023-06-22 ENCOUNTER — TELEPHONE (OUTPATIENT)
Dept: FAMILY MEDICINE CLINIC | Age: 58
End: 2023-06-22

## 2023-06-22 LAB
ALBUMIN SERPL-MCNC: 4.6 G/DL (ref 3.4–5)
ANION GAP SERPL CALCULATED.3IONS-SCNC: 17 MMOL/L (ref 3–16)
BUN SERPL-MCNC: 31 MG/DL (ref 7–20)
CALCIUM SERPL-MCNC: 9.6 MG/DL (ref 8.3–10.6)
CHLORIDE SERPL-SCNC: 97 MMOL/L (ref 99–110)
CO2 SERPL-SCNC: 24 MMOL/L (ref 21–32)
CREAT SERPL-MCNC: 1 MG/DL (ref 0.9–1.3)
GFR SERPLBLD CREATININE-BSD FMLA CKD-EPI: >60 ML/MIN/{1.73_M2}
GLUCOSE SERPL-MCNC: 154 MG/DL (ref 70–99)
PHOSPHATE SERPL-MCNC: 4.2 MG/DL (ref 2.5–4.9)
POTASSIUM SERPL-SCNC: 3.8 MMOL/L (ref 3.5–5.1)
SODIUM SERPL-SCNC: 138 MMOL/L (ref 136–145)

## 2023-06-23 ENCOUNTER — NURSE ONLY (OUTPATIENT)
Dept: FAMILY MEDICINE CLINIC | Age: 58
End: 2023-06-23

## 2023-06-23 VITALS — WEIGHT: 222.2 LBS | BODY MASS INDEX: 30.14 KG/M2

## 2023-06-23 DIAGNOSIS — I50.9 ACUTE DECOMPENSATED HEART FAILURE (HCC): ICD-10-CM

## 2023-06-23 LAB
ALBUMIN SERPL-MCNC: 4.4 G/DL (ref 3.4–5)
ANION GAP SERPL CALCULATED.3IONS-SCNC: 13 MMOL/L (ref 3–16)
BUN SERPL-MCNC: 30 MG/DL (ref 7–20)
CALCIUM SERPL-MCNC: 8.8 MG/DL (ref 8.3–10.6)
CHLORIDE SERPL-SCNC: 97 MMOL/L (ref 99–110)
CO2 SERPL-SCNC: 27 MMOL/L (ref 21–32)
CREAT SERPL-MCNC: 1 MG/DL (ref 0.9–1.3)
GFR SERPLBLD CREATININE-BSD FMLA CKD-EPI: >60 ML/MIN/{1.73_M2}
GLUCOSE SERPL-MCNC: 116 MG/DL (ref 70–99)
PHOSPHATE SERPL-MCNC: 3.5 MG/DL (ref 2.5–4.9)
POTASSIUM SERPL-SCNC: 3.8 MMOL/L (ref 3.5–5.1)
SODIUM SERPL-SCNC: 137 MMOL/L (ref 136–145)

## 2023-06-26 DIAGNOSIS — I50.9 ACUTE DECOMPENSATED HEART FAILURE (HCC): Primary | ICD-10-CM

## 2023-06-26 DIAGNOSIS — I50.9 ACUTE DECOMPENSATED HEART FAILURE (HCC): ICD-10-CM

## 2023-06-26 LAB
ANION GAP SERPL CALCULATED.3IONS-SCNC: 13 MMOL/L (ref 3–16)
BUN SERPL-MCNC: 23 MG/DL (ref 7–20)
CALCIUM SERPL-MCNC: 9.5 MG/DL (ref 8.3–10.6)
CHLORIDE SERPL-SCNC: 96 MMOL/L (ref 99–110)
CO2 SERPL-SCNC: 31 MMOL/L (ref 21–32)
CREAT SERPL-MCNC: 0.8 MG/DL (ref 0.9–1.3)
GFR SERPLBLD CREATININE-BSD FMLA CKD-EPI: >60 ML/MIN/{1.73_M2}
GLUCOSE SERPL-MCNC: 119 MG/DL (ref 70–99)
NT-PROBNP SERPL-MCNC: 831 PG/ML (ref 0–124)
POTASSIUM SERPL-SCNC: 4.1 MMOL/L (ref 3.5–5.1)
SODIUM SERPL-SCNC: 140 MMOL/L (ref 136–145)

## 2023-06-27 ENCOUNTER — TELEPHONE (OUTPATIENT)
Dept: FAMILY MEDICINE CLINIC | Age: 58
End: 2023-06-27

## 2023-06-29 ENCOUNTER — NURSE ONLY (OUTPATIENT)
Dept: FAMILY MEDICINE CLINIC | Age: 58
End: 2023-06-29

## 2023-06-29 VITALS — BODY MASS INDEX: 29.84 KG/M2 | WEIGHT: 220 LBS

## 2023-06-29 DIAGNOSIS — I50.9 ACUTE DECOMPENSATED HEART FAILURE (HCC): ICD-10-CM

## 2023-06-29 LAB
ANION GAP SERPL CALCULATED.3IONS-SCNC: 15 MMOL/L (ref 3–16)
BUN SERPL-MCNC: 28 MG/DL (ref 7–20)
CALCIUM SERPL-MCNC: 9.9 MG/DL (ref 8.3–10.6)
CHLORIDE SERPL-SCNC: 93 MMOL/L (ref 99–110)
CO2 SERPL-SCNC: 29 MMOL/L (ref 21–32)
CREAT SERPL-MCNC: 1 MG/DL (ref 0.9–1.3)
GFR SERPLBLD CREATININE-BSD FMLA CKD-EPI: >60 ML/MIN/{1.73_M2}
GLUCOSE SERPL-MCNC: 125 MG/DL (ref 70–99)
POTASSIUM SERPL-SCNC: 4 MMOL/L (ref 3.5–5.1)
SODIUM SERPL-SCNC: 137 MMOL/L (ref 136–145)

## 2023-07-03 ENCOUNTER — NURSE ONLY (OUTPATIENT)
Dept: FAMILY MEDICINE CLINIC | Age: 58
End: 2023-07-03

## 2023-07-03 VITALS — BODY MASS INDEX: 29.84 KG/M2 | WEIGHT: 220 LBS

## 2023-07-03 DIAGNOSIS — I50.9 ACUTE DECOMPENSATED HEART FAILURE (HCC): ICD-10-CM

## 2023-07-03 DIAGNOSIS — I50.813 ACUTE ON CHRONIC RIGHT-SIDED CONGESTIVE HEART FAILURE (HCC): ICD-10-CM

## 2023-07-03 LAB
ANION GAP SERPL CALCULATED.3IONS-SCNC: 12 MMOL/L (ref 3–16)
BUN SERPL-MCNC: 21 MG/DL (ref 7–20)
CALCIUM SERPL-MCNC: 9.7 MG/DL (ref 8.3–10.6)
CHLORIDE SERPL-SCNC: 94 MMOL/L (ref 99–110)
CO2 SERPL-SCNC: 30 MMOL/L (ref 21–32)
CREAT SERPL-MCNC: 0.8 MG/DL (ref 0.9–1.3)
GFR SERPLBLD CREATININE-BSD FMLA CKD-EPI: >60 ML/MIN/{1.73_M2}
GLUCOSE SERPL-MCNC: 128 MG/DL (ref 70–99)
POTASSIUM SERPL-SCNC: 3.1 MMOL/L (ref 3.5–5.1)
SODIUM SERPL-SCNC: 136 MMOL/L (ref 136–145)

## 2023-07-03 RX ORDER — TORSEMIDE 20 MG/1
TABLET ORAL
Qty: 360 TABLET | Refills: 1 | Status: CANCELLED | OUTPATIENT
Start: 2023-07-03

## 2023-07-03 NOTE — TELEPHONE ENCOUNTER
Stuart Montoya is calling to see if we got the fax over for the patients medication. Regarding Anoro Ellipta, as well as Janet.

## 2023-07-03 NOTE — TELEPHONE ENCOUNTER
Patient states he will be out of his Torsemide tomorrow. Requesting a refill. [phone] Please advise.

## 2023-07-05 DIAGNOSIS — E87.6 HYPOKALEMIA: Primary | ICD-10-CM

## 2023-07-05 RX ORDER — POTASSIUM CHLORIDE 20 MEQ/1
20 TABLET, EXTENDED RELEASE ORAL DAILY
Qty: 30 TABLET | Refills: 0 | Status: SHIPPED | OUTPATIENT
Start: 2023-07-05

## 2023-07-06 ENCOUNTER — OFFICE VISIT (OUTPATIENT)
Dept: FAMILY MEDICINE CLINIC | Age: 58
End: 2023-07-06

## 2023-07-06 ENCOUNTER — NURSE ONLY (OUTPATIENT)
Dept: FAMILY MEDICINE CLINIC | Age: 58
End: 2023-07-06

## 2023-07-06 VITALS — BODY MASS INDEX: 30.38 KG/M2 | WEIGHT: 224 LBS

## 2023-07-06 VITALS — WEIGHT: 224 LBS | BODY MASS INDEX: 30.38 KG/M2

## 2023-07-06 DIAGNOSIS — I50.9 ACUTE DECOMPENSATED HEART FAILURE (HCC): ICD-10-CM

## 2023-07-06 DIAGNOSIS — I50.9 CHRONIC CONGESTIVE HEART FAILURE, UNSPECIFIED HEART FAILURE TYPE (HCC): ICD-10-CM

## 2023-07-06 DIAGNOSIS — J44.9 CHRONIC OBSTRUCTIVE PULMONARY DISEASE, UNSPECIFIED COPD TYPE (HCC): ICD-10-CM

## 2023-07-06 DIAGNOSIS — E11.65 UNCONTROLLED TYPE 2 DIABETES MELLITUS WITH HYPERGLYCEMIA (HCC): ICD-10-CM

## 2023-07-06 DIAGNOSIS — I50.9 ACUTE DECOMPENSATED HEART FAILURE (HCC): Primary | ICD-10-CM

## 2023-07-06 DIAGNOSIS — I27.20 SEVERE PULMONARY HYPERTENSION (HCC): ICD-10-CM

## 2023-07-06 DIAGNOSIS — I50.813 ACUTE ON CHRONIC RIGHT-SIDED CONGESTIVE HEART FAILURE (HCC): ICD-10-CM

## 2023-07-06 LAB
ANION GAP SERPL CALCULATED.3IONS-SCNC: 13 MMOL/L (ref 3–16)
BUN SERPL-MCNC: 20 MG/DL (ref 7–20)
CALCIUM SERPL-MCNC: 9.8 MG/DL (ref 8.3–10.6)
CHLORIDE SERPL-SCNC: 94 MMOL/L (ref 99–110)
CO2 SERPL-SCNC: 30 MMOL/L (ref 21–32)
CREAT SERPL-MCNC: 0.9 MG/DL (ref 0.9–1.3)
GFR SERPLBLD CREATININE-BSD FMLA CKD-EPI: >60 ML/MIN/{1.73_M2}
GLUCOSE SERPL-MCNC: 123 MG/DL (ref 70–99)
POTASSIUM SERPL-SCNC: 3.5 MMOL/L (ref 3.5–5.1)
SODIUM SERPL-SCNC: 137 MMOL/L (ref 136–145)

## 2023-07-06 NOTE — PROGRESS NOTES
Patient came into office for weight follow up and blood work as ordered by Dr Steven Gallo. Weight is up to 224lbs compared to last weight of 220lbs. Patient states he is out of torsemide, last dose was 40mg yesterday morning. Has not started his potassium yet. Script for torsemide 40mg once daily was sent to [facility] per verbal from PCP. Patient also asking about status of inhalers from [facility]. Dr Steven Gallo informed patient that he would send the order. BLE evaluation:  LLE- +1 edema present, skin is tight, paula in color, but improved from last check. RLE- some non-pitting edema, skin is red in color but is not tight and is improved from last check. Denies SOB except with exertion. Patient is walking weekly. Encouraged compression sock use and elevating while sitting. Per Dr Jerson Salas forward patient's assumed baseline weight is 220lb. Patient is to follow up in 2 months and continue with daily weights at home. Patient instructed to call the office with weight gain or signs of increased swelling. Patient is to have blood work today and Monday then monitor from home. Patient denied any further questions or concerns at this time.     Electronically signed by Skylar Rust RN on 7/6/2023 at 1:09 PM

## 2023-07-06 NOTE — PROGRESS NOTES
Patient came into office for weight follow up and blood work as ordered by Dr Tricia Panda. Weight is up to 224lbs compared to last weight of 220lbs. Patient states he is out of torsemide, last dose was 40mg yesterday morning. Has not started his potassium yet. Script for torsemide 40mg once daily was sent to [facility] per verbal from PCP. Patient also asking about status of inhalers from [facility]. Dr Tricia Panda informed patient that he would send the order. BLE evaluation:  LLE- +1 edema present, skin is tight, paula in color, but improved from last check. RLE- some non-pitting edema, skin is red in color but is not tight and is improved from last check. Denies SOB except with exertion. Patient is walking weekly. Encouraged compression sock use and elevating while sitting. Per Dr Perla Al forward patient's assumed baseline weight is 220lb. Patient is to follow up in 2 months and continue with daily weights at home. Patient instructed to call the office with weight gain or signs of increased swelling. Patient is to have blood work today and Monday then monitor from home. Patient denied any further questions or concerns at this time. Scheduled for follow up in 2 months.     Electronically signed by Susan Thompson RN on 7/6/2023 at 1:13 PM

## 2023-07-07 RX ORDER — UMECLIDINIUM BROMIDE AND VILANTEROL TRIFENATATE 62.5; 25 UG/1; UG/1
1 POWDER RESPIRATORY (INHALATION) DAILY
Qty: 60 EACH | Refills: 1 | Status: SHIPPED | OUTPATIENT
Start: 2023-07-07

## 2023-07-07 RX ORDER — UMECLIDINIUM BROMIDE AND VILANTEROL TRIFENATATE 62.5; 25 UG/1; UG/1
POWDER RESPIRATORY (INHALATION)
Qty: 60 EACH | Refills: 1 | Status: SHIPPED | OUTPATIENT
Start: 2023-07-07

## 2023-07-07 NOTE — TELEPHONE ENCOUNTER
Faxed. [employer] this is in your scan folder in case something more is needed with the printed prescriptions. Thanks.

## 2023-07-07 NOTE — TELEPHONE ENCOUNTER
Ranjeet Reddy, DO  [facility] Practice Support 11 minutes ago (2:01 PM)     AS  If someone else is able to sign then that would be great if not then please print and I will sign on monday      Pended to print. Dr. Randi Nova or Dr. Marija Caldwell do you care to sign? If you can not Dr. Sherine Moreau will sign Monday.    Thank you

## 2023-07-07 NOTE — PROGRESS NOTES
Patient: Alicia Emery is a 62 y.o. male who presents today with the following Chief Complaint(s):  Chief Complaint   Patient presents with    Other     Weight check         HPI    Patient initially evaluated 1 month ago for acute decompensated heart failure with LE edema up to abdomen. Baseline Lasix from 20 daily to twice daily since that time patient has reported objective improvement however has not had significant weight loss. Renal monitoring has shown some hypokalemia but no STEPHY or signs of over diuresis. Current Outpatient Medications   Medication Sig Dispense Refill    Torsemide 40 MG TABS Take 40 mg by mouth daily 90 tablet 1    potassium chloride (KLOR-CON M) 20 MEQ extended release tablet Take 1 tablet by mouth daily 30 tablet 0    albuterol sulfate HFA (PROVENTIL;VENTOLIN;PROAIR) 108 (90 Base) MCG/ACT inhaler INHALE TWO PUFFS BY MOUTH FOUR TIMES A DAY AS NEEDED FOR WHEEZING 8.5 g 5    atorvastatin (LIPITOR) 20 MG tablet TAKE ONE TABLET BY MOUTH ONCE NIGHTLY (Patient not taking: Reported on 6/6/2023) 90 tablet 1    ANORO ELLIPTA 62.5-25 MCG/ACT AEPB INHALE ONE DOSE BY MOUTH DAILY 60 each 1    dapagliflozin (FARXIGA) 10 MG tablet Take 1 tablet by mouth every morning 90 tablet 1    metoprolol succinate (TOPROL XL) 25 MG extended release tablet Take 1 tablet by mouth daily 90 tablet 3    spironolactone (ALDACTONE) 25 MG tablet Take 1 tablet by mouth daily 90 tablet 3    losartan (COZAAR) 25 MG tablet Take 1 tablet by mouth daily (Patient not taking: Reported on 6/6/2023) 30 tablet 3    guaiFENesin (MUCINEX) 600 MG extended release tablet Take 1 tablet by mouth 2 times daily (Patient not taking: Reported on 11/30/2022) 14 tablet 0    albuterol-ipratropium (COMBIVENT RESPIMAT)  MCG/ACT AERS inhaler Inhale 1 puff into the lungs in the morning and 1 puff at noon and 1 puff in the evening and 1 puff before bedtime.  (Patient not taking: Reported on 6/6/2023) 1 each 3    ASPIRIN LOW DOSE 81 MG EC tablet

## 2023-07-10 DIAGNOSIS — I50.9 ACUTE DECOMPENSATED HEART FAILURE (HCC): ICD-10-CM

## 2023-07-10 LAB
ANION GAP SERPL CALCULATED.3IONS-SCNC: 15 MMOL/L (ref 3–16)
BUN SERPL-MCNC: 21 MG/DL (ref 7–20)
CALCIUM SERPL-MCNC: 9.5 MG/DL (ref 8.3–10.6)
CHLORIDE SERPL-SCNC: 96 MMOL/L (ref 99–110)
CO2 SERPL-SCNC: 28 MMOL/L (ref 21–32)
CREAT SERPL-MCNC: 0.9 MG/DL (ref 0.9–1.3)
GFR SERPLBLD CREATININE-BSD FMLA CKD-EPI: >60 ML/MIN/{1.73_M2}
GLUCOSE SERPL-MCNC: 143 MG/DL (ref 70–99)
POTASSIUM SERPL-SCNC: 3.6 MMOL/L (ref 3.5–5.1)
SODIUM SERPL-SCNC: 139 MMOL/L (ref 136–145)

## 2023-07-21 ENCOUNTER — TELEPHONE (OUTPATIENT)
Dept: FAMILY MEDICINE CLINIC | Age: 58
End: 2023-07-21

## 2023-07-21 NOTE — TELEPHONE ENCOUNTER
Pt called in and said he read over his AVS (after visit summary) from his last OV and on the 2nd page it states to keep taking certain medications however some of these medications the pt has not taken since December and wants to know if he should still be on them or not. He said it's because he ran out and he thought they'd be refilled and they never were.      Metformin  Losartan  Atorvastatin  Aspirin low dose  Albuterol sulfate

## 2023-07-30 ENCOUNTER — APPOINTMENT (OUTPATIENT)
Dept: GENERAL RADIOLOGY | Age: 58
End: 2023-07-30
Payer: COMMERCIAL

## 2023-07-30 ENCOUNTER — HOSPITAL ENCOUNTER (INPATIENT)
Age: 58
LOS: 11 days | Discharge: HOME | End: 2023-08-10
Attending: STUDENT IN AN ORGANIZED HEALTH CARE EDUCATION/TRAINING PROGRAM | Admitting: STUDENT IN AN ORGANIZED HEALTH CARE EDUCATION/TRAINING PROGRAM
Payer: COMMERCIAL

## 2023-07-30 ENCOUNTER — APPOINTMENT (OUTPATIENT)
Dept: ULTRASOUND IMAGING | Age: 58
End: 2023-07-30
Payer: COMMERCIAL

## 2023-07-30 DIAGNOSIS — J44.1 COPD EXACERBATION (HCC): ICD-10-CM

## 2023-07-30 DIAGNOSIS — J44.9 COPD, SEVERE (HCC): ICD-10-CM

## 2023-07-30 DIAGNOSIS — R60.1 ANASARCA: ICD-10-CM

## 2023-07-30 DIAGNOSIS — E87.70 HYPERVOLEMIA, UNSPECIFIED HYPERVOLEMIA TYPE: Primary | ICD-10-CM

## 2023-07-30 DIAGNOSIS — I51.7 ENLARGED RV (RIGHT VENTRICLE): ICD-10-CM

## 2023-07-30 DIAGNOSIS — I27.81 COR PULMONALE (HCC): ICD-10-CM

## 2023-07-30 DIAGNOSIS — I26.09 PULMONARY HYPERTENSION WITH ACUTE COR PULMONALE (HCC): ICD-10-CM

## 2023-07-30 DIAGNOSIS — I50.813 ACUTE ON CHRONIC RIGHT-SIDED HEART FAILURE (HCC): ICD-10-CM

## 2023-07-30 DIAGNOSIS — I36.1 NONRHEUMATIC TRICUSPID VALVE REGURGITATION: ICD-10-CM

## 2023-07-30 PROBLEM — I50.9 HEART FAILURE EXACERBATED BY SOTALOL (HCC): Status: ACTIVE | Noted: 2023-07-30

## 2023-07-30 LAB
ALBUMIN SERPL-MCNC: 3.8 G/DL (ref 3.4–5)
ALBUMIN/GLOB SERPL: 1.1 {RATIO} (ref 1.1–2.2)
ALP SERPL-CCNC: 228 U/L (ref 40–129)
ALT SERPL-CCNC: 18 U/L (ref 10–40)
ANION GAP SERPL CALCULATED.3IONS-SCNC: 13 MMOL/L (ref 3–16)
AST SERPL-CCNC: 25 U/L (ref 15–37)
BASOPHILS # BLD: 0.1 K/UL (ref 0–0.2)
BASOPHILS NFR BLD: 0.9 %
BILIRUB SERPL-MCNC: 1.7 MG/DL (ref 0–1)
BUN SERPL-MCNC: 30 MG/DL (ref 7–20)
CALCIUM SERPL-MCNC: 9 MG/DL (ref 8.3–10.6)
CHLORIDE SERPL-SCNC: 99 MMOL/L (ref 99–110)
CO2 SERPL-SCNC: 26 MMOL/L (ref 21–32)
CREAT SERPL-MCNC: 1 MG/DL (ref 0.9–1.3)
DEPRECATED RDW RBC AUTO: 15.7 % (ref 12.4–15.4)
EOSINOPHIL # BLD: 0.3 K/UL (ref 0–0.6)
EOSINOPHIL NFR BLD: 3.2 %
GFR SERPLBLD CREATININE-BSD FMLA CKD-EPI: >60 ML/MIN/{1.73_M2}
GLUCOSE SERPL-MCNC: 146 MG/DL (ref 70–99)
HCT VFR BLD AUTO: 47 % (ref 40.5–52.5)
HGB BLD-MCNC: 15.6 G/DL (ref 13.5–17.5)
LYMPHOCYTES # BLD: 1.3 K/UL (ref 1–5.1)
LYMPHOCYTES NFR BLD: 16.2 %
MCH RBC QN AUTO: 30.3 PG (ref 26–34)
MCHC RBC AUTO-ENTMCNC: 33.2 G/DL (ref 31–36)
MCV RBC AUTO: 91.3 FL (ref 80–100)
MONOCYTES # BLD: 0.9 K/UL (ref 0–1.3)
MONOCYTES NFR BLD: 10.9 %
NEUTROPHILS # BLD: 5.7 K/UL (ref 1.7–7.7)
NEUTROPHILS NFR BLD: 68.8 %
NT-PROBNP SERPL-MCNC: 619 PG/ML (ref 0–124)
PLATELET # BLD AUTO: 229 K/UL (ref 135–450)
PMV BLD AUTO: 7.9 FL (ref 5–10.5)
POTASSIUM SERPL-SCNC: 3.7 MMOL/L (ref 3.5–5.1)
PROT SERPL-MCNC: 7.2 G/DL (ref 6.4–8.2)
RBC # BLD AUTO: 5.15 M/UL (ref 4.2–5.9)
SODIUM SERPL-SCNC: 138 MMOL/L (ref 136–145)
TROPONIN, HIGH SENSITIVITY: 27 NG/L (ref 0–22)
WBC # BLD AUTO: 8.3 K/UL (ref 4–11)

## 2023-07-30 PROCEDURE — 2580000003 HC RX 258: Performed by: STUDENT IN AN ORGANIZED HEALTH CARE EDUCATION/TRAINING PROGRAM

## 2023-07-30 PROCEDURE — 80053 COMPREHEN METABOLIC PANEL: CPT

## 2023-07-30 PROCEDURE — 85025 COMPLETE CBC W/AUTO DIFF WBC: CPT

## 2023-07-30 PROCEDURE — 84484 ASSAY OF TROPONIN QUANT: CPT

## 2023-07-30 PROCEDURE — 76870 US EXAM SCROTUM: CPT

## 2023-07-30 PROCEDURE — 83880 ASSAY OF NATRIURETIC PEPTIDE: CPT

## 2023-07-30 PROCEDURE — 6370000000 HC RX 637 (ALT 250 FOR IP): Performed by: STUDENT IN AN ORGANIZED HEALTH CARE EDUCATION/TRAINING PROGRAM

## 2023-07-30 PROCEDURE — 6360000002 HC RX W HCPCS: Performed by: STUDENT IN AN ORGANIZED HEALTH CARE EDUCATION/TRAINING PROGRAM

## 2023-07-30 PROCEDURE — 71045 X-RAY EXAM CHEST 1 VIEW: CPT

## 2023-07-30 PROCEDURE — 93005 ELECTROCARDIOGRAM TRACING: CPT | Performed by: PHYSICIAN ASSISTANT

## 2023-07-30 PROCEDURE — 1200000000 HC SEMI PRIVATE

## 2023-07-30 PROCEDURE — 99285 EMERGENCY DEPT VISIT HI MDM: CPT

## 2023-07-30 RX ORDER — ONDANSETRON 4 MG/1
4 TABLET, ORALLY DISINTEGRATING ORAL EVERY 8 HOURS PRN
Status: DISCONTINUED | OUTPATIENT
Start: 2023-07-30 | End: 2023-08-10 | Stop reason: HOSPADM

## 2023-07-30 RX ORDER — ATORVASTATIN CALCIUM 10 MG/1
20 TABLET, FILM COATED ORAL NIGHTLY
Status: DISCONTINUED | OUTPATIENT
Start: 2023-07-30 | End: 2023-08-10 | Stop reason: HOSPADM

## 2023-07-30 RX ORDER — INSULIN LISPRO 100 [IU]/ML
0-8 INJECTION, SOLUTION INTRAVENOUS; SUBCUTANEOUS
Status: DISCONTINUED | OUTPATIENT
Start: 2023-07-31 | End: 2023-08-10 | Stop reason: HOSPADM

## 2023-07-30 RX ORDER — ACETAMINOPHEN 650 MG/1
650 SUPPOSITORY RECTAL EVERY 6 HOURS PRN
Status: DISCONTINUED | OUTPATIENT
Start: 2023-07-30 | End: 2023-08-09

## 2023-07-30 RX ORDER — SODIUM CHLORIDE 9 MG/ML
INJECTION, SOLUTION INTRAVENOUS PRN
Status: DISCONTINUED | OUTPATIENT
Start: 2023-07-30 | End: 2023-08-10 | Stop reason: HOSPADM

## 2023-07-30 RX ORDER — LOSARTAN POTASSIUM 25 MG/1
25 TABLET ORAL DAILY
Status: DISCONTINUED | OUTPATIENT
Start: 2023-07-31 | End: 2023-08-10 | Stop reason: HOSPADM

## 2023-07-30 RX ORDER — ONDANSETRON 2 MG/ML
4 INJECTION INTRAMUSCULAR; INTRAVENOUS EVERY 6 HOURS PRN
Status: DISCONTINUED | OUTPATIENT
Start: 2023-07-30 | End: 2023-08-10 | Stop reason: HOSPADM

## 2023-07-30 RX ORDER — SODIUM CHLORIDE 0.9 % (FLUSH) 0.9 %
5-40 SYRINGE (ML) INJECTION EVERY 12 HOURS SCHEDULED
Status: DISCONTINUED | OUTPATIENT
Start: 2023-07-30 | End: 2023-08-10 | Stop reason: HOSPADM

## 2023-07-30 RX ORDER — FUROSEMIDE 10 MG/ML
40 INJECTION INTRAMUSCULAR; INTRAVENOUS 2 TIMES DAILY
Status: DISCONTINUED | OUTPATIENT
Start: 2023-07-30 | End: 2023-08-01

## 2023-07-30 RX ORDER — ENOXAPARIN SODIUM 100 MG/ML
30 INJECTION SUBCUTANEOUS 2 TIMES DAILY
Status: DISCONTINUED | OUTPATIENT
Start: 2023-07-30 | End: 2023-08-10 | Stop reason: HOSPADM

## 2023-07-30 RX ORDER — ASPIRIN 81 MG/1
81 TABLET ORAL DAILY
Status: DISCONTINUED | OUTPATIENT
Start: 2023-07-31 | End: 2023-08-10 | Stop reason: HOSPADM

## 2023-07-30 RX ORDER — ACETAMINOPHEN 325 MG/1
650 TABLET ORAL EVERY 6 HOURS PRN
Status: DISCONTINUED | OUTPATIENT
Start: 2023-07-30 | End: 2023-08-09

## 2023-07-30 RX ORDER — SODIUM CHLORIDE 0.9 % (FLUSH) 0.9 %
5-40 SYRINGE (ML) INJECTION PRN
Status: DISCONTINUED | OUTPATIENT
Start: 2023-07-30 | End: 2023-08-10 | Stop reason: HOSPADM

## 2023-07-30 RX ORDER — METOPROLOL SUCCINATE 25 MG/1
25 TABLET, EXTENDED RELEASE ORAL DAILY
Status: DISCONTINUED | OUTPATIENT
Start: 2023-07-31 | End: 2023-08-04

## 2023-07-30 RX ORDER — SPIRONOLACTONE 25 MG/1
25 TABLET ORAL DAILY
Status: DISCONTINUED | OUTPATIENT
Start: 2023-07-31 | End: 2023-08-10 | Stop reason: HOSPADM

## 2023-07-30 RX ORDER — ALBUTEROL SULFATE 90 UG/1
1 AEROSOL, METERED RESPIRATORY (INHALATION) EVERY 6 HOURS PRN
Status: DISCONTINUED | OUTPATIENT
Start: 2023-07-30 | End: 2023-08-09

## 2023-07-30 RX ORDER — INSULIN LISPRO 100 [IU]/ML
0-4 INJECTION, SOLUTION INTRAVENOUS; SUBCUTANEOUS NIGHTLY
Status: DISCONTINUED | OUTPATIENT
Start: 2023-07-30 | End: 2023-07-30

## 2023-07-30 RX ORDER — POLYETHYLENE GLYCOL 3350 17 G/17G
17 POWDER, FOR SOLUTION ORAL DAILY PRN
Status: DISCONTINUED | OUTPATIENT
Start: 2023-07-30 | End: 2023-08-10 | Stop reason: HOSPADM

## 2023-07-30 RX ADMIN — ATORVASTATIN CALCIUM 20 MG: 10 TABLET, FILM COATED ORAL at 22:31

## 2023-07-30 RX ADMIN — SODIUM CHLORIDE, PRESERVATIVE FREE 10 ML: 5 INJECTION INTRAVENOUS at 22:36

## 2023-07-30 RX ADMIN — ENOXAPARIN SODIUM 30 MG: 100 INJECTION SUBCUTANEOUS at 22:31

## 2023-07-30 RX ADMIN — FUROSEMIDE 40 MG: 10 INJECTION, SOLUTION INTRAMUSCULAR; INTRAVENOUS at 22:31

## 2023-07-30 ASSESSMENT — PAIN - FUNCTIONAL ASSESSMENT: PAIN_FUNCTIONAL_ASSESSMENT: 0-10

## 2023-07-30 ASSESSMENT — PAIN SCALES - GENERAL: PAINLEVEL_OUTOF10: 1

## 2023-07-31 LAB
ALBUMIN SERPL-MCNC: 3.9 G/DL (ref 3.4–5)
ALBUMIN/GLOB SERPL: 1.2 {RATIO} (ref 1.1–2.2)
ALP SERPL-CCNC: 229 U/L (ref 40–129)
ALT SERPL-CCNC: 18 U/L (ref 10–40)
ANION GAP SERPL CALCULATED.3IONS-SCNC: 12 MMOL/L (ref 3–16)
AST SERPL-CCNC: 24 U/L (ref 15–37)
BASOPHILS # BLD: 0.1 K/UL (ref 0–0.2)
BASOPHILS NFR BLD: 0.8 %
BILIRUB SERPL-MCNC: 2.4 MG/DL (ref 0–1)
BUN SERPL-MCNC: 24 MG/DL (ref 7–20)
CALCIUM SERPL-MCNC: 9.3 MG/DL (ref 8.3–10.6)
CHLORIDE SERPL-SCNC: 99 MMOL/L (ref 99–110)
CO2 SERPL-SCNC: 29 MMOL/L (ref 21–32)
CREAT SERPL-MCNC: 0.8 MG/DL (ref 0.9–1.3)
DEPRECATED RDW RBC AUTO: 15.8 % (ref 12.4–15.4)
EKG ATRIAL RATE: 102 BPM
EKG DIAGNOSIS: NORMAL
EKG P AXIS: 78 DEGREES
EKG P-R INTERVAL: 166 MS
EKG Q-T INTERVAL: 384 MS
EKG QRS DURATION: 124 MS
EKG QTC CALCULATION (BAZETT): 500 MS
EKG R AXIS: 235 DEGREES
EKG T AXIS: 30 DEGREES
EKG VENTRICULAR RATE: 102 BPM
EOSINOPHIL # BLD: 0.2 K/UL (ref 0–0.6)
EOSINOPHIL NFR BLD: 3.7 %
GFR SERPLBLD CREATININE-BSD FMLA CKD-EPI: >60 ML/MIN/{1.73_M2}
GLUCOSE BLD-MCNC: 105 MG/DL (ref 70–99)
GLUCOSE BLD-MCNC: 116 MG/DL (ref 70–99)
GLUCOSE BLD-MCNC: 97 MG/DL (ref 70–99)
GLUCOSE SERPL-MCNC: 106 MG/DL (ref 70–99)
HCT VFR BLD AUTO: 46.4 % (ref 40.5–52.5)
HGB BLD-MCNC: 15.3 G/DL (ref 13.5–17.5)
LV EF: 55 %
LVEF MODALITY: NORMAL
LYMPHOCYTES # BLD: 1.3 K/UL (ref 1–5.1)
LYMPHOCYTES NFR BLD: 20.5 %
MCH RBC QN AUTO: 30.2 PG (ref 26–34)
MCHC RBC AUTO-ENTMCNC: 33 G/DL (ref 31–36)
MCV RBC AUTO: 91.7 FL (ref 80–100)
MONOCYTES # BLD: 0.7 K/UL (ref 0–1.3)
MONOCYTES NFR BLD: 11.5 %
NEUTROPHILS # BLD: 4.1 K/UL (ref 1.7–7.7)
NEUTROPHILS NFR BLD: 63.5 %
PERFORMED ON: ABNORMAL
PERFORMED ON: ABNORMAL
PERFORMED ON: NORMAL
PHOSPHATE SERPL-MCNC: 2.8 MG/DL (ref 2.5–4.9)
PLATELET # BLD AUTO: 213 K/UL (ref 135–450)
PLATELET BLD QL SMEAR: ADEQUATE
PMV BLD AUTO: 7.6 FL (ref 5–10.5)
POTASSIUM SERPL-SCNC: 3.6 MMOL/L (ref 3.5–5.1)
PROT SERPL-MCNC: 7.2 G/DL (ref 6.4–8.2)
RBC # BLD AUTO: 5.06 M/UL (ref 4.2–5.9)
SLIDE REVIEW: ABNORMAL
SODIUM SERPL-SCNC: 140 MMOL/L (ref 136–145)
WBC # BLD AUTO: 6.4 K/UL (ref 4–11)

## 2023-07-31 PROCEDURE — 1200000000 HC SEMI PRIVATE

## 2023-07-31 PROCEDURE — 93306 TTE W/DOPPLER COMPLETE: CPT

## 2023-07-31 PROCEDURE — 85025 COMPLETE CBC W/AUTO DIFF WBC: CPT

## 2023-07-31 PROCEDURE — 36415 COLL VENOUS BLD VENIPUNCTURE: CPT

## 2023-07-31 PROCEDURE — 94640 AIRWAY INHALATION TREATMENT: CPT

## 2023-07-31 PROCEDURE — 6360000002 HC RX W HCPCS: Performed by: STUDENT IN AN ORGANIZED HEALTH CARE EDUCATION/TRAINING PROGRAM

## 2023-07-31 PROCEDURE — 84100 ASSAY OF PHOSPHORUS: CPT

## 2023-07-31 PROCEDURE — 2580000003 HC RX 258: Performed by: STUDENT IN AN ORGANIZED HEALTH CARE EDUCATION/TRAINING PROGRAM

## 2023-07-31 PROCEDURE — 6370000000 HC RX 637 (ALT 250 FOR IP): Performed by: STUDENT IN AN ORGANIZED HEALTH CARE EDUCATION/TRAINING PROGRAM

## 2023-07-31 PROCEDURE — 80053 COMPREHEN METABOLIC PANEL: CPT

## 2023-07-31 RX ADMIN — SPIRONOLACTONE 25 MG: 25 TABLET ORAL at 09:58

## 2023-07-31 RX ADMIN — LOSARTAN POTASSIUM 25 MG: 25 TABLET, FILM COATED ORAL at 09:58

## 2023-07-31 RX ADMIN — SODIUM CHLORIDE, PRESERVATIVE FREE 10 ML: 5 INJECTION INTRAVENOUS at 20:42

## 2023-07-31 RX ADMIN — Medication 1 PUFF: at 08:55

## 2023-07-31 RX ADMIN — SODIUM CHLORIDE, PRESERVATIVE FREE 10 ML: 5 INJECTION INTRAVENOUS at 09:59

## 2023-07-31 RX ADMIN — METOPROLOL SUCCINATE 25 MG: 25 TABLET, EXTENDED RELEASE ORAL at 09:58

## 2023-07-31 RX ADMIN — ENOXAPARIN SODIUM 30 MG: 100 INJECTION SUBCUTANEOUS at 20:41

## 2023-07-31 RX ADMIN — FUROSEMIDE 40 MG: 10 INJECTION, SOLUTION INTRAMUSCULAR; INTRAVENOUS at 09:58

## 2023-07-31 RX ADMIN — EMPAGLIFLOZIN 10 MG: 10 TABLET, FILM COATED ORAL at 10:00

## 2023-07-31 RX ADMIN — TIOTROPIUM BROMIDE AND OLODATEROL 2 PUFF: 3.124; 2.736 SPRAY, METERED RESPIRATORY (INHALATION) at 08:55

## 2023-07-31 RX ADMIN — ENOXAPARIN SODIUM 30 MG: 100 INJECTION SUBCUTANEOUS at 09:59

## 2023-07-31 RX ADMIN — ASPIRIN 81 MG: 81 TABLET, COATED ORAL at 09:58

## 2023-07-31 RX ADMIN — ATORVASTATIN CALCIUM 20 MG: 10 TABLET, FILM COATED ORAL at 20:41

## 2023-07-31 RX ADMIN — FUROSEMIDE 40 MG: 10 INJECTION, SOLUTION INTRAMUSCULAR; INTRAVENOUS at 17:33

## 2023-07-31 NOTE — PROGRESS NOTES
4 Eyes Skin Assessment     The patient is being assess for   Admission    I agree that 2 RN's have performed a thorough Head to Toe Skin Assessment on the patient. ALL assessment sites listed below have been assessed. Areas assessed by both nurses:   [x]   Head, Face, and Ears   [x]   Shoulders, Back, and Chest, Abdomen  [x]   Arms, Elbows, and Hands   [x]   Coccyx, Sacrum, and Ischium  [x]   Legs, Feet, and Heels            **SHARE this note so that the co-signing nurse is able to place an eSignature**    Co-signer eSignature: Electronically signed by Lawrence Ace RN on 7/31/23 at 1:16 AM EDT    Does the Patient have Skin Breakdown?   No          Paul Prevention initiated:  No   Wound Care Orders initiated:  No      [facility] nurse consulted for Pressure Injury (Stage 3,4, Unstageable, DTI, NWPT, Complex wounds)and New or Established Ostomies:  No      Primary Nurse eSignature: Electronically signed by Donna Leal RN on 7/31/23 at 1:09 AM EDT

## 2023-07-31 NOTE — H&P
Hospital Medicine History & Physical      Date of Admission: 7/30/2023    Date of Service:  7/30/2023    [x]Admitted to Inpatient with expected LOS greater than two midnights due to medical therapy. []Placed in Observation status. Chief Admission Complaint: Scrotal swelling    Presenting Admission History: This is a 51-year-old male with past medical history of severe pulmonary hypertension, COPD, previous smoker, HFmrEF who presented to the emergency department with scrotal swelling. Patient states that his scrotum started swelling gradually within the past week. This has been preventing him from walking prompting him to come to the emergency department. Patient also admits to having increasing shortness of breath. Patient had visited his primary care physician and his torsemide dose was increased from 20 mg to 40 mg. Patient states he has subjective weight gain but unclear how much. Patient states he is compliant with his medications. Denies any chest pain, palpitations, abdominal pain, dysuria, nausea, vomiting, fevers or chills. In the emergency department patient was initially tachycardic which resolved otherwise electrolytes and CBC unremarkable. Mild elevation of BNP actually down from his previous. Slightly elevated troponin without any symptoms.          Assessment/Plan:    #Acute exacerbation of HFmrEF  -Scrotal swelling, lower extremity edema, increasing shortness of breath  -Previous echocardiogram in December 2022 with LVEF 45-50%  -Lasix 40 mg IV twice daily  -Continue Aldactone, metoprolol, hold home torsemide  -Switch home Farxiga with Jardiance  -Salt restriction, fluid restrictions  -Strict ins and outs, daily weight    #COPD  Continue home long-acting and rescue inhaler    Type 2 diabetes mellitus  On metformin and Farxiga at home  Continue Farxiga  Sliding scale insulin    #Severe pulmonary hypertension    #Scrotal swelling-scrotal ultrasound negative    Discussed management of non-obstructive    Diabetes mellitus ([address])     Essential hypertension     Pulmonary HTN ([address]) 10/2022    [facility]    Right heart failure (HCC)     Thrombocytopenia (HCC)        Past Surgical History:        Procedure Laterality Date    CARDIAC CATHETERIZATION  10/24/2022    LHC- mild non-obstructive CAD; RHC- pulm HTN    CHOLECYSTECTOMY, LAPAROSCOPIC N/A 08/12/2022    ROBOTIC CHOLECYSTECTOMY WITH INTRAOPERATIVE CHOLANGIOGRAM performed by Erica Calixto MD at [facility]       Medications Prior to Admission:   Prior to Admission medications    Medication Sig Start Date End Date Taking?  Authorizing Provider   losartan (COZAAR) 25 MG tablet Take 1 tablet by mouth daily 7/24/23   Scot Conrad DO   atorvastatin (LIPITOR) 20 MG tablet Take 1 tablet by mouth nightly 7/24/23   Catarina Pina DO   aspirin (ASPIRIN LOW DOSE) 81 MG EC tablet Take 1 tablet by mouth daily 7/24/23   Catarina Pina DO   dapagliflozin (FARXIGA) 10 MG tablet Take 1 tablet by mouth every morning 7/7/23   Scot Conrad, DO   umeclidinium-vilanterol [facility]) 62.5-25 MCG/ACT inhaler INHALE ONE DOSE BY MOUTH DAILY 7/7/23   [facility]   umeclidinium-vilanterol [facility]) 62.5-25 MCG/ACT inhaler Inhale 1 puff into the lungs daily 7/7/23   Bossman Eric MD   Torsemide 40 MG TABS Take 40 mg by mouth daily 7/6/23   Scot Conrad DO   potassium chloride (KLOR-CON M) 20 MEQ extended release tablet Take 1 tablet by mouth daily 7/5/23   Scot Conrad DO   albuterol sulfate HFA (PROVENTIL;VENTOLIN;PROAIR) 108 (90 Base) MCG/ACT inhaler INHALE TWO PUFFS BY MOUTH FOUR TIMES A DAY AS NEEDED FOR WHEEZING 5/31/23   Catarina Pina DO   metoprolol succinate (TOPROL XL) 25 MG extended release tablet Take 1 tablet by mouth daily 11/30/22   SANCHEZ Francis CNP   spironolactone (ALDACTONE) 25 MG tablet Take 1 tablet by mouth daily 11/30/22   SANCHEZ Francis CNP       Labs: Personally reviewed and interpreted for

## 2023-07-31 NOTE — PLAN OF CARE
Patient's EF (Ejection Fraction) is greater than 40%    Heart Failure Medications:  Diuretics[de-identified] Furosemide    (One of the following REQUIRED for EF </= 40%/SYSTOLIC FAILURE but MAY be used in EF% >40%/DIASTOLIC FAILURE)        ACE[de-identified] None        ARB[de-identified] Losartan         ARNI[de-identified] None    (Beta Blockers)  NON- Evidenced Based Beta Blocker (for EF% >40%/DIASTOLIC FAILURE): None    Evidenced Based Beta Blocker::(REQUIRED for EF% <40%/SYSTOLIC FAILURE) Metoprolol SUCCinate- Toprol XL  . .................................................................................................................................................. Healthy Weight Tracking - BMI + Meds 6/29/2023 7/3/2023 7/6/2023 7/6/2023 7/30/2023 7/30/2023 7/31/2023   Weight 220 lb 220 lb 224 lb 224 lb 226 lb 248 lb 14.4 oz 245 lb 12.8 oz   Height - - - - 6' 0\" 6' 0\" -   Body Mass Index - - - - 30.65 kg/m2 33.76 kg/m2 33.34 kg/m2   Some recent data might be hidden         Patient's weights and intake/output reviewed: Yes    Daily Weight log at bedside and being used: \"yes    Patient's Last Weight: 245 lbs obtained by standing scale. Difference of 3 lbs less than last documented weight. Intake/Output Summary (Last 24 hours) at 7/31/2023 1833  Last data filed at 7/31/2023 1655  Gross per 24 hour   Intake 940 ml   Output 2300 ml   Net -1360 ml       Education Booklet Provided: yes    Comorbidities Reviewed Yes    Patient has a past medical history of Acute systolic congestive heart failure ([address]), CAD (coronary artery disease), Diabetes mellitus ([address]), Essential hypertension, Pulmonary HTN ([address]), Right heart failure ([address]), and Thrombocytopenia ([address]).      >>For CHF and Comorbidity documentation on Education Time and Topics, please see Education Tab    Progressive Mobility Assessment:  What is this patient's Current Level of Mobility?: Ambulatory- with Assistance  How was this patient Mobilized today?: Edge of Bed, Up to Chair, Bedside Commode,  Up to

## 2023-07-31 NOTE — PROGRESS NOTES
----------------------------------------------------------------------  C. Data (any 2)  [x] Discussed management of the case with:    [] Imaging personally reviewed and interpreted, includes:    [x] Data Review (any 3)  [] Collateral history obtained from:    [x] All available Consultant notes from yesterday/today were reviewed  [x] All current labs were reviewed and interpreted for clinical significance   [x] Appropriate follow-up labs were ordered    Medications:  Personally reviewed in detail in conjunction w/ labs as documented for evidence of drug toxicity. Infusion Medications    sodium chloride       Scheduled Medications    aspirin  81 mg Oral Daily    atorvastatin  20 mg Oral Nightly    empagliflozin  10 mg Oral Daily    losartan  25 mg Oral Daily    metoprolol succinate  25 mg Oral Daily    spironolactone  25 mg Oral Daily    tiotropium-olodaterol  2 puff Inhalation Daily    sodium chloride flush  5-40 mL IntraVENous 2 times per day    enoxaparin  30 mg SubCUTAneous BID    insulin lispro  0-8 Units SubCUTAneous TID WC    furosemide  40 mg IntraVENous BID     PRN Meds: perflutren lipid microspheres, albuterol sulfate HFA, sodium chloride flush, sodium chloride, ondansetron **OR** ondansetron, polyethylene glycol, acetaminophen **OR** acetaminophen     Labs:  Personally reviewed and interpreted for clinical significance. Recent Labs     07/30/23 1539 07/31/23 0919   WBC 8.3 6.4   HGB 15.6 15.3   HCT 47.0 46.4    213     Recent Labs     07/30/23 1539 07/31/23 0919    140   K 3.7 3.6   CL 99 99   CO2 26 29   BUN 30* 24*   CREATININE 1.0 0.8*   CALCIUM 9.0 9.3   PHOS  --  2.8     Recent Labs     07/30/23  1539   PROBNP 619*   TROPHS 27*     No results for input(s): LABA1C in the last 72 hours. Recent Labs     07/30/23 1539 07/31/23 0919   AST 25 24   ALT 18 18   BILITOT 1.7* 2.4*   ALKPHOS 228* 229*     No results for input(s): INR, LACTA, TSH in the last 72 hours.     Urine Cultures: No results found for: LABURIN  Blood Cultures: No results found for: BC  No results found for: BLOODCULT2  Organism: No results found for: Leo Lorenzo MD

## 2023-07-31 NOTE — CONSULTS
CARDIOLOGY CONSULTATION        Patient Name: Saleem Dougherty  Primary Care physician: Pako Vogt DO    Reason for Referral/Chief Complaint: Saleem Dougherty is a 62 y.o. patient who is here to cardiology clinic today for evaluation and treatment of Congestive heart failure and hypertension. History of Present Illness:   Kodak Brandt is a 64 y.o. patient with prior medical history of HFpEF, moderate-severe PH (Mixed pre/post capillary), Moderate TR, Hypertension, Pre-DM and severe COPD. Echo 07/29/2022 EF 49%, There is systolic and diastolic septal flattening consistent with right ventricular pressure and volume overload, The right ventricle is enlarged and hypokinetic ,severe tricuspid regurgitation, right atrium is severely dilated, and small pericardial effusion noted without any hemodynamic implications. On admission: CXR no edema. EKG unchanged. Pro . BUN 30, Scr 1.0. HS trop 27. LFTs WNL. Last admission congestive heart failure 10/21/22. Saw PCP 7/6/23. Torsemide inc to 40 mg once daily at that time. Eventually he states this was decreased 20 mg once a day. There was some concern over dosing prior to this. He has steadily gained weight since that time - 20 lbs roughly (226 lbs at that time, 245 on admission). He admits to dietary indiscretions - drinking too much water due to the heat he states. He is eating too much salt. He has LE and scrotal edema. +abd fullness. Hurt to stand at work so he presented to medical attention. He notes no increasing shortness of breath while lying down. No paroxysmal nocturnal dyspnea . No chest pain. Denies palpitations, syncope. Mild dizziness with postural changes and near-syncope episode. Compliant with torsemide. Recently obtained Janet for cheaper price, been taking regularly for at least 3 months. Taking aldactone.          Past Medical History:   has a past medical history of Acute systolic congestive heart failure ([address]), CAD

## 2023-07-31 NOTE — PLAN OF CARE
Problem: Respiratory - Adult  Goal: Achieves optimal ventilation and oxygenation  Outcome: Progressing     Problem: Cardiovascular - Adult  Goal: Maintains optimal cardiac output and hemodynamic stability  Outcome: Progressing     Problem: Skin/Tissue Integrity - Adult  Goal: Incisions, wounds, or drain sites healing without S/S of infection  Outcome: Progressing

## 2023-07-31 NOTE — ED PROVIDER NOTES
(PROVENTIL;VENTOLIN;PROAIR) 108 (90 Base) MCG/ACT inhaler 1 puff (has no administration in time range)   aspirin EC tablet 81 mg (has no administration in time range)   atorvastatin (LIPITOR) tablet 20 mg (20 mg Oral Given 7/30/23 2231)   empagliflozin (JARDIANCE) tablet 10 mg (has no administration in time range)   losartan (COZAAR) tablet 25 mg (has no administration in time range)   metoprolol succinate (TOPROL XL) extended release tablet 25 mg (has no administration in time range)   spironolactone (ALDACTONE) tablet 25 mg (has no administration in time range)   tiotropium-olodaterol (STIOLTO) 2.5-2.5 MCG/ACT inhaler 2 puff (has no administration in time range)   sodium chloride flush 0.9 % injection 5-40 mL (10 mLs IntraVENous Given 7/30/23 2236)   sodium chloride flush 0.9 % injection 5-40 mL (has no administration in time range)   0.9 % sodium chloride infusion (has no administration in time range)   enoxaparin Sodium (LOVENOX) injection 30 mg (30 mg SubCUTAneous Given 7/30/23 2231)   ondansetron (ZOFRAN-ODT) disintegrating tablet 4 mg (has no administration in time range)     Or   ondansetron (ZOFRAN) injection 4 mg (has no administration in time range)   polyethylene glycol (GLYCOLAX) packet 17 g (has no administration in time range)   acetaminophen (TYLENOL) tablet 650 mg (has no administration in time range)     Or   acetaminophen (TYLENOL) suppository 650 mg (has no administration in time range)   insulin lispro (HUMALOG) injection vial 0-8 Units (has no administration in time range)   furosemide (LASIX) injection 40 mg (40 mg IntraVENous Given 7/30/23 2231)             Is this patient to be included in the SEP-1 Core Measure due to severe sepsis or septic shock? No   Exclusion criteria - the patient is NOT to be included for SEP-1 Core Measure due to:   Infection is not suspected    Chronic Conditions affecting care:    has a past medical history of Acute systolic congestive heart failure ([address]), CAD (coronary artery disease) (10/2022), Diabetes mellitus ([address]), Essential hypertension, Pulmonary HTN ([address]) (10/2022), Right heart failure ([address]), and Thrombocytopenia ([address] CONSULTS: (Who and What was discussed)  None      Social Determinants Significantly Affecting Health : None    Records Reviewed (External and Source)     CC/HPI Summary, DDx, ED Course, and Reassessment: Patient presented to the emergency room today with complaints of swollen scrotum. Patient does appear to be fluid overloaded scrotum was significantly edematous. Ultrasound was negative for any torsion or other abnormality it did show some edema. He had no leukocytosis, his BNP was actually not significantly elevated. He is on diuretics, renal function looks okay BUN was 30 but creatinine was 1.0. Patient was admitted to the hospital for further evaluation and treatment, I do feel that he would benefit from IV diuresis with monitoring of his renal function. Hospitalist agreed with the plan of care did have the patient evaluated by the attending who is in agreement. I am the Primary Clinician of Record. FINAL IMPRESSION      1. Hypervolemia, unspecified hypervolemia type          DISPOSITION/PLAN     DISPOSITION Admitted    PATIENT REFERRED TO:  No follow-up provider specified.     DISCHARGE MEDICATIONS:  Current Discharge Medication List          DISCONTINUED MEDICATIONS:  Current Discharge Medication List        STOP taking these medications       metFORMIN (GLUCOPHAGE) 500 MG tablet Comments:   Reason for Stopping:                      (Please note that portions of this note were completed with a voice recognition program.  Efforts were made to edit the dictations but occasionally words are mis-transcribed.)    SANCHEZ Nguyễn CNP (electronically signed) SANCHEZ Chew CNP  07/30/23 3237

## 2023-07-31 NOTE — PLAN OF CARE
Problem: Discharge Planning  Goal: Discharge to home or other facility with appropriate resources  Outcome: Progressing  Flowsheets (Taken 7/30/2023 2104)  Discharge to home or other facility with appropriate resources: Identify barriers to discharge with patient and caregiver     Problem: Pain  Goal: Verbalizes/displays adequate comfort level or baseline comfort level  Outcome: Progressing

## 2023-07-31 NOTE — CONSULTS
Consult Placed     Who: [facility]  Date:7/31/23  HNBI:[id number]     Electronically signed by Henri Back on 7/31/2023 at 9:07 AM

## 2023-07-31 NOTE — CARE COORDINATION
Case Management Assessment  Initial Evaluation    Date/Time of Evaluation: 7/31/2023 4:28 PM  Assessment Completed by: Ania Gutierres RN    If patient is discharged prior to next notation, then this note serves as note for discharge by case management. Patient Name: Claudine Adan                   YOB: 1965  Diagnosis: Heart failure exacerbated by sotalol ([address]) [I50.9]  Hypervolemia, unspecified hypervolemia type [E87.70]                   Date / Time: 7/30/2023  3:36 PM    Patient Admission Status: Inpatient   Readmission Risk (Low < 19, Mod (19-27), High > 27): Readmission Risk Score: 10.6    Current PCP: Nuria Szymanski, DO  PCP verified by CM? Chart Reviewed: Yes      History Provided by:    Patient Orientation:      Patient Cognition:      Hospitalization in the last 30 days (Readmission):  No    If yes, Readmission Assessment in CM Navigator will be completed. Advance Directives:      Code Status: Full Code   Patient's Primary Decision Maker is:      Primary Decision Maker: Willie [facility] [phone]    Discharge Planning:    Patient lives with: Alone Type of Home: Apartment  Primary Care Giver:    Patient Support Systems include: None   Current Financial resources:    Current community resources:    Current services prior to admission: None            Current DME:              Type of Home Care services:  None    ADLS  Prior functional level:    Current functional level:      PT AM-PAC:   /24  OT AM-PAC:   /24    Family can provide assistance at DC: Would you like Case Management to discuss the discharge plan with any other family members/significant others, and if so, who?     Plans to Return to Present Housing:    Other Identified Issues/Barriers to RETURNING to current housing:   Potential Assistance needed at discharge: N/A            Potential DME:    Patient expects to discharge to: [address] [place] [address] for transportation at discharge:      Financial    Payor:

## 2023-07-31 NOTE — PROGRESS NOTES
Patient admitted to room 369 from ED. Patient oriented to room, call light, bed rails, phone, lights and bathroom. Patient instructed about the schedule of the day including: vital sign frequency, lab draws, possible tests, frequency of MD and staff rounds, including RN/MD rounding together at bedside, daily weights, and I &O's. Patient instructed about prescribed diet, how to use 8MENU, and television. bed alarm in place, patient aware of placement and reason. Telemetry box in place, patient aware of placement and reason. Bed locked, in lowest position, side rails up 2/4, call light within reach. Will continue to monitor.

## 2023-07-31 NOTE — ED NOTES
Mr. Farida Henry is a 62 y.o. male who had concerns including Groin Swelling (Pt to ED for c/o testicular swelling, pt states this started early last week. Pt states he has chronic CHF. ). Chief Complaint   Patient presents with    Groin Swelling     Pt to ED for c/o testicular swelling, pt states this started early last week. Pt states he has chronic CHF. He is being admitted for:    Heart failure exacerbated by sotalol [facility])    His ED problem list included:    1.  Hypervolemia, unspecified hypervolemia type        Past Medical History:   Diagnosis Date    Acute systolic congestive heart failure (HCC)     CAD (coronary artery disease) 10/2022    mild non-obstructive    Diabetes mellitus ([address])     Essential hypertension     Pulmonary HTN ([address]) 10/2022    RHC    Right heart failure (HCC)     Thrombocytopenia (HCC)        Past Surgical History:   Procedure Laterality Date    CARDIAC CATHETERIZATION  10/24/2022    LHC- mild non-obstructive CAD; RHC- pulm HTN    CHOLECYSTECTOMY, LAPAROSCOPIC N/A 08/12/2022    ROBOTIC CHOLECYSTECTOMY WITH INTRAOPERATIVE CHOLANGIOGRAM performed by Patricia Enriquez MD at [address] [place] [address]       His recent abnormal labs were:    Labs Reviewed   CBC WITH AUTO DIFFERENTIAL - Abnormal; Notable for the following components:       Result Value    RDW 15.7 (*)     All other components within normal limits   COMPREHENSIVE METABOLIC PANEL W/ REFLEX TO MG FOR LOW K - Abnormal; Notable for the following components:    Glucose 146 (*)     BUN 30 (*)     Total Bilirubin 1.7 (*)     Alkaline Phosphatase 228 (*)     All other components within normal limits   TROPONIN - Abnormal; Notable for the following components:    Troponin, High Sensitivity 27 (*)     All other components within normal limits   BRAIN NATRIURETIC PEPTIDE - Abnormal; Notable for the following components:    Pro- (*)     All other components within normal limits   TROPONIN   URINALYSIS WITH REFLEX TO CULTURE       His vital

## 2023-08-01 DIAGNOSIS — E87.6 HYPOKALEMIA: ICD-10-CM

## 2023-08-01 LAB
ALBUMIN SERPL-MCNC: 3.7 G/DL (ref 3.4–5)
ALP SERPL-CCNC: 214 U/L (ref 40–129)
ALT SERPL-CCNC: 17 U/L (ref 10–40)
ANION GAP SERPL CALCULATED.3IONS-SCNC: 13 MMOL/L (ref 3–16)
AST SERPL-CCNC: 22 U/L (ref 15–37)
BACTERIA URNS QL MICRO: ABNORMAL /HPF
BILIRUB DIRECT SERPL-MCNC: 0.8 MG/DL (ref 0–0.3)
BILIRUB INDIRECT SERPL-MCNC: 1.4 MG/DL (ref 0–1)
BILIRUB SERPL-MCNC: 2.2 MG/DL (ref 0–1)
BILIRUB UR QL STRIP.AUTO: NEGATIVE
BUN SERPL-MCNC: 21 MG/DL (ref 7–20)
CALCIUM SERPL-MCNC: 9.1 MG/DL (ref 8.3–10.6)
CHLORIDE SERPL-SCNC: 103 MMOL/L (ref 99–110)
CLARITY UR: CLEAR
CO2 SERPL-SCNC: 24 MMOL/L (ref 21–32)
COLOR UR: YELLOW
CREAT SERPL-MCNC: 0.7 MG/DL (ref 0.9–1.3)
GFR SERPLBLD CREATININE-BSD FMLA CKD-EPI: >60 ML/MIN/{1.73_M2}
GLUCOSE BLD-MCNC: 107 MG/DL (ref 70–99)
GLUCOSE BLD-MCNC: 111 MG/DL (ref 70–99)
GLUCOSE BLD-MCNC: 99 MG/DL (ref 70–99)
GLUCOSE SERPL-MCNC: 106 MG/DL (ref 70–99)
GLUCOSE UR STRIP.AUTO-MCNC: 250 MG/DL
HGB UR QL STRIP.AUTO: NEGATIVE
KETONES UR STRIP.AUTO-MCNC: NEGATIVE MG/DL
LEUKOCYTE ESTERASE UR QL STRIP.AUTO: NEGATIVE
NITRITE UR QL STRIP.AUTO: NEGATIVE
PERFORMED ON: ABNORMAL
PERFORMED ON: ABNORMAL
PERFORMED ON: NORMAL
PH UR STRIP.AUTO: 6 [PH] (ref 5–8)
POTASSIUM SERPL-SCNC: 3.8 MMOL/L (ref 3.5–5.1)
PROT SERPL-MCNC: 6.9 G/DL (ref 6.4–8.2)
PROT UR STRIP.AUTO-MCNC: ABNORMAL MG/DL
RBC #/AREA URNS HPF: ABNORMAL /HPF (ref 0–4)
SODIUM SERPL-SCNC: 140 MMOL/L (ref 136–145)
SP GR UR STRIP.AUTO: 1.01 (ref 1–1.03)
UA COMPLETE W REFLEX CULTURE PNL UR: ABNORMAL
UA DIPSTICK W REFLEX MICRO PNL UR: YES
URN SPEC COLLECT METH UR: ABNORMAL
UROBILINOGEN UR STRIP-ACNC: 1 E.U./DL
WBC #/AREA URNS HPF: ABNORMAL /HPF (ref 0–5)

## 2023-08-01 PROCEDURE — 1200000000 HC SEMI PRIVATE

## 2023-08-01 PROCEDURE — 80076 HEPATIC FUNCTION PANEL: CPT

## 2023-08-01 PROCEDURE — 6360000002 HC RX W HCPCS: Performed by: NURSE PRACTITIONER

## 2023-08-01 PROCEDURE — 81001 URINALYSIS AUTO W/SCOPE: CPT

## 2023-08-01 PROCEDURE — 6360000002 HC RX W HCPCS: Performed by: STUDENT IN AN ORGANIZED HEALTH CARE EDUCATION/TRAINING PROGRAM

## 2023-08-01 PROCEDURE — 2580000003 HC RX 258: Performed by: STUDENT IN AN ORGANIZED HEALTH CARE EDUCATION/TRAINING PROGRAM

## 2023-08-01 PROCEDURE — 94640 AIRWAY INHALATION TREATMENT: CPT

## 2023-08-01 PROCEDURE — 6370000000 HC RX 637 (ALT 250 FOR IP): Performed by: STUDENT IN AN ORGANIZED HEALTH CARE EDUCATION/TRAINING PROGRAM

## 2023-08-01 PROCEDURE — 80048 BASIC METABOLIC PNL TOTAL CA: CPT

## 2023-08-01 PROCEDURE — 36415 COLL VENOUS BLD VENIPUNCTURE: CPT

## 2023-08-01 PROCEDURE — 6370000000 HC RX 637 (ALT 250 FOR IP): Performed by: INTERNAL MEDICINE

## 2023-08-01 RX ORDER — FUROSEMIDE 10 MG/ML
60 INJECTION INTRAMUSCULAR; INTRAVENOUS 2 TIMES DAILY
Status: DISCONTINUED | OUTPATIENT
Start: 2023-08-01 | End: 2023-08-02

## 2023-08-01 RX ADMIN — ENOXAPARIN SODIUM 30 MG: 100 INJECTION SUBCUTANEOUS at 09:05

## 2023-08-01 RX ADMIN — ASPIRIN 81 MG: 81 TABLET, COATED ORAL at 09:06

## 2023-08-01 RX ADMIN — EMPAGLIFLOZIN 10 MG: 10 TABLET, FILM COATED ORAL at 09:09

## 2023-08-01 RX ADMIN — FUROSEMIDE 60 MG: 10 INJECTION, SOLUTION INTRAMUSCULAR; INTRAVENOUS at 17:02

## 2023-08-01 RX ADMIN — SODIUM CHLORIDE, PRESERVATIVE FREE 10 ML: 5 INJECTION INTRAVENOUS at 09:05

## 2023-08-01 RX ADMIN — METOPROLOL SUCCINATE 25 MG: 25 TABLET, EXTENDED RELEASE ORAL at 09:06

## 2023-08-01 RX ADMIN — SPIRONOLACTONE 25 MG: 25 TABLET ORAL at 09:06

## 2023-08-01 RX ADMIN — SODIUM CHLORIDE, PRESERVATIVE FREE 10 ML: 5 INJECTION INTRAVENOUS at 21:07

## 2023-08-01 RX ADMIN — LOSARTAN POTASSIUM 25 MG: 25 TABLET, FILM COATED ORAL at 09:06

## 2023-08-01 RX ADMIN — Medication 1 PUFF: at 08:27

## 2023-08-01 RX ADMIN — TIOTROPIUM BROMIDE AND OLODATEROL 2 PUFF: 3.124; 2.736 SPRAY, METERED RESPIRATORY (INHALATION) at 08:25

## 2023-08-01 RX ADMIN — FUROSEMIDE 40 MG: 10 INJECTION, SOLUTION INTRAMUSCULAR; INTRAVENOUS at 09:05

## 2023-08-01 RX ADMIN — ENOXAPARIN SODIUM 30 MG: 100 INJECTION SUBCUTANEOUS at 21:07

## 2023-08-01 RX ADMIN — ATORVASTATIN CALCIUM 20 MG: 10 TABLET, FILM COATED ORAL at 21:07

## 2023-08-01 ASSESSMENT — PAIN SCALES - GENERAL: PAINLEVEL_OUTOF10: 0

## 2023-08-01 NOTE — TELEPHONE ENCOUNTER
Refill Request     CONFIRM preferred pharmacy with the patient. If Mail Order Rx - Pend for 90 day refill. Last Seen: Last Seen Department: 7/6/2023  Last Seen by PCP: 7/6/2023    Last Written: 07/05/2023 30 tablet 0 refills     If no future appointment scheduled:  Review the last OV with PCP and review information for follow-up visit,  Route STAFF MESSAGE with patient name to the [facility] for scheduling with the following information:            -  Timing of next visit           -  Visit type ie Physical, OV, etc           -  Diagnoses/Reason ie. COPD, HTN - Do not use MEDICATION, Follow-up or CHECK UP - Give reason for visit      Next Appointment:   Future Appointments   Date Time Provider [facility] [address]   9/7/2023  9:30 AM DO LOWELL Rose - [place]       Message sent to [address] [place] [address] to schedule appt with patient?   NO      Requested Prescriptions     Pending Prescriptions Disp Refills    KLOR-CON M20 20 MEQ extended release tablet [Pharmacy Med Name: KLOR-CON M20 TABLET] 30 tablet 0     Sig: TAKE 1 TABLET BY MOUTH DAILY

## 2023-08-01 NOTE — PROGRESS NOTES
Hospital Medicine Progress Note      Date of Admission: 7/30/2023  Hospital Day: 3    Chief Admission Complaint:  Scrotal swelling     Subjective:  sob with exertion, no chest pain    Presenting Admission History: This is a 80-year-old male with past medical history of severe pulmonary hypertension, COPD, previous smoker, HFmrEF who presented to the emergency department with scrotal swelling. Patient states that his scrotum started swelling gradually within the past week. This has been preventing him from walking prompting him to come to the emergency department. Patient also admits to having increasing shortness of breath. Patient had visited his primary care physician and his torsemide dose was increased from 20 mg to 40 mg. Patient states he has subjective weight gain but unclear how much. Patient states he is compliant with his medications. Denies any chest pain, palpitations, abdominal pain, dysuria, nausea, vomiting, fevers or chills. In the emergency department patient was initially tachycardic which resolved otherwise electrolytes and CBC unremarkable. Mild elevation of BNP actually down from his previous. Slightly elevated troponin without any symptoms.     Assessment/Plan:      Current Principal Problem:  Heart failure exacerbated by sotalol (HCC)    #Acute exacerbation of HFmrEF  -Scrotal swelling, lower extremity edema, increasing shortness of breath  -Previous echocardiogram in December 2022 with LVEF 45-50%  -Lasix 40 mg IV twice daily  -Continue Aldactone, metoprolol, hold home torsemide  -Salt restriction, fluid restrictions  -Strict ins and outs, daily weight  - moniotr electrolytes   - ECHO, tele , cardio eval appreciated      #COPD- stable   Continue home long-acting and rescue inhaler     Type 2 diabetes mellitus  Sliding scale insulin  Hba1c   start Jardiance 10 mg once daily while INPT (transition back to [facility]     #Severe pulmonary hypertension     #Scrotal

## 2023-08-01 NOTE — PLAN OF CARE
Problem: Discharge Planning  Goal: Discharge to home or other facility with appropriate resources  Outcome: Progressing     Problem: Pain  Goal: Verbalizes/displays adequate comfort level or baseline comfort level  Outcome: Progressing     Problem: Respiratory - Adult  Goal: Achieves optimal ventilation and oxygenation  8/1/2023 0047 by Renzo Will RN  Outcome: Progressing  7/31/2023 1831 by Megan Astorga RN  Outcome: Progressing     Problem: Cardiovascular - Adult  Goal: Maintains optimal cardiac output and hemodynamic stability  8/1/2023 0047 by Renzo Will RN  Outcome: Progressing  Flowsheets (Taken 7/31/2023 1952)  Maintains optimal cardiac output and hemodynamic stability: Monitor blood pressure and heart rate  7/31/2023 1831 by Megan Astorga RN  Outcome: Progressing  Goal: Absence of cardiac dysrhythmias or at baseline  Outcome: Progressing     Problem: Skin/Tissue Integrity - Adult  Goal: Incisions, wounds, or drain sites healing without S/S of infection  8/1/2023 0047 by Renzo Will RN  Outcome: Progressing  7/31/2023 1831 by Megan Astorga RN  Outcome: Progressing  Goal: Oral mucous membranes remain intact  Outcome: Progressing     Problem: Genitourinary - Adult  Goal: Absence of urinary retention  Outcome: Progressing  Goal: Urinary catheter remains patent  Outcome: Progressing     Problem: Metabolic/Fluid and Electrolytes - Adult  Goal: Electrolytes maintained within normal limits  Outcome: Progressing  Flowsheets (Taken 7/31/2023 1952)  Electrolytes maintained within normal limits: Monitor labs and assess patient for signs and symptoms of electrolyte imbalances  Goal: Hemodynamic stability and optimal renal function maintained  Outcome: Progressing  Goal: Glucose maintained within prescribed range  Outcome: Progressing     Problem: Chronic Conditions and Co-morbidities  Goal: Patient's chronic conditions and co-morbidity symptoms are monitored and maintained or

## 2023-08-01 NOTE — CARE COORDINATION
Chart reviewed day 2. Care provided by cards and IM. Pt lives alone but is IPTA and denies needs. Pt is on IV lasix, pt diuresed 1900 yesterday and 1100 so far today. Will continue to follow course for needs.  Faisal Abreu RN

## 2023-08-01 NOTE — PLAN OF CARE
Problem: Cardiovascular - Adult  Goal: Maintains optimal cardiac output and hemodynamic stability  8/1/2023 0952 by Kristopher Cabello RN  Outcome: Progressing     Patient's EF (Ejection Fraction) is greater than 40%    Heart Failure Medications:  Diuretics[de-identified] Furosemide and Spironolactone    (One of the following REQUIRED for EF </= 40%/SYSTOLIC FAILURE but MAY be used in EF% >40%/DIASTOLIC FAILURE)        ACE[de-identified] None        ARB[de-identified] Losartan         ARNI[de-identified] None    (Beta Blockers)  NON- Evidenced Based Beta Blocker (for EF% >40%/DIASTOLIC FAILURE): None    Evidenced Based Beta Blocker::(REQUIRED for EF% <40%/SYSTOLIC FAILURE) Metoprolol SUCCinate- Toprol XL  . .................................................................................................................................................. Healthy Weight Tracking - BMI + Meds 7/3/2023 7/6/2023 7/6/2023 7/30/2023 7/30/2023 7/31/2023 8/1/2023   Weight 220 lb 224 lb 224 lb 226 lb 248 lb 14.4 oz 245 lb 12.8 oz 246 lb 12.8 oz   Height - - - 6' 0\" 6' 0\" - -   Body Mass Index - - - 30.65 kg/m2 33.76 kg/m2 33.34 kg/m2 33.47 kg/m2   Some recent data might be hidden         Patient's weights and intake/output reviewed: Yes    Daily Weight log at bedside and being used: \"yes    Patient's Last Weight: 246 lbs obtained by standing scale. Difference of 1 lbs more than last documented weight. Intake/Output Summary (Last 24 hours) at 8/1/2023 7452  Last data filed at 8/1/2023 0948  Gross per 24 hour   Intake 680 ml   Output 2400 ml   Net -1720 ml       Education Booklet Provided: yes    Comorbidities Reviewed Yes    Patient has a past medical history of Acute systolic congestive heart failure ([address]), CAD (coronary artery disease), Diabetes mellitus ([address]), Essential hypertension, Pulmonary HTN ([address]), Right heart failure ([address]), and Thrombocytopenia ([address]).      >>For CHF and Comorbidity documentation on Education Time and Topics, please see Education

## 2023-08-02 ENCOUNTER — APPOINTMENT (OUTPATIENT)
Dept: GENERAL RADIOLOGY | Age: 58
End: 2023-08-02
Payer: COMMERCIAL

## 2023-08-02 ENCOUNTER — APPOINTMENT (OUTPATIENT)
Dept: ULTRASOUND IMAGING | Age: 58
End: 2023-08-02
Payer: COMMERCIAL

## 2023-08-02 LAB
ANION GAP SERPL CALCULATED.3IONS-SCNC: 12 MMOL/L (ref 3–16)
BUN SERPL-MCNC: 20 MG/DL (ref 7–20)
CALCIUM SERPL-MCNC: 9.4 MG/DL (ref 8.3–10.6)
CHLORIDE SERPL-SCNC: 100 MMOL/L (ref 99–110)
CO2 SERPL-SCNC: 26 MMOL/L (ref 21–32)
CREAT SERPL-MCNC: 0.8 MG/DL (ref 0.9–1.3)
GFR SERPLBLD CREATININE-BSD FMLA CKD-EPI: >60 ML/MIN/{1.73_M2}
GLUCOSE BLD-MCNC: 100 MG/DL (ref 70–99)
GLUCOSE BLD-MCNC: 117 MG/DL (ref 70–99)
GLUCOSE BLD-MCNC: 123 MG/DL (ref 70–99)
GLUCOSE BLD-MCNC: 294 MG/DL (ref 70–99)
GLUCOSE BLD-MCNC: 99 MG/DL (ref 70–99)
GLUCOSE SERPL-MCNC: 89 MG/DL (ref 70–99)
PERFORMED ON: ABNORMAL
PERFORMED ON: NORMAL
POTASSIUM SERPL-SCNC: 3.7 MMOL/L (ref 3.5–5.1)
SODIUM SERPL-SCNC: 138 MMOL/L (ref 136–145)

## 2023-08-02 PROCEDURE — 71046 X-RAY EXAM CHEST 2 VIEWS: CPT

## 2023-08-02 PROCEDURE — 94640 AIRWAY INHALATION TREATMENT: CPT

## 2023-08-02 PROCEDURE — 36415 COLL VENOUS BLD VENIPUNCTURE: CPT

## 2023-08-02 PROCEDURE — 6370000000 HC RX 637 (ALT 250 FOR IP): Performed by: STUDENT IN AN ORGANIZED HEALTH CARE EDUCATION/TRAINING PROGRAM

## 2023-08-02 PROCEDURE — 76705 ECHO EXAM OF ABDOMEN: CPT

## 2023-08-02 PROCEDURE — 6370000000 HC RX 637 (ALT 250 FOR IP): Performed by: NURSE PRACTITIONER

## 2023-08-02 PROCEDURE — 6370000000 HC RX 637 (ALT 250 FOR IP): Performed by: INTERNAL MEDICINE

## 2023-08-02 PROCEDURE — 94761 N-INVAS EAR/PLS OXIMETRY MLT: CPT

## 2023-08-02 PROCEDURE — 80048 BASIC METABOLIC PNL TOTAL CA: CPT

## 2023-08-02 PROCEDURE — 2700000000 HC OXYGEN THERAPY PER DAY

## 2023-08-02 PROCEDURE — 6360000002 HC RX W HCPCS: Performed by: STUDENT IN AN ORGANIZED HEALTH CARE EDUCATION/TRAINING PROGRAM

## 2023-08-02 PROCEDURE — 6360000002 HC RX W HCPCS: Performed by: NURSE PRACTITIONER

## 2023-08-02 PROCEDURE — 2580000003 HC RX 258: Performed by: STUDENT IN AN ORGANIZED HEALTH CARE EDUCATION/TRAINING PROGRAM

## 2023-08-02 PROCEDURE — 1200000000 HC SEMI PRIVATE

## 2023-08-02 RX ORDER — POTASSIUM CHLORIDE 20 MEQ/1
20 TABLET, EXTENDED RELEASE ORAL ONCE
Status: COMPLETED | OUTPATIENT
Start: 2023-08-02 | End: 2023-08-02

## 2023-08-02 RX ORDER — METOLAZONE 2.5 MG/1
2.5 TABLET ORAL ONCE
Status: COMPLETED | OUTPATIENT
Start: 2023-08-02 | End: 2023-08-02

## 2023-08-02 RX ORDER — POTASSIUM CHLORIDE 1500 MG/1
TABLET, EXTENDED RELEASE ORAL
Qty: 30 TABLET | Refills: 0 | Status: SHIPPED | OUTPATIENT
Start: 2023-08-02

## 2023-08-02 RX ORDER — FUROSEMIDE 10 MG/ML
80 INJECTION INTRAMUSCULAR; INTRAVENOUS 2 TIMES DAILY
Status: COMPLETED | OUTPATIENT
Start: 2023-08-02 | End: 2023-08-04

## 2023-08-02 RX ADMIN — ENOXAPARIN SODIUM 30 MG: 100 INJECTION SUBCUTANEOUS at 20:38

## 2023-08-02 RX ADMIN — ASPIRIN 81 MG: 81 TABLET, COATED ORAL at 09:03

## 2023-08-02 RX ADMIN — FUROSEMIDE 80 MG: 10 INJECTION, SOLUTION INTRAMUSCULAR; INTRAVENOUS at 17:08

## 2023-08-02 RX ADMIN — ENOXAPARIN SODIUM 30 MG: 100 INJECTION SUBCUTANEOUS at 09:03

## 2023-08-02 RX ADMIN — EMPAGLIFLOZIN 10 MG: 10 TABLET, FILM COATED ORAL at 09:03

## 2023-08-02 RX ADMIN — FUROSEMIDE 60 MG: 10 INJECTION, SOLUTION INTRAMUSCULAR; INTRAVENOUS at 09:49

## 2023-08-02 RX ADMIN — Medication 1 PUFF: at 09:07

## 2023-08-02 RX ADMIN — TIOTROPIUM BROMIDE AND OLODATEROL 2 PUFF: 3.124; 2.736 SPRAY, METERED RESPIRATORY (INHALATION) at 09:06

## 2023-08-02 RX ADMIN — METOLAZONE 2.5 MG: 2.5 TABLET ORAL at 17:08

## 2023-08-02 RX ADMIN — ATORVASTATIN CALCIUM 20 MG: 10 TABLET, FILM COATED ORAL at 20:38

## 2023-08-02 RX ADMIN — SODIUM CHLORIDE, PRESERVATIVE FREE 10 ML: 5 INJECTION INTRAVENOUS at 09:04

## 2023-08-02 RX ADMIN — SODIUM CHLORIDE, PRESERVATIVE FREE 10 ML: 5 INJECTION INTRAVENOUS at 20:39

## 2023-08-02 RX ADMIN — SPIRONOLACTONE 25 MG: 25 TABLET ORAL at 09:03

## 2023-08-02 RX ADMIN — METOPROLOL SUCCINATE 25 MG: 25 TABLET, EXTENDED RELEASE ORAL at 09:03

## 2023-08-02 RX ADMIN — POTASSIUM CHLORIDE 20 MEQ: 1500 TABLET, EXTENDED RELEASE ORAL at 17:08

## 2023-08-02 NOTE — PLAN OF CARE
Patient's EF (Ejection Fraction) is greater than 40%    Heart Failure Medications:  Diuretics[de-identified] Furosemide and Spironolactone    (One of the following REQUIRED for EF </= 40%/SYSTOLIC FAILURE but MAY be used in EF% >40%/DIASTOLIC FAILURE)        ACE[de-identified] None        ARB[de-identified] None         ARNI[de-identified] None    (Beta Blockers)  NON- Evidenced Based Beta Blocker (for EF% >40%/DIASTOLIC FAILURE): None    Evidenced Based Beta Blocker::(REQUIRED for EF% <40%/SYSTOLIC FAILURE) Metoprolol SUCCinate- Toprol XL  . .................................................................................................................................................. Healthy Weight Tracking - BMI + Meds 7/6/2023 7/6/2023 7/30/2023 7/30/2023 7/31/2023 8/1/2023 8/2/2023   Weight 224 lb 224 lb 226 lb 248 lb 14.4 oz 245 lb 12.8 oz 246 lb 12.8 oz 246 lb   Height - - 6' 0\" 6' 0\" - - -   Body Mass Index - - 30.65 kg/m2 33.76 kg/m2 33.34 kg/m2 33.47 kg/m2 33.36 kg/m2   Some recent data might be hidden         Patient's weights and intake/output reviewed: Yes    Daily Weight log at bedside and being used: \"yes    Patient's Last Weight: 246 lbs obtained by standing scale. Difference of 0.8 lbs less than last documented weight. Intake/Output Summary (Last 24 hours) at 8/2/2023 0579  Last data filed at 8/2/2023 0536  Gross per 24 hour   Intake 900 ml   Output 3175 ml   Net -2275 ml       Education Booklet Provided: yes    Comorbidities Reviewed Yes    Patient has a past medical history of Acute systolic congestive heart failure ([address]), CAD (coronary artery disease), Diabetes mellitus ([address]), Essential hypertension, Pulmonary HTN ([address]), Right heart failure ([address]), and Thrombocytopenia ([address]).      >>For CHF and Comorbidity documentation on Education Time and Topics, please see Education Tab    Progressive Mobility Assessment:  What is this patient's Current Level of Mobility?: Ambulatory-Up Ad Maria Dolores  How was this patient Mobilized today?: Edge of Bed, Up to

## 2023-08-02 NOTE — PLAN OF CARE
Problem: Discharge Planning  Goal: Discharge to home or other facility with appropriate resources  8/2/2023 1424 by Mason Narvaez RN  Outcome: Progressing  8/2/2023 0303 by Yuniel Plata RN  Outcome: Progressing   Plans for home when medically stable. Problem: Pain  Goal: Verbalizes/displays adequate comfort level or baseline comfort level  8/2/2023 0303 by Yuniel lPata RN  Outcome: Progressing   No c/o pain  Problem: Skin/Tissue Integrity - Adult  Goal: Incisions, wounds, or drain sites healing without S/S of infection  Outcome: Progressing  Goal: Oral mucous membranes remain intact  8/2/2023 0303 by Yuniel Plata RN  Outcome: Progressing   No new issues noted, patient with edema to scrotum and ble up to thighs, area checked for weeping and open areas, skin intact, patient encouraged to elevate legs, pillows provided.

## 2023-08-02 NOTE — PLAN OF CARE
Problem: Discharge Planning  Goal: Discharge to home or other facility with appropriate resources  Outcome: Progressing     Problem: Pain  Goal: Verbalizes/displays adequate comfort level or baseline comfort level  Outcome: Progressing     Problem: Respiratory - Adult  Goal: Achieves optimal ventilation and oxygenation  Outcome: Progressing     Problem: Skin/Tissue Integrity - Adult  Goal: Incisions, wounds, or drain sites healing without S/S of infection  Recent Flowsheet Documentation  Taken 8/1/2023 2059 by Elsie Perez RN  Incisions, Wounds, or Drain Sites Healing Without Sign and Symptoms of Infection: TWICE DAILY: Assess and document skin integrity     Problem: Skin/Tissue Integrity - Adult  Goal: Oral mucous membranes remain intact  Outcome: Progressing     Problem: Genitourinary - Adult  Goal: Urinary catheter remains patent  Outcome: Progressing

## 2023-08-02 NOTE — PLAN OF CARE
Patient's EF (Ejection Fraction) is greater than 40%    Heart Failure Medications:  Diuretics[de-identified] Furosemide and Spironolactone    (One of the following REQUIRED for EF </= 40%/SYSTOLIC FAILURE but MAY be used in EF% >40%/DIASTOLIC FAILURE)        ACE[de-identified] None        ARB[de-identified] Losartan         ARNI[de-identified] None    (Beta Blockers)  NON- Evidenced Based Beta Blocker (for EF% >40%/DIASTOLIC FAILURE): None    Evidenced Based Beta Blocker::(REQUIRED for EF% <40%/SYSTOLIC FAILURE) Metoprolol SUCCinate- Toprol XL  . .................................................................................................................................................. Healthy Weight Tracking - BMI + Meds 7/6/2023 7/6/2023 7/30/2023 7/30/2023 7/31/2023 8/1/2023 8/2/2023   Weight 224 lb 224 lb 226 lb 248 lb 14.4 oz 245 lb 12.8 oz 246 lb 12.8 oz 246 lb   Height - - 6' 0\" 6' 0\" - - -   Body Mass Index - - 30.65 kg/m2 33.76 kg/m2 33.34 kg/m2 33.47 kg/m2 33.36 kg/m2   Some recent data might be hidden         Patient's weights and intake/output reviewed: Yes    Daily Weight log at bedside and being used: \"yes    Patient's Last Weight: 246 lbs obtained by standing scale. Difference of 1 lbs less than last documented weight. Intake/Output Summary (Last 24 hours) at 8/2/2023 1428  Last data filed at 8/2/2023 1359  Gross per 24 hour   Intake 966 ml   Output 3400 ml   Net -2434 ml       Education Booklet Provided: yes    Comorbidities Reviewed Yes    Patient has a past medical history of Acute systolic congestive heart failure ([address]), CAD (coronary artery disease), Diabetes mellitus ([address]), Essential hypertension, Pulmonary HTN ([address]), Right heart failure ([address]), and Thrombocytopenia ([address]).      >>For CHF and Comorbidity documentation on Education Time and Topics, please see Education Tab    Progressive Mobility Assessment:  What is this patient's Current Level of Mobility?: Ambulatory-Up Ad Maria Dolores  How was this patient Mobilized today?: Edge of Bed, Up to Chair, and Up in Room, ambulated 15 ft                 With Whom? Nurse                 Level of Difficulty/Assistance: 1x Assist     Pt resting in bed at this time on  2 L O2. Pt with complaints of shortness of breath. Pt with pitting lower extremity edema.      Patient and/or Family's stated Goal of Care this Admission: reduce shortness of breath, increase activity tolerance, better understand heart failure and disease management, be more comfortable, and reduce lower extremity edema prior to discharge        :

## 2023-08-02 NOTE — PROGRESS NOTES
Hospital Medicine Progress Note      Date of Admission: 7/30/2023  Hospital Day: 4    Chief Admission Complaint:  Scrotal swelling     Subjective:  sob with exertion, no chest pain, on o2 , ab more distended , no ab pain    Presenting Admission History: This is a 44-year-old male with past medical history of severe pulmonary hypertension, COPD, previous smoker, HFmrEF who presented to the emergency department with scrotal swelling. Patient states that his scrotum started swelling gradually within the past week. This has been preventing him from walking prompting him to come to the emergency department. Patient also admits to having increasing shortness of breath. Patient had visited his primary care physician and his torsemide dose was increased from 20 mg to 40 mg. Patient states he has subjective weight gain but unclear how much. Patient states he is compliant with his medications. Denies any chest pain, palpitations, abdominal pain, dysuria, nausea, vomiting, fevers or chills. In the emergency department patient was initially tachycardic which resolved otherwise electrolytes and CBC unremarkable. Mild elevation of BNP actually down from his previous. Slightly elevated troponin without any symptoms.     Assessment/Plan:      Current Principal Problem:  Heart failure exacerbated by sotalol (HCC)    #Acute exacerbation of HFprEF  -Scrotal swelling, lower extremity edema, increasing shortness of breath  -Previous echocardiogram in December 2022 with LVEF 45-50%  -Lasix 40 mg IV twice daily- increased to 60  -Continue Aldactone, metoprolol, hold home torsemide/ ARB  -Salt restriction, fluid restrictions  -Strict ins and outs, daily weight  - moniotr electrolytes   -   , cardio eval appreciated and following   Needs RHC   Ab US     #COPD- Acute hypoxia - possible poor resp effort a of distended ab / CHF  COPD ex less likely   Continue home long-acting and rescue inhaler  US ab, CXR       Type 2

## 2023-08-03 LAB
ANION GAP SERPL CALCULATED.3IONS-SCNC: 11 MMOL/L (ref 3–16)
BUN SERPL-MCNC: 23 MG/DL (ref 7–20)
CALCIUM SERPL-MCNC: 9.7 MG/DL (ref 8.3–10.6)
CHLORIDE SERPL-SCNC: 97 MMOL/L (ref 99–110)
CO2 SERPL-SCNC: 30 MMOL/L (ref 21–32)
CREAT SERPL-MCNC: 0.9 MG/DL (ref 0.9–1.3)
GFR SERPLBLD CREATININE-BSD FMLA CKD-EPI: >60 ML/MIN/{1.73_M2}
GLUCOSE BLD-MCNC: 102 MG/DL (ref 70–99)
GLUCOSE BLD-MCNC: 108 MG/DL (ref 70–99)
GLUCOSE BLD-MCNC: 148 MG/DL (ref 70–99)
GLUCOSE BLD-MCNC: 92 MG/DL (ref 70–99)
GLUCOSE SERPL-MCNC: 91 MG/DL (ref 70–99)
MAGNESIUM SERPL-MCNC: 2.1 MG/DL (ref 1.8–2.4)
PERFORMED ON: ABNORMAL
PERFORMED ON: NORMAL
POTASSIUM SERPL-SCNC: 3.8 MMOL/L (ref 3.5–5.1)
SODIUM SERPL-SCNC: 138 MMOL/L (ref 136–145)

## 2023-08-03 PROCEDURE — 36415 COLL VENOUS BLD VENIPUNCTURE: CPT

## 2023-08-03 PROCEDURE — 80048 BASIC METABOLIC PNL TOTAL CA: CPT

## 2023-08-03 PROCEDURE — 6370000000 HC RX 637 (ALT 250 FOR IP): Performed by: STUDENT IN AN ORGANIZED HEALTH CARE EDUCATION/TRAINING PROGRAM

## 2023-08-03 PROCEDURE — 1200000000 HC SEMI PRIVATE

## 2023-08-03 PROCEDURE — 2700000000 HC OXYGEN THERAPY PER DAY

## 2023-08-03 PROCEDURE — 94640 AIRWAY INHALATION TREATMENT: CPT

## 2023-08-03 PROCEDURE — 83735 ASSAY OF MAGNESIUM: CPT

## 2023-08-03 PROCEDURE — 6360000002 HC RX W HCPCS: Performed by: STUDENT IN AN ORGANIZED HEALTH CARE EDUCATION/TRAINING PROGRAM

## 2023-08-03 PROCEDURE — 6370000000 HC RX 637 (ALT 250 FOR IP): Performed by: INTERNAL MEDICINE

## 2023-08-03 PROCEDURE — 94761 N-INVAS EAR/PLS OXIMETRY MLT: CPT

## 2023-08-03 PROCEDURE — 6370000000 HC RX 637 (ALT 250 FOR IP): Performed by: NURSE PRACTITIONER

## 2023-08-03 PROCEDURE — 2580000003 HC RX 258: Performed by: STUDENT IN AN ORGANIZED HEALTH CARE EDUCATION/TRAINING PROGRAM

## 2023-08-03 PROCEDURE — 6360000002 HC RX W HCPCS: Performed by: NURSE PRACTITIONER

## 2023-08-03 RX ORDER — METOLAZONE 2.5 MG/1
2.5 TABLET ORAL ONCE
Status: COMPLETED | OUTPATIENT
Start: 2023-08-03 | End: 2023-08-03

## 2023-08-03 RX ADMIN — SPIRONOLACTONE 25 MG: 25 TABLET ORAL at 10:18

## 2023-08-03 RX ADMIN — FUROSEMIDE 80 MG: 10 INJECTION, SOLUTION INTRAMUSCULAR; INTRAVENOUS at 18:03

## 2023-08-03 RX ADMIN — SODIUM CHLORIDE, PRESERVATIVE FREE 10 ML: 5 INJECTION INTRAVENOUS at 10:19

## 2023-08-03 RX ADMIN — TIOTROPIUM BROMIDE AND OLODATEROL 2 PUFF: 3.124; 2.736 SPRAY, METERED RESPIRATORY (INHALATION) at 07:57

## 2023-08-03 RX ADMIN — METOLAZONE 2.5 MG: 2.5 TABLET ORAL at 10:18

## 2023-08-03 RX ADMIN — SODIUM CHLORIDE, PRESERVATIVE FREE 10 ML: 5 INJECTION INTRAVENOUS at 20:58

## 2023-08-03 RX ADMIN — EMPAGLIFLOZIN 10 MG: 10 TABLET, FILM COATED ORAL at 10:18

## 2023-08-03 RX ADMIN — ENOXAPARIN SODIUM 30 MG: 100 INJECTION SUBCUTANEOUS at 10:19

## 2023-08-03 RX ADMIN — METOPROLOL SUCCINATE 25 MG: 25 TABLET, EXTENDED RELEASE ORAL at 10:19

## 2023-08-03 RX ADMIN — ASPIRIN 81 MG: 81 TABLET, COATED ORAL at 10:19

## 2023-08-03 RX ADMIN — ENOXAPARIN SODIUM 30 MG: 100 INJECTION SUBCUTANEOUS at 20:57

## 2023-08-03 RX ADMIN — ATORVASTATIN CALCIUM 20 MG: 10 TABLET, FILM COATED ORAL at 20:57

## 2023-08-03 RX ADMIN — FUROSEMIDE 80 MG: 10 INJECTION, SOLUTION INTRAMUSCULAR; INTRAVENOUS at 10:18

## 2023-08-03 NOTE — CARE COORDINATION
Chart reviewed day 4. Care provided by cards and IM. Pt lives alone and is IPTA. Pt on lasix 80 IV BID and diuresed 4200 yesterday. Per cards will need to continue diuresis and have a right heart cath next week. Will continue to follow for needs.  Orlando Marquez RN

## 2023-08-03 NOTE — PLAN OF CARE
Patient's EF (Ejection Fraction) is greater than 40%    Heart Failure Medications:  Diuretics[de-identified] Furosemide and Spironolactone    (One of the following REQUIRED for EF </= 40%/SYSTOLIC FAILURE but MAY be used in EF% >40%/DIASTOLIC FAILURE)        ACE[de-identified] None        ARB[de-identified] Losartan         ARNI[de-identified] None    (Beta Blockers)  NON- Evidenced Based Beta Blocker (for EF% >40%/DIASTOLIC FAILURE): None    Evidenced Based Beta Blocker::(REQUIRED for EF% <40%/SYSTOLIC FAILURE) Metoprolol SUCCinate- Toprol XL  . .................................................................................................................................................. Healthy Weight Tracking - BMI + Meds 7/6/2023 7/30/2023 7/30/2023 7/31/2023 8/1/2023 8/2/2023 8/3/2023   Weight 224 lb 226 lb 248 lb 14.4 oz 245 lb 12.8 oz 246 lb 12.8 oz 246 lb 241 lb 6.4 oz   Height - 6' 0\" 6' 0\" - - - -   Body Mass Index - 30.65 kg/m2 33.76 kg/m2 33.34 kg/m2 33.47 kg/m2 33.36 kg/m2 32.74 kg/m2   Some recent data might be hidden         Patient's weights and intake/output reviewed: Yes    Daily Weight log at bedside and being used: \"yes    Patient's Last Weight: 241.4 lbs obtained by standing scale. Difference of 4.6 lbs less than last documented weight. Intake/Output Summary (Last 24 hours) at 8/3/2023 8109  Last data filed at 8/3/2023 0530  Gross per 24 hour   Intake 1248 ml   Output 4225 ml   Net -2977 ml       Education Booklet Provided: yes    Comorbidities Reviewed Yes    Patient has a past medical history of Acute systolic congestive heart failure ([address]), CAD (coronary artery disease), Diabetes mellitus ([address]), Essential hypertension, Pulmonary HTN ([address]), Right heart failure ([address]), and Thrombocytopenia ([address]).      >>For CHF and Comorbidity documentation on Education Time and Topics, please see Education Tab    Progressive Mobility Assessment:  What is this patient's Current Level of Mobility?: Ambulatory-Up Ad Maria Dolores  How was this patient Mobilized

## 2023-08-03 NOTE — PROGRESS NOTES
Patient's EF (Ejection Fraction) is greater than 40%    Heart Failure Medications:  Diuretics[de-identified] Furosemide    (One of the following REQUIRED for EF </= 40%/SYSTOLIC FAILURE but MAY be used in EF% >40%/DIASTOLIC FAILURE)        ACE[de-identified] None        ARB[de-identified] None         ARNI[de-identified] None    (Beta Blockers)  NON- Evidenced Based Beta Blocker (for EF% >40%/DIASTOLIC FAILURE): None    Evidenced Based Beta Blocker::(REQUIRED for EF% <40%/SYSTOLIC FAILURE) Metoprolol SUCCinate- Toprol XL  . .................................................................................................................................................. Healthy Weight Tracking - BMI + Meds 7/6/2023 7/30/2023 7/30/2023 7/31/2023 8/1/2023 8/2/2023 8/3/2023   Weight 224 lb 226 lb 248 lb 14.4 oz 245 lb 12.8 oz 246 lb 12.8 oz 246 lb 241 lb 6.4 oz   Height - 6' 0\" 6' 0\" - - - -   Body Mass Index - 30.65 kg/m2 33.76 kg/m2 33.34 kg/m2 33.47 kg/m2 33.36 kg/m2 32.74 kg/m2   Some recent data might be hidden         Patient's weights and intake/output reviewed: Yes    Daily Weight log at bedside and being used: \"yes    Patient's Last Weight: 241 lbs obtained by standing scale. Difference of 5 lbs less than last documented weight. Intake/Output Summary (Last 24 hours) at 8/3/2023 1506  Last data filed at 8/3/2023 1222  Gross per 24 hour   Intake 462 ml   Output 4975 ml   Net -4513 ml       Education Booklet Provided: yes    Comorbidities Reviewed Yes    Patient has a past medical history of Acute systolic congestive heart failure ([address]), CAD (coronary artery disease), Diabetes mellitus ([address]), Essential hypertension, Pulmonary HTN ([address]), Right heart failure ([address]), and Thrombocytopenia ([address]). >>For CHF and Comorbidity documentation on Education Time and Topics, please see Education Tab    Progressive Mobility Assessment:  What is this patient's Current Level of Mobility?: Ambulatory-Up Ad Maria Dolores  How was this patient Mobilized today?  To restroom, ambulatein

## 2023-08-03 NOTE — PLAN OF CARE
Problem: Pain  Goal: Verbalizes/displays adequate comfort level or baseline comfort level  Outcome: Progressing     Problem: Discharge Planning  Goal: Discharge to home or other facility with appropriate resources  8/3/2023 0240 by Yamileth Kumar RN  Outcome: Progressing  Flowsheets (Taken 8/2/2023 1951)  Discharge to home or other facility with appropriate resources:   Identify barriers to discharge with patient and caregiver   Arrange for needed discharge resources and transportation as appropriate  8/2/2023 1424 by Nori Washington RN  Outcome: Progressing     Problem: Respiratory - Adult  Goal: Achieves optimal ventilation and oxygenation  8/3/2023 0240 by Yamileth Kumar RN  Outcome: Progressing  Flowsheets (Taken 8/2/2023 1951)  Achieves optimal ventilation and oxygenation:   Assess for changes in respiratory status   Oxygen supplementation based on oxygen saturation or arterial blood gases   Position to facilitate oxygenation and minimize respiratory effort  8/2/2023 1424 by Nori Washington RN  Outcome: Progressing     Problem: Genitourinary - Adult  Goal: Absence of urinary retention  Outcome: Progressing     Problem: Cardiovascular - Adult  Goal: Absence of cardiac dysrhythmias or at baseline  Outcome: Progressing  Flowsheets (Taken 8/2/2023 1951)  Absence of cardiac dysrhythmias or at baseline:   Monitor cardiac rate and rhythm   Administer antiarrhythmia medication and electrolyte replacement as ordered   Assess for signs of decreased cardiac output     Problem: Cardiovascular - Adult  Goal: Maintains optimal cardiac output and hemodynamic stability  Outcome: Progressing  Flowsheets (Taken 8/2/2023 1951)  Maintains optimal cardiac output and hemodynamic stability: Monitor blood pressure and heart rate     Problem: Genitourinary - Adult  Goal: Absence of urinary retention  Outcome: Progressing     Problem: Metabolic/Fluid and Electrolytes - Adult  Goal: Hemodynamic stability and optimal renal function

## 2023-08-03 NOTE — PROGRESS NOTES
Hospital Medicine Progress Note      Date of Admission: 7/30/2023  Hospital Day: 5    Chief Admission Complaint:  Scrotal swelling     Subjective:  sob with exertion, no chest pain, on o2 , ab more distended , no ab pain    Presenting Admission History: This is a 51-year-old male with past medical history of severe pulmonary hypertension, COPD, previous smoker, HFmrEF who presented to the emergency department with scrotal swelling. Patient states that his scrotum started swelling gradually within the past week. This has been preventing him from walking prompting him to come to the emergency department. Patient also admits to having increasing shortness of breath. Patient had visited his primary care physician and his torsemide dose was increased from 20 mg to 40 mg. Patient states he has subjective weight gain but unclear how much. Patient states he is compliant with his medications. Denies any chest pain, palpitations, abdominal pain, dysuria, nausea, vomiting, fevers or chills. In the emergency department patient was initially tachycardic which resolved otherwise electrolytes and CBC unremarkable. Mild elevation of BNP actually down from his previous. Slightly elevated troponin without any symptoms.     Assessment/Plan:      Current Principal Problem:  Heart failure exacerbated by sotalol (HCC)    #Acute exacerbation of HFprEF  -Scrotal swelling, lower extremity edema, increasing shortness of breath  -Previous echocardiogram in December 2022 with LVEF 45-50%  -Lasix - increased to 80 bid , metolazone once   -Continue Aldactone, metoprolol, hold home torsemide/ ARB  -Salt restriction, fluid restrictions  -Strict ins and outs, daily weight  - moniotr electrolytes   -   , cardio eval appreciated and following   Needs RHC   Ab US-small amt of ascites      #COPD- Acute hypoxia - possible poor resp effort a of distended ab / CHF  COPD ex less likely   Continue home long-acting and rescue inhaler  , 30   BUN 21* 20 23*   CREATININE 0.7* 0.8* 0.9   CALCIUM 9.1 9.4 9.7   MG  --   --  2.10       No results for input(s): PROBNP, TROPHS in the last 72 hours. No results for input(s): LABA1C in the last 72 hours. Recent Labs     08/01/23  0709   AST 22   ALT 17   BILIDIR 0.8*   BILITOT 2.2*   ALKPHOS 214*       No results for input(s): INR, LACTA, TSH in the last 72 hours.     Urine Cultures: No results found for: LABURIN  Blood Cultures: No results found for: BC  No results found for: BLOODCULT2  Organism: No results found for: Rock Lila MD

## 2023-08-03 NOTE — CONSULTS
Nutrition Education    Consult received for CHF diet education. Provided pt with written and verbal instruction on HF nutrition therapy. Discussed low sodium diet, daily weights, and fluid restriction. Pt voiced understanding. Pt states that he has not been as strict with his sodium intake at home and knows that he could do better with his choices. Pt reports that he does not add salt to foods and knows which foods are higher in sodium. Encouraged diet compliancy. Will monitor. Time spent: 5 minutes    Educated on CHF Diet  Learners: Patient  Readiness: Acceptance  Method: Explanation and Handout  Response: Verbalizes Understanding  Contact name and number provided.     Fina Sanchez RD, LD  Contact Number: [phone]

## 2023-08-04 PROBLEM — E87.70 HYPERVOLEMIA: Status: ACTIVE | Noted: 2023-08-04

## 2023-08-04 PROBLEM — I50.33 ACUTE ON CHRONIC HEART FAILURE WITH PRESERVED EJECTION FRACTION (HCC): Status: ACTIVE | Noted: 2023-08-04

## 2023-08-04 PROBLEM — I50.813 ACUTE ON CHRONIC RIGHT-SIDED HEART FAILURE (HCC): Status: ACTIVE | Noted: 2021-08-05

## 2023-08-04 LAB
ANION GAP SERPL CALCULATED.3IONS-SCNC: 11 MMOL/L (ref 3–16)
BUN SERPL-MCNC: 21 MG/DL (ref 7–20)
CALCIUM SERPL-MCNC: 9.8 MG/DL (ref 8.3–10.6)
CHLORIDE SERPL-SCNC: 91 MMOL/L (ref 99–110)
CO2 SERPL-SCNC: 35 MMOL/L (ref 21–32)
CREAT SERPL-MCNC: 0.8 MG/DL (ref 0.9–1.3)
GFR SERPLBLD CREATININE-BSD FMLA CKD-EPI: >60 ML/MIN/{1.73_M2}
GLUCOSE BLD-MCNC: 114 MG/DL (ref 70–99)
GLUCOSE BLD-MCNC: 126 MG/DL (ref 70–99)
GLUCOSE BLD-MCNC: 98 MG/DL (ref 70–99)
GLUCOSE BLD-MCNC: 98 MG/DL (ref 70–99)
GLUCOSE SERPL-MCNC: 100 MG/DL (ref 70–99)
MAGNESIUM SERPL-MCNC: 2 MG/DL (ref 1.8–2.4)
PERFORMED ON: ABNORMAL
PERFORMED ON: ABNORMAL
PERFORMED ON: NORMAL
PERFORMED ON: NORMAL
POTASSIUM SERPL-SCNC: 3.3 MMOL/L (ref 3.5–5.1)
SODIUM SERPL-SCNC: 137 MMOL/L (ref 136–145)

## 2023-08-04 PROCEDURE — 1200000000 HC SEMI PRIVATE

## 2023-08-04 PROCEDURE — 94760 N-INVAS EAR/PLS OXIMETRY 1: CPT

## 2023-08-04 PROCEDURE — 6370000000 HC RX 637 (ALT 250 FOR IP): Performed by: STUDENT IN AN ORGANIZED HEALTH CARE EDUCATION/TRAINING PROGRAM

## 2023-08-04 PROCEDURE — 6360000002 HC RX W HCPCS: Performed by: STUDENT IN AN ORGANIZED HEALTH CARE EDUCATION/TRAINING PROGRAM

## 2023-08-04 PROCEDURE — 6370000000 HC RX 637 (ALT 250 FOR IP): Performed by: INTERNAL MEDICINE

## 2023-08-04 PROCEDURE — 36415 COLL VENOUS BLD VENIPUNCTURE: CPT

## 2023-08-04 PROCEDURE — 6370000000 HC RX 637 (ALT 250 FOR IP): Performed by: NURSE PRACTITIONER

## 2023-08-04 PROCEDURE — 2700000000 HC OXYGEN THERAPY PER DAY

## 2023-08-04 PROCEDURE — 80048 BASIC METABOLIC PNL TOTAL CA: CPT

## 2023-08-04 PROCEDURE — 94761 N-INVAS EAR/PLS OXIMETRY MLT: CPT

## 2023-08-04 PROCEDURE — 83735 ASSAY OF MAGNESIUM: CPT

## 2023-08-04 PROCEDURE — 2580000003 HC RX 258: Performed by: STUDENT IN AN ORGANIZED HEALTH CARE EDUCATION/TRAINING PROGRAM

## 2023-08-04 PROCEDURE — 94640 AIRWAY INHALATION TREATMENT: CPT

## 2023-08-04 PROCEDURE — 6360000002 HC RX W HCPCS: Performed by: NURSE PRACTITIONER

## 2023-08-04 RX ORDER — FUROSEMIDE 10 MG/ML
80 INJECTION INTRAMUSCULAR; INTRAVENOUS 2 TIMES DAILY
Status: DISCONTINUED | OUTPATIENT
Start: 2023-08-05 | End: 2023-08-09

## 2023-08-04 RX ORDER — METOPROLOL SUCCINATE 25 MG/1
25 TABLET, EXTENDED RELEASE ORAL 2 TIMES DAILY
Status: DISCONTINUED | OUTPATIENT
Start: 2023-08-04 | End: 2023-08-10 | Stop reason: HOSPADM

## 2023-08-04 RX ORDER — POTASSIUM CHLORIDE 20 MEQ/1
20 TABLET, EXTENDED RELEASE ORAL 3 TIMES DAILY
Status: COMPLETED | OUTPATIENT
Start: 2023-08-04 | End: 2023-08-04

## 2023-08-04 RX ADMIN — ATORVASTATIN CALCIUM 20 MG: 10 TABLET, FILM COATED ORAL at 20:38

## 2023-08-04 RX ADMIN — ASPIRIN 81 MG: 81 TABLET, COATED ORAL at 09:03

## 2023-08-04 RX ADMIN — Medication 1 PUFF: at 08:31

## 2023-08-04 RX ADMIN — TIOTROPIUM BROMIDE AND OLODATEROL 2 PUFF: 3.124; 2.736 SPRAY, METERED RESPIRATORY (INHALATION) at 08:25

## 2023-08-04 RX ADMIN — POTASSIUM CHLORIDE 20 MEQ: 1500 TABLET, EXTENDED RELEASE ORAL at 14:53

## 2023-08-04 RX ADMIN — SODIUM CHLORIDE, PRESERVATIVE FREE 10 ML: 5 INJECTION INTRAVENOUS at 09:05

## 2023-08-04 RX ADMIN — EMPAGLIFLOZIN 10 MG: 10 TABLET, FILM COATED ORAL at 09:05

## 2023-08-04 RX ADMIN — METOPROLOL SUCCINATE 25 MG: 25 TABLET, EXTENDED RELEASE ORAL at 20:38

## 2023-08-04 RX ADMIN — SPIRONOLACTONE 25 MG: 25 TABLET ORAL at 09:03

## 2023-08-04 RX ADMIN — POTASSIUM CHLORIDE 20 MEQ: 1500 TABLET, EXTENDED RELEASE ORAL at 09:03

## 2023-08-04 RX ADMIN — ENOXAPARIN SODIUM 30 MG: 100 INJECTION SUBCUTANEOUS at 20:39

## 2023-08-04 RX ADMIN — FUROSEMIDE 80 MG: 10 INJECTION, SOLUTION INTRAMUSCULAR; INTRAVENOUS at 09:01

## 2023-08-04 RX ADMIN — SODIUM CHLORIDE, PRESERVATIVE FREE 10 ML: 5 INJECTION INTRAVENOUS at 20:38

## 2023-08-04 RX ADMIN — ENOXAPARIN SODIUM 30 MG: 100 INJECTION SUBCUTANEOUS at 09:03

## 2023-08-04 RX ADMIN — METOPROLOL SUCCINATE 25 MG: 25 TABLET, EXTENDED RELEASE ORAL at 09:03

## 2023-08-04 ASSESSMENT — PAIN SCALES - GENERAL: PAINLEVEL_OUTOF10: 0

## 2023-08-04 NOTE — CARE COORDINATION
Chart reviewed day 5. Care provided by cards and IM. Pt lives alone and is IPTA. IV lasix on hold due to contraction alkalosis and will resume tomorrow. Pt diuresed 7400 yesterday. K 3.3 today. Pt to have a RHC next week. Will continue to follow course for needs.  Colleen Devries RN

## 2023-08-04 NOTE — PLAN OF CARE
Patient's EF (Ejection Fraction) is greater than 40%    Heart Failure Medications:  Diuretics[de-identified] Furosemide and Spironolactone    (One of the following REQUIRED for EF </= 40%/SYSTOLIC FAILURE but MAY be used in EF% >40%/DIASTOLIC FAILURE)        ACE[de-identified] None        ARB[de-identified] None         ARNI[de-identified] None    (Beta Blockers)  NON- Evidenced Based Beta Blocker (for EF% >40%/DIASTOLIC FAILURE): None    Evidenced Based Beta Blocker::(REQUIRED for EF% <40%/SYSTOLIC FAILURE) Metoprolol SUCCinate- Toprol XL  . .................................................................................................................................................. Healthy Weight Tracking - BMI + Meds 7/30/2023 7/30/2023 7/31/2023 8/1/2023 8/2/2023 8/3/2023 8/4/2023   Weight 226 lb 248 lb 14.4 oz 245 lb 12.8 oz 246 lb 12.8 oz 246 lb 241 lb 6.4 oz 232 lb 8 oz   Height 6' 0\" 6' 0\" - - - - -   Body Mass Index 30.65 kg/m2 33.76 kg/m2 33.34 kg/m2 33.47 kg/m2 33.36 kg/m2 32.74 kg/m2 31.53 kg/m2   Some recent data might be hidden         Patient's weights and intake/output reviewed: Yes    Daily Weight log at bedside and being used: \"yes    Patient's Last Weight: 232 lbs obtained by standing scale. Difference of 9 lbs less than last documented weight. Intake/Output Summary (Last 24 hours) at 8/4/2023 0445  Last data filed at 8/4/2023 0008  Gross per 24 hour   Intake 840 ml   Output 7425 ml   Net -6585 ml       Education Booklet Provided: yes    Comorbidities Reviewed Yes    Patient has a past medical history of Acute systolic congestive heart failure ([address]), CAD (coronary artery disease), Diabetes mellitus ([address]), Essential hypertension, Pulmonary HTN ([address]), Right heart failure ([address]), and Thrombocytopenia ([address]).      >>For CHF and Comorbidity documentation on Education Time and Topics, please see Education Tab    Progressive Mobility Assessment:  What is this patient's Current Level of Mobility?: Ambulatory- with Assistance  How was this patient

## 2023-08-04 NOTE — PLAN OF CARE
Problem: Discharge Planning  Goal: Discharge to home or other facility with appropriate resources  8/3/2023 2335 by Best Kaye RN  Outcome: Progressing  Flowsheets (Taken 8/3/2023 2335)  Discharge to home or other facility with appropriate resources: Identify barriers to discharge with patient and caregiver  8/3/2023 1505 by Scott Parikh RN  Outcome: Progressing     Problem: Pain  Goal: Verbalizes/displays adequate comfort level or baseline comfort level  8/3/2023 2335 by Best Kaye RN  Outcome: Progressing  Flowsheets (Taken 8/3/2023 2335)  Verbalizes/displays adequate comfort level or baseline comfort level:   Encourage patient to monitor pain and request assistance   Assess pain using appropriate pain scale  Note: No pain reported  8/3/2023 1505 by Scott Parikh RN  Outcome: Progressing     Problem: Respiratory - Adult  Goal: Achieves optimal ventilation and oxygenation  8/3/2023 2335 by Best Kaye RN  Outcome: Progressing  Flowsheets (Taken 8/3/2023 2335)  Achieves optimal ventilation and oxygenation:   Assess for changes in respiratory status   Assess for changes in mentation and behavior   Position to facilitate oxygenation and minimize respiratory effort   Oxygen supplementation based on oxygen saturation or arterial blood gases   Encourage broncho-pulmonary hygiene including cough, deep breathe, incentive spirometry  Note: Oxygen saturation has been monitored per protocol, oxygen saturation has been optimal on 2 liters.   8/3/2023 1505 by Scott Parikh RN  Outcome: Progressing     Problem: Cardiovascular - Adult  Goal: Maintains optimal cardiac output and hemodynamic stability  8/3/2023 2335 by Best Kaye RN  Outcome: Progressing  Flowsheets (Taken 8/3/2023 2335)  Maintains optimal cardiac output and hemodynamic stability:   Monitor blood pressure and heart rate   Monitor urine output and notify Licensed Independent Practitioner for values outside of normal range   Assess for signs of decreased cardiac output  8/3/2023 1505 by Shantelle Ramirez RN  Outcome: Progressing  Goal: Absence of cardiac dysrhythmias or at baseline  8/3/2023 1505 by Shantelle Ramirez RN  Outcome: Progressing     Problem: Skin/Tissue Integrity - Adult  Goal: Incisions, wounds, or drain sites healing without S/S of infection  8/3/2023 1505 by Shantelle Ramirez RN  Outcome: Progressing  Goal: Oral mucous membranes remain intact  8/3/2023 1505 by Shantelle Ramirez RN  Outcome: Progressing     Problem: Genitourinary - Adult  Goal: Absence of urinary retention  8/3/2023 1505 by Shantelle Ramirez RN  Outcome: Progressing  Goal: Urinary catheter remains patent  8/3/2023 1505 by Shantelle Ramirez RN  Outcome: Progressing     Problem: Metabolic/Fluid and Electrolytes - Adult  Goal: Electrolytes maintained within normal limits  8/3/2023 1505 by Shantelle Ramirez RN  Outcome: Progressing  Goal: Hemodynamic stability and optimal renal function maintained  8/3/2023 1505 by Shantelle Ramirez RN  Outcome: Progressing  Goal: Glucose maintained within prescribed range  8/3/2023 1505 by Shantelle Ramirez RN  Outcome: Progressing     Problem: Chronic Conditions and Co-morbidities  Goal: Patient's chronic conditions and co-morbidity symptoms are monitored and maintained or improved  8/3/2023 1505 by Shantelle Ramirez RN  Outcome: Progressing     Problem: Safety - Adult  Goal: Free from fall injury  8/3/2023 2335 by Jose Martin RN  Outcome: Skip Screen (Taken 8/3/2023 2335)  Free From Fall Injury: Instruct family/caregiver on patient safety  8/3/2023 1505 by Shantelle Ramirez RN  Outcome: Progressing

## 2023-08-04 NOTE — PROGRESS NOTES
Hospital Medicine Progress Note      Date of Admission: 7/30/2023  Hospital Day: 6    Chief Admission Complaint:  Scrotal swelling     Subjective:  sob with exertion, no chest pain, on o2 , ab  still distended , no ab pain    Presenting Admission History: This is a 63-year-old male with past medical history of severe pulmonary hypertension, COPD, previous smoker, HFmrEF who presented to the emergency department with scrotal swelling. Patient states that his scrotum started swelling gradually within the past week. This has been preventing him from walking prompting him to come to the emergency department. Patient also admits to having increasing shortness of breath. Patient had visited his primary care physician and his torsemide dose was increased from 20 mg to 40 mg. Patient states he has subjective weight gain but unclear how much. Patient states he is compliant with his medications. Denies any chest pain, palpitations, abdominal pain, dysuria, nausea, vomiting, fevers or chills. In the emergency department patient was initially tachycardic which resolved otherwise electrolytes and CBC unremarkable. Mild elevation of BNP actually down from his previous. Slightly elevated troponin without any symptoms.     Assessment/Plan:      Current Principal Problem:  Heart failure exacerbated by sotalol (HCC)    #Acute exacerbation of HFprEF/ severe pul HTN/ mod TR  -Scrotal swelling, lower extremity edema,  ab distension - anasarca   -Previous echocardiogram in December 2022 with LVEF 45-50%  -Lasix - increased to 80 bid ,- on hold /2 contracted alkalosis    metolazone once 8/3  -Continue Aldactone, metoprolol, hold home torsemide/ ARB  -Salt restriction, fluid restrictions  -Strict ins and outs, daily weight  - moniotr electrolytes   - cardio eval appreciated and following   Needs RHC - next week   Ab US-small amt of ascites      #COPD- Acute hypoxia - possible poor resp effort a of distended ab / [x]No    ------------------------------------------------------------------------------------------------------------------------------------------------------------------------    MDM      [x] High (any 2)    A. Problems (any 1)  [x] Acute/Chronic Illness/injury posing threat to life or bodily function:    [] Severe exacerbation of chronic illness:    ---------------------------------------------------------------------  B. Risk of Treatment (any 1)   [x] Drugs/treatments that require intensive monitoring for toxicity include:    [] Change in code status:    [] Decision to escalate care:    [] Major surgery/procedure with associated risk factors:    ----------------------------------------------------------------------  C. Data (any 2)  [x] Discussed management of the case with:  cardio  [] Imaging personally reviewed and interpreted, includes:    [x] Data Review (any 3)  [] Collateral history obtained from:    [x] All available Consultant notes from yesterday/today were reviewed  [x] All current labs were reviewed and interpreted for clinical significance   [x] Appropriate follow-up labs were ordered    Medications:  Personally reviewed in detail in conjunction w/ labs as documented for evidence of drug toxicity.      Infusion Medications    sodium chloride       Scheduled Medications    potassium chloride  20 mEq Oral TID    empagliflozin  10 mg Oral Daily    aspirin  81 mg Oral Daily    atorvastatin  20 mg Oral Nightly    [Held by provider] losartan  25 mg Oral Daily    metoprolol succinate  25 mg Oral Daily    spironolactone  25 mg Oral Daily    tiotropium-olodaterol  2 puff Inhalation Daily    sodium chloride flush  5-40 mL IntraVENous 2 times per day    enoxaparin  30 mg SubCUTAneous BID    insulin lispro  0-8 Units SubCUTAneous TID      PRN Meds: perflutren lipid microspheres, albuterol sulfate HFA, sodium chloride flush, sodium chloride, ondansetron **OR** ondansetron, polyethylene glycol, acetaminophen

## 2023-08-05 LAB
ANION GAP SERPL CALCULATED.3IONS-SCNC: 11 MMOL/L (ref 3–16)
BUN SERPL-MCNC: 21 MG/DL (ref 7–20)
CALCIUM SERPL-MCNC: 10 MG/DL (ref 8.3–10.6)
CHLORIDE SERPL-SCNC: 91 MMOL/L (ref 99–110)
CO2 SERPL-SCNC: 32 MMOL/L (ref 21–32)
CREAT SERPL-MCNC: 0.8 MG/DL (ref 0.9–1.3)
GFR SERPLBLD CREATININE-BSD FMLA CKD-EPI: >60 ML/MIN/{1.73_M2}
GLUCOSE BLD-MCNC: 101 MG/DL (ref 70–99)
GLUCOSE BLD-MCNC: 122 MG/DL (ref 70–99)
GLUCOSE BLD-MCNC: 138 MG/DL (ref 70–99)
GLUCOSE BLD-MCNC: 97 MG/DL (ref 70–99)
GLUCOSE SERPL-MCNC: 104 MG/DL (ref 70–99)
MAGNESIUM SERPL-MCNC: 2.2 MG/DL (ref 1.8–2.4)
NT-PROBNP SERPL-MCNC: 722 PG/ML (ref 0–124)
PERFORMED ON: ABNORMAL
PERFORMED ON: NORMAL
POTASSIUM SERPL-SCNC: 3.7 MMOL/L (ref 3.5–5.1)
SODIUM SERPL-SCNC: 134 MMOL/L (ref 136–145)

## 2023-08-05 PROCEDURE — 83735 ASSAY OF MAGNESIUM: CPT

## 2023-08-05 PROCEDURE — 2580000003 HC RX 258: Performed by: STUDENT IN AN ORGANIZED HEALTH CARE EDUCATION/TRAINING PROGRAM

## 2023-08-05 PROCEDURE — 6370000000 HC RX 637 (ALT 250 FOR IP): Performed by: INTERNAL MEDICINE

## 2023-08-05 PROCEDURE — 94761 N-INVAS EAR/PLS OXIMETRY MLT: CPT

## 2023-08-05 PROCEDURE — 6370000000 HC RX 637 (ALT 250 FOR IP): Performed by: STUDENT IN AN ORGANIZED HEALTH CARE EDUCATION/TRAINING PROGRAM

## 2023-08-05 PROCEDURE — 6360000002 HC RX W HCPCS: Performed by: STUDENT IN AN ORGANIZED HEALTH CARE EDUCATION/TRAINING PROGRAM

## 2023-08-05 PROCEDURE — 6360000002 HC RX W HCPCS: Performed by: NURSE PRACTITIONER

## 2023-08-05 PROCEDURE — 80048 BASIC METABOLIC PNL TOTAL CA: CPT

## 2023-08-05 PROCEDURE — 6370000000 HC RX 637 (ALT 250 FOR IP): Performed by: NURSE PRACTITIONER

## 2023-08-05 PROCEDURE — 2700000000 HC OXYGEN THERAPY PER DAY

## 2023-08-05 PROCEDURE — 36415 COLL VENOUS BLD VENIPUNCTURE: CPT

## 2023-08-05 PROCEDURE — 1200000000 HC SEMI PRIVATE

## 2023-08-05 PROCEDURE — 83880 ASSAY OF NATRIURETIC PEPTIDE: CPT

## 2023-08-05 PROCEDURE — 94640 AIRWAY INHALATION TREATMENT: CPT

## 2023-08-05 RX ADMIN — ENOXAPARIN SODIUM 30 MG: 100 INJECTION SUBCUTANEOUS at 08:12

## 2023-08-05 RX ADMIN — FUROSEMIDE 80 MG: 10 INJECTION, SOLUTION INTRAMUSCULAR; INTRAVENOUS at 08:10

## 2023-08-05 RX ADMIN — Medication 10 ML: at 17:42

## 2023-08-05 RX ADMIN — METOPROLOL SUCCINATE 25 MG: 25 TABLET, EXTENDED RELEASE ORAL at 08:12

## 2023-08-05 RX ADMIN — Medication 1 PUFF: at 08:15

## 2023-08-05 RX ADMIN — SPIRONOLACTONE 25 MG: 25 TABLET ORAL at 08:12

## 2023-08-05 RX ADMIN — TIOTROPIUM BROMIDE AND OLODATEROL 2 PUFF: 3.124; 2.736 SPRAY, METERED RESPIRATORY (INHALATION) at 08:15

## 2023-08-05 RX ADMIN — ASPIRIN 81 MG: 81 TABLET, COATED ORAL at 08:12

## 2023-08-05 RX ADMIN — ATORVASTATIN CALCIUM 20 MG: 10 TABLET, FILM COATED ORAL at 20:31

## 2023-08-05 RX ADMIN — SODIUM CHLORIDE, PRESERVATIVE FREE 10 ML: 5 INJECTION INTRAVENOUS at 20:31

## 2023-08-05 RX ADMIN — ENOXAPARIN SODIUM 30 MG: 100 INJECTION SUBCUTANEOUS at 20:31

## 2023-08-05 RX ADMIN — EMPAGLIFLOZIN 10 MG: 10 TABLET, FILM COATED ORAL at 08:12

## 2023-08-05 RX ADMIN — FUROSEMIDE 80 MG: 10 INJECTION, SOLUTION INTRAMUSCULAR; INTRAVENOUS at 17:42

## 2023-08-05 RX ADMIN — METOPROLOL SUCCINATE 25 MG: 25 TABLET, EXTENDED RELEASE ORAL at 20:31

## 2023-08-05 NOTE — PLAN OF CARE
Patient's EF (Ejection Fraction) is greater than 40%    Heart Failure Medications:  Diuretics[de-identified] Furosemide and Spironolactone    (One of the following REQUIRED for EF </= 40%/SYSTOLIC FAILURE but MAY be used in EF% >40%/DIASTOLIC FAILURE)        ACE[de-identified] None        ARB[de-identified] None         ARNI[de-identified] None    (Beta Blockers)  NON- Evidenced Based Beta Blocker (for EF% >40%/DIASTOLIC FAILURE): None    Evidenced Based Beta Blocker::(REQUIRED for EF% <40%/SYSTOLIC FAILURE) Metoprolol SUCCinate- Toprol XL  . .................................................................................................................................................. Healthy Weight Tracking - BMI + Meds 7/30/2023 7/31/2023 8/1/2023 8/2/2023 8/3/2023 8/4/2023 8/5/2023   Weight 248 lb 14.4 oz 245 lb 12.8 oz 246 lb 12.8 oz 246 lb 241 lb 6.4 oz 232 lb 8 oz 226 lb 3.2 oz   Height 6' 0\" - - - - - -   Body Mass Index 33.76 kg/m2 33.34 kg/m2 33.47 kg/m2 33.36 kg/m2 32.74 kg/m2 31.53 kg/m2 30.68 kg/m2   Some recent data might be hidden         Patient's weights and intake/output reviewed: Yes    Daily Weight log at bedside and being used: \"yes    Patient's Last Weight: 226 lbs obtained by standing scale. Difference of 6 lbs less than last documented weight. Intake/Output Summary (Last 24 hours) at 8/5/2023 1316  Last data filed at 8/5/2023 1148  Gross per 24 hour   Intake 960 ml   Output 3625 ml   Net -2665 ml       Education Booklet Provided: yes    Comorbidities Reviewed Yes    Patient has a past medical history of Acute systolic congestive heart failure ([address]), CAD (coronary artery disease), Diabetes mellitus ([address]), Essential hypertension, Pulmonary HTN ([address]), Right heart failure ([address]), and Thrombocytopenia ([address]).      >>For CHF and Comorbidity documentation on Education Time and Topics, please see Education Tab    Progressive Mobility Assessment:  What is this patient's Current Level of Mobility?: Ambulatory-Up Ad Maria Dolores  How was this patient

## 2023-08-05 NOTE — PROGRESS NOTES
Hospital Medicine Progress Note      Date of Admission: 7/30/2023  Hospital Day: 7    Chief Admission Complaint:  Scrotal swelling     Subjective:  sob with exertion, no chest pain, on o2 , ab  still distended , no ab pain    Presenting Admission History: This is a 59-year-old male with past medical history of severe pulmonary hypertension, COPD, previous smoker, HFmrEF who presented to the emergency department with scrotal swelling. Patient states that his scrotum started swelling gradually within the past week. This has been preventing him from walking prompting him to come to the emergency department. Patient also admits to having increasing shortness of breath. Patient had visited his primary care physician and his torsemide dose was increased from 20 mg to 40 mg. Patient states he has subjective weight gain but unclear how much. Patient states he is compliant with his medications. Denies any chest pain, palpitations, abdominal pain, dysuria, nausea, vomiting, fevers or chills. In the emergency department patient was initially tachycardic which resolved otherwise electrolytes and CBC unremarkable. Mild elevation of BNP actually down from his previous. Slightly elevated troponin without any symptoms.     Assessment/Plan:      Current Principal Problem:  Heart failure exacerbated by sotalol (HCC)    #Acute exacerbation of HFprEF/ severe pul HTN/ mod TR  -Scrotal swelling, lower extremity edema,  ab distension - anasarca   -Previous echocardiogram in December 2022 with LVEF 45-50%  -Lasix - increased to 80 bid ,- on hold /2 contracted alkalosis on 8/4 - resumed    metolazone once 8/3  -Continue Aldactone, metoprolol, hold home torsemide/ ARB  -Salt restriction, fluid restrictions  -Strict ins and outs, daily weight  - moniotr electrolytes   - cardio eval appreciated and following   Needs RHC - next week   Ab US-small amt of ascites      #COPD- Acute hypoxia - possible poor resp effort a of acetaminophen     Labs:  Personally reviewed and interpreted for clinical significance. No results for input(s): WBC, HGB, HCT, PLT in the last 72 hours. Recent Labs     08/03/23  0621 08/04/23  0722 08/05/23  0651    137 134*   K 3.8 3.3* 3.7   CL 97* 91* 91*   CO2 30 35* 32   BUN 23* 21* 21*   CREATININE 0.9 0.8* 0.8*   CALCIUM 9.7 9.8 10.0   MG 2.10 2.00 2.20       Recent Labs     08/05/23  0651   PROBNP 722*       No results for input(s): LABA1C in the last 72 hours. No results for input(s): AST, ALT, BILIDIR, BILITOT, ALKPHOS in the last 72 hours. No results for input(s): INR, LACTA, TSH in the last 72 hours.     Urine Cultures: No results found for: LABURIN  Blood Cultures: No results found for: BC  No results found for: BLOODCULT2  Organism: No results found for: Cash Moreno MD

## 2023-08-05 NOTE — PROGRESS NOTES
[address]   Progress Note  Cardiology    CC: swelling    HPI: continue swelling. No sig sob    Past Medical History   has a past medical history of Acute systolic congestive heart failure ([address]), CAD (coronary artery disease), Diabetes mellitus ([address]), Essential hypertension, Pulmonary HTN ([address]), Right heart failure ([address]), and Thrombocytopenia ([address]). Past Surgical History   has a past surgical history that includes Cholecystectomy, laparoscopic (N/A, 08/12/2022) and Cardiac catheterization (10/24/2022). Social History   reports that he quit smoking about 2 years ago. His smoking use included cigarettes. He started smoking about 42 years ago. He has a 15.00 pack-year smoking history. He has never used smokeless tobacco. He reports that he does not currently use alcohol. He reports that he does not currently use drugs. Family History  Family history is unknown by patient. Medications  Prior to Admission medications    Medication Sig Start Date End Date Taking?  Authorizing Provider   KLOR-CON M20 20 MEQ extended release tablet TAKE 1 TABLET BY MOUTH DAILY 8/2/23   Gene Conrad, DO   losartan (COZAAR) 25 MG tablet Take 1 tablet by mouth daily 7/24/23   Asia Client, DO   atorvastatin (LIPITOR) 20 MG tablet Take 1 tablet by mouth nightly 7/24/23   [facility], DO   aspirin (ASPIRIN LOW DOSE) 81 MG EC tablet Take 1 tablet by mouth daily 7/24/23   [facility], DO   dapagliflozin (FARXIGA) 10 MG tablet Take 1 tablet by mouth every morning 7/7/23   Gene Conrad, DO   umeclidinium-vilanterol [facility]) 62.5-25 MCG/ACT inhaler INHALE ONE DOSE BY MOUTH DAILY 7/7/23   Gene Conrad,    umeclidinium-vilanterol [facility]) 62.5-25 MCG/ACT inhaler Inhale 1 puff into the lungs daily 7/7/23   Richard Eric MD   Torsemide 40 MG TABS Take 40 mg by mouth daily 7/6/23   Gene Conrad, DO   albuterol sulfate HFA (PROVENTIL;VENTOLIN;PROAIR) 108 (90 Base) MCG/ACT inhaler INHALE TWO

## 2023-08-05 NOTE — PROGRESS NOTES
Assessment complete. VSS. Taut abd. On 2L O2, none at baseline. Wheezy. BLE +2 edema. Denies pain. Call light in reach.

## 2023-08-05 NOTE — PLAN OF CARE
Problem: Discharge Planning  Goal: Discharge to home or other facility with appropriate resources  Outcome: Progressing     Problem: Pain  Goal: Verbalizes/displays adequate comfort level or baseline comfort level  Outcome: Progressing     Problem: Respiratory - Adult  Goal: Achieves optimal ventilation and oxygenation  Outcome: Progressing     Problem: Cardiovascular - Adult  Goal: Maintains optimal cardiac output and hemodynamic stability  Outcome: Progressing  Goal: Absence of cardiac dysrhythmias or at baseline  Outcome: Progressing     Problem: Skin/Tissue Integrity - Adult  Goal: Incisions, wounds, or drain sites healing without S/S of infection  Outcome: Progressing  Goal: Oral mucous membranes remain intact  Outcome: Progressing     Problem: Genitourinary - Adult  Goal: Absence of urinary retention  Outcome: Progressing  Goal: Urinary catheter remains patent  Outcome: Progressing     Problem: Metabolic/Fluid and Electrolytes - Adult  Goal: Electrolytes maintained within normal limits  Outcome: Progressing  Goal: Hemodynamic stability and optimal renal function maintained  Outcome: Progressing  Goal: Glucose maintained within prescribed range  Outcome: Progressing     Problem: Chronic Conditions and Co-morbidities  Goal: Patient's chronic conditions and co-morbidity symptoms are monitored and maintained or improved  Outcome: Progressing     Problem: Safety - Adult  Goal: Free from fall injury  Outcome: Progressing

## 2023-08-05 NOTE — PLAN OF CARE
Patient's EF (Ejection Fraction) is greater than 40%    Heart Failure Medications:  Diuretics[de-identified] Furosemide and Spironolactone    (One of the following REQUIRED for EF </= 40%/SYSTOLIC FAILURE but MAY be used in EF% >40%/DIASTOLIC FAILURE)        ACE[de-identified] None        ARB[de-identified] None         ARNI[de-identified] None    (Beta Blockers)  NON- Evidenced Based Beta Blocker (for EF% >40%/DIASTOLIC FAILURE): None    Evidenced Based Beta Blocker::(REQUIRED for EF% <40%/SYSTOLIC FAILURE) Metoprolol SUCCinate- Toprol XL  . .................................................................................................................................................. Healthy Weight Tracking - BMI + Meds 7/30/2023 7/31/2023 8/1/2023 8/2/2023 8/3/2023 8/4/2023 8/5/2023   Weight 248 lb 14.4 oz 245 lb 12.8 oz 246 lb 12.8 oz 246 lb 241 lb 6.4 oz 232 lb 8 oz 226 lb 3.2 oz   Height 6' 0\" - - - - - -   Body Mass Index 33.76 kg/m2 33.34 kg/m2 33.47 kg/m2 33.36 kg/m2 32.74 kg/m2 31.53 kg/m2 30.68 kg/m2   Some recent data might be hidden         Patient's weights and intake/output reviewed: Yes    Daily Weight log at bedside and being used: \"yes    Patient's Last Weight: 226 lbs obtained by standing scale. Difference of 6 lbs less than last documented weight. Intake/Output Summary (Last 24 hours) at 8/5/2023 8521  Last data filed at 8/5/2023 8736  Gross per 24 hour   Intake 1080 ml   Output 5000 ml   Net -3920 ml       Education Booklet Provided: yes    Comorbidities Reviewed Yes    Patient has a past medical history of Acute systolic congestive heart failure ([address]), CAD (coronary artery disease), Diabetes mellitus ([address]), Essential hypertension, Pulmonary HTN ([address]), Right heart failure ([address]), and Thrombocytopenia ([address]).      >>For CHF and Comorbidity documentation on Education Time and Topics, please see Education Tab    Progressive Mobility Assessment:  What is this patient's Current Level of Mobility?: Ambulatory- with Assistance  How was this patient Mobilized today?: Edge of Bed and Up in Room, ambulated 20 ft                 With Whom? Nurse, PCA, and Self                 Level of Difficulty/Assistance: 1x Assist     Pt resting in bed at this time on  2 L O2. Pt denies shortness of breath. Pt with nonpitting lower extremity edema.      Patient and/or Family's stated Goal of Care this Admission: reduce shortness of breath, increase activity tolerance, better understand heart failure and disease management, be more comfortable, and reduce lower extremity edema prior to discharge        :

## 2023-08-06 LAB
ANION GAP SERPL CALCULATED.3IONS-SCNC: 11 MMOL/L (ref 3–16)
BUN SERPL-MCNC: 25 MG/DL (ref 7–20)
CALCIUM SERPL-MCNC: 9.9 MG/DL (ref 8.3–10.6)
CHLORIDE SERPL-SCNC: 92 MMOL/L (ref 99–110)
CO2 SERPL-SCNC: 32 MMOL/L (ref 21–32)
CREAT SERPL-MCNC: 0.7 MG/DL (ref 0.9–1.3)
GFR SERPLBLD CREATININE-BSD FMLA CKD-EPI: >60 ML/MIN/{1.73_M2}
GLUCOSE BLD-MCNC: 107 MG/DL (ref 70–99)
GLUCOSE BLD-MCNC: 123 MG/DL (ref 70–99)
GLUCOSE BLD-MCNC: 165 MG/DL (ref 70–99)
GLUCOSE BLD-MCNC: 97 MG/DL (ref 70–99)
GLUCOSE SERPL-MCNC: 117 MG/DL (ref 70–99)
MAGNESIUM SERPL-MCNC: 2.2 MG/DL (ref 1.8–2.4)
PERFORMED ON: ABNORMAL
PERFORMED ON: NORMAL
POTASSIUM SERPL-SCNC: 3.5 MMOL/L (ref 3.5–5.1)
SODIUM SERPL-SCNC: 135 MMOL/L (ref 136–145)

## 2023-08-06 PROCEDURE — 6370000000 HC RX 637 (ALT 250 FOR IP): Performed by: INTERNAL MEDICINE

## 2023-08-06 PROCEDURE — 6370000000 HC RX 637 (ALT 250 FOR IP): Performed by: NURSE PRACTITIONER

## 2023-08-06 PROCEDURE — 6360000002 HC RX W HCPCS: Performed by: STUDENT IN AN ORGANIZED HEALTH CARE EDUCATION/TRAINING PROGRAM

## 2023-08-06 PROCEDURE — 94761 N-INVAS EAR/PLS OXIMETRY MLT: CPT

## 2023-08-06 PROCEDURE — 83735 ASSAY OF MAGNESIUM: CPT

## 2023-08-06 PROCEDURE — 2580000003 HC RX 258: Performed by: STUDENT IN AN ORGANIZED HEALTH CARE EDUCATION/TRAINING PROGRAM

## 2023-08-06 PROCEDURE — 1200000000 HC SEMI PRIVATE

## 2023-08-06 PROCEDURE — 36415 COLL VENOUS BLD VENIPUNCTURE: CPT

## 2023-08-06 PROCEDURE — 6370000000 HC RX 637 (ALT 250 FOR IP): Performed by: STUDENT IN AN ORGANIZED HEALTH CARE EDUCATION/TRAINING PROGRAM

## 2023-08-06 PROCEDURE — 6360000002 HC RX W HCPCS: Performed by: NURSE PRACTITIONER

## 2023-08-06 PROCEDURE — 80048 BASIC METABOLIC PNL TOTAL CA: CPT

## 2023-08-06 PROCEDURE — 94640 AIRWAY INHALATION TREATMENT: CPT

## 2023-08-06 PROCEDURE — 2700000000 HC OXYGEN THERAPY PER DAY

## 2023-08-06 RX ADMIN — ATORVASTATIN CALCIUM 20 MG: 10 TABLET, FILM COATED ORAL at 20:56

## 2023-08-06 RX ADMIN — ENOXAPARIN SODIUM 30 MG: 100 INJECTION SUBCUTANEOUS at 20:56

## 2023-08-06 RX ADMIN — METOPROLOL SUCCINATE 25 MG: 25 TABLET, EXTENDED RELEASE ORAL at 20:56

## 2023-08-06 RX ADMIN — ENOXAPARIN SODIUM 30 MG: 100 INJECTION SUBCUTANEOUS at 08:39

## 2023-08-06 RX ADMIN — TIOTROPIUM BROMIDE AND OLODATEROL 2 PUFF: 3.124; 2.736 SPRAY, METERED RESPIRATORY (INHALATION) at 08:40

## 2023-08-06 RX ADMIN — SODIUM CHLORIDE, PRESERVATIVE FREE 10 ML: 5 INJECTION INTRAVENOUS at 20:57

## 2023-08-06 RX ADMIN — FUROSEMIDE 80 MG: 10 INJECTION, SOLUTION INTRAMUSCULAR; INTRAVENOUS at 08:37

## 2023-08-06 RX ADMIN — SPIRONOLACTONE 25 MG: 25 TABLET ORAL at 08:39

## 2023-08-06 RX ADMIN — METOPROLOL SUCCINATE 25 MG: 25 TABLET, EXTENDED RELEASE ORAL at 08:39

## 2023-08-06 RX ADMIN — Medication 1 PUFF: at 08:40

## 2023-08-06 RX ADMIN — Medication 10 ML: at 17:49

## 2023-08-06 RX ADMIN — SODIUM CHLORIDE, PRESERVATIVE FREE 10 ML: 5 INJECTION INTRAVENOUS at 08:39

## 2023-08-06 RX ADMIN — FUROSEMIDE 80 MG: 10 INJECTION, SOLUTION INTRAMUSCULAR; INTRAVENOUS at 17:49

## 2023-08-06 RX ADMIN — EMPAGLIFLOZIN 10 MG: 10 TABLET, FILM COATED ORAL at 08:39

## 2023-08-06 RX ADMIN — ASPIRIN 81 MG: 81 TABLET, COATED ORAL at 08:39

## 2023-08-06 NOTE — PLAN OF CARE
Patient's EF (Ejection Fraction) is greater than 40%    Heart Failure Medications:  Diuretics[de-identified] Furosemide and Spironolactone    (One of the following REQUIRED for EF </= 40%/SYSTOLIC FAILURE but MAY be used in EF% >40%/DIASTOLIC FAILURE)        ACE[de-identified] None        ARB[de-identified] None         ARNI[de-identified] None    (Beta Blockers)  NON- Evidenced Based Beta Blocker (for EF% >40%/DIASTOLIC FAILURE): Other    Evidenced Based Beta Blocker::(REQUIRED for EF% <40%/SYSTOLIC FAILURE) Metoprolol SUCCinate- Toprol XL  . .................................................................................................................................................. Healthy Weight Tracking - BMI + Meds 7/31/2023 8/1/2023 8/2/2023 8/3/2023 8/4/2023 8/5/2023 8/6/2023   Weight 245 lb 12.8 oz 246 lb 12.8 oz 246 lb 241 lb 6.4 oz 232 lb 8 oz 226 lb 3.2 oz 220 lb 12.8 oz   Height - - - - - - -   Body Mass Index 33.34 kg/m2 33.47 kg/m2 33.36 kg/m2 32.74 kg/m2 31.53 kg/m2 30.68 kg/m2 29.95 kg/m2   Some recent data might be hidden         Patient's weights and intake/output reviewed: Yes    Daily Weight log at bedside and being used: \"yes    Patient's Last Weight: 220 lbs obtained by standing scale. Difference of 6 lbs less than last documented weight. Intake/Output Summary (Last 24 hours) at 8/6/2023 1219  Last data filed at 8/6/2023 1113  Gross per 24 hour   Intake 1140 ml   Output 4350 ml   Net -3210 ml       Education Booklet Provided: yes    Comorbidities Reviewed Yes    Patient has a past medical history of Acute systolic congestive heart failure ([address]), CAD (coronary artery disease), Diabetes mellitus ([address]), Essential hypertension, Pulmonary HTN ([address]), Right heart failure ([address]), and Thrombocytopenia ([address]).      >>For CHF and Comorbidity documentation on Education Time and Topics, please see Education Tab    Progressive Mobility Assessment:  What is this patient's Current Level of Mobility?: Ambulatory-Up Ad Maria Dolores  How was this patient Mobilized today?: Edge of Bed, Up to Chair, and  Up to Toilet/Shower, ambulated 15 ft                 With Whom? Nurse, PCA, and Self                 Level of Difficulty/Assistance: Independent     Pt sitting in bed at this time on  2 L O2. Pt denies shortness of breath. Pt with nonpitting lower extremity edema.      Patient and/or Family's stated Goal of Care this Admission: reduce shortness of breath, increase activity tolerance, be more comfortable, and reduce lower extremity edema prior to discharge        :

## 2023-08-06 NOTE — PROGRESS NOTES
Hospital Medicine Progress Note      Date of Admission: 7/30/2023  Hospital Day: 8    Chief Admission Complaint:  Scrotal swelling     Subjective:  sob with exertion, no chest pain, on o2 , ab   distension is better  , no ab pain    Presenting Admission History: This is a 80-year-old male with past medical history of severe pulmonary hypertension, COPD, previous smoker, HFmrEF who presented to the emergency department with scrotal swelling. Patient states that his scrotum started swelling gradually within the past week. This has been preventing him from walking prompting him to come to the emergency department. Patient also admits to having increasing shortness of breath. Patient had visited his primary care physician and his torsemide dose was increased from 20 mg to 40 mg. Patient states he has subjective weight gain but unclear how much. Patient states he is compliant with his medications. Denies any chest pain, palpitations, abdominal pain, dysuria, nausea, vomiting, fevers or chills. In the emergency department patient was initially tachycardic which resolved otherwise electrolytes and CBC unremarkable. Mild elevation of BNP actually down from his previous. Slightly elevated troponin without any symptoms.     Assessment/Plan:      Current Principal Problem:  Heart failure exacerbated by sotalol (HCC)    #Acute exacerbation of HFprEF/ severe pul HTN/ mod TR  -Scrotal swelling, lower extremity edema,  ab distension - anasarca   -Previous echocardiogram in December 2022 with LVEF 45-50%  -Lasix - increased to 80 bid ,- on hold /2 contracted alkalosis on 8/4 - resumed    metolazone once 8/3  -Continue Aldactone, metoprolol, hold home torsemide/ ARB  -Salt restriction, fluid restrictions  -Strict ins and outs, daily weight  - moniotr electrolytes   - cardio eval appreciated and following   Needs RHC - next week   Ab US-small amt of ascites      #COPD- Acute hypoxia - possible poor resp effort a [x]No    ------------------------------------------------------------------------------------------------------------------------------------------------------------------------    MDM      [x] High (any 2)    A. Problems (any 1)  [x] Acute/Chronic Illness/injury posing threat to life or bodily function:    [] Severe exacerbation of chronic illness:    ---------------------------------------------------------------------  B. Risk of Treatment (any 1)   [] Drugs/treatments that require intensive monitoring for toxicity include:    [] Change in code status:    [] Decision to escalate care:    [] Major surgery/procedure with associated risk factors:    ----------------------------------------------------------------------  C. Data (any 2)  [] Discussed management of the case with:    [] Imaging personally reviewed and interpreted, includes:    [x] Data Review (any 3)  [] Collateral history obtained from:    [x] All available Consultant notes from yesterday/today were reviewed  [x] All current labs were reviewed and interpreted for clinical significance   [x] Appropriate follow-up labs were ordered    Medications:  Personally reviewed in detail in conjunction w/ labs as documented for evidence of drug toxicity.      Infusion Medications    sodium chloride       Scheduled Medications    furosemide  80 mg IntraVENous BID    metoprolol succinate  25 mg Oral BID    empagliflozin  10 mg Oral Daily    aspirin  81 mg Oral Daily    atorvastatin  20 mg Oral Nightly    [Held by provider] losartan  25 mg Oral Daily    spironolactone  25 mg Oral Daily    tiotropium-olodaterol  2 puff Inhalation Daily    sodium chloride flush  5-40 mL IntraVENous 2 times per day    enoxaparin  30 mg SubCUTAneous BID    insulin lispro  0-8 Units SubCUTAneous TID      PRN Meds: perflutren lipid microspheres, albuterol sulfate HFA, sodium chloride flush, sodium chloride, ondansetron **OR** ondansetron, polyethylene glycol, acetaminophen **OR**

## 2023-08-06 NOTE — PROGRESS NOTES
[facility]   Progress Note  Cardiology    CC: swelling    HPI: continue swelling. No sig sob    Past Medical History   has a past medical history of Acute systolic congestive heart failure ([address]), CAD (coronary artery disease), Diabetes mellitus ([address]), Essential hypertension, Pulmonary HTN ([address]), Right heart failure ([address]), and Thrombocytopenia ([address]). Past Surgical History   has a past surgical history that includes Cholecystectomy, laparoscopic (N/A, 08/12/2022) and Cardiac catheterization (10/24/2022). Social History   reports that he quit smoking about 2 years ago. His smoking use included cigarettes. He started smoking about 42 years ago. He has a 15.00 pack-year smoking history. He has never used smokeless tobacco. He reports that he does not currently use alcohol. He reports that he does not currently use drugs. Family History  Family history is unknown by patient. Medications  Prior to Admission medications    Medication Sig Start Date End Date Taking?  Authorizing Provider   KLOR-CON M2Norah 20 MEQ extended release tablet TAKE 1 TABLET BY MOUTH DAILY 8/2/23   Robyn Conrad, DO   losartan (COZAAR) 25 MG tablet Take 1 tablet by mouth daily 7/24/23   Pako Vogt, DO   atorvastatin (LIPITOR) 20 MG tablet Take 1 tablet by mouth nightly 7/24/23   Pako Vogt, DO   aspirin (ASPIRIN LOW DOSE) 81 MG EC tablet Take 1 tablet by mouth daily 7/24/23   Pako Vogt, DO   dapagliflozin (FARXIGA) 10 MG tablet Take 1 tablet by mouth every morning 7/7/23   Robyn Conrad, DO   umeclidinium-vilanterol [facility]) 62.5-25 MCG/ACT inhaler INHALE ONE DOSE BY MOUTH DAILY 7/7/23   Robyn Conrad, DO   umeclidinium-vilanterol [facility]) 62.5-25 MCG/ACT inhaler Inhale 1 puff into the lungs daily 7/7/23   Vivian Ji MD   Torsemide 40 MG TABS Take 40 mg by mouth daily 7/6/23   Robyn Conrad,    albuterol sulfate HFA (PROVENTIL;VENTOLIN;PROAIR) 108 (90 Base) MCG/ACT inhaler INHALE TWO

## 2023-08-06 NOTE — PLAN OF CARE
Patient's EF (Ejection Fraction) is greater than 40%    Heart Failure Medications:  Diuretics[de-identified] Furosemide and Spironolactone    (One of the following REQUIRED for EF </= 40%/SYSTOLIC FAILURE but MAY be used in EF% >40%/DIASTOLIC FAILURE)        ACE[de-identified] None        ARB[de-identified] None         ARNI[de-identified] None    (Beta Blockers)  NON- Evidenced Based Beta Blocker (for EF% >40%/DIASTOLIC FAILURE): None    Evidenced Based Beta Blocker::(REQUIRED for EF% <40%/SYSTOLIC FAILURE) Metoprolol SUCCinate- Toprol XL  . .................................................................................................................................................. Healthy Weight Tracking - BMI + Meds 7/31/2023 8/1/2023 8/2/2023 8/3/2023 8/4/2023 8/5/2023 8/6/2023   Weight 245 lb 12.8 oz 246 lb 12.8 oz 246 lb 241 lb 6.4 oz 232 lb 8 oz 226 lb 3.2 oz 220 lb 12.8 oz   Height - - - - - - -   Body Mass Index 33.34 kg/m2 33.47 kg/m2 33.36 kg/m2 32.74 kg/m2 31.53 kg/m2 30.68 kg/m2 29.95 kg/m2   Some recent data might be hidden         Patient's weights and intake/output reviewed: Yes    Daily Weight log at bedside and being used: \"yes    Patient's Last Weight: [id number] oz obtained by standing scale. Difference of 6 lbs less than last documented weight. Intake/Output Summary (Last 24 hours) at 8/6/2023 0567  Last data filed at 8/6/2023 0514  Gross per 24 hour   Intake 1140 ml   Output 4450 ml   Net -3310 ml       Education Booklet Provided: yes    Comorbidities Reviewed Yes    Patient has a past medical history of Acute systolic congestive heart failure ([address]), CAD (coronary artery disease), Diabetes mellitus ([address]), Essential hypertension, Pulmonary HTN ([address]), Right heart failure ([address]), and Thrombocytopenia ([address]).      >>For CHF and Comorbidity documentation on Education Time and Topics, please see Education Tab    Progressive Mobility Assessment:  What is this patient's Current Level of Mobility?: Ambulatory-Up Ad Maria Dolores  How was this patient Mobilized today?: Up in Room, ambulated 10   ft                 With Whom? Self                 Level of Difficulty/Assistance: Independent     Pt sitting in bed at this time on  2 L O2. Pt denies shortness of breath. Pt with nonpitting lower extremity edema.      Patient and/or Family's stated Goal of Care this Admission: reduce shortness of breath, increase activity tolerance, better understand heart failure and disease management, be more comfortable, and reduce lower extremity edema prior to discharge        Spent five minutes discussing importance on monitoring oral intake and output and weighing self daily at same time of day

## 2023-08-07 LAB
ANION GAP SERPL CALCULATED.3IONS-SCNC: 13 MMOL/L (ref 3–16)
BUN SERPL-MCNC: 23 MG/DL (ref 7–20)
CALCIUM SERPL-MCNC: 10 MG/DL (ref 8.3–10.6)
CHLORIDE SERPL-SCNC: 93 MMOL/L (ref 99–110)
CO2 SERPL-SCNC: 31 MMOL/L (ref 21–32)
CREAT SERPL-MCNC: 0.8 MG/DL (ref 0.9–1.3)
GFR SERPLBLD CREATININE-BSD FMLA CKD-EPI: >60 ML/MIN/{1.73_M2}
GLUCOSE BLD-MCNC: 115 MG/DL (ref 70–99)
GLUCOSE BLD-MCNC: 131 MG/DL (ref 70–99)
GLUCOSE BLD-MCNC: 180 MG/DL (ref 70–99)
GLUCOSE BLD-MCNC: 89 MG/DL (ref 70–99)
GLUCOSE SERPL-MCNC: 130 MG/DL (ref 70–99)
MAGNESIUM SERPL-MCNC: 2.3 MG/DL (ref 1.8–2.4)
PERFORMED ON: ABNORMAL
PERFORMED ON: NORMAL
POTASSIUM SERPL-SCNC: 3.6 MMOL/L (ref 3.5–5.1)
SODIUM SERPL-SCNC: 137 MMOL/L (ref 136–145)

## 2023-08-07 PROCEDURE — 94761 N-INVAS EAR/PLS OXIMETRY MLT: CPT

## 2023-08-07 PROCEDURE — 2580000003 HC RX 258: Performed by: STUDENT IN AN ORGANIZED HEALTH CARE EDUCATION/TRAINING PROGRAM

## 2023-08-07 PROCEDURE — 1200000000 HC SEMI PRIVATE

## 2023-08-07 PROCEDURE — 6360000002 HC RX W HCPCS: Performed by: STUDENT IN AN ORGANIZED HEALTH CARE EDUCATION/TRAINING PROGRAM

## 2023-08-07 PROCEDURE — 80048 BASIC METABOLIC PNL TOTAL CA: CPT

## 2023-08-07 PROCEDURE — 83735 ASSAY OF MAGNESIUM: CPT

## 2023-08-07 PROCEDURE — 6370000000 HC RX 637 (ALT 250 FOR IP): Performed by: STUDENT IN AN ORGANIZED HEALTH CARE EDUCATION/TRAINING PROGRAM

## 2023-08-07 PROCEDURE — 2700000000 HC OXYGEN THERAPY PER DAY

## 2023-08-07 PROCEDURE — 6370000000 HC RX 637 (ALT 250 FOR IP): Performed by: INTERNAL MEDICINE

## 2023-08-07 PROCEDURE — 94640 AIRWAY INHALATION TREATMENT: CPT

## 2023-08-07 PROCEDURE — 36415 COLL VENOUS BLD VENIPUNCTURE: CPT

## 2023-08-07 PROCEDURE — 6360000002 HC RX W HCPCS: Performed by: NURSE PRACTITIONER

## 2023-08-07 PROCEDURE — 6370000000 HC RX 637 (ALT 250 FOR IP): Performed by: NURSE PRACTITIONER

## 2023-08-07 RX ORDER — METOLAZONE 2.5 MG/1
2.5 TABLET ORAL ONCE
Status: COMPLETED | OUTPATIENT
Start: 2023-08-07 | End: 2023-08-07

## 2023-08-07 RX ORDER — DIAPER,BRIEF,INFANT-TODD,DISP
EACH MISCELLANEOUS 2 TIMES DAILY
Status: DISCONTINUED | OUTPATIENT
Start: 2023-08-07 | End: 2023-08-10 | Stop reason: HOSPADM

## 2023-08-07 RX ADMIN — TIOTROPIUM BROMIDE AND OLODATEROL 2 PUFF: 3.124; 2.736 SPRAY, METERED RESPIRATORY (INHALATION) at 08:55

## 2023-08-07 RX ADMIN — FUROSEMIDE 80 MG: 10 INJECTION, SOLUTION INTRAMUSCULAR; INTRAVENOUS at 16:15

## 2023-08-07 RX ADMIN — ATORVASTATIN CALCIUM 20 MG: 10 TABLET, FILM COATED ORAL at 20:27

## 2023-08-07 RX ADMIN — EMPAGLIFLOZIN 10 MG: 10 TABLET, FILM COATED ORAL at 09:50

## 2023-08-07 RX ADMIN — ENOXAPARIN SODIUM 30 MG: 100 INJECTION SUBCUTANEOUS at 20:27

## 2023-08-07 RX ADMIN — METOLAZONE 2.5 MG: 2.5 TABLET ORAL at 16:15

## 2023-08-07 RX ADMIN — ENOXAPARIN SODIUM 30 MG: 100 INJECTION SUBCUTANEOUS at 09:49

## 2023-08-07 RX ADMIN — SODIUM CHLORIDE, PRESERVATIVE FREE 10 ML: 5 INJECTION INTRAVENOUS at 20:28

## 2023-08-07 RX ADMIN — HYDROCORTISONE: 10 CREAM TOPICAL at 20:28

## 2023-08-07 RX ADMIN — Medication 1 PUFF: at 08:55

## 2023-08-07 RX ADMIN — METOPROLOL SUCCINATE 25 MG: 25 TABLET, EXTENDED RELEASE ORAL at 09:50

## 2023-08-07 RX ADMIN — SODIUM CHLORIDE, PRESERVATIVE FREE 10 ML: 5 INJECTION INTRAVENOUS at 09:51

## 2023-08-07 RX ADMIN — FUROSEMIDE 80 MG: 10 INJECTION, SOLUTION INTRAMUSCULAR; INTRAVENOUS at 09:49

## 2023-08-07 RX ADMIN — SPIRONOLACTONE 25 MG: 25 TABLET ORAL at 09:50

## 2023-08-07 RX ADMIN — ASPIRIN 81 MG: 81 TABLET, COATED ORAL at 09:50

## 2023-08-07 RX ADMIN — METOPROLOL SUCCINATE 25 MG: 25 TABLET, EXTENDED RELEASE ORAL at 20:27

## 2023-08-07 NOTE — CARE COORDINATION
Chart reviewed day 8. Care provided by cards and IM. Pt is from home alone and IPTA. On Lasix 80 BID. Pt diuresed 3700 yesterday. Pt to have Alichester when fully diuresed, likely Wednesday. Will continue to follow course for needs.  Magi Gonzalez RN

## 2023-08-07 NOTE — PROGRESS NOTES
Comprehensive Nutrition Assessment    Type and Reason for Visit:  Initial    Nutrition Recommendations/Plan:   Continue low sodium diet  Encourage po intakes  Monitor po intakes, nutrition adequacy, weights, pertinent labs, BMs     Malnutrition Assessment:  Malnutrition Status:  No malnutrition (08/07/23 1433)      Nutrition Assessment:    LOS assessment. Pt with PMHx of severe pulmonary hypertension, COPD, HF who presented to the emergency department with scrotal swelling. Currently on a low sodium diet with 1500 ml FR. PO intakes % per EMR. Pt endorses good appetite now and PTA. Pt provided with HF nutrition therapy education this admission. Pt denied having any nutrition questions or needs. Will monitor. Nutrition Related Findings:    Non-pitting BLE edema Wound Type: None       Current Nutrition Intake & Therapies:    Average Meal Intake: %  Average Supplements Intake: None Ordered  ADULT DIET; Regular; Low Sodium (2 gm); 1500 ml    Anthropometric Measures:  Height: 6' (182.9 cm)  Ideal Body Weight (IBW): 178 lbs (81 kg)       Current Body Weight: 216 lb 11.2 oz (98.3 kg),   IBW. Weight Source: Standing Scale  Current BMI (kg/m2): 29.4                          BMI Categories: Overweight (BMI 25.0-29. 9)    Nutrition Diagnosis:   No nutrition diagnosis at this time     Nutrition Interventions:   Food and/or Nutrient Delivery: Continue Current Diet  Nutrition Education/Counseling: No recommendation at this time, Education completed  Coordination of Nutrition Care: Continue to monitor while inpatient       Goals:     Goals: PO intake 50% or greater, prior to discharge       Nutrition Monitoring and Evaluation:   Behavioral-Environmental Outcomes: None Identified  Food/Nutrient Intake Outcomes: Food and Nutrient Intake  Physical Signs/Symptoms Outcomes: Biochemical Data, Weight    Discharge Planning:    Continue current diet     Amber Alarcon RD, LD  Contact: [phone]

## 2023-08-07 NOTE — PROGRESS NOTES
4 Eyes Skin Assessment     NAME:  Alex See  YOB: 1965  MEDICAL RECORD NUMBER:  [id number]    The patient is being assessed for  Shift Handoff    I agree that at least one RN has performed a thorough Head to Toe Skin Assessment on the patient. ALL assessment sites listed below have been assessed. Areas assessed by both nurses:    Head, Face, Ears, Shoulders, Back, Chest, Arms, Elbows, Hands, Sacrum. Buttock, Coccyx, Ischium, Legs. Feet and Heels, Under Medical Devices , and Other . Does the Patient have a Wound?  No noted wound(s)       Paul Prevention initiated by RN: No  Wound Care Orders initiated by RN: No    Pressure Injury (Stage 3,4, Unstageable, DTI, NWPT, and Complex wounds) if present, place Wound referral order by RN under : No    New Ostomies, if present place, Ostomy referral order under : No     Nurse 1 eSignature: Electronically signed by Shelley Garcia RN on 8/7/23 at 12:05 PM EDT    **SHARE this note so that the co-signing nurse can place an eSignature**    Nurse 2 eSignature: Electronically signed by Gene Castro RN on 8/7/23 at 12:19 PM EDT

## 2023-08-07 NOTE — PROGRESS NOTES
O2 titrated to 1 liter at this time per MD request. See flowsheet. Patient denies SOB. Currently 91%.  Electronically signed by Nikos Chang RN on 8/7/23 at 10:03 AM EDT

## 2023-08-08 LAB
ALBUMIN SERPL-MCNC: 4.3 G/DL (ref 3.4–5)
ANION GAP SERPL CALCULATED.3IONS-SCNC: 15 MMOL/L (ref 3–16)
BUN SERPL-MCNC: 24 MG/DL (ref 7–20)
CALCIUM SERPL-MCNC: 10 MG/DL (ref 8.3–10.6)
CHLORIDE SERPL-SCNC: 94 MMOL/L (ref 99–110)
CO2 SERPL-SCNC: 29 MMOL/L (ref 21–32)
CREAT SERPL-MCNC: 0.9 MG/DL (ref 0.9–1.3)
GFR SERPLBLD CREATININE-BSD FMLA CKD-EPI: >60 ML/MIN/{1.73_M2}
GLUCOSE BLD-MCNC: 102 MG/DL (ref 70–99)
GLUCOSE BLD-MCNC: 109 MG/DL (ref 70–99)
GLUCOSE BLD-MCNC: 123 MG/DL (ref 70–99)
GLUCOSE BLD-MCNC: 97 MG/DL (ref 70–99)
GLUCOSE SERPL-MCNC: 103 MG/DL (ref 70–99)
NT-PROBNP SERPL-MCNC: 853 PG/ML (ref 0–124)
PERFORMED ON: ABNORMAL
PERFORMED ON: NORMAL
PHOSPHATE SERPL-MCNC: 4.3 MG/DL (ref 2.5–4.9)
POTASSIUM SERPL-SCNC: 3.8 MMOL/L (ref 3.5–5.1)
SODIUM SERPL-SCNC: 138 MMOL/L (ref 136–145)

## 2023-08-08 PROCEDURE — 6370000000 HC RX 637 (ALT 250 FOR IP): Performed by: STUDENT IN AN ORGANIZED HEALTH CARE EDUCATION/TRAINING PROGRAM

## 2023-08-08 PROCEDURE — 94761 N-INVAS EAR/PLS OXIMETRY MLT: CPT

## 2023-08-08 PROCEDURE — 6360000002 HC RX W HCPCS: Performed by: NURSE PRACTITIONER

## 2023-08-08 PROCEDURE — 36415 COLL VENOUS BLD VENIPUNCTURE: CPT

## 2023-08-08 PROCEDURE — 6360000002 HC RX W HCPCS: Performed by: STUDENT IN AN ORGANIZED HEALTH CARE EDUCATION/TRAINING PROGRAM

## 2023-08-08 PROCEDURE — 6370000000 HC RX 637 (ALT 250 FOR IP): Performed by: INTERNAL MEDICINE

## 2023-08-08 PROCEDURE — 2700000000 HC OXYGEN THERAPY PER DAY

## 2023-08-08 PROCEDURE — 80069 RENAL FUNCTION PANEL: CPT

## 2023-08-08 PROCEDURE — 94640 AIRWAY INHALATION TREATMENT: CPT

## 2023-08-08 PROCEDURE — 1200000000 HC SEMI PRIVATE

## 2023-08-08 PROCEDURE — 6370000000 HC RX 637 (ALT 250 FOR IP): Performed by: NURSE PRACTITIONER

## 2023-08-08 PROCEDURE — 83880 ASSAY OF NATRIURETIC PEPTIDE: CPT

## 2023-08-08 PROCEDURE — 2580000003 HC RX 258: Performed by: STUDENT IN AN ORGANIZED HEALTH CARE EDUCATION/TRAINING PROGRAM

## 2023-08-08 RX ADMIN — HYDROCORTISONE: 10 CREAM TOPICAL at 21:02

## 2023-08-08 RX ADMIN — Medication 1 PUFF: at 07:22

## 2023-08-08 RX ADMIN — TIOTROPIUM BROMIDE AND OLODATEROL 2 PUFF: 3.124; 2.736 SPRAY, METERED RESPIRATORY (INHALATION) at 07:22

## 2023-08-08 RX ADMIN — SODIUM CHLORIDE, PRESERVATIVE FREE 10 ML: 5 INJECTION INTRAVENOUS at 09:28

## 2023-08-08 RX ADMIN — EMPAGLIFLOZIN 10 MG: 10 TABLET, FILM COATED ORAL at 09:28

## 2023-08-08 RX ADMIN — METOPROLOL SUCCINATE 25 MG: 25 TABLET, EXTENDED RELEASE ORAL at 09:28

## 2023-08-08 RX ADMIN — ENOXAPARIN SODIUM 30 MG: 100 INJECTION SUBCUTANEOUS at 09:30

## 2023-08-08 RX ADMIN — ASPIRIN 81 MG: 81 TABLET, COATED ORAL at 09:28

## 2023-08-08 RX ADMIN — METOPROLOL SUCCINATE 25 MG: 25 TABLET, EXTENDED RELEASE ORAL at 21:01

## 2023-08-08 RX ADMIN — HYDROCORTISONE: 10 CREAM TOPICAL at 09:28

## 2023-08-08 RX ADMIN — SPIRONOLACTONE 25 MG: 25 TABLET ORAL at 09:28

## 2023-08-08 RX ADMIN — FUROSEMIDE 80 MG: 10 INJECTION, SOLUTION INTRAMUSCULAR; INTRAVENOUS at 09:28

## 2023-08-08 RX ADMIN — ENOXAPARIN SODIUM 30 MG: 100 INJECTION SUBCUTANEOUS at 21:01

## 2023-08-08 RX ADMIN — SODIUM CHLORIDE, PRESERVATIVE FREE 10 ML: 5 INJECTION INTRAVENOUS at 21:02

## 2023-08-08 RX ADMIN — FUROSEMIDE 80 MG: 10 INJECTION, SOLUTION INTRAMUSCULAR; INTRAVENOUS at 17:16

## 2023-08-08 RX ADMIN — ATORVASTATIN CALCIUM 20 MG: 10 TABLET, FILM COATED ORAL at 21:01

## 2023-08-08 ASSESSMENT — PAIN SCALES - GENERAL
PAINLEVEL_OUTOF10: 0

## 2023-08-08 NOTE — PLAN OF CARE
Problem: Cardiovascular - Adult  Goal: Maintains optimal cardiac output and hemodynamic stability  Outcome: Progressing     Patient's EF (Ejection Fraction) is greater than 40%    Heart Failure Medications:  Diuretics[de-identified] Furosemide and Spironolactone    (One of the following REQUIRED for EF </= 40%/SYSTOLIC FAILURE but MAY be used in EF% >40%/DIASTOLIC FAILURE)        ACE[de-identified] None        ARB[de-identified] None         ARNI[de-identified] None    (Beta Blockers)  NON- Evidenced Based Beta Blocker (for EF% >40%/DIASTOLIC FAILURE): None    Evidenced Based Beta Blocker::(REQUIRED for EF% <40%/SYSTOLIC FAILURE) Metoprolol SUCCinate- Toprol XL  . .................................................................................................................................................. Healthy Weight Tracking - BMI + Meds 8/2/2023 8/3/2023 8/4/2023 8/5/2023 8/6/2023 8/7/2023 8/8/2023   Weight 246 lb 241 lb 6.4 oz 232 lb 8 oz 226 lb 3.2 oz 220 lb 12.8 oz 216 lb 11.2 oz 212 lb   Height - - - - - - -   Body Mass Index 33.36 kg/m2 32.74 kg/m2 31.53 kg/m2 30.68 kg/m2 29.95 kg/m2 29.39 kg/m2 28.75 kg/m2   Some recent data might be hidden         Patient's weights and intake/output reviewed: Yes    Daily Weight log at bedside and being used: \"yes    Patient's Last Weight: 212 lbs obtained by standing scale. Difference of 4 lbs less than last documented weight. Intake/Output Summary (Last 24 hours) at 8/8/2023 1403  Last data filed at 8/8/2023 1243  Gross per 24 hour   Intake 0 ml   Output 4300 ml   Net -4300 ml       Education Booklet Provided: yes    Comorbidities Reviewed Yes    Patient has a past medical history of Acute systolic congestive heart failure ([address]), CAD (coronary artery disease), Diabetes mellitus ([address]), Essential hypertension, Pulmonary HTN ([address]), Right heart failure ([address]), and Thrombocytopenia ([address]).      >>For CHF and Comorbidity documentation on Education Time and Topics, please see Education Tab    Progressive Mobility

## 2023-08-08 NOTE — PROGRESS NOTES
[x]No    ------------------------------------------------------------------------------------------------------------------------------------------------------------------------    MDM      [x] High (any 2)    A. Problems (any 1)  [x] Acute/Chronic Illness/injury posing threat to life or bodily function:    [] Severe exacerbation of chronic illness:    ---------------------------------------------------------------------  B. Risk of Treatment (any 1)   [] Drugs/treatments that require intensive monitoring for toxicity include:    [] Change in code status:    [] Decision to escalate care:    [] Major surgery/procedure with associated risk factors:    ----------------------------------------------------------------------  C. Data (any 2)  [] Discussed management of the case with:    [] Imaging personally reviewed and interpreted, includes:    [x] Data Review (any 3)  [] Collateral history obtained from:    [x] All available Consultant notes from yesterday/today were reviewed  [x] All current labs were reviewed and interpreted for clinical significance   [x] Appropriate follow-up labs were ordered    Medications:  Personally reviewed in detail in conjunction w/ labs as documented for evidence of drug toxicity.      Infusion Medications    sodium chloride       Scheduled Medications    hydrocortisone   Topical BID    furosemide  80 mg IntraVENous BID    metoprolol succinate  25 mg Oral BID    empagliflozin  10 mg Oral Daily    aspirin  81 mg Oral Daily    atorvastatin  20 mg Oral Nightly    [Held by provider] losartan  25 mg Oral Daily    spironolactone  25 mg Oral Daily    tiotropium-olodaterol  2 puff Inhalation Daily    sodium chloride flush  5-40 mL IntraVENous 2 times per day    enoxaparin  30 mg SubCUTAneous BID    insulin lispro  0-8 Units SubCUTAneous TID      PRN Meds: perflutren lipid microspheres, albuterol sulfate HFA, sodium chloride flush, sodium chloride, ondansetron **OR** ondansetron, polyethylene glycol, acetaminophen **OR** acetaminophen     Labs:  Personally reviewed and interpreted for clinical significance. No results for input(s): WBC, HGB, HCT, PLT in the last 72 hours. Recent Labs     08/06/23  0615 08/07/23  0625 08/08/23  0558   * 137 138   K 3.5 3.6 3.8   CL 92* 93* 94*   CO2 32 31 29   BUN 25* 23* 24*   CREATININE 0.7* 0.8* 0.9   CALCIUM 9.9 10.0 10.0   MG 2.20 2.30  --    PHOS  --   --  4.3       Recent Labs     08/08/23  0558   PROBNP 853*       No results for input(s): LABA1C in the last 72 hours. No results for input(s): AST, ALT, BILIDIR, BILITOT, ALKPHOS in the last 72 hours. No results for input(s): INR, LACTA, TSH in the last 72 hours.     Urine Cultures: No results found for: LABURIN  Blood Cultures: No results found for: BC  No results found for: BLOODCULT2  Organism: No results found for: Ab Whiting MD

## 2023-08-08 NOTE — PLAN OF CARE
Problem: Discharge Planning  Goal: Discharge to home or other facility with appropriate resources  8/7/2023 2248 by Tiffani Villalpando RN  Outcome: Progressing  8/7/2023 2247 by Tiffani Villalpando RN  Outcome: Progressing     Problem: Pain  Goal: Verbalizes/displays adequate comfort level or baseline comfort level  8/7/2023 2248 by Tiffani Villalpando RN  Outcome: Progressing  8/7/2023 2247 by Tiffani Villalpando RN  Outcome: Progressing     Problem: Respiratory - Adult  Goal: Achieves optimal ventilation and oxygenation  8/7/2023 2248 by Tiffani Villalpando RN  Outcome: Progressing  8/7/2023 2247 by Tiffani Villalpando RN  Outcome: Progressing     Problem: Cardiovascular - Adult  Goal: Maintains optimal cardiac output and hemodynamic stability  8/7/2023 2248 by Tiffani Villalpando RN  Outcome: Progressing  8/7/2023 2247 by Tiffani Villalpando RN  Outcome: Progressing  Goal: Absence of cardiac dysrhythmias or at baseline  8/7/2023 2248 by Tiffani Villalpando RN  Outcome: Progressing  8/7/2023 2247 by Tiffani Villalpando RN  Outcome: Progressing     Problem: Skin/Tissue Integrity - Adult  Goal: Incisions, wounds, or drain sites healing without S/S of infection  8/7/2023 2248 by Tiffani Villalpando RN  Outcome: Progressing  8/7/2023 2247 by Tiffani Villalpando RN  Outcome: Progressing  Goal: Oral mucous membranes remain intact  8/7/2023 2248 by Tiffani Villalpando RN  Outcome: Progressing  8/7/2023 2247 by Tiffani Villalpando RN  Outcome: Progressing     Problem: Genitourinary - Adult  Goal: Absence of urinary retention  8/7/2023 2248 by Tiffani Villalpando RN  Outcome: Progressing  8/7/2023 2247 by Tiffani Villalpando RN  Outcome: Progressing  Goal: Urinary catheter remains patent  8/7/2023 2248 by Tiffani Villalpando RN  Outcome: Progressing  8/7/2023 2247 by Tiffani Villalpando RN  Outcome: Progressing     Problem: Metabolic/Fluid and Electrolytes - Adult  Goal: Electrolytes maintained within normal limits  8/7/2023 2248 by Tiffani Villalpando RN  Outcome: Progressing  8/7/2023 2247 by

## 2023-08-09 ENCOUNTER — APPOINTMENT (OUTPATIENT)
Dept: CARDIAC CATH/INVASIVE PROCEDURES | Age: 58
End: 2023-08-09
Payer: COMMERCIAL

## 2023-08-09 DIAGNOSIS — I27.20 PULMONARY HYPERTENSION (HCC): Primary | ICD-10-CM

## 2023-08-09 PROBLEM — I26.09: Status: ACTIVE | Noted: 2023-08-09

## 2023-08-09 PROBLEM — I51.89 DIASTOLIC DYSFUNCTION: Status: ACTIVE | Noted: 2023-08-09

## 2023-08-09 LAB
ALBUMIN SERPL-MCNC: 4.3 G/DL (ref 3.4–5)
ANION GAP SERPL CALCULATED.3IONS-SCNC: 15 MMOL/L (ref 3–16)
BUN SERPL-MCNC: 28 MG/DL (ref 7–20)
CALCIUM SERPL-MCNC: 10.3 MG/DL (ref 8.3–10.6)
CHLORIDE SERPL-SCNC: 93 MMOL/L (ref 99–110)
CO2 SERPL-SCNC: 29 MMOL/L (ref 21–32)
CREAT SERPL-MCNC: 0.9 MG/DL (ref 0.9–1.3)
DEPRECATED RDW RBC AUTO: 16.8 % (ref 12.4–15.4)
GFR SERPLBLD CREATININE-BSD FMLA CKD-EPI: >60 ML/MIN/{1.73_M2}
GLUCOSE BLD-MCNC: 100 MG/DL (ref 70–99)
GLUCOSE BLD-MCNC: 102 MG/DL (ref 70–99)
GLUCOSE BLD-MCNC: 110 MG/DL (ref 70–99)
GLUCOSE BLD-MCNC: 122 MG/DL (ref 70–99)
GLUCOSE BLD-MCNC: 141 MG/DL (ref 70–99)
GLUCOSE SERPL-MCNC: 100 MG/DL (ref 70–99)
HCT VFR BLD AUTO: 49.5 % (ref 40.5–52.5)
HGB BLD-MCNC: 16.5 G/DL (ref 13.5–17.5)
INR PPP: 1.17 (ref 0.84–1.16)
MCH RBC QN AUTO: 30.2 PG (ref 26–34)
MCHC RBC AUTO-ENTMCNC: 33.3 G/DL (ref 31–36)
MCV RBC AUTO: 90.6 FL (ref 80–100)
PERFORMED ON: ABNORMAL
PHOSPHATE SERPL-MCNC: 4.5 MG/DL (ref 2.5–4.9)
PLATELET # BLD AUTO: 170 K/UL (ref 135–450)
PMV BLD AUTO: 9 FL (ref 5–10.5)
POTASSIUM SERPL-SCNC: 3.6 MMOL/L (ref 3.5–5.1)
PROTHROMBIN TIME: 14.9 SEC (ref 11.5–14.8)
RBC # BLD AUTO: 5.47 M/UL (ref 4.2–5.9)
SODIUM SERPL-SCNC: 137 MMOL/L (ref 136–145)
WBC # BLD AUTO: 7 K/UL (ref 4–11)

## 2023-08-09 PROCEDURE — 2580000003 HC RX 258: Performed by: STUDENT IN AN ORGANIZED HEALTH CARE EDUCATION/TRAINING PROGRAM

## 2023-08-09 PROCEDURE — C1887 CATHETER, GUIDING: HCPCS

## 2023-08-09 PROCEDURE — 1200000000 HC SEMI PRIVATE

## 2023-08-09 PROCEDURE — 6370000000 HC RX 637 (ALT 250 FOR IP)

## 2023-08-09 PROCEDURE — C1894 INTRO/SHEATH, NON-LASER: HCPCS

## 2023-08-09 PROCEDURE — 4A023N6 MEASUREMENT OF CARDIAC SAMPLING AND PRESSURE, RIGHT HEART, PERCUTANEOUS APPROACH: ICD-10-PCS | Performed by: INTERNAL MEDICINE

## 2023-08-09 PROCEDURE — 6370000000 HC RX 637 (ALT 250 FOR IP): Performed by: NURSE PRACTITIONER

## 2023-08-09 PROCEDURE — C1894 INTRO/SHEATH, NON-LASER: HCPCS | Performed by: INTERNAL MEDICINE

## 2023-08-09 PROCEDURE — 6360000002 HC RX W HCPCS: Performed by: STUDENT IN AN ORGANIZED HEALTH CARE EDUCATION/TRAINING PROGRAM

## 2023-08-09 PROCEDURE — 6360000002 HC RX W HCPCS: Performed by: NURSE PRACTITIONER

## 2023-08-09 PROCEDURE — 85027 COMPLETE CBC AUTOMATED: CPT

## 2023-08-09 PROCEDURE — 6360000002 HC RX W HCPCS

## 2023-08-09 PROCEDURE — C1887 CATHETER, GUIDING: HCPCS | Performed by: INTERNAL MEDICINE

## 2023-08-09 PROCEDURE — 94640 AIRWAY INHALATION TREATMENT: CPT

## 2023-08-09 PROCEDURE — 80069 RENAL FUNCTION PANEL: CPT

## 2023-08-09 PROCEDURE — 6370000000 HC RX 637 (ALT 250 FOR IP): Performed by: INTERNAL MEDICINE

## 2023-08-09 PROCEDURE — 93451 RIGHT HEART CATH: CPT

## 2023-08-09 PROCEDURE — 6370000000 HC RX 637 (ALT 250 FOR IP): Performed by: STUDENT IN AN ORGANIZED HEALTH CARE EDUCATION/TRAINING PROGRAM

## 2023-08-09 PROCEDURE — 36415 COLL VENOUS BLD VENIPUNCTURE: CPT

## 2023-08-09 PROCEDURE — 2500000003 HC RX 250 WO HCPCS

## 2023-08-09 PROCEDURE — 85610 PROTHROMBIN TIME: CPT

## 2023-08-09 PROCEDURE — C1769 GUIDE WIRE: HCPCS | Performed by: INTERNAL MEDICINE

## 2023-08-09 PROCEDURE — C1769 GUIDE WIRE: HCPCS

## 2023-08-09 RX ORDER — ALBUTEROL SULFATE 90 UG/1
2 AEROSOL, METERED RESPIRATORY (INHALATION)
Status: DISCONTINUED | OUTPATIENT
Start: 2023-08-09 | End: 2023-08-10 | Stop reason: HOSPADM

## 2023-08-09 RX ORDER — FUROSEMIDE 10 MG/ML
40 INJECTION INTRAMUSCULAR; INTRAVENOUS 2 TIMES DAILY
Status: COMPLETED | OUTPATIENT
Start: 2023-08-09 | End: 2023-08-09

## 2023-08-09 RX ORDER — SODIUM CHLORIDE 0.9 % (FLUSH) 0.9 %
5-40 SYRINGE (ML) INJECTION PRN
Status: DISCONTINUED | OUTPATIENT
Start: 2023-08-09 | End: 2023-08-10 | Stop reason: HOSPADM

## 2023-08-09 RX ORDER — TORSEMIDE 20 MG/1
50 TABLET ORAL DAILY
Status: DISCONTINUED | OUTPATIENT
Start: 2023-08-10 | End: 2023-08-10 | Stop reason: HOSPADM

## 2023-08-09 RX ORDER — ACETAMINOPHEN 325 MG/1
650 TABLET ORAL EVERY 4 HOURS PRN
Status: DISCONTINUED | OUTPATIENT
Start: 2023-08-09 | End: 2023-08-10 | Stop reason: HOSPADM

## 2023-08-09 RX ORDER — SODIUM CHLORIDE 0.9 % (FLUSH) 0.9 %
5-40 SYRINGE (ML) INJECTION EVERY 12 HOURS SCHEDULED
Status: DISCONTINUED | OUTPATIENT
Start: 2023-08-09 | End: 2023-08-10 | Stop reason: HOSPADM

## 2023-08-09 RX ORDER — MIDAZOLAM HYDROCHLORIDE 1 MG/ML
INJECTION INTRAMUSCULAR; INTRAVENOUS
Status: COMPLETED | OUTPATIENT
Start: 2023-08-09 | End: 2023-08-09

## 2023-08-09 RX ORDER — SODIUM CHLORIDE 9 MG/ML
INJECTION, SOLUTION INTRAVENOUS PRN
Status: DISCONTINUED | OUTPATIENT
Start: 2023-08-09 | End: 2023-08-10 | Stop reason: HOSPADM

## 2023-08-09 RX ORDER — FENTANYL CITRATE 50 UG/ML
INJECTION, SOLUTION INTRAMUSCULAR; INTRAVENOUS
Status: COMPLETED | OUTPATIENT
Start: 2023-08-09 | End: 2023-08-09

## 2023-08-09 RX ADMIN — METOPROLOL SUCCINATE 25 MG: 25 TABLET, EXTENDED RELEASE ORAL at 09:01

## 2023-08-09 RX ADMIN — EMPAGLIFLOZIN 10 MG: 10 TABLET, FILM COATED ORAL at 09:01

## 2023-08-09 RX ADMIN — ATORVASTATIN CALCIUM 20 MG: 10 TABLET, FILM COATED ORAL at 20:16

## 2023-08-09 RX ADMIN — METOPROLOL SUCCINATE 25 MG: 25 TABLET, EXTENDED RELEASE ORAL at 20:16

## 2023-08-09 RX ADMIN — FUROSEMIDE 40 MG: 10 INJECTION, SOLUTION INTRAMUSCULAR; INTRAVENOUS at 18:27

## 2023-08-09 RX ADMIN — FENTANYL CITRATE 12.5 MCG: 50 INJECTION, SOLUTION INTRAMUSCULAR; INTRAVENOUS at 11:32

## 2023-08-09 RX ADMIN — SODIUM CHLORIDE, PRESERVATIVE FREE 10 ML: 5 INJECTION INTRAVENOUS at 20:17

## 2023-08-09 RX ADMIN — ASPIRIN 81 MG: 81 TABLET, COATED ORAL at 09:01

## 2023-08-09 RX ADMIN — Medication 2 PUFF: at 20:26

## 2023-08-09 RX ADMIN — MIDAZOLAM HYDROCHLORIDE 0.5 MG: 1 INJECTION INTRAMUSCULAR; INTRAVENOUS at 11:31

## 2023-08-09 RX ADMIN — TIOTROPIUM BROMIDE AND OLODATEROL 2 PUFF: 3.124; 2.736 SPRAY, METERED RESPIRATORY (INHALATION) at 08:04

## 2023-08-09 RX ADMIN — ENOXAPARIN SODIUM 30 MG: 100 INJECTION SUBCUTANEOUS at 20:30

## 2023-08-09 RX ADMIN — SPIRONOLACTONE 25 MG: 25 TABLET ORAL at 09:01

## 2023-08-09 RX ADMIN — HYDROCORTISONE: 10 CREAM TOPICAL at 20:18

## 2023-08-09 RX ADMIN — Medication 1 PUFF: at 08:02

## 2023-08-09 ASSESSMENT — PAIN SCALES - GENERAL: PAINLEVEL_OUTOF10: 0

## 2023-08-09 NOTE — CARE COORDINATION
Chart reviewed day 10. Care provided by cards, and IM. Pt is from home alone and is IPTA. Pt has been on Lasix in order to diurese and have a RHC today. B/P sys . Pt diuresed 4300 yesterday. Will continue to follow course for needs.  Genevieve Wang RN

## 2023-08-09 NOTE — PROCEDURES
CARDIAC CATHETERIZATION REPORT    Date of Procedure: 8/9/2023  : Carola Hunter DO  Primary Indication: Diastolic dysfunction, pulmonary hypertension with cor pulmonale    Procedures Performed:  1. Right heart catheterization  2. Moderate conscious sedation    Procedural Details:  Access: Local anesthetic was given and a 5F Terumo sheath was placed in the right brachial vein via wire exchange of an existing IV. Diagnostic: A 5F Precious Milks catheter was used to perform the right heart catheterization. Hemostasis: At the end of the procedure, the right brachial venous sheath was removed and manual pressure applied to maintain hemostasis. Findings:  Hemodynamics:  A. Right heart catheterization                   1. RA: 22 mmHg                   2. RV: 76/22 mmHg                   3. PA: 76/40 (54) mmHg                   4. PCWP: 15 mmHg                   5. Saturations: AO 92%, RA 54%, PA 53%                   6. Annika CO: 3.7 L/min                   7. Annika CI: 1.6 L/min*m2                   8. Annika SVR: 1,737 dyne*sec/cm5                   9. Annika PVR: 837 dyne*sec/cm    Technical Factors:  Complications:  None. Estimated blood loss: Minimal.  Radiation:  Air kerma 29 mGy and 1.6 minutes of fluoroscopy  Sedation: Moderate conscious sedation was administered by qualified nursing personnel under continuous hemodynamic monitoring, starting at 11:30 AM and ending at 11:55 AM.  Medications: 12.5 mcg IV Fentanyl, 0.5 mg IV Versed  Contrast: 0cc    Impression:  1. Normal left-sided cardiac filling pressures. 2. Severely elevated right-sided cardiac filing pressures. 3. Severe precapillary pulmonary hypertension. 4. Reduced Annika cardiac output and index. Plan:  1. Recommend referral to pulmonary hypertension specialist for further evaluation and management of severe precapillary pulmonary hypertension.   2. Left-sided filling pressures are normal, but right-sided filing pressures remains severely elevated as

## 2023-08-09 NOTE — PROGRESS NOTES
Per Dr. Gwendloyn Galeazzi, external referral placed for patient to see Dr. Farrukh Landry at [facility] for [address] [place] [address]. Referral faxed.

## 2023-08-09 NOTE — PLAN OF CARE
Patient's EF (Ejection Fraction) is greater than 40%    Heart Failure Medications:  Diuretics[de-identified] Furosemide and Spironolactone    (One of the following REQUIRED for EF </= 40%/SYSTOLIC FAILURE but MAY be used in EF% >40%/DIASTOLIC FAILURE)        ACE[de-identified] None        ARB[de-identified] None         ARNI[de-identified] None    (Beta Blockers)  NON- Evidenced Based Beta Blocker (for EF% >40%/DIASTOLIC FAILURE): None    Evidenced Based Beta Blocker::(REQUIRED for EF% <40%/SYSTOLIC FAILURE) Metoprolol SUCCinate- Toprol XL  . .................................................................................................................................................. Healthy Weight Tracking - BMI + Meds 8/3/2023 8/4/2023 8/5/2023 8/6/2023 8/7/2023 8/8/2023 8/9/2023   Weight 241 lb 6.4 oz 232 lb 8 oz 226 lb 3.2 oz 220 lb 12.8 oz 216 lb 11.2 oz 212 lb 206 lb 4.8 oz   Height - - - - - - -   Body Mass Index 32.74 kg/m2 31.53 kg/m2 30.68 kg/m2 29.95 kg/m2 29.39 kg/m2 28.75 kg/m2 27.98 kg/m2   Some recent data might be hidden         Patient's weights and intake/output reviewed: Yes    Daily Weight log at bedside and being used: \"yes    Patient's Last Weight: 206 lbs obtained by standing scale. Difference of 6 lbs less than last documented weight. Intake/Output Summary (Last 24 hours) at 8/9/2023 5627  Last data filed at 8/9/2023 0421  Gross per 24 hour   Intake 744 ml   Output 4300 ml   Net -3556 ml       Education Booklet Provided: yes    Comorbidities Reviewed Yes    Patient has a past medical history of Acute systolic congestive heart failure ([address]), CAD (coronary artery disease), Diabetes mellitus ([address]), Essential hypertension, Pulmonary HTN ([address]), Right heart failure ([address]), and Thrombocytopenia ([address]).      >>For CHF and Comorbidity documentation on Education Time and Topics, please see Education Tab    Progressive Mobility Assessment:  What is this patient's Current Level of Mobility?: Ambulatory- with Assistance  How was this patient Mobilized today?:  Up to Toilet/Shower, ambulated 20 ft                 With Whom? Nurse                 Level of Difficulty/Assistance: 1x Assist     Pt resting in bed at this time on room air. Pt denies shortness of breath. Pt without lower extremity edema.      Patient and/or Family's stated Goal of Care this Admission: reduce shortness of breath, increase activity tolerance, better understand heart failure and disease management, be more comfortable, and reduce lower extremity edema prior to discharge        :

## 2023-08-09 NOTE — ADT AUTH CERT
8/8  by Abdulkadir Link RN       Review Status Review Entered   In Primary 8/9/2023 1317       Created By   Abdulkadir Link RN      Criteria Review   DATE: 8/8 m/s tele        RELEVANT BASELINES: (lab values, vitals, o2 amount/delivery, etc.)  On RA     PERTINENT UPDATES:  Still requiring IV lasix bid.  To have cardiac cath in am.     VITALS:  97.8   18   85    98/71   90% on 1L o2     ABNL/PERTINENT LABS/RADIOLOGY/DIAGNOSTIC STUDIES:  Bnp 853  Cl 94  Bun 24        PHYSICAL EXAM:  Abdomen:  Firm, less distended, +bowel sounds  Extremities:  1+ non-pitting edema        MD CONSULTS/ASSESSMENT AND PLAN:  IM:  Acute exacerbation of HFprEF/ severe pul HTN/ mod TR  -Scrotal swelling, lower extremity edema,  ab distension - anasarca   -Previous echocardiogram in December 2022 with LVEF 45-50%  -Lasix - increased to 80 bid ,- on hold /2 contracted alkalosis on 8/4 - resumed    metolazone once 8/3  -Continue Aldactone, metoprolol, held home torsemide/ ARB  -Salt restriction, fluid restrictions  -Strict ins and outs, daily weight  - moniotr electrolytes   - cardio eval appreciated and following   Needs RHC -  timing per cardio  Ab US-small amt of ascites      #COPD- Acute hypoxia - possible poor resp effort a of distended ab / CHF  COPD ex less likely   Continue home long-acting and rescue inhaler  , CXR- neg - ISS  albuterol inhaler prn  Weaning off  1 l o2         Card:  HFpEF, acute on chronic: weight down another 4 lbs for total of 36 lbs, net -28L, nearing euvolemia  Right heart failure/ Cor Pulmonale:   Anasarca: improving  PHTN: TPG 32, PVR 6 GUERRERO in Oct 2022 with WP of 21, weight at that time was 236 lbs  Moderate TR  HYPERTENSION  COPD, severe  EToH abuse  DM     PLAN:  Continue diuresis with Lasix 80 mg IV BID  Holding ARB while diuresing  Continue ASA, statin, Jardiance, Toprol and spironolactone  Daily weights, strict I/O, daily labs  Will plan RHC tomorrow as feel he is nearing euvolemic though renal function and BP

## 2023-08-09 NOTE — PROGRESS NOTES
Hospital Medicine Progress Note      Date of Admission: 7/30/2023  Hospital Day: 11    Chief Admission Complaint:  scrotal swelling     Subjective:  denies n/v, chest pain    Presenting Admission History: 51-year-old male with past medical history of severe pulmonary hypertension, COPD, previous smoker, HFmrEF who presented to the emergency department with scrotal swelling. Patient states that his scrotum started swelling gradually within the past week. This has been preventing him from walking prompting him to come to the emergency department. Patient also admits to having increasing shortness of breath. Patient had visited his primary care physician and his torsemide dose was increased from 20 mg to 40 mg. Patient states he has subjective weight gain but unclear how much. Patient states he is compliant with his medications. Denies any chest pain, palpitations, abdominal pain, dysuria, nausea, vomiting, fevers or chills. In the emergency department patient was initially tachycardic which resolved otherwise electrolytes and CBC unremarkable. Mild elevation of BNP actually down from his previous. Slightly elevated troponin without any symptoms. Assessment/Plan:      Current Principal Problem:  Heart failure exacerbated by sotalol (HCC)    Acute on chronic diastolic heart failure, w/ R heart failure. Echo this admission: EF 55%; RV volume/pressure overload; G2 DD, elevated pressure. Daily STANDING weights. On admission 1129kg, currently 93.6kg. Strict I&O. Continue IV lasix, toprol-xl, spironolactone, jardiance.  on arrival, currently 853. CHF orderset is in place. [facility] 8/9: normal L sided filling pressures; severely elevated R sided filling pressures; severe precapillary pHTN; reduced Annika 3.7/1.6    Diabetes type II, controlled. A1C noted to be 6.4 in June 2023. Hold home regimen while admitted, anticipate resuming on discharge.   Sliding scale insulin will be monitored and adjusted as

## 2023-08-10 VITALS
OXYGEN SATURATION: 93 % | TEMPERATURE: 97.8 F | HEART RATE: 88 BPM | HEIGHT: 72 IN | RESPIRATION RATE: 16 BRPM | SYSTOLIC BLOOD PRESSURE: 116 MMHG | BODY MASS INDEX: 27.82 KG/M2 | WEIGHT: 205.4 LBS | DIASTOLIC BLOOD PRESSURE: 87 MMHG

## 2023-08-10 LAB
ALBUMIN SERPL-MCNC: 4.2 G/DL (ref 3.4–5)
ANION GAP SERPL CALCULATED.3IONS-SCNC: 12 MMOL/L (ref 3–16)
BUN SERPL-MCNC: 32 MG/DL (ref 7–20)
CALCIUM SERPL-MCNC: 10.5 MG/DL (ref 8.3–10.6)
CHLORIDE SERPL-SCNC: 94 MMOL/L (ref 99–110)
CO2 SERPL-SCNC: 32 MMOL/L (ref 21–32)
CREAT SERPL-MCNC: 1 MG/DL (ref 0.9–1.3)
GFR SERPLBLD CREATININE-BSD FMLA CKD-EPI: >60 ML/MIN/{1.73_M2}
GLUCOSE BLD-MCNC: 114 MG/DL (ref 70–99)
GLUCOSE BLD-MCNC: 114 MG/DL (ref 70–99)
GLUCOSE SERPL-MCNC: 112 MG/DL (ref 70–99)
PERFORMED ON: ABNORMAL
PERFORMED ON: ABNORMAL
PHOSPHATE SERPL-MCNC: 4.2 MG/DL (ref 2.5–4.9)
POTASSIUM SERPL-SCNC: 3.6 MMOL/L (ref 3.5–5.1)
SODIUM SERPL-SCNC: 138 MMOL/L (ref 136–145)

## 2023-08-10 PROCEDURE — 6360000002 HC RX W HCPCS: Performed by: STUDENT IN AN ORGANIZED HEALTH CARE EDUCATION/TRAINING PROGRAM

## 2023-08-10 PROCEDURE — 94640 AIRWAY INHALATION TREATMENT: CPT

## 2023-08-10 PROCEDURE — 6370000000 HC RX 637 (ALT 250 FOR IP): Performed by: INTERNAL MEDICINE

## 2023-08-10 PROCEDURE — 6370000000 HC RX 637 (ALT 250 FOR IP): Performed by: NURSE PRACTITIONER

## 2023-08-10 PROCEDURE — 2580000003 HC RX 258: Performed by: STUDENT IN AN ORGANIZED HEALTH CARE EDUCATION/TRAINING PROGRAM

## 2023-08-10 PROCEDURE — 36415 COLL VENOUS BLD VENIPUNCTURE: CPT

## 2023-08-10 PROCEDURE — 80069 RENAL FUNCTION PANEL: CPT

## 2023-08-10 PROCEDURE — 6370000000 HC RX 637 (ALT 250 FOR IP): Performed by: STUDENT IN AN ORGANIZED HEALTH CARE EDUCATION/TRAINING PROGRAM

## 2023-08-10 RX ORDER — METOPROLOL SUCCINATE 25 MG/1
25 TABLET, EXTENDED RELEASE ORAL 2 TIMES DAILY
Qty: 60 TABLET | Refills: 3 | Status: SHIPPED | OUTPATIENT
Start: 2023-08-10

## 2023-08-10 RX ORDER — TORSEMIDE 100 MG/1
50 TABLET ORAL DAILY
Qty: 45 TABLET | Refills: 1 | Status: SHIPPED | OUTPATIENT
Start: 2023-08-11

## 2023-08-10 RX ORDER — DIAPER,BRIEF,INFANT-TODD,DISP
EACH MISCELLANEOUS
Qty: 30 G | Refills: 1 | Status: SHIPPED | OUTPATIENT
Start: 2023-08-10 | End: 2023-08-17

## 2023-08-10 RX ADMIN — SODIUM CHLORIDE, PRESERVATIVE FREE 10 ML: 5 INJECTION INTRAVENOUS at 07:57

## 2023-08-10 RX ADMIN — SPIRONOLACTONE 25 MG: 25 TABLET ORAL at 07:58

## 2023-08-10 RX ADMIN — TIOTROPIUM BROMIDE AND OLODATEROL 2 PUFF: 3.124; 2.736 SPRAY, METERED RESPIRATORY (INHALATION) at 08:15

## 2023-08-10 RX ADMIN — TORSEMIDE 50 MG: 20 TABLET ORAL at 07:58

## 2023-08-10 RX ADMIN — METOPROLOL SUCCINATE 25 MG: 25 TABLET, EXTENDED RELEASE ORAL at 07:58

## 2023-08-10 RX ADMIN — ASPIRIN 81 MG: 81 TABLET, COATED ORAL at 07:58

## 2023-08-10 RX ADMIN — Medication 2 PUFF: at 08:15

## 2023-08-10 RX ADMIN — HYDROCORTISONE: 10 CREAM TOPICAL at 07:57

## 2023-08-10 RX ADMIN — EMPAGLIFLOZIN 10 MG: 10 TABLET, FILM COATED ORAL at 07:58

## 2023-08-10 RX ADMIN — ENOXAPARIN SODIUM 30 MG: 100 INJECTION SUBCUTANEOUS at 07:57

## 2023-08-10 NOTE — PROGRESS NOTES
DC instructions given, pt verbalized understanding. IV removed no complications, tele box removed returned. Lockbox emptied, pt belongings gathered. Taken to car via wheelchair. Pt RX in OP pharmacy to be picked up on way out.

## 2023-08-10 NOTE — PLAN OF CARE
Problem: Discharge Planning  Goal: Discharge to home or other facility with appropriate resources  8/9/2023 2200 by Randa Muller RN  Flowsheets (Taken 8/9/2023 2200)  Discharge to home or other facility with appropriate resources:   Arrange for needed discharge resources and transportation as appropriate   Identify barriers to discharge with patient and caregiver  Note: Discharge is planned for 8/10/23  8/9/2023 1836 by Lawanda Sharam RN  Outcome: Progressing     Problem: Pain  Goal: Verbalizes/displays adequate comfort level or baseline comfort level  8/9/2023 2200 by Randa Muller RN  Outcome: Progressing  Flowsheets (Taken 8/9/2023 2200)  Verbalizes/displays adequate comfort level or baseline comfort level:   Encourage patient to monitor pain and request assistance   Assess pain using appropriate pain scale  Note: Patient has reported no pain this shift. 8/9/2023 1836 by Lawanda Sharma RN  Outcome: Progressing  Flowsheets (Taken 8/9/2023 9417)  Verbalizes/displays adequate comfort level or baseline comfort level: Encourage patient to monitor pain and request assistance     Problem: Respiratory - Adult  Goal: Achieves optimal ventilation and oxygenation  8/9/2023 2200 by Randa Muller RN  Outcome: Progressing  Flowsheets (Taken 8/9/2023 2200)  Achieves optimal ventilation and oxygenation:   Assess for changes in respiratory status   Assess for changes in mentation and behavior   Assess and instruct to report shortness of breath or any respiratory difficulty   Encourage broncho-pulmonary hygiene including cough, deep breathe, incentive spirometry  Note: Oxygen saturation has been monitored per protocol, oxygen saturation has been optimal on room air.    8/9/2023 1836 by Lawanda Sharma RN  Outcome: Progressing  Flowsheets (Taken 8/9/2023 9494)  Achieves optimal ventilation and oxygenation: Assess for changes in respiratory status     Problem: Cardiovascular - Adult  Goal: Maintains optimal

## 2023-08-10 NOTE — DISCHARGE SUMMARY
and venous Doppler flow within the testicle. Scrotal Sac: Small amount free fluid in the scrotal sac. Skin is diffusely thickened and edema toes. Gas is not appreciated within it. No fluid collection. Epididymis: No acute abnormality. 1.5 x 2 x 1.6 cm. Epididymal head cyst is 1 cm x 1.2 cm x 0.8 cm. Left: Grey Scale: The left testicle demonstrates normal homogeneous echotexture without focal lesion. No evidence of testicular microlithiasis. 4.7 x 3 by 2.6 cm. Doppler Evaluation: There is normal arterial and venous Doppler flow within the testicle. Scrotal Sac: Small amount of free fluid within the scrotal sac. Again the skin is diffusely thickened and edema toes. No drainable fluid collection. No definite gas in the soft tissues. Epididymis: No acute abnormality. 1.5 x 1.5 x 1.3 cm. It is not hyperemic. 1.  Unremarkable testicular ultrasound with normal Doppler flow. 2.  The scrotal skin is diffusely thickened and edema toes. XR CHEST PORTABLE    Result Date: 7/30/2023  EXAMINATION: ONE XRAY VIEW OF THE CHEST 7/30/2023 3:58 pm COMPARISON: 10/29/2022. HISTORY: ORDERING SYSTEM PROVIDED HISTORY: tachy, SOB, edema TECHNOLOGIST PROVIDED HISTORY: Reason for exam:->tachy, SOB, edema Reason for Exam: has COPD, emphysemia and CHF. has had edema recently FINDINGS: The cardiac silhouette is enlarged. Aortic vascular calcification. The lungs are clear. No infiltrate, pleural fluid or evidence of overt failure. No acute cardiopulmonary disease. Cardiomegaly without overt failure. US ABDOMEN LIMITED    Result Date: 8/3/2023  EXAMINATION: LIMITED 4 QUADRANT ULTRASOUND 8/2/2023 4:58 pm COMPARISON: 07/17/2022 HISTORY: ORDERING SYSTEM PROVIDED HISTORY: distended ab TECHNOLOGIST PROVIDED HISTORY: Reason for exam:->distended ab FINDINGS: There is a small amount of ascites within the right and left lower quadrants. Small amount of ascites in the lower quadrants.        Consults:     IP CONSULT TO CARDIOLOGY  IP CONSULT TO HEART FAILURE NURSE/COORDINATOR  IP CONSULT TO DIETITIAN    Labs:     Recent Labs     08/09/23  0554   WBC 7.0   HGB 16.5   HCT 49.5        Recent Labs     08/08/23  0558 08/09/23  0554 08/10/23  0646    137 138   K 3.8 3.6 3.6   CL 94* 93* 94*   CO2 29 29 32   BUN 24* 28* 32*   CREATININE 0.9 0.9 1.0   CALCIUM 10.0 10.3 10.5   PHOS 4.3 4.5 4.2     Recent Labs     08/08/23  0558   PROBNP 853*     No results for input(s): LABA1C in the last 72 hours. No results for input(s): AST, ALT, BILIDIR, BILITOT, ALKPHOS in the last 72 hours.   Recent Labs     08/09/23  0959   INR 1.17*       Urine Cultures: No results found for: LABURIN  Blood Cultures: No results found for: BC  No results found for: BLOODCULT2  Organism: No results found for: ORG    Signed:    SANCHEZ Louis - CNP

## 2023-08-10 NOTE — PLAN OF CARE
Patient's EF (Ejection Fraction) is greater than 40%    Heart Failure Medications:  Diuretics[de-identified] Torsemide and Spironolactone    (One of the following REQUIRED for EF </= 40%/SYSTOLIC FAILURE but MAY be used in EF% >40%/DIASTOLIC FAILURE)        ACE[de-identified] None        ARB[de-identified] None         ARNI[de-identified] None    (Beta Blockers)  NON- Evidenced Based Beta Blocker (for EF% >40%/DIASTOLIC FAILURE): None    Evidenced Based Beta Blocker::(REQUIRED for EF% <40%/SYSTOLIC FAILURE) Metoprolol SUCCinate- Toprol XL  . .................................................................................................................................................. Healthy Weight Tracking - BMI + Meds 8/4/2023 8/5/2023 8/6/2023 8/7/2023 8/8/2023 8/9/2023 8/10/2023   Weight 232 lb 8 oz 226 lb 3.2 oz 220 lb 12.8 oz 216 lb 11.2 oz 212 lb 206 lb 4.8 oz 205 lb 6.4 oz   Height - - - - - - -   Body Mass Index 31.53 kg/m2 30.68 kg/m2 29.95 kg/m2 29.39 kg/m2 28.75 kg/m2 27.98 kg/m2 27.86 kg/m2   Some recent data might be hidden         Patient's weights and intake/output reviewed: Yes    Daily Weight log at bedside, patient/family participation in use of log: \"yes    Patient's Last Weight: 205 lbs obtained by standing scale. Difference of 1 lbs less than last documented weight. Intake/Output Summary (Last 24 hours) at 8/10/2023 5550  Last data filed at 8/10/2023 0454  Gross per 24 hour   Intake 1042 ml   Output 2250 ml   Net -1208 ml       Education Booklet Provided: yes    Comorbidities Reviewed Yes    Patient has a past medical history of Acute systolic congestive heart failure ([address]), CAD (coronary artery disease), Diabetes mellitus ([address]), Essential hypertension, Pulmonary HTN ([address]), Right heart failure ([address]), and Thrombocytopenia ([address]).      >>For CHF and Comorbidity documentation on Education Time and Topics, please see Education Tab    Progressive Mobility Assessment:  What is this patient's Current Level of Mobility?: Ambulatory- with

## 2023-08-11 ENCOUNTER — FOLLOWUP TELEPHONE ENCOUNTER (OUTPATIENT)
Dept: TELEMETRY | Age: 58
End: 2023-08-11

## 2023-08-11 ENCOUNTER — CARE COORDINATION (OUTPATIENT)
Dept: CASE MANAGEMENT | Age: 58
End: 2023-08-11

## 2023-08-11 ENCOUNTER — TELEPHONE (OUTPATIENT)
Dept: FAMILY MEDICINE CLINIC | Age: 58
End: 2023-08-11

## 2023-08-11 NOTE — CARE COORDINATION
[facility] Care Transitions Initial Follow Up Call    Call within 2 business days of discharge: Yes     Patient: Magdiel Dorado Patient : 1965   MRN: [id number]  Reason for Admission: CHF s/p cath  Discharge Date: 8/10/23 RARS: Readmission Risk Score: 10.8      Last Discharge [facility] [facility]       Date Complaint Diagnosis Description Type Department Provider    23 Groin Swelling Hypervolemia, unspecified hypervolemia type . .. ED to Hosp-Admission (Discharged) (ADMITTED) CARMELO Gordon MD; Antoine Rodriguez. .. Attempted to reach patient via phone for initial post hospital transition call. VM left stating purpose of call along with my contact information requesting a return call.         Care Transitions 24 Hour Call    Do you have all of your prescriptions and are they filled?: Yes  Do you have support at home?: Alone  Are you an active caregiver in your home?: No  Care Transitions Interventions       Follow Up  Future Appointments   Date Time Provider [facility] [address]   8/15/2023 11:00 AM DO LOWELL Sellers [place] - [place]   2023  9:30 AM DO LOWELL Sellers [place] - [place]   2023 11:00 AM Ari Montes De Oca MD [facility]     Mine Hensley RN

## 2023-08-11 NOTE — TELEPHONE ENCOUNTER
Care Transitions Initial Follow Up Call    Outreach made within 2 business days of discharge: Yes    Patient: Vaishali Morales Patient : 1965   MRN: [id number]  Reason for Admission: There are no discharge diagnoses documented for the most recent discharge. Discharge Date: 8/10/23       Spoke with: Sonali Messer RN spoke with Florence Villegas RN at [facility] to schedule appointment for patient. Discharge department/facility: [facility] Interactive Patient Contact:  Was patient able to fill all prescriptions: Yes  Was patient instructed to bring all medications to the follow-up visit: Yes  Is patient taking all medications as directed in the discharge summary?  Yes  Does patient understand their discharge instructions: Yes  Does patient have questions or concerns that need addressed prior to 7-14 day follow up office visit: no    Scheduled appointment with PCP within 7-14 days    Follow Up  Future Appointments   Date Time Provider [address] [place] [address]   8/15/2023 11:00 AM DO LOWELL Vizcaino - LIBRADO   2023  9:30 AM DO LOWELL Vizcaino - DYLUBNA   2023 11:00 AM Diane Nguyen MD [facility]       John Amaro LPN

## 2023-08-11 NOTE — TELEPHONE ENCOUNTER
2nd Attempt; No Answer- Left HIPAA compliant voicemail with Non-Urgent Heart Failure Resource Line number for call back. First attempt made by clinical transitions RN.       Seda Apple RN

## 2023-08-11 NOTE — TELEPHONE ENCOUNTER
Care Transitions Initial Follow Up Call    Call within 2 business days of discharge: Yes     Patient: Jaison Rizo Patient : 1965 MRN: [id number]    [unfilled]    RARS: Readmission Risk Score: 10.8       Spoke with: patient     Discharge department/facility: home    Non-face-to-face services provided:  Scheduled appointment with PCP-8/15/23      Spoke to patient for hospital follow up.   Denies any needs, states he is doing \"fine\"    Follow Up  Future Appointments   Date Time Provider [address] [place] [address]   8/15/2023 11:00 AM [facility] - [place]   2023  9:30 AM [facility] - [place]   2023 11:00 AM Yulisa Guzman MD [facility]       Florina Jean RN

## 2023-08-14 ENCOUNTER — CARE COORDINATION (OUTPATIENT)
Dept: CASE MANAGEMENT | Age: 58
End: 2023-08-14

## 2023-08-14 NOTE — CARE COORDINATION
[facility] Care Transitions Initial Follow Up Call    Call within 2 business days of discharge: Yes    Patient: Orland Bernheim Patient : 1965   MRN: [id number]  Reason for Admission: hypervolemia, heart failure   Discharge Date: 8/10/23 RARS: Readmission Risk Score: 10.8      Last Discharge [facility] [facility]       Date Complaint Diagnosis Description Type Department Provider    23 Groin Swelling Hypervolemia, unspecified hypervolemia type . .. ED to Hosp-Admission (Discharged) (ADMITTED) CARMELO Santizo MD; Kirti Nazario. .. Second and final attempt made to reach patient for initial post hospital transition call. VM left stating purpose of call along with my contact information requesting a return call.     Care Transitions 24 Hour Call    Do you have all of your prescriptions and are they filled?: Yes  Do you have support at home?: Alone  Are you an active caregiver in your home?: No  Care Transitions Interventions       Follow Up  Future Appointments   Date Time Provider [address]   8/15/2023 11:00 AM DO LOWELL Becker [place] - [place]   2023  9:30 AM DO LOWELL Becker [place] - [place]   2023 11:30 AM MD Lorri Anthony RN

## 2023-08-15 ENCOUNTER — OFFICE VISIT (OUTPATIENT)
Dept: FAMILY MEDICINE CLINIC | Age: 58
End: 2023-08-15

## 2023-08-15 VITALS
BODY MASS INDEX: 28.31 KG/M2 | HEIGHT: 72 IN | SYSTOLIC BLOOD PRESSURE: 130 MMHG | OXYGEN SATURATION: 91 % | WEIGHT: 209 LBS | DIASTOLIC BLOOD PRESSURE: 60 MMHG | HEART RATE: 91 BPM

## 2023-08-15 DIAGNOSIS — I50.813 ACUTE ON CHRONIC RIGHT-SIDED HEART FAILURE (HCC): ICD-10-CM

## 2023-08-15 DIAGNOSIS — I27.20 PULMONARY HYPERTENSION (HCC): ICD-10-CM

## 2023-08-15 DIAGNOSIS — Z09 HOSPITAL DISCHARGE FOLLOW-UP: Primary | ICD-10-CM

## 2023-08-15 DIAGNOSIS — I50.33 ACUTE ON CHRONIC DIASTOLIC CONGESTIVE HEART FAILURE (HCC): ICD-10-CM

## 2023-08-15 DIAGNOSIS — E11.65 UNCONTROLLED TYPE 2 DIABETES MELLITUS WITH HYPERGLYCEMIA (HCC): ICD-10-CM

## 2023-08-15 NOTE — PROGRESS NOTES
Post-Discharge Transitional Care  Follow Up      Thien Edge   YOB: 1965    Date of Office Visit:  8/15/2023  Date of Hospital Admission: 7/30/23  Date of Hospital Discharge: 8/10/23  Risk of hospital readmission (high >=14%. Medium >=10%) :Readmission Risk Score: 10.8      Care management risk score Rising risk (score 2-5) and Complex Care (Scores >=6): No Risk Score On File     Non face to face  following discharge, date last encounter closed (first attempt may have been earlier): 08/14/2023    Call initiated 2 business days of discharge: Yes    ASSESSMENT/PLAN:   Hospital discharge follow-up  -     MD DISCHARGE MEDS RECONCILED W/ CURRENT OUTPATIENT MED LIST  Acute on chronic right-sided heart failure (HCC)  Pulmonary hypertension (HCC)  Acute on chronic diastolic congestive heart failure ([address])  Uncontrolled type 2 diabetes mellitus with hyperglycemia (HCC)  Dry weight 205. Has gained 4 lbs since discharge. Enouged to double diuretic for next 3 days or until back to dry weight. Renal function normal at discharge. Will restart metformin and potassium supplementation. Medical Decision Making: moderate complexity  No follow-ups on file. Subjective:   HPI:  Follow up of Hospital problems/diagnosis(es):     Inpatient course: Discharge summary reviewed- see chart. Interval history/Current status:     Trying to watch fluid intake to 1500ml daily  Compliant with torsemide  Weight has fluctated up to 212 lbs but then decreased back to 209 today. 205 dry weight.      Patient Active Problem List   Diagnosis    Tobacco abuse    Daily consumption of alcohol    QT prolongation    Right axis deviation    Macrocytosis    Primary hypertension    Acute on chronic right-sided heart failure (HCC)    CHF (congestive heart failure) (HCC)    COPD, severe (HCC)    Calculus of gallbladder without cholecystitis without obstruction    Pulmonary hypertension ([address])    Suspected sleep apnea    Former

## 2023-09-11 RX ORDER — POTASSIUM CHLORIDE 1500 MG/1
20 TABLET, EXTENDED RELEASE ORAL DAILY
Qty: 90 TABLET | Refills: 1 | OUTPATIENT
Start: 2023-09-11

## 2023-09-11 NOTE — TELEPHONE ENCOUNTER
Refill Request     CONFIRM preferred pharmacy with the patient. If Mail Order Rx - Pend for 90 day refill. Last Seen: Last Seen Department: 8/15/2023  Last Seen by PCP: 8/15/2023    Last Written: 8/02/2023, #30, 0 refill    If no future appointment scheduled:  Review the last OV with PCP and review information for follow-up visit,  Route STAFF MESSAGE with patient name to the [facility] for scheduling with the following information:            -  Timing of next visit           -  Visit type ie Physical, OV, etc           -  Diagnoses/Reason ie. COPD, HTN - Do not use MEDICATION, Follow-up or CHECK UP - Give reason for visit      Next Appointment:   Future Appointments   Date Time Provider [facility] [address]   9/12/2023 11:30 AM MD Kumar Mcgowan [facility]   9/14/2023  4:00 PM DO LOWELL Yang [place] - [place]       Message sent to  to schedule appt with patient?   N/A      Requested Prescriptions     Pending Prescriptions Disp Refills    KLOR-CON M20 20 MEQ extended release tablet [Pharmacy Med Name: KLOR-CON M20 TABLET] 30 tablet      Sig: TAKE 1 TABLET BY MOUTH DAILY

## 2023-09-11 NOTE — PROGRESS NOTES
[address] [facility]  Advanced CHF/Pulmonary Hypertension   Cardiac Evaluation      Elton June  YOB: 1965    Date of Visit:  9/12/23      Chief Complaint   Patient presents with    New Patient    Congestive Heart Failure        History of Present Illness:  Elton June is a 62 y.o. male who presents as a new patient referred by MIGUELINA Erickson CNP for consultation and management of pulmonary HTN. He has a medical history of CHF, diastolic dysfunction, COPD. He presented tot he ER 10/21/2022 with complaints of BLE edema and ascites. [facility] 10/24/2022 showed severe pulmonary HTN, mild non obstructive CAD. [place] 10/31/2022 showed severe pulmonary HTN, RA 19 mmHg, RV 68/13 mmHG, PA 70/43/53 mmHG, PCWP 21 mmHg. He was admitted 7/30/2023-8/10/2023 for complaints of testicular swelling and chronic CHF. [place] 7/30/2023 shows RA 22 mmHg, RV 76/22 mmHg, PA 76/40 (54) mmHg, PCWP 15 mmHg. Echo 7/31/2023 EF 55%, mild cLVH, grade 2 DD, moderate TR, RA severely enlarged, SPAP 48. Today, 9/12/2023, During his recent hospitalization he was diuresed he lost around 40 lbs. At home his weight averages 215-219 lbs. He walks one half to a full mile nightly. He reports he is tolerating activity well with less shortness of breath. He does not see a pulmonologist. Patient is taking all cardiac medications as prescribed and tolerates them well. Patient denies current edema, chest pain, palpitations, or syncope. He is scheduled to see [address] [place] [address] team at [facility] in the next few weeks.     NYHA CLASS:  3    No Known Allergies  Current Outpatient Medications   Medication Sig Dispense Refill    Acetaminophen (TYLENOL PO) Take by mouth      metoprolol succinate (TOPROL XL) 25 MG extended release tablet Take 1 tablet by mouth in the morning and at bedtime 60 tablet 3    torsemide (DEMADEX) 100 MG tablet Take 0.5 tablets by mouth daily 45 tablet 1    losartan (COZAAR) 25 MG tablet Take 1 tablet by mouth daily 90 tablet 1

## 2023-09-12 ENCOUNTER — OFFICE VISIT (OUTPATIENT)
Dept: CARDIOLOGY CLINIC | Age: 58
End: 2023-09-12

## 2023-09-12 VITALS
SYSTOLIC BLOOD PRESSURE: 90 MMHG | BODY MASS INDEX: 29.93 KG/M2 | HEART RATE: 86 BPM | OXYGEN SATURATION: 94 % | WEIGHT: 221 LBS | DIASTOLIC BLOOD PRESSURE: 60 MMHG | HEIGHT: 72 IN

## 2023-09-12 DIAGNOSIS — I50.812 CHRONIC RIGHT-SIDED HEART FAILURE (HCC): ICD-10-CM

## 2023-09-12 DIAGNOSIS — I27.81 CHRONIC COR PULMONALE (HCC): ICD-10-CM

## 2023-09-12 DIAGNOSIS — J44.9 CHRONIC OBSTRUCTIVE PULMONARY DISEASE, UNSPECIFIED COPD TYPE (HCC): ICD-10-CM

## 2023-09-12 DIAGNOSIS — I27.20 PULMONARY HYPERTENSION (HCC): ICD-10-CM

## 2023-09-12 DIAGNOSIS — I50.32 CHRONIC DIASTOLIC HEART FAILURE (HCC): Primary | ICD-10-CM

## 2023-09-12 RX ORDER — TORSEMIDE 20 MG/1
20 TABLET ORAL DAILY
COMMUNITY

## 2023-09-12 NOTE — PATIENT INSTRUCTIONS
Plan:  Goal weight 205-215 lbs take torsemide 50 mg daily  If weight is above 215 lbs take torsemide 50 mg in the morning and 20 mg in the afternoon  If weight is less than 205 lbs call me  If weight is above 222 lbs call me  Labs in 1 week  : BNP, CBC, BMP  Follow up in 2 months with NPDD

## 2023-09-14 RX ORDER — POTASSIUM CHLORIDE 1500 MG/1
20 TABLET, EXTENDED RELEASE ORAL DAILY
Qty: 30 TABLET | Refills: 0 | Status: SHIPPED | OUTPATIENT
Start: 2023-09-14

## 2023-09-14 NOTE — TELEPHONE ENCOUNTER
Refill Request     CONFIRM preferred pharmacy with the patient. If Mail Order Rx - Pend for 90 day refill. Last Seen: Last Seen Department: 8/15/2023  Last Seen by PCP: 8/15/2023    Last Written: 08/02/2023 30 tab 0 refills     If no future appointment scheduled:  Review the last OV with PCP and review information for follow-up visit,  Route STAFF MESSAGE with patient name to the [facility] for scheduling with the following information:            -  Timing of next visit           -  Visit type ie Physical, OV, etc           -  Diagnoses/Reason ie. COPD, HTN - Do not use MEDICATION, Follow-up or CHECK UP - Give reason for visit      Next Appointment:   Future Appointments   Date Time Provider [address] [place] [address]   10/5/2023  3:30 PM DO LOWELL Wilde  [place] - [place]   12/5/2023 11:30 AM SANCHEZ Garibay CNP [facility]       Message sent to [address] [place] [address] to schedule appt with patient?   NO      Requested Prescriptions     Pending Prescriptions Disp Refills    KLOR-CON M20 20 MEQ extended release tablet [Pharmacy Med Name: KLOR-CON M20 TABLET] 30 tablet      Sig: TAKE 1 TABLET BY MOUTH DAILY

## 2023-09-15 DIAGNOSIS — I27.20 PULMONARY HYPERTENSION (HCC): ICD-10-CM

## 2023-09-15 DIAGNOSIS — I50.812 CHRONIC RIGHT-SIDED HEART FAILURE (HCC): ICD-10-CM

## 2023-09-15 LAB
ANION GAP SERPL CALCULATED.3IONS-SCNC: 8 MMOL/L (ref 3–16)
BASOPHILS # BLD: 0 K/UL (ref 0–0.2)
BASOPHILS NFR BLD: 0.5 %
BUN SERPL-MCNC: 35 MG/DL (ref 7–20)
CALCIUM SERPL-MCNC: 9.9 MG/DL (ref 8.3–10.6)
CHLORIDE SERPL-SCNC: 92 MMOL/L (ref 99–110)
CO2 SERPL-SCNC: 33 MMOL/L (ref 21–32)
CREAT SERPL-MCNC: 1 MG/DL (ref 0.9–1.3)
DEPRECATED RDW RBC AUTO: 18.6 % (ref 12.4–15.4)
EOSINOPHIL # BLD: 0.2 K/UL (ref 0–0.6)
EOSINOPHIL NFR BLD: 3.1 %
GFR SERPLBLD CREATININE-BSD FMLA CKD-EPI: >60 ML/MIN/{1.73_M2}
GLUCOSE SERPL-MCNC: 140 MG/DL (ref 70–99)
HCT VFR BLD AUTO: 44.5 % (ref 40.5–52.5)
HGB BLD-MCNC: 14.6 G/DL (ref 13.5–17.5)
LYMPHOCYTES # BLD: 1.6 K/UL (ref 1–5.1)
LYMPHOCYTES NFR BLD: 21.7 %
MCH RBC QN AUTO: 30.8 PG (ref 26–34)
MCHC RBC AUTO-ENTMCNC: 32.8 G/DL (ref 31–36)
MCV RBC AUTO: 93.9 FL (ref 80–100)
MONOCYTES # BLD: 0.7 K/UL (ref 0–1.3)
MONOCYTES NFR BLD: 10.1 %
NEUTROPHILS # BLD: 4.8 K/UL (ref 1.7–7.7)
NEUTROPHILS NFR BLD: 64.6 %
NT-PROBNP SERPL-MCNC: 1463 PG/ML (ref 0–124)
PLATELET # BLD AUTO: 186 K/UL (ref 135–450)
PMV BLD AUTO: 8.1 FL (ref 5–10.5)
POTASSIUM SERPL-SCNC: 4.4 MMOL/L (ref 3.5–5.1)
RBC # BLD AUTO: 4.74 M/UL (ref 4.2–5.9)
SODIUM SERPL-SCNC: 133 MMOL/L (ref 136–145)
WBC # BLD AUTO: 7.4 K/UL (ref 4–11)

## 2023-09-18 ENCOUNTER — TELEPHONE (OUTPATIENT)
Dept: CARDIOLOGY CLINIC | Age: 58
End: 2023-09-18

## 2023-09-18 NOTE — TELEPHONE ENCOUNTER
Labs reviewed in DAGOBERTO absence. Pro-BNP elevated 1463 from 853. Weight 215-218. Today 215. Upper legs are a little swelled. He walked 3 miles a few times and felt good. Denies salty foods. Not eating out. No [place] food. Not SOB, no PND. Feels good. Took Torsemide 50mg daily. (Not the extra 20mg today)   He is leaving for work.  (Works at [employer])

## 2023-09-27 DIAGNOSIS — E11.65 UNCONTROLLED TYPE 2 DIABETES MELLITUS WITH HYPERGLYCEMIA (HCC): ICD-10-CM

## 2023-09-27 DIAGNOSIS — I50.9 CHRONIC CONGESTIVE HEART FAILURE, UNSPECIFIED HEART FAILURE TYPE (HCC): ICD-10-CM

## 2023-09-27 DIAGNOSIS — J44.9 CHRONIC OBSTRUCTIVE PULMONARY DISEASE, UNSPECIFIED COPD TYPE (HCC): ICD-10-CM

## 2023-09-27 NOTE — TELEPHONE ENCOUNTER
Patient called the office stating that he does use [facility] [employer].      Fax: [phone]

## 2023-09-27 NOTE — TELEPHONE ENCOUNTER
[facility] pharmacy called the office asking for prescription refills on Farxiga and [address] [facility]. Left message for the patient to call the office to confirm if [facility] is a pharmacy he uses.

## 2023-09-28 RX ORDER — UMECLIDINIUM BROMIDE AND VILANTEROL TRIFENATATE 62.5; 25 UG/1; UG/1
POWDER RESPIRATORY (INHALATION)
Qty: 60 EACH | Refills: 1 | Status: SHIPPED | OUTPATIENT
Start: 2023-09-28

## 2023-10-12 RX ORDER — POTASSIUM CHLORIDE 1500 MG/1
20 TABLET, EXTENDED RELEASE ORAL DAILY
Qty: 30 TABLET | Refills: 0 | OUTPATIENT
Start: 2023-10-12

## 2023-10-12 NOTE — TELEPHONE ENCOUNTER
Refill Request     CONFIRM preferred pharmacy with the patient. If Mail Order Rx - Pend for 90 day refill. Last Seen: Last Seen Department: 8/15/2023  Last Seen by PCP: 8/15/2023    Last Written: 9/14/2023    If no future appointment scheduled:  Review the last OV with PCP and review information for follow-up visit,  Route STAFF MESSAGE with patient name to the [facility] for scheduling with the following information:            -  Timing of next visit           -  Visit type ie Physical, OV, etc           -  Diagnoses/Reason ie. COPD, HTN - Do not use MEDICATION, Follow-up or CHECK UP - Give reason for visit      Next Appointment:   Future Appointments   Date Time Provider [address]   12/5/2023 11:30 AM SANCHEZ George - RICHARD Eddy [place] [facility]       Message sent to [facility] to schedule appt with patient?   NO      Requested Prescriptions     Pending Prescriptions Disp Refills    KLOR-CON M20 20 MEQ extended release tablet [Pharmacy Med Name: KLOR-CON M20 TABLET] 30 tablet 0     Sig: TAKE 1 TABLET BY MOUTH DAILY

## 2023-11-02 ENCOUNTER — HOSPITAL ENCOUNTER (OUTPATIENT)
Dept: ULTRASOUND IMAGING | Age: 58
Discharge: HOME | End: 2023-11-02
Payer: COMMERCIAL

## 2023-11-02 ENCOUNTER — OFFICE VISIT (OUTPATIENT)
Dept: PULMONOLOGY | Age: 58
End: 2023-11-02
Payer: COMMERCIAL

## 2023-11-02 VITALS
RESPIRATION RATE: 18 BRPM | WEIGHT: 255 LBS | DIASTOLIC BLOOD PRESSURE: 60 MMHG | BODY MASS INDEX: 34.54 KG/M2 | OXYGEN SATURATION: 90 % | SYSTOLIC BLOOD PRESSURE: 114 MMHG | HEART RATE: 111 BPM | TEMPERATURE: 97.8 F | HEIGHT: 72 IN

## 2023-11-02 DIAGNOSIS — I50.813 ACUTE ON CHRONIC SYSTOLIC RIGHT HEART FAILURE (HCC): ICD-10-CM

## 2023-11-02 DIAGNOSIS — I27.21 PULMONARY ARTERY HYPERTENSION (HCC): ICD-10-CM

## 2023-11-02 DIAGNOSIS — R18.8 ASCITES OF LIVER: ICD-10-CM

## 2023-11-02 DIAGNOSIS — G47.33 OSA (OBSTRUCTIVE SLEEP APNEA): ICD-10-CM

## 2023-11-02 DIAGNOSIS — Z87.891 FORMER HEAVY CIGARETTE SMOKER (20-39 PER DAY): ICD-10-CM

## 2023-11-02 DIAGNOSIS — E66.09 CLASS 1 OBESITY DUE TO EXCESS CALORIES WITH SERIOUS COMORBIDITY AND BODY MASS INDEX (BMI) OF 34.0 TO 34.9 IN ADULT: ICD-10-CM

## 2023-11-02 DIAGNOSIS — I27.20 PULMONARY HYPERTENSION (HCC): ICD-10-CM

## 2023-11-02 DIAGNOSIS — J44.9 COPD, SEVERE (HCC): Primary | ICD-10-CM

## 2023-11-02 PROCEDURE — 93975 VASCULAR STUDY: CPT

## 2023-11-02 PROCEDURE — 99214 OFFICE O/P EST MOD 30 MIN: CPT | Performed by: NURSE PRACTITIONER

## 2023-11-02 PROCEDURE — 3074F SYST BP LT 130 MM HG: CPT | Performed by: NURSE PRACTITIONER

## 2023-11-02 PROCEDURE — 3078F DIAST BP <80 MM HG: CPT | Performed by: NURSE PRACTITIONER

## 2023-11-02 PROCEDURE — 76700 US EXAM ABDOM COMPLETE: CPT

## 2023-11-02 ASSESSMENT — ENCOUNTER SYMPTOMS
SORE THROAT: 0
CHEST TIGHTNESS: 0
ABDOMINAL PAIN: 0
EYE PAIN: 0
COUGH: 0
WHEEZING: 0
SHORTNESS OF BREATH: 0
RHINORRHEA: 0

## 2023-11-02 ASSESSMENT — SLEEP AND FATIGUE QUESTIONNAIRES
HOW LIKELY ARE YOU TO NOD OFF OR FALL ASLEEP IN A CAR, WHILE STOPPED FOR A FEW MINUTES IN TRAFFIC: 0
HOW LIKELY ARE YOU TO NOD OFF OR FALL ASLEEP WHILE LYING DOWN TO REST IN THE AFTERNOON WHEN CIRCUMSTANCES PERMIT: 0
HOW LIKELY ARE YOU TO NOD OFF OR FALL ASLEEP WHILE SITTING AND READING: 0
NECK CIRCUMFERENCE (INCHES): 18
HOW LIKELY ARE YOU TO NOD OFF OR FALL ASLEEP WHILE SITTING QUIETLY AFTER LUNCH WITHOUT ALCOHOL: 2
HOW LIKELY ARE YOU TO NOD OFF OR FALL ASLEEP WHEN YOU ARE A PASSENGER IN A CAR FOR AN HOUR WITHOUT A BREAK: 0
ESS TOTAL SCORE: 5
HOW LIKELY ARE YOU TO NOD OFF OR FALL ASLEEP WHILE WATCHING TV: 3
HOW LIKELY ARE YOU TO NOD OFF OR FALL ASLEEP WHILE SITTING AND TALKING TO SOMEONE: 0
HOW LIKELY ARE YOU TO NOD OFF OR FALL ASLEEP WHILE SITTING INACTIVE IN A PUBLIC PLACE: 0

## 2023-11-02 NOTE — PROGRESS NOTES
MA Communication: The following orders are received by verbal communication from James Gordillo CNP    Orders include:  [address] will call to schedule. After patient is scheduled and then call our office to follow up with Dr. Peng Krause for results.
Content: Thought content normal.         Judgment: Judgment normal.          Assessment/Plan:     1. COPD, severe ([address])  2. Pulmonary hypertension ([address])  3. LALA (obstructive sleep apnea)  4. Class 1 obesity due to excess calories with serious comorbidity and body mass index (BMI) of 34.0 to 34.9 in adult  5. Former heavy cigarette smoker (20-39 per day)       Prior pulmonary OV note,  Dr. Sherly Lema note from 10/25/23, PFT report and recent chest imaging report reviewed. COPD stable today. Does not meet smoking criteria for having low dose screening CT due to only 15 pack years. I have personally reviewed and summarized the old records and/or obtained further history from someone other than the patient. Reviewed present meds and side effects. Reviewed proper inhaler usage. He is to continue Anoro and using albuterol as needed for increased SOB, wheezing. This is controlling his COPD. Discussed when to call with worsening symptoms such as increased shortness of breath, productive cough, wheezing or symptoms not responding to treatment plan. Earlene Guzman will be scheduled for polysomnography in order to evaluate for the presence and severity of obstructive sleep apnea. He was given a discussion of the pathophysiology, evaluation and treatment of apnea. Thyroid function tests are recommended if not done recently. Advised to avoid driving when too sleepy to function safely. Discussed the risks of untreated apnea such as accidents, cognitive impairment, mood impairment, high blood pressure, various cardiac diseases and stroke. Regarding obesity, recommend to try a formal program and increase physical activity by adding a 30 minute walk to daily routine. Weight loss was encouraged as a long term approach to treatment of LALA. Explained the correlation between obesity and apnea and the causative role it can play. Follow up with Dr. Sonali Cowan after testing completed.      SANCHEZ Covarrubias - CNP

## 2023-11-02 NOTE — PATIENT INSTRUCTIONS
Call with worsening symptoms such as increased shortness of breath, productive cough, wheezing or symptoms not responding to treatment plan. Your current pulmonary medications are controlling/treating your COPD. Stay compliant. Reviewed present pulmonary medications and side effects. Reviewed proper inhaler usage. Lexy Shahid will be scheduled for polysomnography in order to evaluate for the presence and severity of obstructive sleep apnea. He was given a discussion of the pathophysiology, evaluation and treatment of apnea. Thyroid function tests are recommended if not done recently. Advised to avoid driving when too sleepy to function safely. Discussed the risks of untreated apnea such as accidents, cognitive impairment, mood impairment, high blood pressure, various cardiac diseases and stroke. Regarding obesity, recommend to try a formal program and increase physical activity by adding a 30 minute walk to daily routine. Weight loss was encouraged as a long term approach to treatment of LALA. Explained the correlation between obesity and apnea and the causative role it can play. Follow up with Dr. Marcus Vickers after testing completed. Remember to bring a list of pulmonary medications and any CPAP or BiPAP machines to your next appointment with the office. Please keep all of your future appointments scheduled by [facility] office. Out of respect for other patients and providers, you may be asked to reschedule your appointment if you arrive later than your scheduled appointment time. Appointments cancelled less than 24hrs in advance will be considered a no show. Patients with three missed appointments within 1 year or four missed appointments within 2 years can be dismissed from the practice.      Please be aware that our physicians are required to work in the Intensive Care Unit at [facility].  Your appointment may need to be rescheduled if they are

## 2023-11-05 ENCOUNTER — APPOINTMENT (OUTPATIENT)
Dept: GENERAL RADIOLOGY | Age: 58
DRG: 432 | End: 2023-11-05
Payer: COMMERCIAL

## 2023-11-05 ENCOUNTER — APPOINTMENT (OUTPATIENT)
Dept: CT IMAGING | Age: 58
DRG: 432 | End: 2023-11-05
Payer: COMMERCIAL

## 2023-11-05 ENCOUNTER — HOSPITAL ENCOUNTER (INPATIENT)
Age: 58
LOS: 3 days | Discharge: ANOTHER HOSPITAL | DRG: 432 | End: 2023-11-08
Attending: EMERGENCY MEDICINE | Admitting: STUDENT IN AN ORGANIZED HEALTH CARE EDUCATION/TRAINING PROGRAM
Payer: COMMERCIAL

## 2023-11-05 DIAGNOSIS — J44.1 COPD EXACERBATION (HCC): ICD-10-CM

## 2023-11-05 DIAGNOSIS — R18.8 CIRRHOSIS OF LIVER WITH ASCITES, UNSPECIFIED HEPATIC CIRRHOSIS TYPE (HCC): ICD-10-CM

## 2023-11-05 DIAGNOSIS — R79.89 ELEVATED TROPONIN: ICD-10-CM

## 2023-11-05 DIAGNOSIS — K74.60 CIRRHOSIS OF LIVER WITH ASCITES, UNSPECIFIED HEPATIC CIRRHOSIS TYPE (HCC): ICD-10-CM

## 2023-11-05 DIAGNOSIS — I50.9 ACUTE ON CHRONIC HEART FAILURE, UNSPECIFIED HEART FAILURE TYPE (HCC): Primary | ICD-10-CM

## 2023-11-05 LAB
ALBUMIN SERPL-MCNC: 3.9 G/DL (ref 3.4–5)
ALBUMIN/GLOB SERPL: 1.1 {RATIO} (ref 1.1–2.2)
ALP SERPL-CCNC: 258 U/L (ref 40–129)
ALT SERPL-CCNC: 18 U/L (ref 10–40)
ANION GAP SERPL CALCULATED.3IONS-SCNC: 13 MMOL/L (ref 3–16)
AST SERPL-CCNC: 31 U/L (ref 15–37)
BASE EXCESS BLDV CALC-SCNC: -0.4 MMOL/L (ref -3–3)
BASOPHILS # BLD: 0 K/UL (ref 0–0.2)
BASOPHILS NFR BLD: 0.7 %
BILIRUB SERPL-MCNC: 1.8 MG/DL (ref 0–1)
BILIRUB UR QL STRIP.AUTO: NEGATIVE
BUN SERPL-MCNC: 43 MG/DL (ref 7–20)
CALCIUM SERPL-MCNC: 9.1 MG/DL (ref 8.3–10.6)
CHLORIDE SERPL-SCNC: 100 MMOL/L (ref 99–110)
CLARITY UR: CLEAR
CO2 BLDV-SCNC: 28 MMOL/L
CO2 SERPL-SCNC: 25 MMOL/L (ref 21–32)
COHGB MFR BLDV: 1.8 % (ref 0–1.5)
COLOR UR: YELLOW
CREAT SERPL-MCNC: 1.2 MG/DL (ref 0.9–1.3)
DEPRECATED RDW RBC AUTO: 16.7 % (ref 12.4–15.4)
EOSINOPHIL # BLD: 0.2 K/UL (ref 0–0.6)
EOSINOPHIL NFR BLD: 3.8 %
GFR SERPLBLD CREATININE-BSD FMLA CKD-EPI: >60 ML/MIN/{1.73_M2}
GLUCOSE SERPL-MCNC: 124 MG/DL (ref 70–99)
GLUCOSE UR STRIP.AUTO-MCNC: 100 MG/DL
HCO3 BLDV-SCNC: 26.5 MMOL/L (ref 23–29)
HCT VFR BLD AUTO: 41.9 % (ref 40.5–52.5)
HGB BLD-MCNC: 13.7 G/DL (ref 13.5–17.5)
HGB UR QL STRIP.AUTO: NEGATIVE
KETONES UR STRIP.AUTO-MCNC: NEGATIVE MG/DL
LEUKOCYTE ESTERASE UR QL STRIP.AUTO: NEGATIVE
LYMPHOCYTES # BLD: 0.8 K/UL (ref 1–5.1)
LYMPHOCYTES NFR BLD: 13 %
MCH RBC QN AUTO: 31.8 PG (ref 26–34)
MCHC RBC AUTO-ENTMCNC: 32.6 G/DL (ref 31–36)
MCV RBC AUTO: 97.3 FL (ref 80–100)
METHGB MFR BLDV: 0.6 %
MONOCYTES # BLD: 0.8 K/UL (ref 0–1.3)
MONOCYTES NFR BLD: 11.7 %
NEUTROPHILS # BLD: 4.6 K/UL (ref 1.7–7.7)
NEUTROPHILS NFR BLD: 70.8 %
NITRITE UR QL STRIP.AUTO: NEGATIVE
NT-PROBNP SERPL-MCNC: 2503 PG/ML (ref 0–124)
O2 THERAPY: ABNORMAL
PCO2 BLDV: 52.1 MMHG (ref 40–50)
PH BLDV: 7.32 [PH] (ref 7.35–7.45)
PH UR STRIP.AUTO: 6 [PH] (ref 5–8)
PLATELET # BLD AUTO: 191 K/UL (ref 135–450)
PMV BLD AUTO: 8 FL (ref 5–10.5)
PO2 BLDV: 53.2 MMHG (ref 25–40)
POTASSIUM SERPL-SCNC: 4.3 MMOL/L (ref 3.5–5.1)
PROT SERPL-MCNC: 7.3 G/DL (ref 6.4–8.2)
PROT UR STRIP.AUTO-MCNC: NEGATIVE MG/DL
RBC # BLD AUTO: 4.31 M/UL (ref 4.2–5.9)
SAO2 % BLDV: 84 %
SODIUM SERPL-SCNC: 138 MMOL/L (ref 136–145)
SP GR UR STRIP.AUTO: 1.01 (ref 1–1.03)
TROPONIN, HIGH SENSITIVITY: 31 NG/L (ref 0–22)
TROPONIN, HIGH SENSITIVITY: 32 NG/L (ref 0–22)
UA COMPLETE W REFLEX CULTURE PNL UR: ABNORMAL
UA DIPSTICK W REFLEX MICRO PNL UR: ABNORMAL
URN SPEC COLLECT METH UR: ABNORMAL
UROBILINOGEN UR STRIP-ACNC: 0.2 E.U./DL
WBC # BLD AUTO: 6.5 K/UL (ref 4–11)

## 2023-11-05 PROCEDURE — 86709 HEPATITIS A IGM ANTIBODY: CPT

## 2023-11-05 PROCEDURE — 6360000002 HC RX W HCPCS: Performed by: REGISTERED NURSE

## 2023-11-05 PROCEDURE — 6370000000 HC RX 637 (ALT 250 FOR IP): Performed by: STUDENT IN AN ORGANIZED HEALTH CARE EDUCATION/TRAINING PROGRAM

## 2023-11-05 PROCEDURE — 94644 CONT INHLJ TX 1ST HOUR: CPT

## 2023-11-05 PROCEDURE — 83880 ASSAY OF NATRIURETIC PEPTIDE: CPT

## 2023-11-05 PROCEDURE — 85025 COMPLETE CBC W/AUTO DIFF WBC: CPT

## 2023-11-05 PROCEDURE — 71260 CT THORAX DX C+: CPT

## 2023-11-05 PROCEDURE — 99285 EMERGENCY DEPT VISIT HI MDM: CPT

## 2023-11-05 PROCEDURE — 74177 CT ABD & PELVIS W/CONTRAST: CPT

## 2023-11-05 PROCEDURE — 2580000003 HC RX 258: Performed by: STUDENT IN AN ORGANIZED HEALTH CARE EDUCATION/TRAINING PROGRAM

## 2023-11-05 PROCEDURE — 1200000000 HC SEMI PRIVATE

## 2023-11-05 PROCEDURE — 82803 BLOOD GASES ANY COMBINATION: CPT

## 2023-11-05 PROCEDURE — 6370000000 HC RX 637 (ALT 250 FOR IP): Performed by: REGISTERED NURSE

## 2023-11-05 PROCEDURE — 86708 HEPATITIS A ANTIBODY: CPT

## 2023-11-05 PROCEDURE — 84484 ASSAY OF TROPONIN QUANT: CPT

## 2023-11-05 PROCEDURE — 93005 ELECTROCARDIOGRAM TRACING: CPT | Performed by: REGISTERED NURSE

## 2023-11-05 PROCEDURE — 96375 TX/PRO/DX INJ NEW DRUG ADDON: CPT

## 2023-11-05 PROCEDURE — 81003 URINALYSIS AUTO W/O SCOPE: CPT

## 2023-11-05 PROCEDURE — 96374 THER/PROPH/DIAG INJ IV PUSH: CPT

## 2023-11-05 PROCEDURE — 86803 HEPATITIS C AB TEST: CPT

## 2023-11-05 PROCEDURE — 86706 HEP B SURFACE ANTIBODY: CPT

## 2023-11-05 PROCEDURE — 6360000004 HC RX CONTRAST MEDICATION: Performed by: REGISTERED NURSE

## 2023-11-05 PROCEDURE — 71045 X-RAY EXAM CHEST 1 VIEW: CPT

## 2023-11-05 PROCEDURE — 36415 COLL VENOUS BLD VENIPUNCTURE: CPT

## 2023-11-05 PROCEDURE — 80053 COMPREHEN METABOLIC PANEL: CPT

## 2023-11-05 RX ORDER — SODIUM CHLORIDE 0.9 % (FLUSH) 0.9 %
5-40 SYRINGE (ML) INJECTION PRN
Status: DISCONTINUED | OUTPATIENT
Start: 2023-11-05 | End: 2023-11-08 | Stop reason: HOSPADM

## 2023-11-05 RX ORDER — ATORVASTATIN CALCIUM 10 MG/1
20 TABLET, FILM COATED ORAL NIGHTLY
Status: DISCONTINUED | OUTPATIENT
Start: 2023-11-05 | End: 2023-11-08 | Stop reason: HOSPADM

## 2023-11-05 RX ORDER — SODIUM CHLORIDE 0.9 % (FLUSH) 0.9 %
5-40 SYRINGE (ML) INJECTION EVERY 12 HOURS SCHEDULED
Status: DISCONTINUED | OUTPATIENT
Start: 2023-11-05 | End: 2023-11-08 | Stop reason: HOSPADM

## 2023-11-05 RX ORDER — MAGNESIUM SULFATE IN WATER 40 MG/ML
2000 INJECTION, SOLUTION INTRAVENOUS PRN
Status: DISCONTINUED | OUTPATIENT
Start: 2023-11-05 | End: 2023-11-08 | Stop reason: HOSPADM

## 2023-11-05 RX ORDER — METOPROLOL SUCCINATE 25 MG/1
25 TABLET, EXTENDED RELEASE ORAL 2 TIMES DAILY
Status: DISCONTINUED | OUTPATIENT
Start: 2023-11-06 | End: 2023-11-08 | Stop reason: HOSPADM

## 2023-11-05 RX ORDER — ONDANSETRON 4 MG/1
4 TABLET, ORALLY DISINTEGRATING ORAL EVERY 8 HOURS PRN
Status: DISCONTINUED | OUTPATIENT
Start: 2023-11-05 | End: 2023-11-08 | Stop reason: HOSPADM

## 2023-11-05 RX ORDER — IPRATROPIUM BROMIDE AND ALBUTEROL SULFATE 2.5; .5 MG/3ML; MG/3ML
1 SOLUTION RESPIRATORY (INHALATION)
Status: DISCONTINUED | OUTPATIENT
Start: 2023-11-06 | End: 2023-11-08 | Stop reason: ALTCHOICE

## 2023-11-05 RX ORDER — FUROSEMIDE 10 MG/ML
40 INJECTION INTRAMUSCULAR; INTRAVENOUS DAILY
Status: DISCONTINUED | OUTPATIENT
Start: 2023-11-06 | End: 2023-11-06

## 2023-11-05 RX ORDER — METHYLPREDNISOLONE SODIUM SUCCINATE 125 MG/2ML
125 INJECTION, POWDER, LYOPHILIZED, FOR SOLUTION INTRAMUSCULAR; INTRAVENOUS ONCE
Status: COMPLETED | OUTPATIENT
Start: 2023-11-05 | End: 2023-11-05

## 2023-11-05 RX ORDER — SODIUM CHLORIDE 9 MG/ML
INJECTION, SOLUTION INTRAVENOUS PRN
Status: DISCONTINUED | OUTPATIENT
Start: 2023-11-05 | End: 2023-11-08 | Stop reason: HOSPADM

## 2023-11-05 RX ORDER — FUROSEMIDE 10 MG/ML
20 INJECTION INTRAMUSCULAR; INTRAVENOUS ONCE
Status: DISCONTINUED | OUTPATIENT
Start: 2023-11-05 | End: 2023-11-05

## 2023-11-05 RX ORDER — ONDANSETRON 2 MG/ML
4 INJECTION INTRAMUSCULAR; INTRAVENOUS EVERY 6 HOURS PRN
Status: DISCONTINUED | OUTPATIENT
Start: 2023-11-05 | End: 2023-11-08 | Stop reason: HOSPADM

## 2023-11-05 RX ORDER — ENOXAPARIN SODIUM 100 MG/ML
30 INJECTION SUBCUTANEOUS 2 TIMES DAILY
Status: DISCONTINUED | OUTPATIENT
Start: 2023-11-06 | End: 2023-11-08

## 2023-11-05 RX ORDER — ACETAMINOPHEN 325 MG/1
650 TABLET ORAL EVERY 6 HOURS PRN
Status: DISCONTINUED | OUTPATIENT
Start: 2023-11-05 | End: 2023-11-08 | Stop reason: HOSPADM

## 2023-11-05 RX ORDER — IPRATROPIUM BROMIDE AND ALBUTEROL SULFATE 2.5; .5 MG/3ML; MG/3ML
1 SOLUTION RESPIRATORY (INHALATION) ONCE
Status: COMPLETED | OUTPATIENT
Start: 2023-11-05 | End: 2023-11-05

## 2023-11-05 RX ORDER — POLYETHYLENE GLYCOL 3350 17 G/17G
17 POWDER, FOR SOLUTION ORAL DAILY PRN
Status: DISCONTINUED | OUTPATIENT
Start: 2023-11-05 | End: 2023-11-08 | Stop reason: HOSPADM

## 2023-11-05 RX ORDER — IPRATROPIUM BROMIDE AND ALBUTEROL SULFATE 2.5; .5 MG/3ML; MG/3ML
1 SOLUTION RESPIRATORY (INHALATION) EVERY 4 HOURS PRN
Status: DISCONTINUED | OUTPATIENT
Start: 2023-11-05 | End: 2023-11-08 | Stop reason: ALTCHOICE

## 2023-11-05 RX ORDER — SPIRONOLACTONE 25 MG/1
25 TABLET ORAL DAILY
Status: DISCONTINUED | OUTPATIENT
Start: 2023-11-06 | End: 2023-11-06

## 2023-11-05 RX ORDER — LOSARTAN POTASSIUM 25 MG/1
25 TABLET ORAL DAILY
Status: DISCONTINUED | OUTPATIENT
Start: 2023-11-06 | End: 2023-11-07

## 2023-11-05 RX ORDER — SILDENAFIL CITRATE 20 MG/1
20 TABLET ORAL 3 TIMES DAILY
Status: ON HOLD | COMMUNITY
End: 2023-11-08 | Stop reason: HOSPADM

## 2023-11-05 RX ORDER — POTASSIUM CHLORIDE 7.45 MG/ML
10 INJECTION INTRAVENOUS PRN
Status: DISCONTINUED | OUTPATIENT
Start: 2023-11-05 | End: 2023-11-08 | Stop reason: HOSPADM

## 2023-11-05 RX ORDER — ACETAMINOPHEN 650 MG/1
650 SUPPOSITORY RECTAL EVERY 6 HOURS PRN
Status: DISCONTINUED | OUTPATIENT
Start: 2023-11-05 | End: 2023-11-08 | Stop reason: HOSPADM

## 2023-11-05 RX ORDER — FUROSEMIDE 10 MG/ML
40 INJECTION INTRAMUSCULAR; INTRAVENOUS ONCE
Status: COMPLETED | OUTPATIENT
Start: 2023-11-05 | End: 2023-11-05

## 2023-11-05 RX ORDER — POTASSIUM CHLORIDE 20 MEQ/1
40 TABLET, EXTENDED RELEASE ORAL PRN
Status: DISCONTINUED | OUTPATIENT
Start: 2023-11-05 | End: 2023-11-08 | Stop reason: HOSPADM

## 2023-11-05 RX ORDER — ASPIRIN 81 MG/1
81 TABLET ORAL DAILY
Status: DISCONTINUED | OUTPATIENT
Start: 2023-11-06 | End: 2023-11-08 | Stop reason: HOSPADM

## 2023-11-05 RX ORDER — ALBUTEROL SULFATE 2.5 MG/3ML
5 SOLUTION RESPIRATORY (INHALATION) ONCE
Status: COMPLETED | OUTPATIENT
Start: 2023-11-05 | End: 2023-11-05

## 2023-11-05 RX ADMIN — METHYLPREDNISOLONE SODIUM SUCCINATE 125 MG: 125 INJECTION INTRAMUSCULAR; INTRAVENOUS at 16:51

## 2023-11-05 RX ADMIN — ALBUTEROL SULFATE 5 MG: 2.5 SOLUTION RESPIRATORY (INHALATION) at 16:35

## 2023-11-05 RX ADMIN — SODIUM CHLORIDE, PRESERVATIVE FREE 10 ML: 5 INJECTION INTRAVENOUS at 23:34

## 2023-11-05 RX ADMIN — IPRATROPIUM BROMIDE AND ALBUTEROL SULFATE 1 DOSE: 2.5; .5 SOLUTION RESPIRATORY (INHALATION) at 16:35

## 2023-11-05 RX ADMIN — ATORVASTATIN CALCIUM 20 MG: 10 TABLET, FILM COATED ORAL at 23:33

## 2023-11-05 RX ADMIN — FUROSEMIDE 40 MG: 10 INJECTION, SOLUTION INTRAMUSCULAR; INTRAVENOUS at 17:14

## 2023-11-05 RX ADMIN — IOPAMIDOL 75 ML: 755 INJECTION, SOLUTION INTRAVENOUS at 17:38

## 2023-11-05 ASSESSMENT — LIFESTYLE VARIABLES
HOW MANY STANDARD DRINKS CONTAINING ALCOHOL DO YOU HAVE ON A TYPICAL DAY: PATIENT DOES NOT DRINK
HOW OFTEN DO YOU HAVE A DRINK CONTAINING ALCOHOL: NEVER

## 2023-11-05 ASSESSMENT — ENCOUNTER SYMPTOMS
RHINORRHEA: 0
COUGH: 0
CHEST TIGHTNESS: 1
SHORTNESS OF BREATH: 1
ABDOMINAL PAIN: 0
STRIDOR: 0
CHOKING: 0
WHEEZING: 1

## 2023-11-05 ASSESSMENT — PAIN - FUNCTIONAL ASSESSMENT: PAIN_FUNCTIONAL_ASSESSMENT: NONE - DENIES PAIN

## 2023-11-05 NOTE — ED PROVIDER NOTES
severe ([address]) 10/01/2021    Calculus of gallbladder without cholecystitis without obstruction 08/12/2022    Pulmonary hypertension ([address]) 10/25/2022    Suspected sleep apnea 10/25/2022    Former smoker 10/25/2022    Enlarged RV (right ventricle) 10/26/2022    Tricuspid valve insufficiency 10/26/2022    Chest congestion 10/26/2022    Uncontrolled type 2 diabetes mellitus with hyperglycemia ([address]) 10/26/2022    Class 1 obesity in adult 10/26/2022    COPD exacerbation ([address]) 10/26/2022    Cor pulmonale ([address]) 10/26/2022    Anasarca 10/28/2022    Heart failure exacerbated by sotalol ([address]) 07/30/2023    Hypervolemia 08/04/2023    Acute on chronic heart failure with preserved ejection fraction ([address]) 74/06/5649    Diastolic dysfunction 70/86/7853    Pulmonary hypertension with acute cor pulmonale ([address]) 08/09/2023     Resolved Ambulatory Problems     Diagnosis Date Noted    Elevated blood pressure reading     Edema of both lower extremities 08/23/2021     Past Medical History:   Diagnosis Date    Acute systolic congestive heart failure (HCC)     CAD (coronary artery disease) 10/2022    Diabetes mellitus (HCC)     Essential hypertension     Pulmonary HTN ([address]) 10/2022    Right heart failure (HCC)     Thrombocytopenia (HCC)        Chronic Conditions: Hypertension, heart failure    CONSULTS: (Who and What was discussed)  None    ED Course:      CC/HPI Summary, DDx, ED Course, and Reassessment: Here the patient is maintaining fairly normal oxygen saturation at rest and I am not concerned for respiratory failure and he is in no distress. He does however have essentially anasarca with significant fluid overload in the lower extremities and in the abdomen with ascites. I believe patient will benefit from hospitalization for diuresis. Differential diagnostic considerations: CHF, renal failure, liver failure      I am the Primary Physician of Record.        Luisa Farrell MD  11/05/23 2037

## 2023-11-05 NOTE — H&P
umeclidinium-vilanterol (ANORO ELLIPTA) 62.5-25 MCG/ACT inhaler INHALE ONE DOSE BY MOUTH DAILY 9/28/23   Steve Conrad DO   KLOR-CON M20 20 MEQ extended release tablet TAKE 1 TABLET BY MOUTH DAILY  Patient not taking: Reported on 11/2/2023 9/14/23   Steve Conrad DO   Acetaminophen (TYLENOL PO) Take by mouth  Patient not taking: Reported on 11/2/2023    Anastasia Adams MD   torsemide (DEMADEX) 20 MG tablet Take 1 tablet by mouth daily Take one tablet in addition to the 50 mg tablet if weight is above 215 lbs    ProviderAnastasia MD   metoprolol succinate (TOPROL XL) 25 MG extended release tablet Take 1 tablet by mouth in the morning and at bedtime  Patient not taking: Reported on 11/2/2023 8/10/23   SANCHEZ Hidalgo CNP   torsemide (DEMADEX) 100 MG tablet Take 0.5 tablets by mouth daily  Patient not taking: Reported on 11/2/2023 8/11/23   SANCHEZ Hidalgo CNP   losartan (COZAAR) 25 MG tablet Take 1 tablet by mouth daily 7/24/23   Steve Conrad DO   atorvastatin (LIPITOR) 20 MG tablet Take 1 tablet by mouth nightly 7/24/23   Steve Conrad DO   aspirin (ASPIRIN LOW DOSE) 81 MG EC tablet Take 1 tablet by mouth daily 7/24/23   Steve Conrad DO   albuterol sulfate HFA (PROVENTIL;VENTOLIN;PROAIR) 108 (90 Base) MCG/ACT inhaler INHALE TWO PUFFS BY MOUTH FOUR TIMES A DAY AS NEEDED FOR WHEEZING 5/31/23   Steve Conrad DO   spironolactone (ALDACTONE) 25 MG tablet Take 1 tablet by mouth daily 11/30/22   SANCHEZ Prater CNP       Labs: Personally reviewed and interpreted for clinical significance. Recent Labs     11/05/23  1552   WBC 6.5   HGB 13.7   HCT 41.9        Recent Labs     11/05/23  1552      K 4.3      CO2 25   BUN 43*   CREATININE 1.2   CALCIUM 9.1     Recent Labs     11/05/23  1552 11/05/23  1818   PROBNP 2,503*  --    TROPHS 31* 32*     No results for input(s): \"LABA1C\" in the last 72 hours.   Recent Labs     11/05/23  1552   AST 31

## 2023-11-06 ENCOUNTER — APPOINTMENT (OUTPATIENT)
Dept: ULTRASOUND IMAGING | Age: 58
DRG: 432 | End: 2023-11-06
Payer: COMMERCIAL

## 2023-11-06 LAB
ALBUMIN SERPL-MCNC: 3.8 G/DL (ref 3.4–5)
ALBUMIN/GLOB SERPL: 1.2 {RATIO} (ref 1.1–2.2)
ALP SERPL-CCNC: 244 U/L (ref 40–129)
ALT SERPL-CCNC: 16 U/L (ref 10–40)
ANION GAP SERPL CALCULATED.3IONS-SCNC: 14 MMOL/L (ref 3–16)
APPEARANCE FLUID: NORMAL
AST SERPL-CCNC: 21 U/L (ref 15–37)
BASOPHILS # BLD: 0 K/UL (ref 0–0.2)
BASOPHILS NFR BLD: 0.4 %
BDY FLUID QUALITY: NORMAL
BILIRUB SERPL-MCNC: 1.6 MG/DL (ref 0–1)
BUN SERPL-MCNC: 44 MG/DL (ref 7–20)
CALCIUM SERPL-MCNC: 9.4 MG/DL (ref 8.3–10.6)
CELL COUNT FLUID TYPE: NORMAL
CHLORIDE SERPL-SCNC: 96 MMOL/L (ref 99–110)
CO2 SERPL-SCNC: 24 MMOL/L (ref 21–32)
COLOR FLUID: NORMAL
CREAT SERPL-MCNC: 1 MG/DL (ref 0.9–1.3)
DEPRECATED RDW RBC AUTO: 16 % (ref 12.4–15.4)
EKG ATRIAL RATE: 122 BPM
EKG DIAGNOSIS: NORMAL
EKG P-R INTERVAL: 128 MS
EKG Q-T INTERVAL: 300 MS
EKG QRS DURATION: 106 MS
EKG QTC CALCULATION (BAZETT): 427 MS
EKG R AXIS: 252 DEGREES
EKG T AXIS: 116 DEGREES
EKG VENTRICULAR RATE: 122 BPM
EOSINOPHIL # BLD: 0 K/UL (ref 0–0.6)
EOSINOPHIL NFR BLD: 0.1 %
GFR SERPLBLD CREATININE-BSD FMLA CKD-EPI: >60 ML/MIN/{1.73_M2}
GLUCOSE SERPL-MCNC: 174 MG/DL (ref 70–99)
HAV IGM SERPL QL IA: NORMAL
HBV SURFACE AB SERPL IA-ACNC: <3.5 MIU/ML
HCT VFR BLD AUTO: 43.3 % (ref 40.5–52.5)
HCV AB SERPL QL IA: NORMAL
HGB BLD-MCNC: 14.3 G/DL (ref 13.5–17.5)
INR PPP: 1.23 (ref 0.84–1.16)
IRON SATN MFR SERPL: 15 % (ref 20–50)
IRON SERPL-MCNC: 55 UG/DL (ref 59–158)
LYMPHOCYTES # BLD: 0.4 K/UL (ref 1–5.1)
LYMPHOCYTES NFR BLD: 7.3 %
LYMPHOCYTES NFR FLD: 25 %
MACROPHAGES # FLD: 5 %
MAGNESIUM SERPL-MCNC: 2.3 MG/DL (ref 1.8–2.4)
MCH RBC QN AUTO: 31.8 PG (ref 26–34)
MCHC RBC AUTO-ENTMCNC: 33 G/DL (ref 31–36)
MCV RBC AUTO: 96.2 FL (ref 80–100)
MESOTHL CELL NFR FLD: 38 %
MONOCYTES # BLD: 0.1 K/UL (ref 0–1.3)
MONOCYTES NFR BLD: 1.6 %
MONOCYTES NFR FLD: 8 %
NEUTROPHIL, FLUID: 24 %
NEUTROPHILS # BLD: 5 K/UL (ref 1.7–7.7)
NEUTROPHILS NFR BLD: 90.6 %
NUC CELL # FLD: 393 /CUMM
PHOSPHATE SERPL-MCNC: 3.2 MG/DL (ref 2.5–4.9)
PLATELET # BLD AUTO: 186 K/UL (ref 135–450)
PMV BLD AUTO: 7.9 FL (ref 5–10.5)
POTASSIUM SERPL-SCNC: 4.2 MMOL/L (ref 3.5–5.1)
PROT SERPL-MCNC: 7 G/DL (ref 6.4–8.2)
PROTHROMBIN TIME: 15.5 SEC (ref 11.5–14.8)
RBC # BLD AUTO: 4.5 M/UL (ref 4.2–5.9)
RBC FLUID: NORMAL /CUMM
SODIUM SERPL-SCNC: 134 MMOL/L (ref 136–145)
TIBC SERPL-MCNC: 377 UG/DL (ref 260–445)
TOTAL CELLS COUNTED FLD: 100
TRANSFERRIN SERPL-MCNC: 288 MG/DL (ref 200–360)
WBC # BLD AUTO: 5.5 K/UL (ref 4–11)

## 2023-11-06 PROCEDURE — 94640 AIRWAY INHALATION TREATMENT: CPT

## 2023-11-06 PROCEDURE — 87070 CULTURE OTHR SPECIMN AEROBIC: CPT

## 2023-11-06 PROCEDURE — 84466 ASSAY OF TRANSFERRIN: CPT

## 2023-11-06 PROCEDURE — 82042 OTHER SOURCE ALBUMIN QUAN EA: CPT

## 2023-11-06 PROCEDURE — 83540 ASSAY OF IRON: CPT

## 2023-11-06 PROCEDURE — P9047 ALBUMIN (HUMAN), 25%, 50ML: HCPCS | Performed by: PHYSICIAN ASSISTANT

## 2023-11-06 PROCEDURE — 6360000002 HC RX W HCPCS: Performed by: PHYSICIAN ASSISTANT

## 2023-11-06 PROCEDURE — 2700000000 HC OXYGEN THERAPY PER DAY

## 2023-11-06 PROCEDURE — 97530 THERAPEUTIC ACTIVITIES: CPT

## 2023-11-06 PROCEDURE — 6370000000 HC RX 637 (ALT 250 FOR IP): Performed by: STUDENT IN AN ORGANIZED HEALTH CARE EDUCATION/TRAINING PROGRAM

## 2023-11-06 PROCEDURE — 85610 PROTHROMBIN TIME: CPT

## 2023-11-06 PROCEDURE — 6360000002 HC RX W HCPCS: Performed by: STUDENT IN AN ORGANIZED HEALTH CARE EDUCATION/TRAINING PROGRAM

## 2023-11-06 PROCEDURE — 49083 ABD PARACENTESIS W/IMAGING: CPT

## 2023-11-06 PROCEDURE — 97162 PT EVAL MOD COMPLEX 30 MIN: CPT

## 2023-11-06 PROCEDURE — 97166 OT EVAL MOD COMPLEX 45 MIN: CPT

## 2023-11-06 PROCEDURE — 1200000000 HC SEMI PRIVATE

## 2023-11-06 PROCEDURE — 88112 CYTOPATH CELL ENHANCE TECH: CPT

## 2023-11-06 PROCEDURE — 84100 ASSAY OF PHOSPHORUS: CPT

## 2023-11-06 PROCEDURE — 82390 ASSAY OF CERULOPLASMIN: CPT

## 2023-11-06 PROCEDURE — 2580000003 HC RX 258: Performed by: STUDENT IN AN ORGANIZED HEALTH CARE EDUCATION/TRAINING PROGRAM

## 2023-11-06 PROCEDURE — 83735 ASSAY OF MAGNESIUM: CPT

## 2023-11-06 PROCEDURE — 83516 IMMUNOASSAY NONANTIBODY: CPT

## 2023-11-06 PROCEDURE — 87205 SMEAR GRAM STAIN: CPT

## 2023-11-06 PROCEDURE — 86038 ANTINUCLEAR ANTIBODIES: CPT

## 2023-11-06 PROCEDURE — 82103 ALPHA-1-ANTITRYPSIN TOTAL: CPT

## 2023-11-06 PROCEDURE — 89051 BODY FLUID CELL COUNT: CPT

## 2023-11-06 PROCEDURE — 36415 COLL VENOUS BLD VENIPUNCTURE: CPT

## 2023-11-06 PROCEDURE — 84157 ASSAY OF PROTEIN OTHER: CPT

## 2023-11-06 PROCEDURE — 94761 N-INVAS EAR/PLS OXIMETRY MLT: CPT

## 2023-11-06 PROCEDURE — 85025 COMPLETE CBC W/AUTO DIFF WBC: CPT

## 2023-11-06 PROCEDURE — 0W9G3ZZ DRAINAGE OF PERITONEAL CAVITY, PERCUTANEOUS APPROACH: ICD-10-PCS | Performed by: RADIOLOGY

## 2023-11-06 PROCEDURE — 97116 GAIT TRAINING THERAPY: CPT

## 2023-11-06 PROCEDURE — 88305 TISSUE EXAM BY PATHOLOGIST: CPT

## 2023-11-06 PROCEDURE — 93010 ELECTROCARDIOGRAM REPORT: CPT | Performed by: INTERNAL MEDICINE

## 2023-11-06 PROCEDURE — 86015 ACTIN ANTIBODY EACH: CPT

## 2023-11-06 PROCEDURE — 80053 COMPREHEN METABOLIC PANEL: CPT

## 2023-11-06 RX ORDER — METOPROLOL TARTRATE 5 MG/5ML
5 INJECTION INTRAVENOUS
Status: ACTIVE | OUTPATIENT
Start: 2023-11-06 | End: 2023-11-07

## 2023-11-06 RX ORDER — SPIRONOLACTONE 100 MG/1
100 TABLET, FILM COATED ORAL DAILY
Status: DISCONTINUED | OUTPATIENT
Start: 2023-11-07 | End: 2023-11-08 | Stop reason: HOSPADM

## 2023-11-06 RX ORDER — NITROGLYCERIN 0.4 MG/1
0.4 TABLET SUBLINGUAL EVERY 5 MIN PRN
Status: DISCONTINUED | OUTPATIENT
Start: 2023-11-06 | End: 2023-11-08 | Stop reason: HOSPADM

## 2023-11-06 RX ORDER — SILDENAFIL CITRATE 20 MG/1
20 TABLET ORAL 3 TIMES DAILY
Status: DISCONTINUED | OUTPATIENT
Start: 2023-11-06 | End: 2023-11-07

## 2023-11-06 RX ORDER — ALBUMIN (HUMAN) 12.5 G/50ML
25 SOLUTION INTRAVENOUS ONCE
Status: COMPLETED | OUTPATIENT
Start: 2023-11-06 | End: 2023-11-06

## 2023-11-06 RX ORDER — FUROSEMIDE 10 MG/ML
40 INJECTION INTRAMUSCULAR; INTRAVENOUS 2 TIMES DAILY
Status: DISCONTINUED | OUTPATIENT
Start: 2023-11-06 | End: 2023-11-07

## 2023-11-06 RX ADMIN — METOPROLOL SUCCINATE 25 MG: 25 TABLET, EXTENDED RELEASE ORAL at 22:15

## 2023-11-06 RX ADMIN — IPRATROPIUM BROMIDE AND ALBUTEROL SULFATE 1 DOSE: 2.5; .5 SOLUTION RESPIRATORY (INHALATION) at 11:12

## 2023-11-06 RX ADMIN — METOPROLOL SUCCINATE 25 MG: 25 TABLET, EXTENDED RELEASE ORAL at 10:15

## 2023-11-06 RX ADMIN — EMPAGLIFLOZIN 10 MG: 10 TABLET, FILM COATED ORAL at 10:15

## 2023-11-06 RX ADMIN — FUROSEMIDE 40 MG: 10 INJECTION, SOLUTION INTRAMUSCULAR; INTRAVENOUS at 18:43

## 2023-11-06 RX ADMIN — FUROSEMIDE 40 MG: 10 INJECTION, SOLUTION INTRAMUSCULAR; INTRAVENOUS at 10:15

## 2023-11-06 RX ADMIN — IPRATROPIUM BROMIDE AND ALBUTEROL SULFATE 1 DOSE: 2.5; .5 SOLUTION RESPIRATORY (INHALATION) at 07:23

## 2023-11-06 RX ADMIN — IPRATROPIUM BROMIDE AND ALBUTEROL SULFATE 1 DOSE: 2.5; .5 SOLUTION RESPIRATORY (INHALATION) at 19:58

## 2023-11-06 RX ADMIN — SODIUM CHLORIDE, PRESERVATIVE FREE 10 ML: 5 INJECTION INTRAVENOUS at 22:15

## 2023-11-06 RX ADMIN — SPIRONOLACTONE 25 MG: 25 TABLET ORAL at 10:15

## 2023-11-06 RX ADMIN — TIOTROPIUM BROMIDE AND OLODATEROL 2 PUFF: 3.124; 2.736 SPRAY, METERED RESPIRATORY (INHALATION) at 07:23

## 2023-11-06 RX ADMIN — ASPIRIN 81 MG: 81 TABLET, COATED ORAL at 10:15

## 2023-11-06 RX ADMIN — SODIUM CHLORIDE, PRESERVATIVE FREE 10 ML: 5 INJECTION INTRAVENOUS at 10:16

## 2023-11-06 RX ADMIN — ALBUMIN (HUMAN) 25 G: 0.25 INJECTION, SOLUTION INTRAVENOUS at 17:51

## 2023-11-06 RX ADMIN — LOSARTAN POTASSIUM 25 MG: 25 TABLET, FILM COATED ORAL at 10:15

## 2023-11-06 RX ADMIN — ENOXAPARIN SODIUM 30 MG: 100 INJECTION SUBCUTANEOUS at 22:15

## 2023-11-06 RX ADMIN — ATORVASTATIN CALCIUM 20 MG: 10 TABLET, FILM COATED ORAL at 22:15

## 2023-11-06 NOTE — ACP (ADVANCE CARE PLANNING)
Advance Care Planning     General Advance Care Planning (ACP) Conversation    Date of Conversation: 11/5/2023  Conducted with: Patient with Decision Making Capacity   Healthcare Decision Maker: Next of Kin by law (only applies in absence of above) (name) Radames Kehr (father) [phone]    Healthcare Decision Maker:    Primary Decision Maker: Radames Kehr - Parent - [phone]  Click here to Jaswantrt including selection of the Healthcare Decision Maker Relationship (ie \"Primary\"). Today we documented Decision Maker(s) consistent with Legal Next of Kin hierarchy.     Content/Action Overview:  Has NO ACP documents/care preferences - information provided, considering goals and options  Reviewed DNR/DNI and patient elects Full Code (Attempt Resuscitation)      Length of Voluntary ACP Conversation in minutes:  <16 minutes (Non-Billable)    Genevieve Wang RN

## 2023-11-06 NOTE — ED NOTES
Mr. Catherine Heard is a 62 y.o. male who had concerns including Leg Swelling (Swelling of lower extremities and abdomen) and Shortness of Breath. Chief Complaint   Patient presents with    Leg Swelling     Swelling of lower extremities and abdomen    Shortness of Breath       He is being admitted for:    Ascites of liver    His ED problem list included:    1. Acute on chronic heart failure, unspecified heart failure type ([address])    2. COPD exacerbation ([address])    3.  Cirrhosis of liver with ascites, unspecified hepatic cirrhosis type (HCC)    4. Elevated troponin        Past Medical History:   Diagnosis Date    Acute systolic congestive heart failure (HCC)     CAD (coronary artery disease) 10/2022    mild non-obstructive    Diabetes mellitus ([address])     Essential hypertension     Pulmonary HTN ([address]) 10/2022    RHC    Right heart failure (HCC)     Thrombocytopenia (HCC)        Past Surgical History:   Procedure Laterality Date    CARDIAC CATHETERIZATION  10/24/2022    LHC- mild non-obstructive CAD; RHC- pulm HTN    CHOLECYSTECTOMY, LAPAROSCOPIC N/A 08/12/2022    ROBOTIC CHOLECYSTECTOMY WITH INTRAOPERATIVE CHOLANGIOGRAM performed by Rachel Littlejohn MD at [facility]       His recent abnormal labs were:    Labs Reviewed   CBC WITH AUTO DIFFERENTIAL - Abnormal; Notable for the following components:       Result Value    RDW 16.7 (*)     Lymphocytes Absolute 0.8 (*)     All other components within normal limits   COMPREHENSIVE METABOLIC PANEL W/ REFLEX TO MG FOR LOW K - Abnormal; Notable for the following components:    Glucose 124 (*)     BUN 43 (*)     Total Bilirubin 1.8 (*)     Alkaline Phosphatase 258 (*)     All other components within normal limits   BRAIN NATRIURETIC PEPTIDE - Abnormal; Notable for the following components:    Pro-BNP 2,503 (*)     All other components within normal limits   TROPONIN - Abnormal; Notable for the following components:    Troponin, High Sensitivity 31 (*)     All other components within

## 2023-11-06 NOTE — CARE COORDINATION
Case Management Assessment  Initial Evaluation    Date/Time of Evaluation: 11/6/2023 10:57 AM  Assessment Completed by: Jocy Rodriges RN    If patient is discharged prior to next notation, then this note serves as note for discharge by case management. Patient Name: Leon Potter                   YOB: 1965  Diagnosis: Elevated troponin [R79.89]  COPD exacerbation ([address]) [J44.1]  Cirrhosis of liver with ascites, unspecified hepatic cirrhosis type ([address]) [K74.60, R18.8]  Ascites of liver [R18.8]  Acute on chronic heart failure, unspecified heart failure type ([address]) [I50.9]                   Date / Time: 11/5/2023  3:39 PM    Patient Admission Status: Inpatient   Readmission Risk (Low < 19, Mod (19-27), High > 27): Readmission Risk Score: 11.5    Current PCP: Sean Anaya, DO  PCP verified by CM? Chart Reviewed: Yes      History Provided by:    Patient Orientation:      Patient Cognition:      Hospitalization in the last 30 days (Readmission):  No    If yes, Readmission Assessment in CM Navigator will be completed. Advance Directives:      Code Status: Full Code   Patient's Primary Decision Maker is:      Primary Decision Maker: Willie Fisher [facility] [phone]    Discharge Planning:    Patient lives with: Alone Type of Home: Apartment (Ground floor)  Primary Care Giver:    Patient Support Systems include: Family Members (in addition to brother and son)   Current Financial resources:    Current community resources:    Current services prior to admission: C-pap (dont have it yet has to do the final test study to get at home.)            Current DME:              Type of Home Care services:  None    ADLS  Prior functional level:    Current functional level:      PT AM-PAC: 23 /24  OT AM-PAC:   /24    Family can provide assistance at DC: Would you like Case Management to discuss the discharge plan with any other family members/significant others, and if so, who?     Plans to

## 2023-11-07 PROBLEM — R18.8 CIRRHOSIS OF LIVER WITH ASCITES (HCC): Status: ACTIVE | Noted: 2023-11-07

## 2023-11-07 PROBLEM — K74.60 CIRRHOSIS OF LIVER WITH ASCITES (HCC): Status: ACTIVE | Noted: 2023-11-07

## 2023-11-07 PROBLEM — I27.21 PAH (PULMONARY ARTERY HYPERTENSION) (HCC): Status: ACTIVE | Noted: 2023-11-07

## 2023-11-07 LAB
ALBUMIN FLD-MCNC: 2.7 G/DL
ALBUMIN SERPL-MCNC: 4 G/DL (ref 3.4–5)
ALBUMIN/GLOB SERPL: 1.3 {RATIO} (ref 1.1–2.2)
ALP SERPL-CCNC: 226 U/L (ref 40–129)
ALT SERPL-CCNC: 19 U/L (ref 10–40)
AMMONIA PLAS-SCNC: 66 UMOL/L (ref 16–60)
ANION GAP SERPL CALCULATED.3IONS-SCNC: 12 MMOL/L (ref 3–16)
AST SERPL-CCNC: 24 U/L (ref 15–37)
BASOPHILS # BLD: 0 K/UL (ref 0–0.2)
BASOPHILS NFR BLD: 0.2 %
BILIRUB SERPL-MCNC: 1.2 MG/DL (ref 0–1)
BUN SERPL-MCNC: 49 MG/DL (ref 7–20)
CALCIUM SERPL-MCNC: 9.8 MG/DL (ref 8.3–10.6)
CHLORIDE SERPL-SCNC: 100 MMOL/L (ref 99–110)
CO2 SERPL-SCNC: 27 MMOL/L (ref 21–32)
CREAT SERPL-MCNC: 1 MG/DL (ref 0.9–1.3)
DEPRECATED RDW RBC AUTO: 16.4 % (ref 12.4–15.4)
EOSINOPHIL # BLD: 0 K/UL (ref 0–0.6)
EOSINOPHIL NFR BLD: 0.3 %
FERRITIN SERPL IA-MCNC: 116.7 NG/ML (ref 30–400)
GFR SERPLBLD CREATININE-BSD FMLA CKD-EPI: >60 ML/MIN/{1.73_M2}
GLUCOSE SERPL-MCNC: 121 MG/DL (ref 70–99)
HAV AB SER QL IA: NEGATIVE
HCT VFR BLD AUTO: 42.9 % (ref 40.5–52.5)
HGB BLD-MCNC: 14.1 G/DL (ref 13.5–17.5)
LYMPHOCYTES # BLD: 0.8 K/UL (ref 1–5.1)
LYMPHOCYTES NFR BLD: 6.9 %
MCH RBC QN AUTO: 32.3 PG (ref 26–34)
MCHC RBC AUTO-ENTMCNC: 32.9 G/DL (ref 31–36)
MCV RBC AUTO: 98.3 FL (ref 80–100)
MONOCYTES # BLD: 0.9 K/UL (ref 0–1.3)
MONOCYTES NFR BLD: 8.2 %
NEUTROPHILS # BLD: 9.6 K/UL (ref 1.7–7.7)
NEUTROPHILS NFR BLD: 84.4 %
PLATELET # BLD AUTO: 197 K/UL (ref 135–450)
PMV BLD AUTO: 7.9 FL (ref 5–10.5)
POTASSIUM SERPL-SCNC: 4.6 MMOL/L (ref 3.5–5.1)
PROT FLD-MCNC: 4.3 G/DL
PROT SERPL-MCNC: 7.1 G/DL (ref 6.4–8.2)
RBC # BLD AUTO: 4.36 M/UL (ref 4.2–5.9)
SODIUM SERPL-SCNC: 139 MMOL/L (ref 136–145)
SPECIMEN SOURCE FLD: NORMAL
WBC # BLD AUTO: 11.4 K/UL (ref 4–11)

## 2023-11-07 PROCEDURE — 94761 N-INVAS EAR/PLS OXIMETRY MLT: CPT

## 2023-11-07 PROCEDURE — 1200000000 HC SEMI PRIVATE

## 2023-11-07 PROCEDURE — 36415 COLL VENOUS BLD VENIPUNCTURE: CPT

## 2023-11-07 PROCEDURE — 2700000000 HC OXYGEN THERAPY PER DAY

## 2023-11-07 PROCEDURE — 80053 COMPREHEN METABOLIC PANEL: CPT

## 2023-11-07 PROCEDURE — 85025 COMPLETE CBC W/AUTO DIFF WBC: CPT

## 2023-11-07 PROCEDURE — 6370000000 HC RX 637 (ALT 250 FOR IP): Performed by: PHYSICIAN ASSISTANT

## 2023-11-07 PROCEDURE — 94640 AIRWAY INHALATION TREATMENT: CPT

## 2023-11-07 PROCEDURE — 6360000002 HC RX W HCPCS: Performed by: STUDENT IN AN ORGANIZED HEALTH CARE EDUCATION/TRAINING PROGRAM

## 2023-11-07 PROCEDURE — 99223 1ST HOSP IP/OBS HIGH 75: CPT | Performed by: INTERNAL MEDICINE

## 2023-11-07 PROCEDURE — 6360000002 HC RX W HCPCS: Performed by: PHYSICIAN ASSISTANT

## 2023-11-07 PROCEDURE — 6370000000 HC RX 637 (ALT 250 FOR IP): Performed by: INTERNAL MEDICINE

## 2023-11-07 PROCEDURE — 6370000000 HC RX 637 (ALT 250 FOR IP): Performed by: STUDENT IN AN ORGANIZED HEALTH CARE EDUCATION/TRAINING PROGRAM

## 2023-11-07 PROCEDURE — 2580000003 HC RX 258: Performed by: STUDENT IN AN ORGANIZED HEALTH CARE EDUCATION/TRAINING PROGRAM

## 2023-11-07 PROCEDURE — 82728 ASSAY OF FERRITIN: CPT

## 2023-11-07 PROCEDURE — 82140 ASSAY OF AMMONIA: CPT

## 2023-11-07 PROCEDURE — 6360000002 HC RX W HCPCS: Performed by: INTERNAL MEDICINE

## 2023-11-07 PROCEDURE — 2580000003 HC RX 258: Performed by: INTERNAL MEDICINE

## 2023-11-07 RX ORDER — SILDENAFIL CITRATE 20 MG/1
40 TABLET ORAL 3 TIMES DAILY
Status: DISCONTINUED | OUTPATIENT
Start: 2023-11-07 | End: 2023-11-08 | Stop reason: HOSPADM

## 2023-11-07 RX ADMIN — SPIRONOLACTONE 100 MG: 100 TABLET ORAL at 08:15

## 2023-11-07 RX ADMIN — IPRATROPIUM BROMIDE AND ALBUTEROL SULFATE 1 DOSE: 2.5; .5 SOLUTION RESPIRATORY (INHALATION) at 21:02

## 2023-11-07 RX ADMIN — ENOXAPARIN SODIUM 30 MG: 100 INJECTION SUBCUTANEOUS at 20:26

## 2023-11-07 RX ADMIN — SILDENAFIL 20 MG: 20 TABLET ORAL at 08:14

## 2023-11-07 RX ADMIN — LOSARTAN POTASSIUM 25 MG: 25 TABLET, FILM COATED ORAL at 08:15

## 2023-11-07 RX ADMIN — IPRATROPIUM BROMIDE AND ALBUTEROL SULFATE 1 DOSE: 2.5; .5 SOLUTION RESPIRATORY (INHALATION) at 07:40

## 2023-11-07 RX ADMIN — METOPROLOL SUCCINATE 25 MG: 25 TABLET, EXTENDED RELEASE ORAL at 08:15

## 2023-11-07 RX ADMIN — SODIUM CHLORIDE, PRESERVATIVE FREE 10 ML: 5 INJECTION INTRAVENOUS at 08:15

## 2023-11-07 RX ADMIN — SILDENAFIL 40 MG: 20 TABLET ORAL at 20:25

## 2023-11-07 RX ADMIN — ASPIRIN 81 MG: 81 TABLET, COATED ORAL at 08:15

## 2023-11-07 RX ADMIN — TIOTROPIUM BROMIDE AND OLODATEROL 2 PUFF: 3.124; 2.736 SPRAY, METERED RESPIRATORY (INHALATION) at 07:40

## 2023-11-07 RX ADMIN — EMPAGLIFLOZIN 10 MG: 10 TABLET, FILM COATED ORAL at 08:14

## 2023-11-07 RX ADMIN — FUROSEMIDE 10 MG/HR: 10 INJECTION, SOLUTION INTRAMUSCULAR; INTRAVENOUS at 14:32

## 2023-11-07 RX ADMIN — IPRATROPIUM BROMIDE AND ALBUTEROL SULFATE 1 DOSE: 2.5; .5 SOLUTION RESPIRATORY (INHALATION) at 11:24

## 2023-11-07 RX ADMIN — METOPROLOL SUCCINATE 25 MG: 25 TABLET, EXTENDED RELEASE ORAL at 20:26

## 2023-11-07 RX ADMIN — FUROSEMIDE 40 MG: 10 INJECTION, SOLUTION INTRAMUSCULAR; INTRAVENOUS at 08:14

## 2023-11-07 RX ADMIN — SILDENAFIL 20 MG: 20 TABLET ORAL at 14:31

## 2023-11-07 RX ADMIN — IPRATROPIUM BROMIDE AND ALBUTEROL SULFATE 1 DOSE: 2.5; .5 SOLUTION RESPIRATORY (INHALATION) at 15:49

## 2023-11-07 RX ADMIN — ATORVASTATIN CALCIUM 20 MG: 10 TABLET, FILM COATED ORAL at 20:26

## 2023-11-07 NOTE — CARE COORDINATION
Chart reviewed day 2. Care provided by EP, cards, GI and IM. Pt is from home alone and states he is IPTA. Pt had a paracentesis yesterday. Pt is on O2 at 2 L, baseline is RA. BUN elevated at 49. Cards has been consulted. Will continue to follow course for needs.  Ta Zhou RN

## 2023-11-07 NOTE — PLAN OF CARE
CHF Care Plan      Patient's EF (Ejection Fraction) is greater than 40%    Heart Failure Medications:  Diuretics[de-identified] Furosemide and Spironolactone    (One of the following REQUIRED for EF </= 40%/SYSTOLIC FAILURE but MAY be used in EF% >40%/DIASTOLIC FAILURE)        ACE[de-identified] None        ARB[de-identified] None         ARNI[de-identified] None    (Beta Blockers)  NON- Evidenced Based Beta Blocker (for EF% >40%/DIASTOLIC FAILURE): None    Evidenced Based Beta Blocker::(REQUIRED for EF% <40%/SYSTOLIC FAILURE) Metoprolol SUCCinate- Toprol XL  . .................................................................................................................................................. Healthy Weight Tracking - BMI + Meds 8/10/2023 8/15/2023 9/12/2023 11/2/2023 11/5/2023 11/6/2023 11/7/2023   Weight 205 lb 6.4 oz 209 lb 221 lb 255 lb 252 lb 254 lb 3.2 oz 249 lb 4.8 oz   Height - 6' 0\" 6' 0\" 6' 0\" 6' 0\" - -   Body Mass Index 27.86 kg/m2 28.35 kg/m2 29.97 kg/m2 34.58 kg/m2 34.18 kg/m2 34.48 kg/m2 33.81 kg/m2   Some recent data might be hidden         Patient's weights and intake/output reviewed: Yes    Daily Weight log at bedside, patient/family participation in use of log: \"yes    Patient's Last Weight: 259 lbs 4oz obtained by standing scale. Difference of 4 lbs less than last documented weight. Intake/Output Summary (Last 24 hours) at 11/7/2023 6799  Last data filed at 11/7/2023 0002  Gross per 24 hour   Intake 940 ml   Output 1075 ml   Net -135 ml       Education Booklet Provided: yes    Comorbidities Reviewed Yes    Patient has a past medical history of Acute systolic congestive heart failure ([address]), CAD (coronary artery disease), Diabetes mellitus ([address]), Essential hypertension, Pulmonary HTN ([address]), Right heart failure ([address]), and Thrombocytopenia ([address]).      >>For CHF and Comorbidity documentation on Education Time and Topics, please see Education Tab    Progressive Mobility Assessment:  What is this patient's Current Level of Mobility?:

## 2023-11-07 NOTE — PLAN OF CARE
Problem: Discharge Planning  Goal: Discharge to home or other facility with appropriate resources  Outcome: Progressing  Flowsheets (Taken 11/7/2023 0819)  Discharge to home or other facility with appropriate resources: Identify barriers to discharge with patient and caregiver     Problem: Respiratory - Adult  Goal: Achieves optimal ventilation and oxygenation  Outcome: Progressing  Flowsheets (Taken 11/7/2023 0819)  Achieves optimal ventilation and oxygenation: Assess for changes in respiratory status     Problem: Cardiovascular - Adult  Goal: Maintains optimal cardiac output and hemodynamic stability  Outcome: Progressing  Flowsheets (Taken 11/7/2023 0819)  Maintains optimal cardiac output and hemodynamic stability: Monitor blood pressure and heart rate     Problem: Metabolic/Fluid and Electrolytes - Adult  Goal: Electrolytes maintained within normal limits  Outcome: Progressing  Flowsheets (Taken 11/7/2023 0819)  Electrolytes maintained within normal limits: Monitor labs and assess patient for signs and symptoms of electrolyte imbalances     Problem: Hematologic - Adult  Goal: Maintains hematologic stability  Outcome: Progressing  Flowsheets (Taken 11/7/2023 0819)  Maintains hematologic stability: Assess for signs and symptoms of bleeding or hemorrhage     Problem: Chronic Conditions and Co-morbidities  Goal: Patient's chronic conditions and co-morbidity symptoms are monitored and maintained or improved  Outcome: Progressing  Flowsheets (Taken 11/7/2023 0819)  Care Plan - Patient's Chronic Conditions and Co-Morbidity Symptoms are Monitored and Maintained or Improved: Monitor and assess patient's chronic conditions and comorbid symptoms for stability, deterioration, or improvement     Problem: Skin/Tissue Integrity - Adult  Goal: Skin integrity remains intact  Outcome: Progressing  Flowsheets  Taken 11/7/2023 1327  Skin Integrity Remains Intact: Monitor for areas of redness and/or skin breakdown  Taken 11/7/2023

## 2023-11-07 NOTE — PLAN OF CARE
CHF Care Plan      Patient's EF (Ejection Fraction) is greater than 40%    Heart Failure Medications:  Diuretics[de-identified] Furosemide and Spironolactone    (One of the following REQUIRED for EF </= 40%/SYSTOLIC FAILURE but MAY be used in EF% >40%/DIASTOLIC FAILURE)        ACE[de-identified] None        ARB[de-identified] None         ARNI[de-identified] None    (Beta Blockers)  NON- Evidenced Based Beta Blocker (for EF% >40%/DIASTOLIC FAILURE): None    Evidenced Based Beta Blocker::(REQUIRED for EF% <40%/SYSTOLIC FAILURE) Metoprolol SUCCinate- Toprol XL  . .................................................................................................................................................. Healthy Weight Tracking - BMI + Meds 8/9/2023 8/10/2023 8/15/2023 9/12/2023 11/2/2023 11/5/2023 11/6/2023   Weight 206 lb 4.8 oz 205 lb 6.4 oz 209 lb 221 lb 255 lb 252 lb 254 lb 3.2 oz   Height - - 6' 0\" 6' 0\" 6' 0\" 6' 0\" -   Body Mass Index 27.98 kg/m2 27.86 kg/m2 28.35 kg/m2 29.97 kg/m2 34.58 kg/m2 34.18 kg/m2 34.48 kg/m2   Some recent data might be hidden         Patient's weights and intake/output reviewed: Yes    Daily Weight log at bedside, patient/family participation in use of log: \"yes    Patient's Last Weight: 254 lbs obtained by standing scale. Difference of 0 lbs more than last documented weight. Intake/Output Summary (Last 24 hours) at 11/6/2023/2023 1925  Last data filed at 11/6/2023 1840  Gross per 24 hour   Intake 820 ml   Output 1225 ml   Net -405 ml       Education Booklet Provided: yes    Comorbidities Reviewed Yes    Patient has a past medical history of Acute systolic congestive heart failure ([address]), CAD (coronary artery disease), Diabetes mellitus ([address]), Essential hypertension, Pulmonary HTN ([address]), Right heart failure ([address]), and Thrombocytopenia ([address]).      >>For CHF and Comorbidity documentation on Education Time and Topics, please see Education Tab    Progressive Mobility Assessment:  What is this patient's Current Level of Mobility?:

## 2023-11-07 NOTE — PLAN OF CARE
Problem: Discharge Planning  Goal: Discharge to home or other facility with appropriate resources  Outcome: Progressing     Problem: Respiratory - Adult  Goal: Achieves optimal ventilation and oxygenation  Outcome: Progressing     Problem: Cardiovascular - Adult  Goal: Maintains optimal cardiac output and hemodynamic stability  Outcome: Progressing     Problem: Metabolic/Fluid and Electrolytes - Adult  Goal: Electrolytes maintained within normal limits  Outcome: Progressing     Problem: Hematologic - Adult  Goal: Maintains hematologic stability  Outcome: Progressing     Problem: Chronic Conditions and Co-morbidities  Goal: Patient's chronic conditions and co-morbidity symptoms are monitored and maintained or improved  Outcome: Progressing

## 2023-11-08 VITALS
TEMPERATURE: 97.5 F | RESPIRATION RATE: 18 BRPM | WEIGHT: 246.4 LBS | HEART RATE: 120 BPM | DIASTOLIC BLOOD PRESSURE: 76 MMHG | HEIGHT: 72 IN | OXYGEN SATURATION: 94 % | BODY MASS INDEX: 33.38 KG/M2 | SYSTOLIC BLOOD PRESSURE: 115 MMHG

## 2023-11-08 LAB
ALBUMIN SERPL-MCNC: 4.1 G/DL (ref 3.4–5)
ALBUMIN/GLOB SERPL: 1.4 {RATIO} (ref 1.1–2.2)
ALP SERPL-CCNC: 243 U/L (ref 40–129)
ALT SERPL-CCNC: 28 U/L (ref 10–40)
ANION GAP SERPL CALCULATED.3IONS-SCNC: 11 MMOL/L (ref 3–16)
AST SERPL-CCNC: 35 U/L (ref 15–37)
BASOPHILS # BLD: 0.1 K/UL (ref 0–0.2)
BASOPHILS NFR BLD: 0.8 %
BILIRUB SERPL-MCNC: 1.2 MG/DL (ref 0–1)
BUN SERPL-MCNC: 48 MG/DL (ref 7–20)
CALCIUM SERPL-MCNC: 9.5 MG/DL (ref 8.3–10.6)
CHLORIDE SERPL-SCNC: 100 MMOL/L (ref 99–110)
CO2 SERPL-SCNC: 29 MMOL/L (ref 21–32)
CREAT SERPL-MCNC: 0.9 MG/DL (ref 0.9–1.3)
DEPRECATED RDW RBC AUTO: 16.7 % (ref 12.4–15.4)
EOSINOPHIL # BLD: 0.2 K/UL (ref 0–0.6)
EOSINOPHIL NFR BLD: 2.6 %
GFR SERPLBLD CREATININE-BSD FMLA CKD-EPI: >60 ML/MIN/{1.73_M2}
GLUCOSE SERPL-MCNC: 103 MG/DL (ref 70–99)
HCT VFR BLD AUTO: 43.5 % (ref 40.5–52.5)
HGB BLD-MCNC: 14.2 G/DL (ref 13.5–17.5)
LYMPHOCYTES # BLD: 1.3 K/UL (ref 1–5.1)
LYMPHOCYTES NFR BLD: 15.1 %
MAGNESIUM SERPL-MCNC: 2.4 MG/DL (ref 1.8–2.4)
MCH RBC QN AUTO: 32 PG (ref 26–34)
MCHC RBC AUTO-ENTMCNC: 32.7 G/DL (ref 31–36)
MCV RBC AUTO: 98 FL (ref 80–100)
MONOCYTES # BLD: 0.8 K/UL (ref 0–1.3)
MONOCYTES NFR BLD: 8.9 %
NEUTROPHILS # BLD: 6.3 K/UL (ref 1.7–7.7)
NEUTROPHILS NFR BLD: 72.6 %
PLATELET # BLD AUTO: 197 K/UL (ref 135–450)
PMV BLD AUTO: 7.7 FL (ref 5–10.5)
POTASSIUM SERPL-SCNC: 4.2 MMOL/L (ref 3.5–5.1)
PROT SERPL-MCNC: 7.1 G/DL (ref 6.4–8.2)
RBC # BLD AUTO: 4.44 M/UL (ref 4.2–5.9)
SODIUM SERPL-SCNC: 140 MMOL/L (ref 136–145)
WBC # BLD AUTO: 8.7 K/UL (ref 4–11)

## 2023-11-08 PROCEDURE — 36415 COLL VENOUS BLD VENIPUNCTURE: CPT

## 2023-11-08 PROCEDURE — 6370000000 HC RX 637 (ALT 250 FOR IP): Performed by: INTERNAL MEDICINE

## 2023-11-08 PROCEDURE — 94761 N-INVAS EAR/PLS OXIMETRY MLT: CPT

## 2023-11-08 PROCEDURE — 2580000003 HC RX 258: Performed by: INTERNAL MEDICINE

## 2023-11-08 PROCEDURE — 2700000000 HC OXYGEN THERAPY PER DAY

## 2023-11-08 PROCEDURE — 94640 AIRWAY INHALATION TREATMENT: CPT

## 2023-11-08 PROCEDURE — 6370000000 HC RX 637 (ALT 250 FOR IP): Performed by: STUDENT IN AN ORGANIZED HEALTH CARE EDUCATION/TRAINING PROGRAM

## 2023-11-08 PROCEDURE — 80053 COMPREHEN METABOLIC PANEL: CPT

## 2023-11-08 PROCEDURE — 83735 ASSAY OF MAGNESIUM: CPT

## 2023-11-08 PROCEDURE — 6360000002 HC RX W HCPCS: Performed by: NURSE PRACTITIONER

## 2023-11-08 PROCEDURE — 6370000000 HC RX 637 (ALT 250 FOR IP)

## 2023-11-08 PROCEDURE — 6370000000 HC RX 637 (ALT 250 FOR IP): Performed by: PHYSICIAN ASSISTANT

## 2023-11-08 PROCEDURE — 85025 COMPLETE CBC W/AUTO DIFF WBC: CPT

## 2023-11-08 PROCEDURE — 6360000002 HC RX W HCPCS: Performed by: INTERNAL MEDICINE

## 2023-11-08 RX ORDER — SILDENAFIL CITRATE 20 MG/1
40 TABLET ORAL 3 TIMES DAILY
Qty: 30 TABLET | Refills: 3 | Status: SHIPPED | OUTPATIENT
Start: 2023-11-08

## 2023-11-08 RX ORDER — ALBUTEROL SULFATE 2.5 MG/3ML
2.5 SOLUTION RESPIRATORY (INHALATION) EVERY 4 HOURS PRN
Status: DISCONTINUED | OUTPATIENT
Start: 2023-11-08 | End: 2023-11-08 | Stop reason: HOSPADM

## 2023-11-08 RX ORDER — AMIODARONE HYDROCHLORIDE 200 MG/1
400 TABLET ORAL 2 TIMES DAILY
Status: DISCONTINUED | OUTPATIENT
Start: 2023-11-08 | End: 2023-11-08 | Stop reason: HOSPADM

## 2023-11-08 RX ORDER — SPIRONOLACTONE 100 MG/1
100 TABLET, FILM COATED ORAL DAILY
Qty: 30 TABLET | Refills: 3 | Status: SHIPPED | OUTPATIENT
Start: 2023-11-09

## 2023-11-08 RX ORDER — AMIODARONE HYDROCHLORIDE 400 MG/1
400 TABLET ORAL 2 TIMES DAILY
Qty: 30 TABLET | Refills: 0 | Status: SHIPPED | OUTPATIENT
Start: 2023-11-08

## 2023-11-08 RX ORDER — METOPROLOL SUCCINATE 25 MG/1
25 TABLET, EXTENDED RELEASE ORAL 2 TIMES DAILY
Qty: 30 TABLET | Refills: 3 | Status: SHIPPED | OUTPATIENT
Start: 2023-11-08

## 2023-11-08 RX ORDER — ALBUTEROL SULFATE 2.5 MG/3ML
2.5 SOLUTION RESPIRATORY (INHALATION)
Status: DISCONTINUED | OUTPATIENT
Start: 2023-11-08 | End: 2023-11-08 | Stop reason: HOSPADM

## 2023-11-08 RX ADMIN — IPRATROPIUM BROMIDE AND ALBUTEROL SULFATE 1 DOSE: 2.5; .5 SOLUTION RESPIRATORY (INHALATION) at 07:32

## 2023-11-08 RX ADMIN — SPIRONOLACTONE 100 MG: 100 TABLET ORAL at 08:45

## 2023-11-08 RX ADMIN — FUROSEMIDE 10 MG/HR: 10 INJECTION, SOLUTION INTRAMUSCULAR; INTRAVENOUS at 00:36

## 2023-11-08 RX ADMIN — ASPIRIN 81 MG: 81 TABLET, COATED ORAL at 08:45

## 2023-11-08 RX ADMIN — EMPAGLIFLOZIN 10 MG: 10 TABLET, FILM COATED ORAL at 08:45

## 2023-11-08 RX ADMIN — FUROSEMIDE 10 MG/HR: 10 INJECTION, SOLUTION INTRAMUSCULAR; INTRAVENOUS at 20:26

## 2023-11-08 RX ADMIN — ALBUTEROL SULFATE 2.5 MG: 2.5 SOLUTION RESPIRATORY (INHALATION) at 15:43

## 2023-11-08 RX ADMIN — SILDENAFIL 40 MG: 20 TABLET ORAL at 15:00

## 2023-11-08 RX ADMIN — TIOTROPIUM BROMIDE AND OLODATEROL 2 PUFF: 3.124; 2.736 SPRAY, METERED RESPIRATORY (INHALATION) at 07:34

## 2023-11-08 RX ADMIN — FUROSEMIDE 10 MG/HR: 10 INJECTION, SOLUTION INTRAMUSCULAR; INTRAVENOUS at 09:56

## 2023-11-08 RX ADMIN — AMIODARONE HYDROCHLORIDE 400 MG: 200 TABLET ORAL at 11:39

## 2023-11-08 RX ADMIN — IPRATROPIUM BROMIDE AND ALBUTEROL SULFATE 1 DOSE: 2.5; .5 SOLUTION RESPIRATORY (INHALATION) at 11:30

## 2023-11-08 RX ADMIN — METOPROLOL SUCCINATE 25 MG: 25 TABLET, EXTENDED RELEASE ORAL at 08:45

## 2023-11-08 RX ADMIN — SILDENAFIL 40 MG: 20 TABLET ORAL at 08:45

## 2023-11-08 NOTE — PLAN OF CARE
Problem: Discharge Planning  Goal: Discharge to home or other facility with appropriate resources  Outcome: Progressing     Problem: Respiratory - Adult  Goal: Achieves optimal ventilation and oxygenation  Outcome: Progressing     Problem: Cardiovascular - Adult  Goal: Maintains optimal cardiac output and hemodynamic stability  Outcome: Progressing     Problem: Skin/Tissue Integrity - Adult  Goal: Skin integrity remains intact  Outcome: Progressing

## 2023-11-08 NOTE — CARE COORDINATION
ANCELMO preston. Pt from home alone. CHF, COPD. Pt may need transfer to [facility]. Palliative consult prior to initiating transfer.

## 2023-11-08 NOTE — PLAN OF CARE
CHF Care Plan      Patient's EF (Ejection Fraction) is greater than 40%    Heart Failure Medications:  Diuretics[de-identified] Furosemide and Spironolactone    (One of the following REQUIRED for EF </= 40%/SYSTOLIC FAILURE but MAY be used in EF% >40%/DIASTOLIC FAILURE)        ACE[de-identified] None        ARB[de-identified] None         ARNI[de-identified] None    (Beta Blockers)  NON- Evidenced Based Beta Blocker (for EF% >40%/DIASTOLIC FAILURE): None    Evidenced Based Beta Blocker::(REQUIRED for EF% <40%/SYSTOLIC FAILURE) Metoprolol SUCCinate- Toprol XL  . .................................................................................................................................................. Healthy Weight Tracking - BMI + Meds 8/15/2023 9/12/2023 11/2/2023 11/5/2023 11/6/2023 11/7/2023 11/8/2023   Weight 209 lb 221 lb 255 lb 252 lb 254 lb 3.2 oz 249 lb 4.8 oz 246 lb 6.4 oz   Height 6' 0\" 6' 0\" 6' 0\" 6' 0\" - - -   Body Mass Index 28.35 kg/m2 29.97 kg/m2 34.58 kg/m2 34.18 kg/m2 34.48 kg/m2 33.81 kg/m2 33.42 kg/m2   Some recent data might be hidden         Patient's weights and intake/output reviewed: Yes    Daily Weight log at bedside, patient/family participation in use of log: \"yes    Patient's Last Weight: 246.4 lbs obtained by standing scale. Difference of 3 lbs less than last documented weight. Intake/Output Summary (Last 24 hours) at 11/8/2023 9275  Last data filed at 11/8/2023 0446  Gross per 24 hour   Intake 780 ml   Output 2715 ml   Net -1935 ml       Education Booklet Provided: yes    Comorbidities Reviewed Yes    Patient has a past medical history of Acute systolic congestive heart failure ([address]), CAD (coronary artery disease), Diabetes mellitus ([address]), Essential hypertension, Pulmonary HTN ([address]), Right heart failure ([address]), and Thrombocytopenia ([address]).      >>For CHF and Comorbidity documentation on Education Time and Topics, please see Education Tab    Progressive Mobility Assessment:  What is this patient's Current Level of Mobility?:

## 2023-11-08 NOTE — PLAN OF CARE
Problem: Discharge Planning  Goal: Discharge to home or other facility with appropriate resources  11/8/2023 1820 by Leyda Ngo RN  Outcome: Progressing  Flowsheets (Taken 11/8/2023 0381)  Discharge to home or other facility with appropriate resources: Identify barriers to discharge with patient and caregiver  11/8/2023 0638 by Stephanie Sanabria RN  Outcome: Progressing     Problem: Respiratory - Adult  Goal: Achieves optimal ventilation and oxygenation  11/8/2023 1820 by Leyda Ngo RN  Outcome: Progressing  Flowsheets (Taken 11/8/2023 0846)  Achieves optimal ventilation and oxygenation: Assess for changes in respiratory status  11/8/2023 0638 by Stephanie Sanabria RN  Outcome: Progressing     Problem: Cardiovascular - Adult  Goal: Maintains optimal cardiac output and hemodynamic stability  11/8/2023 1820 by Leyda Ngo RN  Outcome: Progressing  Flowsheets (Taken 11/8/2023 0846)  Maintains optimal cardiac output and hemodynamic stability: Monitor blood pressure and heart rate  11/8/2023 0638 by Stephanie Sanabria RN  Outcome: Progressing     Problem: Metabolic/Fluid and Electrolytes - Adult  Goal: Electrolytes maintained within normal limits  Outcome: Progressing  Flowsheets (Taken 11/8/2023 0846)  Electrolytes maintained within normal limits: Administer electrolyte replacement as ordered     Problem: Hematologic - Adult  Goal: Maintains hematologic stability  Outcome: Progressing  Flowsheets (Taken 11/8/2023 0846)  Maintains hematologic stability: Assess for signs and symptoms of bleeding or hemorrhage     Problem: Chronic Conditions and Co-morbidities  Goal: Patient's chronic conditions and co-morbidity symptoms are monitored and maintained or improved  Outcome: Progressing  Flowsheets (Taken 11/8/2023 0846)  Care Plan - Patient's Chronic Conditions and Co-Morbidity Symptoms are Monitored and Maintained or Improved: Monitor and assess patient's chronic conditions and comorbid symptoms for stability, deterioration, or

## 2023-11-08 NOTE — DISCHARGE SUMMARY
adjustment of the mA/kV was utilized to reduce the radiation dose to as low as reasonably achievable.; CTA of the chest was performed after the administration of intravenous contrast.  Multiplanar reformatted images are provided for review. MIP images are provided for review. Automated exposure control, iterative reconstruction, and/or weight based adjustment of the mA/kV was utilized to reduce the radiation dose to as low as reasonably achievable. COMPARISON: 10/23/2022 HISTORY: ORDERING SYSTEM PROVIDED HISTORY: distention, elevated bili, hx of cirrhosis TECHNOLOGIST PROVIDED HISTORY: Reason for exam:->distention, elevated bili, hx of cirrhosis Additional Contrast?->None Decision Support Exception - unselect if not a suspected or confirmed emergency medical condition->Emergency Medical Condition (MA); ORDERING SYSTEM PROVIDED HISTORY: tachycardia, sob TECHNOLOGIST PROVIDED HISTORY: Reason for exam:->tachycardia, sob Decision Support Exception - unselect if not a suspected or confirmed emergency medical condition->Emergency Medical Condition (MA) Reason for Exam: Abd, leg, and groin swelling with h/o Copd, CHF FINDINGS: CTA CHEST: Pulmonary Arteries:  No central, lobar, or segmental pulmonary embolus. Mediastinum: Small pericardial effusion. Heart size normal.  No thoracic aortic aneurysm. No thoracic adenopathy. Lungs/pleura: Trace layering bilateral pleural effusions. No acute lung consolidation. No suspicious pulmonary nodule. Central airways are clear. Soft Tissues/Bones: No acute bone or soft tissue abnormality. CT ABDOMEN AND PELVIS Organs: Liver cirrhosis. No focal hepatic lesion. Main portal vein is patent. Spleen, pancreas, adrenal glands and kidneys demonstrate no acute abnormality. Status post cholecystectomy. GI/Bowel: Sigmoid diverticulosis. No acutely dilated or inflamed loops of bowel. Pelvis: No pelvic mass, adenopathy, or fluid collection.  Peritoneum/Retroperitoneum: Large volume of

## 2023-11-09 ENCOUNTER — TELEPHONE (OUTPATIENT)
Dept: FAMILY MEDICINE CLINIC | Age: 58
End: 2023-11-09

## 2023-11-09 LAB
A1AT SERPL-MCNC: 157 MG/DL (ref 90–200)
BACTERIA FLD AEROBE CULT: NORMAL
CERULOPLASMIN SERPL-MCNC: 40 MG/DL (ref 15–30)
GRAM STN SPEC: NORMAL
SMA IGG SER-ACNC: 9 UNITS (ref 0–19)

## 2023-11-09 NOTE — CONSULTS
Consult Call Back    Who:Che  Date:11/8/2023,  Time:4:12 PM    Electronically signed by Farhad Adams on 11/8/23 at 4:12 PM EST
Consult Placed     Who: Toni Vieyra  EMUU:[id number]  ZIGX:[id number]     Electronically signed by Pastora Bardales on 11/6/2023 at 10:55 AM
Consult Placed     Who: [facility]   Date: 11/7/2023   Time: 10:04 AM
Jarvis Al is a 62 y.o. male asked to see us in consultation by  Gerard Vargas MD for evaluation of cirrhosis. Mr. Xander Simpson is a 62year old male with past medical history of  pulmonary hypertension, former smoker, tricuspid valve insufficiency, type 2 diabetes, obesity, COPD, cor pulmonale, QT prolongation, hypertension, and heart failure presenting with peripheral edema and shortness of breath. He had a recent US at  reporting cirrhosis with ascites. He has not had a paracentesis. CT chest, abdomen, pelvis here shows trace bilateral pleural effusions, cirrhosis and ascites. The patient denies a history of liver disease. Says he used to drink alcohol heavily in his younger years but does not actively drink now. Denies family history of liver disease. His LFTs have been chronically elevated since 2022. His platelet count and albumin level are normal.  He had a rt heart cath and echo in July showing  normal left-sided cardiac filling pressures but severely elevated right-sided pressures, severe pulmonary hypertension and grade II diastolic dysfunction. Medications Prior to Admission: umeclidinium-vilanterol (ANORO ELLIPTA) 62.5-25 MCG/ACT inhaler, Inhale 1 puff into the lungs daily Indications: COPD  dapagliflozin (FARXIGA) 10 MG tablet, Take 1 tablet by mouth every morning Indications: CHF, DM, HYPERGLYCEMIA  metFORMIN (GLUCOPHAGE) 500 MG tablet, Take by mouth 2 times daily (with meals) Patient expressed he takes one pill, however he is unsure of the amount. Expressed he was never told to stop taking so he continues to take.   sildenafil (REVATIO) 20 MG tablet, Take 1 tablet by mouth 3 times daily  dapagliflozin (FARXIGA) 10 MG tablet, Take 1 tablet by mouth every morning  umeclidinium-vilanterol (ANORO ELLIPTA) 62.5-25 MCG/ACT inhaler, INHALE ONE DOSE BY
Nutrition Education    Consult received for CHF diet education. Provided pt with written and verbal instruction on HF nutrition therapy. Discussed low sodium diet, daily weights, and fluid restriction. Pt reports following a sodium restriction at home by not adding any salt to foods. Reports difficulty with meals d/t working nights and only eating something before he goes into work and then a turkey sandwich at work. Reviewed low and high sources of sodium and high sodium content in lunch meat, pt plans to look at sodium content of sandwiches. Reports having a scale at home and taking daily weights up until recent weight gain and admit. Reports being aware of fluid restriction and monitoring his fluids however sometimes having difficulty d/t hot working conditions, etc. RD encouraged continued compliance with daily weights and fl restriction. Pt denied any further HF diet related questions, left handout at bedside. Pt voiced understanding. Time spent: 15 minutes    Educated on HF diet education  Learners: Patient  Readiness: Acceptance  Method: Explanation and Handout  Response: Verbalizes Understanding  Contact name and number provided.     Hari Yoon, [id number] [facility]  Contact Number: Office: [phone]; [address]: [address]
PALLIATIVE MEDICINE CONSULTATION     Patient name:Willie Waldrop   MPD:[id number]    :1965  Room/Bed:0355/0355-02   LOS: 3 days         Date of consult:2023    Inpatient consult to Palliative Care  Consult performed by: SANCHEZ Prieto - CNP  Consult ordered by: SANCHEZ Bales              ASSESSMENT/RECOMMENDATIONS     62 y.o. male with acute on chronic heart failure, COPD, severe pulmonary artery hypertension, and liver cirrhosis with large volume of ascites admitted with swelling      Symptom Management:  Goals of Care- Longevity. Patient wants to remain very aggressive with medical care including surgical interventions and dialysis if recommended. Agreeable to transfer to [facility] for specialized care. Code status is full code. I am concerned he has limited insight into the severity of his illness at this time and [employer] discussions will need to be ongoing. Cirrhosis with ascites - CT A/P suggestive of cirrhosis. GI following. S/p paracentesis 23 with 2 L removed. Patient states he would be agreeable to further paracentesis or liver biopsy if recommended    Severe right heart failure/Severe [facility] - cardiology following. Transfer to [facility] team for aggressive treatment of [facility]      Patient/Family Goals of Care :    23    Spoke with patient at the bedside. Patient is alert and oriented and decisional.  Patient agreeable to discussion with me today. - does not have advance directives. Does not want to complete advance directives. Dad should be his surrogate. - appears to have limited insight into his medical issues. States he is getting fluid off then getting his heart \"jumped\" and then is going home. Has no real concerns at this time  - initiated discussion about importance of [employer]. He is receptive, but unclear as to why this is needed  - he wants to be [employer]" with his care. He wants to live as long as possible.   He would want a
cardioversion with antiarrhythmic for rhythm control and anticoagulation, ablation, continue attempt for rate control. Patient would prefer to start on anticoagulation for 1 month with amiodarone for rhythm control and ablation. Should he remain in house we will plan to proceed tomorrow. All question concerns addressed. - Start amiodarone 400 mg BID.  - NPO at midnight for cardioversion.  - We will continue to follow along with you. 2. Severe pulmonary hypertension.  - Per cardiology. 3. Heart failure with preserved ejection fraction.  - Per cardiology. Thank you for allowing me to participate in the care of Thien Edge . If you have any questions/comments, please do not hesitate to contact us.     Goran Gerber MD  Cardiac Electrophysiology  [id number] [facility]  [phone] [facility]
establish goals of care. If he wants aggressive care, I suggest transfer to [facility] team.  CHF education reinforced. ~salt restriction  ~fluid restriction  ~medication compliance  ~daily weights and notify of any significant weight gain/loss    The patient was seen for > 75 minutes. I reviewed interval history, physical exam, review of data including labs, imaging, development and implementation of treatment plan and coordination of complex care. More than 50% of the time was devoted to counseling the patient on their diagnoses/treatments, as well as coordination of care with the other care teams        I appreciate the opportunity of cooperating in the care of this patient.     Dudley Ozuna M.D., [facility]

## 2023-11-09 NOTE — TELEPHONE ENCOUNTER
Care Transitions Initial Follow Up Call    Outreach made within 2 business days of discharge: Yes    Patient: Davi López Patient : 1965   MRN: [id number]  Reason for Admission: There are no discharge diagnoses documented for the most recent discharge. Discharge Date: 23       Spoke with: Patient transferred to [facility] for care, will follow up when patient discharged.     Discharge department/facility: [facility]    Scheduled appointment with PCP within 7-14 days    Follow Up  Future Appointments   Date Time Provider [address] [place] [address]   2023 11:30 AM SANCHEZ George - CNP [facility]       Sami Hatfield LPN

## 2023-11-11 LAB — MISCELLANEOUS LAB TEST ORDER: NORMAL

## 2023-11-12 LAB — MITOCHONDRIA M2 AB SER IA-ACNC: 1.4 U/ML (ref 0–4)

## 2023-12-28 DIAGNOSIS — I50.9 CHRONIC CONGESTIVE HEART FAILURE, UNSPECIFIED HEART FAILURE TYPE (HCC): ICD-10-CM

## 2023-12-28 DIAGNOSIS — J44.9 CHRONIC OBSTRUCTIVE PULMONARY DISEASE, UNSPECIFIED COPD TYPE (HCC): ICD-10-CM

## 2023-12-28 DIAGNOSIS — E11.65 UNCONTROLLED TYPE 2 DIABETES MELLITUS WITH HYPERGLYCEMIA (HCC): ICD-10-CM

## 2023-12-28 RX ORDER — UMECLIDINIUM BROMIDE AND VILANTEROL TRIFENATATE 62.5; 25 UG/1; UG/1
POWDER RESPIRATORY (INHALATION)
Qty: 60 EACH | Refills: 1 | Status: SHIPPED | OUTPATIENT
Start: 2023-12-28

## 2023-12-28 NOTE — TELEPHONE ENCOUNTER
Received phone call from the pharmacy they have not received the refill. They only fill via fax and they do not accept electronic refills.  Please advise     Spoke with pt he does fill the farxiga and ellipta through [address] [place] [address] please advise sign rx once we receive and fax back

## 2024-01-06 NOTE — TELEPHONE ENCOUNTER
Refill Request     CONFIRM preferred pharmacy with the patient.    If Mail Order Rx - Pend for 90 day refill.      Last Seen: Last Seen Department: 8/15/2023  Last Seen by PCP: 8/15/2023    Last Written: 11/14/2022     If no future appointment scheduled:  Review the last OV with PCP and review information for follow-up visit,  Route STAFF MESSAGE with patient name to the  Pool for scheduling with the following information:            -  Timing of next visit           -  Visit type ie Physical, OV, etc           -  Diagnoses/Reason ie. COPD, HTN - Do not use MEDICATION, Follow-up or CHECK UP - Give reason for visit      Next Appointment:   No future appointments.    Message sent to  to schedule appt with patient?  N/A      Requested Prescriptions     Pending Prescriptions Disp Refills    torsemide (DEMADEX) 20 MG tablet [Pharmacy Med Name: TORSEMIDE 20 MG TABLET] 180 tablet 1     Sig: TAKE 2 TABLETS (40MG) BY MOUTH DAILY

## 2024-01-10 RX ORDER — TORSEMIDE 20 MG/1
TABLET ORAL
Qty: 180 TABLET | Refills: 1 | OUTPATIENT
Start: 2024-01-10

## 2024-01-22 DIAGNOSIS — I50.33 ACUTE ON CHRONIC DIASTOLIC CONGESTIVE HEART FAILURE (HCC): ICD-10-CM

## 2024-01-22 DIAGNOSIS — I10 ESSENTIAL HYPERTENSION: ICD-10-CM

## 2024-01-22 DIAGNOSIS — E11.65 UNCONTROLLED TYPE 2 DIABETES MELLITUS WITH HYPERGLYCEMIA (HCC): Primary | ICD-10-CM

## 2024-01-22 RX ORDER — LOSARTAN POTASSIUM 25 MG/1
25 TABLET ORAL DAILY
Qty: 90 TABLET | Refills: 1 | Status: SHIPPED | OUTPATIENT
Start: 2024-01-22

## 2024-01-22 RX ORDER — ASPIRIN 81 MG/1
81 TABLET, COATED ORAL DAILY
Qty: 90 TABLET | Refills: 1 | Status: SHIPPED | OUTPATIENT
Start: 2024-01-22

## 2024-01-22 NOTE — TELEPHONE ENCOUNTER
Refill Request     CONFIRM preferred pharmacy with the patient.    If Mail Order Rx - Pend for 90 day refill.      Last Seen: Last Seen Department: 8/15/2023  Last Seen by PCP: 8/15/2023    Last Written:   Aspirin-07/24/2023 90 tab 1 refills   Losartan-07/24/2023 90 tab 1 refills   Metformin-      If no future appointment scheduled:  Review the last OV with PCP and review information for follow-up visit,  Route STAFF MESSAGE with patient name to the  Pool for scheduling with the following information:            -  Timing of next visit           -  Visit type ie Physical, OV, etc           -  Diagnoses/Reason ie. COPD, HTN - Do not use MEDICATION, Follow-up or CHECK UP - Give reason for visit      Next Appointment:   No future appointments.    Message sent to  to schedule appt with patient?  YES  Pt is due for annual physical    Requested Prescriptions     Pending Prescriptions Disp Refills    ASPIRIN LOW DOSE 81 MG EC tablet [Pharmacy Med Name: ASPIRIN EC 81 MG TABLET] 90 tablet 1     Sig: TAKE 1 TABLET BY MOUTH DAILY    losartan (COZAAR) 25 MG tablet [Pharmacy Med Name: LOSARTAN POTASSIUM 25 MG TAB] 90 tablet 1     Sig: TAKE 1 TABLET BY MOUTH DAILY    metFORMIN (GLUCOPHAGE) 500 MG tablet [Pharmacy Med Name: metFORMIN  MG TABLET] 90 tablet      Sig: TAKE 1 TABLET BY MOUTH DAILY WITH BREAKFAST

## 2024-02-05 RX ORDER — ALBUTEROL SULFATE 90 UG/1
AEROSOL, METERED RESPIRATORY (INHALATION)
Qty: 8.5 G | Refills: 5 | Status: SHIPPED | OUTPATIENT
Start: 2024-02-05

## 2024-02-05 NOTE — TELEPHONE ENCOUNTER
Refill Request     CONFIRM preferred pharmacy with the patient.    If Mail Order Rx - Pend for 90 day refill.      Last Seen: Last Seen Department: 8/15/2023  Last Seen by PCP: 8/15/2023    Last Written: 5/31/2023 8.5g 5 refills    If no future appointment scheduled:  Review the last OV with PCP and review information for follow-up visit,  Route STAFF MESSAGE with patient name to the  Pool for scheduling with the following information:            -  Timing of next visit           -  Visit type ie Physical, OV, etc           -  Diagnoses/Reason ie. COPD, HTN - Do not use MEDICATION, Follow-up or CHECK UP - Give reason for visit      Next Appointment:   Future Appointments   Date Time Provider Department Center   2/29/2024 10:00 AM Heriberto Conrad, DO [facility]       Message sent to  to schedule appt with patient?  NO      Requested Prescriptions     Pending Prescriptions Disp Refills    albuterol sulfate HFA (PROVENTIL;VENTOLIN;PROAIR) 108 (90 Base) MCG/ACT inhaler [Pharmacy Med Name: ALBUTEROL HFA 90 MCG INHALER] 8.5 g 5     Sig: INHALE 2 PUFFS BY MOUTH FOUR TIMES A DAY AS NEEDED FOR WHEEZING

## 2024-02-14 RX ORDER — TORSEMIDE 20 MG/1
TABLET ORAL
Qty: 180 TABLET | OUTPATIENT
Start: 2024-02-14

## 2024-02-14 NOTE — TELEPHONE ENCOUNTER
Refill Request     CONFIRM preferred pharmacy with the patient.    If Mail Order Rx - Pend for 90 day refill.      Last Seen: Last Seen Department: 8/15/2023  Last Seen by PCP: 8/15/2023    Last Written: discontinued 7/6/23     If no future appointment scheduled:  Review the last OV with PCP and review information for follow-up visit,  Route STAFF MESSAGE with patient name to the  Pool for scheduling with the following information:            -  Timing of next visit           -  Visit type ie Physical, OV, etc           -  Diagnoses/Reason ie. COPD, HTN - Do not use MEDICATION, Follow-up or CHECK UP - Give reason for visit      Next Appointment:   Future Appointments   Date Time Provider Department Center   2/29/2024 10:00 AM Heriberto Conrad DO [facility] [place]       Message sent to  to schedule appt with patient?  NO      Requested Prescriptions     Pending Prescriptions Disp Refills    torsemide (DEMADEX) 20 MG tablet [Pharmacy Med Name: TORSEMIDE 20 MG TABLET] 180 tablet      Sig: TAKE 2 TABLETS (40MG) BY MOUTH DAILY

## 2024-02-27 ENCOUNTER — TELEPHONE (OUTPATIENT)
Dept: FAMILY MEDICINE CLINIC | Age: 59
End: 2024-02-27

## 2024-02-27 DIAGNOSIS — Z87.891 FORMER SMOKER: Primary | ICD-10-CM

## 2024-02-27 NOTE — TELEPHONE ENCOUNTER
Patient called back and would like the Ct lung screening order to be placed and he will schedule it. Thank you

## 2024-02-27 NOTE — TELEPHONE ENCOUNTER
Left voicemail for patient to call the office back.   If patient calls back please offer our services on having his Ct annual lung screening scheduled.

## 2024-02-29 ENCOUNTER — OFFICE VISIT (OUTPATIENT)
Dept: FAMILY MEDICINE CLINIC | Age: 59
End: 2024-02-29
Payer: COMMERCIAL

## 2024-02-29 VITALS
OXYGEN SATURATION: 95 % | SYSTOLIC BLOOD PRESSURE: 118 MMHG | BODY MASS INDEX: 31.06 KG/M2 | DIASTOLIC BLOOD PRESSURE: 86 MMHG | HEART RATE: 105 BPM | WEIGHT: 229 LBS

## 2024-02-29 DIAGNOSIS — I50.33 ACUTE ON CHRONIC DIASTOLIC CONGESTIVE HEART FAILURE (HCC): ICD-10-CM

## 2024-02-29 DIAGNOSIS — I50.33 ACUTE ON CHRONIC DIASTOLIC HEART FAILURE (HCC): ICD-10-CM

## 2024-02-29 DIAGNOSIS — I27.20 PULMONARY HYPERTENSION (HCC): ICD-10-CM

## 2024-02-29 DIAGNOSIS — E11.65 UNCONTROLLED TYPE 2 DIABETES MELLITUS WITH HYPERGLYCEMIA (HCC): ICD-10-CM

## 2024-02-29 DIAGNOSIS — I27.20 PULMONARY HYPERTENSION (HCC): Primary | ICD-10-CM

## 2024-02-29 LAB
ALBUMIN SERPL-MCNC: 4.5 G/DL (ref 3.4–5)
ALP SERPL-CCNC: 219 U/L (ref 40–129)
ALT SERPL-CCNC: 8 U/L (ref 10–40)
ANION GAP SERPL CALCULATED.3IONS-SCNC: 12 MMOL/L (ref 3–16)
AST SERPL-CCNC: 16 U/L (ref 15–37)
BASOPHILS # BLD: 0 K/UL (ref 0–0.2)
BASOPHILS NFR BLD: 0.5 %
BILIRUB DIRECT SERPL-MCNC: 0.5 MG/DL (ref 0–0.3)
BILIRUB INDIRECT SERPL-MCNC: 1 MG/DL (ref 0–1)
BILIRUB SERPL-MCNC: 1.5 MG/DL (ref 0–1)
BUN SERPL-MCNC: 21 MG/DL (ref 7–20)
CALCIUM SERPL-MCNC: 9.4 MG/DL (ref 8.3–10.6)
CHLORIDE SERPL-SCNC: 94 MMOL/L (ref 99–110)
CO2 SERPL-SCNC: 30 MMOL/L (ref 21–32)
CREAT SERPL-MCNC: 1 MG/DL (ref 0.9–1.3)
DEPRECATED RDW RBC AUTO: 18.3 % (ref 12.4–15.4)
EOSINOPHIL # BLD: 0.3 K/UL (ref 0–0.6)
EOSINOPHIL NFR BLD: 4.7 %
GFR SERPLBLD CREATININE-BSD FMLA CKD-EPI: >60 ML/MIN/{1.73_M2}
GLUCOSE SERPL-MCNC: 97 MG/DL (ref 70–99)
HBA1C MFR BLD: 6.1 %
HCT VFR BLD AUTO: 36.1 % (ref 40.5–52.5)
HGB BLD-MCNC: 12 G/DL (ref 13.5–17.5)
LYMPHOCYTES # BLD: 1.2 K/UL (ref 1–5.1)
LYMPHOCYTES NFR BLD: 16.9 %
MCH RBC QN AUTO: 31.8 PG (ref 26–34)
MCHC RBC AUTO-ENTMCNC: 33.2 G/DL (ref 31–36)
MCV RBC AUTO: 95.9 FL (ref 80–100)
MONOCYTES # BLD: 0.8 K/UL (ref 0–1.3)
MONOCYTES NFR BLD: 10.9 %
NEUTROPHILS # BLD: 4.9 K/UL (ref 1.7–7.7)
NEUTROPHILS NFR BLD: 67 %
NT-PROBNP SERPL-MCNC: 1629 PG/ML (ref 0–124)
PHOSPHATE SERPL-MCNC: 3.7 MG/DL (ref 2.5–4.9)
PLATELET # BLD AUTO: 226 K/UL (ref 135–450)
PMV BLD AUTO: 7.3 FL (ref 5–10.5)
POTASSIUM SERPL-SCNC: 3.7 MMOL/L (ref 3.5–5.1)
PROT SERPL-MCNC: 7.9 G/DL (ref 6.4–8.2)
RBC # BLD AUTO: 3.77 M/UL (ref 4.2–5.9)
SODIUM SERPL-SCNC: 136 MMOL/L (ref 136–145)
WBC # BLD AUTO: 7.3 K/UL (ref 4–11)

## 2024-02-29 PROCEDURE — 3079F DIAST BP 80-89 MM HG: CPT | Performed by: STUDENT IN AN ORGANIZED HEALTH CARE EDUCATION/TRAINING PROGRAM

## 2024-02-29 PROCEDURE — 3074F SYST BP LT 130 MM HG: CPT | Performed by: STUDENT IN AN ORGANIZED HEALTH CARE EDUCATION/TRAINING PROGRAM

## 2024-02-29 PROCEDURE — 3044F HG A1C LEVEL LT 7.0%: CPT | Performed by: STUDENT IN AN ORGANIZED HEALTH CARE EDUCATION/TRAINING PROGRAM

## 2024-02-29 PROCEDURE — 83036 HEMOGLOBIN GLYCOSYLATED A1C: CPT | Performed by: STUDENT IN AN ORGANIZED HEALTH CARE EDUCATION/TRAINING PROGRAM

## 2024-02-29 PROCEDURE — 99214 OFFICE O/P EST MOD 30 MIN: CPT | Performed by: STUDENT IN AN ORGANIZED HEALTH CARE EDUCATION/TRAINING PROGRAM

## 2024-02-29 RX ORDER — TORSEMIDE 20 MG/1
60 TABLET ORAL DAILY
Qty: 15 TABLET | Refills: 0 | Status: SHIPPED | OUTPATIENT
Start: 2024-02-29 | End: 2024-03-05

## 2024-02-29 SDOH — ECONOMIC STABILITY: FOOD INSECURITY: WITHIN THE PAST 12 MONTHS, YOU WORRIED THAT YOUR FOOD WOULD RUN OUT BEFORE YOU GOT MONEY TO BUY MORE.: NEVER TRUE

## 2024-02-29 SDOH — ECONOMIC STABILITY: HOUSING INSECURITY
IN THE LAST 12 MONTHS, WAS THERE A TIME WHEN YOU DID NOT HAVE A STEADY PLACE TO SLEEP OR SLEPT IN A SHELTER (INCLUDING NOW)?: NO

## 2024-02-29 SDOH — ECONOMIC STABILITY: FOOD INSECURITY: WITHIN THE PAST 12 MONTHS, THE FOOD YOU BOUGHT JUST DIDN'T LAST AND YOU DIDN'T HAVE MONEY TO GET MORE.: NEVER TRUE

## 2024-02-29 SDOH — ECONOMIC STABILITY: INCOME INSECURITY: HOW HARD IS IT FOR YOU TO PAY FOR THE VERY BASICS LIKE FOOD, HOUSING, MEDICAL CARE, AND HEATING?: NOT HARD AT ALL

## 2024-02-29 NOTE — PROGRESS NOTES
Patient: Willie Fisher is a 58 y.o. male who presents today with the following Chief Complaint(s):  Chief Complaint   Patient presents with    Annual Exam    Leg Swelling     Bilateral thigh swelling and some tightness as well    Weight Loss     Better options         HPI    COPD  Taking anoro     DM2  Farxiga  Metformin 500mg daily     HF  Unsure if taking lipitor   aldactone, demadex daily    Pulm Htn w/ right sided heart failure  Following with Dr. Cordell Warren, starting uptravi  Has had large volume ascites requiring paracentesis in 11/2023  \"metop succinate 25mg BID on advice of Dr. Landa given concern for rate dependence in setting of RH \"    A-flutter  200mg amio  Xarelto    Amiodarone - has been out of medication for 3 weeks. Has gained n additional 12 lbs since this time  Revatio  Spironolactone   Demedex    Reports home scale discharge weight 205 and up to 226 on home scale  Has been trying to increase demedex from 1 to 4 pills at once. Not urinating much even with increased dose and did not notice weight loss  Current Outpatient Medications   Medication Sig Dispense Refill    torsemide (DEMADEX) 20 MG tablet Take 3 tablets by mouth daily for 5 days 15 tablet 0    ASPIRIN LOW DOSE 81 MG EC tablet TAKE 1 TABLET BY MOUTH DAILY 90 tablet 1    metFORMIN (GLUCOPHAGE) 500 MG tablet TAKE 1 TABLET BY MOUTH DAILY WITH BREAKFAST 90 tablet 3    dapagliflozin (FARXIGA) 10 MG tablet Take 1 tablet by mouth every morning 90 tablet 1    umeclidinium-vilanterol (ANORO ELLIPTA) 62.5-25 MCG/ACT inhaler INHALE ONE DOSE BY MOUTH DAILY 60 each 1    spironolactone (ALDACTONE) 100 MG tablet Take 1 tablet by mouth daily 30 tablet 3    sildenafil (REVATIO) 20 MG tablet Take 2 tablets by mouth 3 times daily 30 tablet 3    rivaroxaban (XARELTO) 20 MG TABS tablet Take 1 tablet by mouth daily 30 tablet 0    umeclidinium-vilanterol (ANORO ELLIPTA) 62.5-25 MCG/ACT inhaler Inhale 1 puff into the lungs daily Indications: COPD

## 2024-03-01 ENCOUNTER — CARE COORDINATION (OUTPATIENT)
Dept: CARE COORDINATION | Age: 59
End: 2024-03-01

## 2024-03-01 DIAGNOSIS — I50.33 ACUTE ON CHRONIC DIASTOLIC CONGESTIVE HEART FAILURE (HCC): ICD-10-CM

## 2024-03-01 DIAGNOSIS — I50.33 ACUTE ON CHRONIC DIASTOLIC HEART FAILURE (HCC): ICD-10-CM

## 2024-03-01 DIAGNOSIS — I27.20 PULMONARY HYPERTENSION (HCC): ICD-10-CM

## 2024-03-01 NOTE — TELEPHONE ENCOUNTER
Refill Request     CONFIRM preferred pharmacy with the patient.    If Mail Order Rx - Pend for 90 day refill.      Last Seen: Last Seen Department: 2/29/2024  Last Seen by PCP: 2/29/2024    Last Written: 2/29/2024, #15, 0 refills    If no future appointment scheduled:  Review the last OV with PCP and review information for follow-up visit,  Route STAFF MESSAGE with patient name to the  Pool for scheduling with the following information:            -  Timing of next visit           -  Visit type ie Physical, OV, etc           -  Diagnoses/Reason ie. COPD, HTN - Do not use MEDICATION, Follow-up or CHECK UP - Give reason for visit      Next Appointment:   Future Appointments   Date Time Provider Department Center   3/14/2024 11:30 AM Heriberto Conrad, DO [facility] [place]       Message sent to  to schedule appt with patient?  N/A      Requested Prescriptions     Pending Prescriptions Disp Refills    torsemide (DEMADEX) 20 MG tablet [Pharmacy Med Name: TORSEMIDE 20 MG TABLET] 15 tablet 0     Sig: TAKE THREE TABLETS BY MOUTH DAILY FOR 5 DAYS

## 2024-03-04 ENCOUNTER — TELEPHONE (OUTPATIENT)
Dept: FAMILY MEDICINE CLINIC | Age: 59
End: 2024-03-04

## 2024-03-04 RX ORDER — TORSEMIDE 20 MG/1
TABLET ORAL
Qty: 15 TABLET | Refills: 0 | Status: SHIPPED | OUTPATIENT
Start: 2024-03-04 | End: 2024-03-08

## 2024-03-04 NOTE — TELEPHONE ENCOUNTER
Called patient per PCP request. Informed patient I will be following up with him on his weights every couple of days and then weekly, patient agreeable to calls.     This morning patient's weight post void was 225lbs    Started torsemide on Friday 3/1. Patient is asking what you would like him to do regarding the torsemide after tomorrow (day 5 of the medication). Advised patient to continue taking spironolactone and I would ask about torsemide.     I see a script was sent to pharmacy today for 3 tablets daily for 5 days. Is this for an additional 5 days? Please advise and I will let patient know.     Plan to follow up on weight on Wednesday 3/6. Reminded patient to monitor sodium intake and limit fluid intake to 8oz 8 times per day.

## 2024-03-04 NOTE — TELEPHONE ENCOUNTER
Spoke with Dr Conrad regarding plan of care for patient.    Called patient to discuss if he is voiding frequently and inform about medication.     Patient states he just checked his weight and is now 219lbs. He states the last 2 days he was voiding but today he has been voiding much more than he was. Confirmed he has been voiding at least once an hour, but it is starting to slow down as the day goes on. He is watching and counting his fluid intake. He states that while at work, he has been chewing on ice and then when he is actually thirsty, he will take some sips of water.      Informed that I will call him tomorrow morning to check on his morning weight. Informed torsemide was sent to his pharmacy for an additional 5 days . He is aware that if he is unable to successfully diurese at home, he will need to go to the hospital.  I plan to follow up with the patient daily this week to check on his weight.

## 2024-03-05 ENCOUNTER — TELEPHONE (OUTPATIENT)
Dept: FAMILY MEDICINE CLINIC | Age: 59
End: 2024-03-05

## 2024-03-05 NOTE — TELEPHONE ENCOUNTER
Called patient, weight this morning was 222lbs. Patient states he took his medication and had to run to the pharmacy so he hasn't really started voiding yet but is starting to feel the torsemide \"kick in\".    Patient will call back this afternoon before he goes to work with another weight

## 2024-03-05 NOTE — TELEPHONE ENCOUNTER
Called patient to follow up on weight. He was just waking up and hadn't had a chance to check yet. Requested a later call. Informed I will call him after 1030.     Patient did state that yesterday he was in a hurry when we were on the phone and gave me a wrong weight, he states he was actually 222lbs not 219lbs.     I will call back later for today's weight per patient request.

## 2024-03-06 DIAGNOSIS — I50.33 ACUTE ON CHRONIC DIASTOLIC CONGESTIVE HEART FAILURE (HCC): ICD-10-CM

## 2024-03-06 DIAGNOSIS — I50.33 ACUTE ON CHRONIC DIASTOLIC HEART FAILURE (HCC): ICD-10-CM

## 2024-03-06 DIAGNOSIS — I27.20 PULMONARY HYPERTENSION (HCC): ICD-10-CM

## 2024-03-06 NOTE — TELEPHONE ENCOUNTER
Refill Request     CONFIRM preferred pharmacy with the patient.    If Mail Order Rx - Pend for 90 day refill.      Last Seen: Last Seen Department: 2/29/2024  Last Seen by PCP: 2/29/2024    Last Written: 3/04/2024, #15, 0 refills    If no future appointment scheduled:  Review the last OV with PCP and review information for follow-up visit,  Route STAFF MESSAGE with patient name to the  Pool for scheduling with the following information:            -  Timing of next visit           -  Visit type ie Physical, OV, etc           -  Diagnoses/Reason ie. COPD, HTN - Do not use MEDICATION, Follow-up or CHECK UP - Give reason for visit      Next Appointment:   Future Appointments   Date Time Provider Department Center   3/14/2024 11:30 AM Heriberto Conrad, DO [facility] [place]       Message sent to  to schedule appt with patient?  YES      Requested Prescriptions     Pending Prescriptions Disp Refills    torsemide (DEMADEX) 20 MG tablet [Pharmacy Med Name: TORSEMIDE 20 MG TABLET] 15 tablet 0     Sig: TAKE THREE TABLETS BY MOUTH DAILY FOR 5 DAYS

## 2024-03-06 NOTE — TELEPHONE ENCOUNTER
Called patient to follow up on daily weight. No answer, LVM for return call or requested patient send a [employer] message with his weight and let us know if he is continuing to have good urine output or if it has slowed down at all compared to the last few days.

## 2024-03-06 NOTE — TELEPHONE ENCOUNTER
Patient called back stating his weight is 219. The urinary frequency has slowed down and he feels like he is emptying completely

## 2024-03-08 ENCOUNTER — CARE COORDINATION (OUTPATIENT)
Dept: CARE COORDINATION | Age: 59
End: 2024-03-08

## 2024-03-08 DIAGNOSIS — I50.813 ACUTE ON CHRONIC RIGHT-SIDED HEART FAILURE (HCC): Primary | ICD-10-CM

## 2024-03-08 NOTE — CARE COORDINATION
Patient returned call and said he will continue to call [facility] to report his weight. Patient said his weight is 219 today and the increase in diuretic has helped. Patient has noticed swelling has improved but after working all day he does notice swelling has increased. Patient is complaint with wearing compression socks. Patient denies any other needs at this time. [facility] updated patient on new Rx that was sent to the pharmacy. Patient verbalized understanding and declined further calls from [facility].

## 2024-03-08 NOTE — TELEPHONE ENCOUNTER
Please let patient know that I have sent 60mg pills to his pharmacy that he will continue until he is down to normal weight. I have also ordered a BMP to be done to check his kidney function while on this increased dose

## 2024-03-11 ENCOUNTER — TELEPHONE (OUTPATIENT)
Dept: FAMILY MEDICINE CLINIC | Age: 59
End: 2024-03-11

## 2024-03-11 NOTE — TELEPHONE ENCOUNTER
Called patient to follow up on weight, he states he was 219lbs this morning. He states he tried to  torsemide 60mg on Saturday but pharmacy said they had to clarify something with us first. I will call and follow up with pharmacy.     Patient feels that the edema is improving but continues to be tight in the upper legs. Still watching fluid intake. Has been out of medication.     Informed of the blood work order, would you like patient to have labs done prior to his appointment on 3/14 or is it OK for him to wait and complete while he is in the office. Please advise

## 2024-03-11 NOTE — TELEPHONE ENCOUNTER
Called pharm,  they needed clarification on dose. Pharmacist states they don't have 60mg tablets, wanted to make sure it was okay to provide 20mg tablets for 3 tablets PO daily. Confirmed OK to dispense 90 tablets of Torsemide 20mg

## 2024-03-12 NOTE — TELEPHONE ENCOUNTER
Patient informed and he states he went to pick them up yesterday but the line was too long for him to wait. He will  the RX today but had others at home and improvised to achieve the goal requested until he gets the others.

## 2024-03-14 ENCOUNTER — OFFICE VISIT (OUTPATIENT)
Dept: FAMILY MEDICINE CLINIC | Age: 59
End: 2024-03-14
Payer: COMMERCIAL

## 2024-03-14 VITALS
WEIGHT: 221 LBS | SYSTOLIC BLOOD PRESSURE: 118 MMHG | DIASTOLIC BLOOD PRESSURE: 88 MMHG | BODY MASS INDEX: 29.97 KG/M2 | HEART RATE: 112 BPM | OXYGEN SATURATION: 97 %

## 2024-03-14 DIAGNOSIS — R60.0 LOWER EXTREMITY EDEMA: ICD-10-CM

## 2024-03-14 DIAGNOSIS — I50.813 ACUTE ON CHRONIC RIGHT-SIDED HEART FAILURE (HCC): ICD-10-CM

## 2024-03-14 DIAGNOSIS — I27.20 PULMONARY HYPERTENSION (HCC): Primary | ICD-10-CM

## 2024-03-14 PROCEDURE — 3079F DIAST BP 80-89 MM HG: CPT | Performed by: STUDENT IN AN ORGANIZED HEALTH CARE EDUCATION/TRAINING PROGRAM

## 2024-03-14 PROCEDURE — 3074F SYST BP LT 130 MM HG: CPT | Performed by: STUDENT IN AN ORGANIZED HEALTH CARE EDUCATION/TRAINING PROGRAM

## 2024-03-14 PROCEDURE — 99213 OFFICE O/P EST LOW 20 MIN: CPT | Performed by: STUDENT IN AN ORGANIZED HEALTH CARE EDUCATION/TRAINING PROGRAM

## 2024-03-14 ASSESSMENT — PATIENT HEALTH QUESTIONNAIRE - PHQ9
2. FEELING DOWN, DEPRESSED OR HOPELESS: 0
1. LITTLE INTEREST OR PLEASURE IN DOING THINGS: NOT AT ALL
2. FEELING DOWN, DEPRESSED OR HOPELESS: NOT AT ALL
1. LITTLE INTEREST OR PLEASURE IN DOING THINGS: 0
SUM OF ALL RESPONSES TO PHQ9 QUESTIONS 1 & 2: 0
SUM OF ALL RESPONSES TO PHQ QUESTIONS 1-9: 0
SUM OF ALL RESPONSES TO PHQ9 QUESTIONS 1 & 2: 0
SUM OF ALL RESPONSES TO PHQ QUESTIONS 1-9: 0

## 2024-03-14 NOTE — PROGRESS NOTES
(GLUCOPHAGE) 500 MG tablet TAKE 1 TABLET BY MOUTH DAILY WITH BREAKFAST 90 tablet 3    spironolactone (ALDACTONE) 100 MG tablet Take 1 tablet by mouth daily 30 tablet 3    sildenafil (REVATIO) 20 MG tablet Take 2 tablets by mouth 3 times daily 30 tablet 3    rivaroxaban (XARELTO) 20 MG TABS tablet Take 1 tablet by mouth daily 30 tablet 0    umeclidinium-vilanterol (ANORO ELLIPTA) 62.5-25 MCG/ACT inhaler Inhale 1 puff into the lungs daily Indications: COPD      dapagliflozin (FARXIGA) 10 MG tablet Take 1 tablet by mouth every morning Indications: CHF, DM, HYPERGLYCEMIA      atorvastatin (LIPITOR) 20 MG tablet Take 1 tablet by mouth nightly 90 tablet 1    ASPIRIN LOW DOSE 81 MG EC tablet TAKE 1 TABLET BY MOUTH DAILY (Patient not taking: Reported on 3/14/2024) 90 tablet 1    amiodarone (PACERONE) 400 MG tablet Take 1 tablet by mouth 2 times daily 400mg BID x 7 days, then down to 200mg daily (Patient not taking: Reported on 2/29/2024) 30 tablet 0     No current facility-administered medications for this visit.       Patient's past medical history, surgical history, family history, medications,  andallergies  were all reviewed and updated as appropriate today.      Review of Systems  All other systems reviewed and negative    Physical Exam  Vitals reviewed.   Constitutional:       Appearance: Normal appearance.   HENT:      Head: Normocephalic and atraumatic.   Cardiovascular:      Rate and Rhythm: Normal rate and regular rhythm.   Pulmonary:      Effort: Pulmonary effort is normal.      Breath sounds: Normal breath sounds.   Neurological:      General: No focal deficit present.      Mental Status: He is alert and oriented to person, place, and time.   Psychiatric:         Behavior: Behavior normal.         Thought Content: Thought content normal.       Vitals:    03/14/24 1142   BP: 118/88   Pulse: (!) 112   SpO2: 97%       Please note that portions of this note may have been completed with a voice recognition program.

## 2024-03-15 LAB
ANION GAP SERPL CALCULATED.3IONS-SCNC: 16 MMOL/L (ref 3–16)
BUN SERPL-MCNC: 18 MG/DL (ref 7–20)
CALCIUM SERPL-MCNC: 9.5 MG/DL (ref 8.3–10.6)
CHLORIDE SERPL-SCNC: 95 MMOL/L (ref 99–110)
CO2 SERPL-SCNC: 27 MMOL/L (ref 21–32)
CREAT SERPL-MCNC: 1 MG/DL (ref 0.9–1.3)
GFR SERPLBLD CREATININE-BSD FMLA CKD-EPI: >60 ML/MIN/{1.73_M2}
GLUCOSE SERPL-MCNC: 110 MG/DL (ref 70–99)
POTASSIUM SERPL-SCNC: 3.7 MMOL/L (ref 3.5–5.1)
SODIUM SERPL-SCNC: 138 MMOL/L (ref 136–145)

## 2024-03-18 ENCOUNTER — TELEPHONE (OUTPATIENT)
Dept: FAMILY MEDICINE CLINIC | Age: 59
End: 2024-03-18

## 2024-03-18 NOTE — TELEPHONE ENCOUNTER
Called patient to follow up on edema.    Patient states he was 210lbs this morning. States his weight has been decreasing every day. BLE edema improved. Patient reports that his thighs are much better now and not hard and tight like before. He has been voiding frequently and a good amount each time.  Patient picked up compression socks, wore for the 1st time last night.   He is taking torsemide and spironolactone as ordered.     Advised patient I would let PCP know and I will call and follow up with him again in a few days.

## 2024-03-21 ENCOUNTER — TELEPHONE (OUTPATIENT)
Dept: FAMILY MEDICINE CLINIC | Age: 59
End: 2024-03-21

## 2024-03-21 NOTE — TELEPHONE ENCOUNTER
Called the patient to check and see how his weight loss and edema were progressing.  Patient stated he still has some edema in his legs and this morning  his weight was at 210 lbs.  Patient stated that he continues to wear his compression stockings, elevating his legs as much as possible, taking medications as prescribed, monitoring his sodium intake.  Patient denies any shortness of breath or difficulty breathing.  Patient advised if he has any questions or any changes ( increase in weight or edema, or shortness of breath) to call the office.  Patient verbalized understanding.

## 2024-03-28 ENCOUNTER — TELEPHONE (OUTPATIENT)
Dept: FAMILY MEDICINE CLINIC | Age: 59
End: 2024-03-28

## 2024-03-28 NOTE — TELEPHONE ENCOUNTER
Called the patient to check in on his progress and weight.  Patient stated that his weight is staying at 210 lbs. to 211 lbs.  Patient stated that his edema is down and that he is still wearing his compression stockings and he is doing a good bit of walking.  Patient states that he is taking his medications as ordered and not having any shortness of breath or difficulty breathing.  Patient advised if he needed anything or had any questions to call the office.  Patient verbalized understanding.

## 2024-04-08 DIAGNOSIS — I27.20 PULMONARY HYPERTENSION (HCC): ICD-10-CM

## 2024-04-08 DIAGNOSIS — I50.33 ACUTE ON CHRONIC DIASTOLIC CONGESTIVE HEART FAILURE (HCC): ICD-10-CM

## 2024-04-08 DIAGNOSIS — I50.33 ACUTE ON CHRONIC DIASTOLIC HEART FAILURE (HCC): ICD-10-CM

## 2024-04-08 NOTE — TELEPHONE ENCOUNTER
Refill Request     CONFIRM preferred pharmacy with the patient.    If Mail Order Rx - Pend for 90 day refill.      Last Seen: Last Seen Department: 3/14/2024  Last Seen by PCP: 3/14/2024    Last Written: 3/8/24 30 with no refills     If no future appointment scheduled:  Review the last OV with PCP and review information for follow-up visit,  Route STAFF MESSAGE with patient name to the  Pool for scheduling with the following information:            -  Timing of next visit           -  Visit type ie Physical, OV, etc           -  Diagnoses/Reason ie. COPD, HTN - Do not use MEDICATION, Follow-up or CHECK UP - Give reason for visit      Next Appointment:   Future Appointments   Date Time Provider Department Center   6/17/2024 10:30 AM Heriberto Conrad, DO [facility]       Message sent to  to schedule appt with patient?  NO      Requested Prescriptions     Pending Prescriptions Disp Refills    torsemide (DEMADEX) 20 MG tablet [Pharmacy Med Name: TORSEMIDE 20 MG TABLET] 90 tablet      Sig: TAKE THREE TABLETS BY MOUTH DAILY

## 2024-04-12 RX ORDER — TORSEMIDE 20 MG/1
TABLET ORAL
Qty: 90 TABLET | Refills: 0 | Status: SHIPPED | OUTPATIENT
Start: 2024-04-12

## 2024-05-02 DIAGNOSIS — I50.33 ACUTE ON CHRONIC DIASTOLIC CONGESTIVE HEART FAILURE (HCC): ICD-10-CM

## 2024-05-03 RX ORDER — ATORVASTATIN CALCIUM 20 MG/1
20 TABLET, FILM COATED ORAL NIGHTLY
Qty: 90 TABLET | Refills: 1 | Status: SHIPPED | OUTPATIENT
Start: 2024-05-03

## 2024-05-14 DIAGNOSIS — I50.33 ACUTE ON CHRONIC DIASTOLIC CONGESTIVE HEART FAILURE (HCC): ICD-10-CM

## 2024-05-14 DIAGNOSIS — I50.33 ACUTE ON CHRONIC DIASTOLIC HEART FAILURE (HCC): ICD-10-CM

## 2024-05-14 DIAGNOSIS — I27.20 PULMONARY HYPERTENSION (HCC): ICD-10-CM

## 2024-05-14 RX ORDER — TORSEMIDE 20 MG/1
TABLET ORAL
Qty: 90 TABLET | Refills: 0 | Status: SHIPPED | OUTPATIENT
Start: 2024-05-14

## 2024-05-14 NOTE — TELEPHONE ENCOUNTER
Refill Request     CONFIRM preferred pharmacy with the patient.    If Mail Order Rx - Pend for 90 day refill.      Last Seen: Last Seen Department: 3/14/2024  Last Seen by PCP: 3/14/2024    Last Written: 4/12/24 90 with no refills     If no future appointment scheduled:  Review the last OV with PCP and review information for follow-up visit,  Route STAFF MESSAGE with patient name to the  Pool for scheduling with the following information:            -  Timing of next visit           -  Visit type ie Physical, OV, etc           -  Diagnoses/Reason ie. COPD, HTN - Do not use MEDICATION, Follow-up or CHECK UP - Give reason for visit      Next Appointment:   Future Appointments   Date Time Provider Department Center   6/17/2024 10:30 AM Heriberto Conrad, DO [facility]       Message sent to  to schedule appt with patient?  NO      Requested Prescriptions     Pending Prescriptions Disp Refills    torsemide (DEMADEX) 20 MG tablet [Pharmacy Med Name: TORSEMIDE 20 MG TABLET] 90 tablet 0     Sig: TAKE THREE TABLETS BY MOUTH DAILY

## 2024-05-17 NOTE — PROGRESS NOTES
CARDIOLOGY FOLLOW UP        Patient Name: Juanita Fortune  Primary Care physician: Leana Miller DO    Reason for Referral/Chief Complaint: Juanita Fortune is a 64 y.o. patient who is here to cardiology clinic today for evaluation and treatment of CHF and hypertension. History of Present Illness:   Jeannie Hoffman is a 64 y.o. patient with prior medical history of HFpEF, Hypertension, Pre-DM and severe COPD. Diagnosed with new onset congestive heart failure with preserved EF. Echocardiogram revealed preserved ejection fraction, severely dilated right ventricle with reduced function in the setting of moderate pulmonary hypertension with estimated PASP 56 mmHg. Some evidence to suggest diastolic dysfunction. LOV 12/03/2021 he reported subtle wt gain, pants fitting tighter. No overt volume overload by exam. Follow-up echo study was planned and he was to continue Lasix 40 mg daily. Today, patient said weight climbed to 235 so saw pcp on 05/12/2022. Pro-BNP elevated and his lasix was titrated to 80 mg bid. Today weight is 216. States 210-213 at home. Good wt loss, states edema is much improved. Has not had sleep study because he did not want to go in to get it done. Did not get ECHO performed as instructed last visit. The patient denies chest pain, shortness of breath at rest and dyspnea with exertion. Denies palpitations, dizziness, near-syncope or barbara syncope. Denies paroxysmal nocturnal dyspnea, orthopnea, increasing lower extremity edema or weight gain. Past Medical History:   has a past medical history of Acute systolic congestive heart failure ([address] Essential hypertension, Pre-diabetes, and Thrombocytopenia ([address] Surgical History:  Denies prior surgical history. Social History:   reports that he quit smoking about 10 months ago. His smoking use included cigarettes. He has a 15.00 pack-year smoking history.  He has never used smokeless tobacco. He reports previous alcohol Returned call to notify that anesthesia has cleared the patient and the surgery is starting. Could not advise if procedure will be outpatient or admitted due to co-morbidities. She asked if anyone could update her supervisor regarding the status following surgery if possible. I explained that I am part of the outpatient office and we generally do not get this information, but that I will take the number in the event nursing staff at the hospital access this note.    Direct line to Supervisor: [phone]   use. He reports previous drug use. Family History:  Reports no history of early onset coronary artery disease, myocardial infarction, cerebrovascular accident or sudden cardiac death among primary relatives. Home Medications:  Were reviewed and are listed in nursing record and/or below  Prior to Admission medications    Medication Sig Start Date End Date Taking? Authorizing Provider   losartan (COZAAR) 50 mg tablet TAKE ONE TABLET BY MOUTH DAILY 6/1/22  Yes Sil Conrad DO   ASPIRIN LOW DOSE 81 MG EC tablet TAKE ONE TABLET BY MOUTH DAILY 6/1/22  Yes Sil Conrad DO   atorvastatin (LIPITOR) 20 MG tablet TAKE ONE TABLET BY MOUTH ONCE NIGHTLY 6/1/22  Yes SANCHEZ Hutchins - CNP   metFORMIN (GLUCOPHAGE) 500 MG tablet TAKE ONE TABLET BY MOUTH DAILY WITH BREAKFAST 6/1/22  Yes Sil Conrad DO   furosemide (LASIX) 80 MG tablet Take 1 tablet by mouth daily  Patient taking differently: Take 80 mg by mouth 2 times daily  5/20/22  Yes Sil Conrad DO   potassium chloride (KLOR-CON M) 20 MEQ extended release tablet Take 1 tablet by mouth 2 times daily While on increased dose of lasix 5/13/22  Yes Sil Conrad DO   albuterol sulfate  (90 Base) MCG/ACT inhaler INHALE 2 PUFFS BY MOUTH FOUR TIMES A DAY AS NEEDED FOR WHEEZING 4/23/22  Yes SANCHEZ Wheeler   umeclidinium-vilanterol (ANORO ELLIPTA) 62.5-25 MCG/INH AEPB inhaler Inhale 1 puff into the lungs daily  Patient not taking: Reported on 6/9/2022 5/12/22   [facility],         CURRENT Medications:  No current facility-administered medications for this visit. Allergies:  Patient has no known allergies. Review of Systems:   A 14 point review of symptoms completed. Pertinent positives identified in the HPI, all other review of symptoms negative as below.       Objective:     Vitals:    06/09/22 1033   BP: 110/80   Pulse: 82   SpO2: (!) 89%    Weight: 216 lb 8 oz (98.2 kg)       PHYSICAL EXAM:    General:  Alert, cooperative, no distress, appears stated age   Head:  Normocephalic, atraumatic   Eyes:  Conjunctiva/corneas clear, anicteric sclerae    Nose: Nares normal, no drainage or sinus tenderness   Throat: No abnormalities of the lips, oral mucosa or tongue. Neck: Trachea midline. Neck supple with no lymphadenopathy, thyroid not enlarged, symmetric, no tenderness/mass/nodules   Lungs:   Clear to auscultation bilaterally, end expiratory wheezing, no rales, no respiratory distress   Chest Wall:  No deformity or tenderness to palpation   Heart:  Regular rate and rhythm, normal S1, normal caliber P2, no murmur, no rub, no S3/S4, PMI non-displaced. No heave. Abdomen:   Obese, Soft, non-tender, with normoactive bowel sounds. No masses, no hepatosplenomegaly   Extremities: No cyanosis, clubbing +trace pitting feet bilaterally with varicose veins. Vascular: 2+ radial,dorsalis pedis and posterior tibial pulses bilaterally. Brisk carotid upstrokes without carotid bruit. Skin: Skin color, texture, turgor are normal with no rashes or ulceration. Pysch: Euthymic mood, appropriate affect   Neurologic: Oriented to person, place and time. No slurred speech or facial asymmetry. No motor or sensory deficits on gross examination.          Labs:   CBC:   Lab Results   Component Value Date    WBC 4.3 08/06/2021    RBC 4.78 08/06/2021    HGB 16.0 08/06/2021    HCT 48.5 08/06/2021    .4 08/06/2021    RDW 13.5 08/06/2021     08/06/2021     CMP:  Lab Results   Component Value Date     05/19/2022    K 4.3 05/19/2022    CL 99 05/19/2022    CO2 30 05/19/2022    BUN 24 05/19/2022    CREATININE 0.9 05/19/2022    GFRAA >60 05/19/2022    AGRATIO 1.1 08/05/2021    LABGLOM >60 05/19/2022    GLUCOSE 105 05/19/2022    PROT 7.0 08/05/2021    CALCIUM 10.3 05/19/2022    BILITOT 0.7 08/05/2021    ALKPHOS 110 08/05/2021    AST 22 08/05/2021    ALT 19 08/05/2021     PT/INR:  No results found for: PTINR  HgBA1c:  Lab Results   Component Value Date LABA1C 6.3 2021     Lab Results   Component Value Date    TROPONINI <0.01 2021     Lab Results   Component Value Date    CHOL 121 2021    CHOL 121 2021     Lab Results   Component Value Date    TRIG 49 2021    TRIG 83 2021     Lab Results   Component Value Date    HDL 41 2021    HDL 39 (L) 2021     Lab Results   Component Value Date    LDLCALC 70 2021    LDLCALC 65 2021     Lab Results   Component Value Date    LABVLDL 10 2021    LABVLDL 17 2021     No results found for: CHOLHDLRATIO      Cardiac Data:     [id number]  Normal sinus rhythmRight axis deviationIncomplete right bundle branch blockRight ventricular hypertrophy with repolarization abnormalityT wave abnormality, consider anterior ischemiaProlonged QTAbnormal ECGWhen compared with ECG of 05-AUG-2021 14:42,T wave inversion more evident in Anterior leadsConfirmed by Marce Hodge MD, John White ([id number]) on 2021 4:20:17 PM       Echo: 2021  Summary   Left ventricular systolic function is low normal with a visually estimated   ejection fraction of 50-55%. EF estimated by 3D at 52 %. The left ventricle is normal in size with normal wall thickness. Flattened in diastole and systole (\"D-shaped septum\") consistent with right   ventricular volume and pressure overload. Normal left ventricular diastolic function. The right ventricle is severely dilated. Right ventricular systolic function is reduced. The right atrium is severely enlarged. Mild eccentric tricuspid regurgitation. Systolic pulmonary artery pressure (SPAP) is elevated and estimated at 56   mmHg (right atrial pressure 15 mmHg) consistent with moderate pulmonary   hypertension. The IVC is dilated in size (>2.1 cm) and collapses <50% with respiration   consistent with markedly elevated right atrial pressures (15 mmHg) .        Additional studies:   PFT 21:  PFT INTERPRETATION  Spirometry shows FVC to be 63%, FEV1 to be 33%, FEV1 to  FVC ratio was 53%, FEF25%-75% was 18%. Postbronchodilator study was not  done on this test.  Lung volume shows the total lung capacity was  normal.  The patient does have some air trapping and hyperinflation. The patient also has normal diffusion capacity when adjusted for volume. The patient's flow-volume loop was suggestive of obstructive pattern. On the basis of this PFT, the patient has very severe obstructive airway  disease and cannot comment upon postbronchodilator improvement because  the test was not done. The patient will benefit from pulmonary rehab on  the basis of this PFT parameter. Please correlate clinically. CT Chest 8/5/2021  FINDINGS:   Pulmonary Arteries: Pulmonary arteries are adequately opacified. No emboli   are demonstrated       Mediastinum: Thoracic aorta normal in caliber. Cardiomegaly with trace   pericardial effusion. No mediastinal adenopathy       Lungs/pleura: Trace pleural effusions. Calcified granulomas in the left   lung. No infiltrate or edema       Upper Abdomen: Unremarkable       Soft Tissues/Bones: No acute bone or soft tissue abnormality. Impression   1. No evidence of pulmonary embolism   2. Cardiomegaly with trace pleural effusions                   Impression and Plan:      1. HFpEF, chronic, NYHA I-II symptoms at present. 2. Moderate pulmonary hypertension with cor pulmonale/RV dilation: This appears multifactorial with suspected untreated underlying sleep apnea, COPD now known severe, as well as left-sided heart failure/diastolic dysfunction. 3. Tobacco abuse, now quit  4. EtOH abuse   5. Obesity  6. Hypertension, controlled  7. Pre-diabetes  8. Thrombocytopenia  9.  Severe COPD, resting hypoxia with sat 89% today    Patient Active Problem List   Diagnosis    Tobacco abuse    Daily consumption of alcohol    QT prolongation    Right axis deviation    Macrocytosis    Essential hypertension    [facility]    New onset of congestive heart failure (HCC)    Chronic obstructive pulmonary disease ([facility]   The ASCVD Risk score (Rafael Flores, et al., 2013) failed to calculate for the following reasons: The valid total cholesterol range is 130 to 320 mg/dL        PLAN:  1. Echo for follow up of PA pressures, R heart function. 2. Lab work: Pro-BNP, BMP  3. Pulmonary Referrral Dr. Christa Fenton  4. Continue lasix 80 mg  twice a day   5. Limit fluid intake to 64 ounces a day  6. Monitor salt intake, < 2 g daily advised   7. Call for worsening dyspnea or increasing weight  8. Pt has declined sleep study    Follow up with me in 8 weeks     This note is scribed in the presence of Jack Vanegas by Helio Martines RN    The scribes documentation has been prepared under my direction and personally reviewed by me in its entirety. I confirm that the note above accurately reflects all work, treatment, procedures, and medical decision making performed by me. Jerome Foster MD, personally performed the services described in this documentation as scribed by  Helio Martines RN in my presence, and it is both accurate and complete to the best of our ability. I will address the patient's cardiac risk factors and adjusted pharmacologic treatment as needed. In addition, I have reinforced the need for patient directed risk factor modification. All questions and concerns were addressed to the patient/family. Alternatives to my treatment were discussed. Thank you for allowing us to participate in the care of Adair Schaeffer. Please call me with any questions [phone].     Jack Vanegas MD, [facility]  Cardiovascular Disease  [facility] [address]  [phone] [facility]  [phone] [facility]  6/9/2022 10:44 AM

## 2024-06-13 DIAGNOSIS — I50.33 ACUTE ON CHRONIC DIASTOLIC CONGESTIVE HEART FAILURE (HCC): ICD-10-CM

## 2024-06-13 DIAGNOSIS — I50.33 ACUTE ON CHRONIC DIASTOLIC HEART FAILURE (HCC): ICD-10-CM

## 2024-06-13 DIAGNOSIS — I27.20 PULMONARY HYPERTENSION (HCC): ICD-10-CM

## 2024-06-13 RX ORDER — TORSEMIDE 20 MG/1
TABLET ORAL
Qty: 90 TABLET | Refills: 0 | OUTPATIENT
Start: 2024-06-13

## 2024-06-13 NOTE — TELEPHONE ENCOUNTER
Refill Request     CONFIRM preferred pharmacy with the patient.    If Mail Order Rx - Pend for 90 day refill.      Last Seen: Last Seen Department: 3/14/2024  Last Seen by PCP: 3/14/2024    Last Written: Torsemide 5/14/24 90 tabs  refills     If no future appointment scheduled:  Review the last OV with PCP and review information for follow-up visit,  Route STAFF MESSAGE with patient name to the  Pool for scheduling with the following information:            -  Timing of next visit           -  Visit type ie Physical, OV, etc           -  Diagnoses/Reason ie. COPD, HTN - Do not use MEDICATION, Follow-up or CHECK UP - Give reason for visit      Next Appointment:   No future appointments.    Message sent to  to schedule appt with patient?  NO      Requested Prescriptions     Pending Prescriptions Disp Refills    torsemide (DEMADEX) 20 MG tablet 90 tablet 0     Sig: TAKE THREE TABLETS BY MOUTH DAILY

## 2024-07-03 ENCOUNTER — TELEPHONE (OUTPATIENT)
Dept: CARDIOLOGY CLINIC | Age: 59
End: 2024-07-03

## 2024-07-03 NOTE — TELEPHONE ENCOUNTER
I reviewed this patient's chart.  He is on 3 drugs for PAH and sees Dr. Landa at  for this.  He has been in the hospital 33 days.  I cannot accommodate this patient in my [place] practice.  It would be best for him to follow at  with Dr. Landa and with the  heart failure clinic if needed.      I called and left a message on the number provided at  with this information.    Urmila Nayak M.D., [facility]  Advanced Heart Failure and Pulmonary Hypertension Program  [phone]

## 2024-07-03 NOTE — TELEPHONE ENCOUNTER
Saul from  contacted office to schedule pt 1 wk hspfu w/ [facility] for heart failure. [facility] does not have any availability. Nor [facility] in the next week.     Saul CB# [phone], ok to leave VM confidential.

## 2024-07-24 DIAGNOSIS — E11.65 UNCONTROLLED TYPE 2 DIABETES MELLITUS WITH HYPERGLYCEMIA (HCC): Primary | ICD-10-CM

## 2024-07-24 NOTE — TELEPHONE ENCOUNTER
Patient Assistance Program called into office to request refill of pended medication with a 90 day supply.    Medication: Farxiga 10 mg  Pharmacy: [facility]  Last OV: 3/14/2024

## 2024-07-25 RX ORDER — DAPAGLIFLOZIN 10 MG/1
10 TABLET, FILM COATED ORAL EVERY MORNING
Qty: 90 TABLET | Refills: 3 | Status: SHIPPED | OUTPATIENT
Start: 2024-07-25

## 2024-08-26 DIAGNOSIS — I27.20 PULMONARY HYPERTENSION (HCC): ICD-10-CM

## 2024-08-26 DIAGNOSIS — I50.33 ACUTE ON CHRONIC DIASTOLIC HEART FAILURE (HCC): ICD-10-CM

## 2024-08-26 DIAGNOSIS — Z59.9 FINANCIAL DIFFICULTY: Primary | ICD-10-CM

## 2024-08-26 DIAGNOSIS — I50.33 ACUTE ON CHRONIC DIASTOLIC CONGESTIVE HEART FAILURE (HCC): ICD-10-CM

## 2024-08-26 SDOH — ECONOMIC STABILITY - INCOME SECURITY: PROBLEM RELATED TO HOUSING AND ECONOMIC CIRCUMSTANCES, UNSPECIFIED: Z59.9

## 2024-08-26 NOTE — TELEPHONE ENCOUNTER
Refill Request     CONFIRM preferred pharmacy with the patient.    If Mail Order Rx - Pend for 90 day refill.      Last Seen: Last Seen Department: 3/14/2024  Last Seen by PCP: 3/14/2024    Last Written: 5/14/24 #90 - 0 refills    If no future appointment scheduled:  Review the last OV with PCP and review information for follow-up visit,  Route STAFF MESSAGE with patient name to the  Pool for scheduling with the following information:            -  Timing of next visit           -  Visit type ie Physical, OV, etc           -  Diagnoses/Reason ie. COPD, HTN - Do not use MEDICATION, Follow-up or CHECK UP - Give reason for visit      Next Appointment:   No future appointments.    Message sent to  to schedule appt with patient?  YES Return in about 3 months (around 6/14/2024) for diabetes       Requested Prescriptions     Pending Prescriptions Disp Refills    torsemide (DEMADEX) 20 MG tablet 90 tablet 0     Sig: Take 1 tablet by mouth daily TAKE THREE TABLETS BY MOUTH DAILY

## 2024-08-30 ENCOUNTER — TELEPHONE (OUTPATIENT)
Dept: PULMONOLOGY | Age: 59
End: 2024-08-30

## 2024-08-30 NOTE — TELEPHONE ENCOUNTER
Received a hospital follow up from [facility], for pt to be schedule in our office.  Call patient and he state he already seen a Pulmonologist at [facility],and will continue care with them

## 2024-09-03 ENCOUNTER — TELEPHONE (OUTPATIENT)
Dept: FAMILY MEDICINE CLINIC | Age: 59
End: 2024-09-03

## 2024-09-03 RX ORDER — TORSEMIDE 20 MG/1
20 TABLET ORAL DAILY
Qty: 90 TABLET | Refills: 0 | OUTPATIENT
Start: 2024-09-03

## 2024-09-03 NOTE — TELEPHONE ENCOUNTER
Patient is currently taking Torsemide 3 times daily but he had Dr. Landa send in an updated script.     Patient currently doesn't have insurance at this time as he had to quit his job. He had to move in with his father, he states that he is having  a tough time.     Patient would like to see if Estephania could be of any assistant.

## 2024-09-03 NOTE — TELEPHONE ENCOUNTER
for [place] Medicaid through [facility] and Enrollment [place].   SW assisted in contacting [facility] on a conference call with pt so pt could leave a message to schedule a phone interview to apply for [place] Medicaid.    SW asked pt to contact SW about the outcome of  phone appointment with [facility] and to schedule to meet SW so can assist with completing [facility] Financial Assistance form.

## 2024-09-05 ENCOUNTER — TELEPHONE (OUTPATIENT)
Dept: FAMILY MEDICINE CLINIC | Age: 59
End: 2024-09-05

## 2024-09-30 ENCOUNTER — TELEPHONE (OUTPATIENT)
Dept: FAMILY MEDICINE CLINIC | Age: 59
End: 2024-09-30

## 2024-09-30 NOTE — TELEPHONE ENCOUNTER
Care Transitions Initial Follow Up Call    Outreach made within 2 business days of discharge: Yes    Patient: Willie Fisher Patient : 1965   MRN: [id number]  Reason for Admission:   Discharge Date: 23       Spoke with: discharge nurse @     Discharge department/facility: [facility] Interactive Patient Contact:      Scheduled appointment with PCP within 7-14 days    Follow Up  Future Appointments   Date Time Provider Department Center   10/11/2024 11:00 AM Heriberto Conrad DO [facility] DEP       ALL [place] LPN

## 2024-10-01 NOTE — TELEPHONE ENCOUNTER
Checked status of discharge.   Patient is still admitted at  [facility].    Will keep encounter open to check status of discharge.

## 2024-10-17 ENCOUNTER — OFFICE VISIT (OUTPATIENT)
Dept: FAMILY MEDICINE CLINIC | Age: 59
End: 2024-10-17

## 2024-10-17 VITALS
WEIGHT: 213 LBS | OXYGEN SATURATION: 90 % | DIASTOLIC BLOOD PRESSURE: 64 MMHG | HEART RATE: 105 BPM | BODY MASS INDEX: 28.85 KG/M2 | SYSTOLIC BLOOD PRESSURE: 118 MMHG | HEIGHT: 72 IN

## 2024-10-17 DIAGNOSIS — Z09 HOSPITAL DISCHARGE FOLLOW-UP: ICD-10-CM

## 2024-10-17 DIAGNOSIS — K74.60 CIRRHOSIS OF LIVER WITH ASCITES, UNSPECIFIED HEPATIC CIRRHOSIS TYPE (HCC): ICD-10-CM

## 2024-10-17 DIAGNOSIS — I26.09 PULMONARY HYPERTENSION WITH ACUTE COR PULMONALE (HCC): ICD-10-CM

## 2024-10-17 DIAGNOSIS — J44.9 COPD, SEVERE (HCC): ICD-10-CM

## 2024-10-17 DIAGNOSIS — E11.65 TYPE 2 DIABETES MELLITUS WITH HYPERGLYCEMIA, WITHOUT LONG-TERM CURRENT USE OF INSULIN (HCC): ICD-10-CM

## 2024-10-17 DIAGNOSIS — I27.20 PULMONARY HYPERTENSION (HCC): ICD-10-CM

## 2024-10-17 DIAGNOSIS — R18.8 CIRRHOSIS OF LIVER WITH ASCITES, UNSPECIFIED HEPATIC CIRRHOSIS TYPE (HCC): ICD-10-CM

## 2024-10-17 DIAGNOSIS — I27.21 PAH (PULMONARY ARTERY HYPERTENSION) (HCC): ICD-10-CM

## 2024-10-17 DIAGNOSIS — I27.81 COR PULMONALE (CHRONIC) (HCC): Primary | ICD-10-CM

## 2024-10-17 DIAGNOSIS — I50.33 ACUTE ON CHRONIC DIASTOLIC HEART FAILURE (HCC): ICD-10-CM

## 2024-10-17 PROBLEM — J44.1 COPD EXACERBATION (HCC): Status: RESOLVED | Noted: 2022-10-26 | Resolved: 2024-10-17

## 2024-10-17 NOTE — PROGRESS NOTES
admission showed elevated L+R sided filling pressures, severe mixed pre-post capillary pHTN; mild thickness and dilation of LV, normal systolic fxn (EF 50-55%), no WMA, severely dilated LA + RA + RV, mod reduced RV sys fxn. Mod-severe tricuspid regurg. No MR or AS.  Admission weight 255lb, last weight 211 lbs at Pulmonology appointment 7/29/24. During admission, he required a Lasix drip from 9/18 until 9/29. He required two paracenteses with 7 liters removed on 9/23 and 4 liters on 9/26. Cytology of the paracenteses ascites fluid showed  SAAG of 1.3 consistent with portal hypertension. Cytology without evidence of infection.  Further diuresis limited by weight loss plateau at 208 lbs and hypotension. Patient was transitioned back to his home torsemide 60 mg BID on 10/7 but continued to gain weight rapidly. Diuresed with lasix 100mg IV BID to weight of 213lbs.   - Transitioned to torsemide 100mg AM, 50mg PM at discharge with close follow-up with Dr. Landa and cards. Left PICC in place for IV lasix outpatient.  - discontinued metoprolol on discharge  - plan for IR consult for PRN paracentesis  -PAH - macitentan, tadalafil, uptravi over the last week of admission     #Pseudomonas bacteremia  Positive 2/2 blood cultures on 9/28. Completed 14D zosyn. Unclear source. Abd mass on unclear. 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 Was using asmanex after discharge in June, had extra at home. Ran out a month ago. Patient also noted to have overnight desaturations, concerning for possible LALA. Underwent inpatient pulse ox study that did not qualify.   - outpatient sleep study next week     #Prediabetes   Home reg: Metformin, Farxiga  Recent A1c from June of 5.7  Continue home medications at discharge     #Congestive

## 2024-11-01 DIAGNOSIS — I50.33 ACUTE ON CHRONIC DIASTOLIC CONGESTIVE HEART FAILURE (HCC): ICD-10-CM

## 2024-11-01 RX ORDER — ATORVASTATIN CALCIUM 20 MG/1
20 TABLET, FILM COATED ORAL NIGHTLY
Qty: 90 TABLET | Refills: 1 | Status: SHIPPED | OUTPATIENT
Start: 2024-11-01

## 2024-11-01 NOTE — TELEPHONE ENCOUNTER
Refill Request     CONFIRM preferred pharmacy with the patient.    If Mail Order Rx - Pend for 90 day refill.      Last Seen: Last Seen Department: 10/17/2024  Last Seen by PCP: 10/17/2024    Last Written: 5/30/24 90 with 1 refill     If no future appointment scheduled:  Review the last OV with PCP and review information for follow-up visit,  Route STAFF MESSAGE with patient name to the  Pool for scheduling with the following information:            -  Timing of next visit           -  Visit type ie Physical, OV, etc           -  Diagnoses/Reason ie. COPD, HTN - Do not use MEDICATION, Follow-up or CHECK UP - Give reason for visit      Next Appointment:   Future Appointments   Date Time Provider Department Center   1/20/2025 10:00 AM Heriberto Conrad DO [facility]       Message sent to  to schedule appt with patient?  NO      Requested Prescriptions     Pending Prescriptions Disp Refills    atorvastatin (LIPITOR) 20 MG tablet [Pharmacy Med Name: ATORVASTATIN 20 MG TABLET] 90 tablet 1     Sig: TAKE ONE TABLET BY MOUTH ONCE NIGHTLY

## 2025-01-19 ASSESSMENT — PATIENT HEALTH QUESTIONNAIRE - PHQ9
2. FEELING DOWN, DEPRESSED OR HOPELESS: NOT AT ALL
1. LITTLE INTEREST OR PLEASURE IN DOING THINGS: NOT AT ALL
SUM OF ALL RESPONSES TO PHQ9 QUESTIONS 1 & 2: 0
1. LITTLE INTEREST OR PLEASURE IN DOING THINGS: NOT AT ALL
SUM OF ALL RESPONSES TO PHQ QUESTIONS 1-9: 0
SUM OF ALL RESPONSES TO PHQ QUESTIONS 1-9: 0
SUM OF ALL RESPONSES TO PHQ9 QUESTIONS 1 & 2: 0
SUM OF ALL RESPONSES TO PHQ QUESTIONS 1-9: 0
2. FEELING DOWN, DEPRESSED OR HOPELESS: NOT AT ALL
SUM OF ALL RESPONSES TO PHQ QUESTIONS 1-9: 0

## 2025-01-20 ENCOUNTER — OFFICE VISIT (OUTPATIENT)
Dept: FAMILY MEDICINE CLINIC | Age: 60
End: 2025-01-20

## 2025-01-20 VITALS
DIASTOLIC BLOOD PRESSURE: 64 MMHG | HEIGHT: 72 IN | BODY MASS INDEX: 26.01 KG/M2 | WEIGHT: 192 LBS | SYSTOLIC BLOOD PRESSURE: 118 MMHG

## 2025-01-20 DIAGNOSIS — Z87.891 PERSONAL HISTORY OF TOBACCO USE: ICD-10-CM

## 2025-01-20 DIAGNOSIS — E78.5 HYPERLIPIDEMIA, UNSPECIFIED HYPERLIPIDEMIA TYPE: ICD-10-CM

## 2025-01-20 DIAGNOSIS — E11.65 TYPE 2 DIABETES MELLITUS WITH HYPERGLYCEMIA, WITHOUT LONG-TERM CURRENT USE OF INSULIN (HCC): Primary | ICD-10-CM

## 2025-01-20 DIAGNOSIS — Z12.11 SCREEN FOR COLON CANCER: ICD-10-CM

## 2025-01-20 LAB
CHOLEST SERPL-MCNC: 138 MG/DL (ref 0–199)
HBA1C MFR BLD: 5.6 %
HDLC SERPL-MCNC: 52 MG/DL (ref 40–60)
LDLC SERPL CALC-MCNC: 66 MG/DL
TRIGL SERPL-MCNC: 98 MG/DL (ref 0–150)
VLDLC SERPL CALC-MCNC: 20 MG/DL

## 2025-01-20 SDOH — ECONOMIC STABILITY: FOOD INSECURITY: WITHIN THE PAST 12 MONTHS, YOU WORRIED THAT YOUR FOOD WOULD RUN OUT BEFORE YOU GOT MONEY TO BUY MORE.: NEVER TRUE

## 2025-01-20 SDOH — ECONOMIC STABILITY: FOOD INSECURITY: WITHIN THE PAST 12 MONTHS, THE FOOD YOU BOUGHT JUST DIDN'T LAST AND YOU DIDN'T HAVE MONEY TO GET MORE.: NEVER TRUE

## 2025-01-20 NOTE — PROGRESS NOTES
Ear: Tympanic membrane normal.      Ears:      Comments: Left ear fluid level  Eyes:      Extraocular Movements: Extraocular movements intact.      Conjunctiva/sclera: Conjunctivae normal.   Cardiovascular:      Rate and Rhythm: Regular rhythm. Tachycardia present.      Pulses: Normal pulses.      Heart sounds: Normal heart sounds. No murmur heard.  Pulmonary:      Effort: Pulmonary effort is normal.      Breath sounds: Normal breath sounds.   Abdominal:      General: Abdomen is flat. Bowel sounds are normal. There is no distension.      Palpations: Abdomen is soft.      Tenderness: There is no abdominal tenderness.   Musculoskeletal:         General: No swelling. Normal range of motion.      Right lower leg: No edema.      Left lower leg: No edema.   Skin:     General: Skin is warm and dry.      Capillary Refill: Capillary refill takes less than 2 seconds.   Neurological:      General: No focal deficit present.      Mental Status: He is alert and oriented to person, place, and time.   Psychiatric:         Mood and Affect: Mood normal.         Behavior: Behavior normal.         Thought Content: Thought content normal.         Judgment: Judgment normal.       Visual inspection:  Deformity/amputation: absent  Skin lesions/pre-ulcerative calluses: absent  Edema: right- negative, left- negative    Sensory exam:  Monofilament sensation: normal  (minimum of 5 random plantar locations tested, avoiding callused areas - > 1 area with absence of sensation is + for neuropathy)    Plus at least one of the following:  Pulses: normal,     Vitals:    01/20/25 1001   BP: 118/64       Please note that portions of this note may have been completed with a voice recognition program. Efforts were made to edit the dictations but occasionally words are mis-transcribed.Discussed with the patient the current USPSTF guidelines released March 9, 2021 for screening for lung cancer.    For adults aged 50 to 80 years who have a 20 pack-year

## 2025-01-21 LAB
CREAT UR-MCNC: 41.7 MG/DL (ref 39–259)
MICROALBUMIN UR DL<=1MG/L-MCNC: 20.7 MG/DL
MICROALBUMIN/CREAT UR: 496.4 MG/G (ref 0–30)

## 2025-01-28 ENCOUNTER — HOSPITAL ENCOUNTER (OUTPATIENT)
Dept: CT IMAGING | Age: 60
Discharge: HOME | End: 2025-01-28
Payer: COMMERCIAL

## 2025-01-28 DIAGNOSIS — Z87.891 PERSONAL HISTORY OF TOBACCO USE: ICD-10-CM

## 2025-01-28 PROCEDURE — 71271 CT THORAX LUNG CANCER SCR C-: CPT

## 2025-01-31 DIAGNOSIS — R19.5 POSITIVE COLORECTAL CANCER SCREENING USING COLOGUARD TEST: Primary | ICD-10-CM

## 2025-01-31 LAB — NONINV COLON CA DNA+OCC BLD SCRN STL QL: POSITIVE

## 2025-03-19 ENCOUNTER — PATIENT MESSAGE (OUTPATIENT)
Dept: FAMILY MEDICINE CLINIC | Age: 60
End: 2025-03-19

## 2025-03-25 NOTE — TELEPHONE ENCOUNTER
Left  message for patient wireless customer is not available unable to LM to return phone call to the office regarding open referral for Gastro open referral      Please see if patient plans to proceed with this or if patient no longer wishes to and we can close this referral out

## 2025-03-29 DIAGNOSIS — E11.65 UNCONTROLLED TYPE 2 DIABETES MELLITUS WITH HYPERGLYCEMIA (HCC): ICD-10-CM

## 2025-03-31 NOTE — TELEPHONE ENCOUNTER
Refill Request     CONFIRM preferred pharmacy with the patient.    If Mail Order Rx - Pend for 90 day refill.      Last Seen: Last Seen Department: 1/20/2025  Last Seen by PCP: 1/20/2025    Last Written: 1/22/2024 90 tab 3 refills    If no future appointment scheduled:  Review the last OV with PCP and review information for follow-up visit,  Route STAFF MESSAGE with patient name to the  Pool for scheduling with the following information:            -  Timing of next visit           -  Visit type ie Physical, OV, etc           -  Diagnoses/Reason ie. COPD, HTN - Do not use MEDICATION, Follow-up or CHECK UP - Give reason for visit      Next Appointment:   No future appointments.    Message sent to  to schedule appt with patient?  YES  Return for DM 4/20/2025    Requested Prescriptions     Pending Prescriptions Disp Refills    metFORMIN (GLUCOPHAGE) 500 MG tablet [Pharmacy Med Name: METFORMIN  MG TABLET] 90 tablet 3     Sig: TAKE 1 TABLET BY MOUTH DAILY WITH BREAKFAST

## 2025-05-02 DIAGNOSIS — I50.33 ACUTE ON CHRONIC DIASTOLIC CONGESTIVE HEART FAILURE (HCC): ICD-10-CM

## 2025-05-02 RX ORDER — ATORVASTATIN CALCIUM 20 MG/1
20 TABLET, FILM COATED ORAL NIGHTLY
Qty: 90 TABLET | Refills: 3 | Status: SHIPPED | OUTPATIENT
Start: 2025-05-02

## 2025-05-02 NOTE — TELEPHONE ENCOUNTER
Refill Request     CONFIRM preferred pharmacy with the patient.    If Mail Order Rx - Pend for 90 day refill.      Last Seen: Last Seen Department: 1/20/2025  Last Seen by PCP: 1/20/2025    Last Written: 11/1/24 90 with 1 refill     If no future appointment scheduled:  Review the last OV with PCP and review information for follow-up visit,  Route STAFF MESSAGE with patient name to the  Pool for scheduling with the following information:            -  Timing of next visit           -  Visit type ie Physical, OV, etc           -  Diagnoses/Reason ie. COPD, HTN - Do not use MEDICATION, Follow-up or CHECK UP - Give reason for visit      Next Appointment:   No future appointments.    Message sent to  to schedule appt with patient?  NO      Requested Prescriptions     Pending Prescriptions Disp Refills    atorvastatin (LIPITOR) 20 MG tablet [Pharmacy Med Name: ATORVASTATIN 20 MG TABLET] 90 tablet 1     Sig: TAKE ONE TABLET BY MOUTH ONCE NIGHTLY

## (undated) DEVICE — Device

## (undated) DEVICE — CATHETER CHOLANGIOGRAM 4.5 FRX30 IN SUPP TUBE MTL STRL TAUT

## (undated) DEVICE — SUTURE VCRL + SZ 3-0 L18IN ABSRB UD SH 1/2 CIR TAPERCUT NDL VCP864D

## (undated) DEVICE — COLUMN DRAPE

## (undated) DEVICE — TISSUE RETRIEVAL SYSTEM: Brand: INZII RETRIEVAL SYSTEM

## (undated) DEVICE — CATHETER CHOLGM 4.5FR L18IN W/ MTL SUPP TB

## (undated) DEVICE — SOLUTION IV IRRIG WATER 1000ML POUR BRL 2F7114

## (undated) DEVICE — CANNULA SEAL

## (undated) DEVICE — FENESTRATED BIPOLAR FORCEPS: Brand: ENDOWRIST

## (undated) DEVICE — TIP COVER ACCESSORY

## (undated) DEVICE — SEAL

## (undated) DEVICE — GLOVE,SURG,SENSICARE SLT,LF,PF,7: Brand: MEDLINE

## (undated) DEVICE — TROCAR: Brand: KII FIOS FIRST ENTRY

## (undated) DEVICE — C-ARM: Brand: UNBRANDED

## (undated) DEVICE — BLADELESS OBTURATOR: Brand: WECK VISTA

## (undated) DEVICE — MONOPOLAR CURVED SCISSORS: Brand: ENDOWRIST

## (undated) DEVICE — MEDIUM-LARGE CLIP APPLIER: Brand: ENDOWRIST

## (undated) DEVICE — SUTURE VCRL + SZ 0 L27IN ABSRB VLT L26MM UR-6 5/8 CIR VCP603H

## (undated) DEVICE — ARM DRAPE

## (undated) DEVICE — STOPCOCK 3-WAY INTRAVENOUS

## (undated) DEVICE — SYSTEM SMK EVAC LAP TBNG FILTER HSNG BENT STYL PNK SEE CLR

## (undated) DEVICE — REDUCER: Brand: ENDOWRIST

## (undated) DEVICE — SOLUTION IV 1000ML 0.9% SOD CHL PH 5 INJ USP VIAFLX PLAS

## (undated) DEVICE — MARYLAND BIPOLAR FORCEPS: Brand: ENDOWRIST

## (undated) DEVICE — CLIP INT M L POLYMER LOK LIG HEM O LOK